# Patient Record
Sex: MALE | Race: WHITE | NOT HISPANIC OR LATINO | URBAN - METROPOLITAN AREA
[De-identification: names, ages, dates, MRNs, and addresses within clinical notes are randomized per-mention and may not be internally consistent; named-entity substitution may affect disease eponyms.]

---

## 2020-09-22 ENCOUNTER — TRANSFERRED RECORDS (OUTPATIENT)
Dept: HEALTH INFORMATION MANAGEMENT | Facility: CLINIC | Age: 50
End: 2020-09-22

## 2020-09-23 ENCOUNTER — TRANSFERRED RECORDS (OUTPATIENT)
Dept: HEALTH INFORMATION MANAGEMENT | Facility: CLINIC | Age: 50
End: 2020-09-23

## 2020-09-24 ENCOUNTER — TRANSFERRED RECORDS (OUTPATIENT)
Dept: HEALTH INFORMATION MANAGEMENT | Facility: CLINIC | Age: 50
End: 2020-09-24

## 2021-03-25 ENCOUNTER — TRANSFERRED RECORDS (OUTPATIENT)
Dept: HEALTH INFORMATION MANAGEMENT | Facility: CLINIC | Age: 51
End: 2021-03-25

## 2021-04-01 ENCOUNTER — TRANSFERRED RECORDS (OUTPATIENT)
Dept: HEALTH INFORMATION MANAGEMENT | Facility: CLINIC | Age: 51
End: 2021-04-01

## 2021-04-13 ENCOUNTER — MEDICAL CORRESPONDENCE (OUTPATIENT)
Dept: HEALTH INFORMATION MANAGEMENT | Facility: CLINIC | Age: 51
End: 2021-04-13

## 2021-04-15 ENCOUNTER — REFERRAL (OUTPATIENT)
Dept: TRANSPLANT | Facility: CLINIC | Age: 51
End: 2021-04-15

## 2021-05-03 ENCOUNTER — OFFICE VISIT (OUTPATIENT)
Dept: TRANSPLANT | Facility: CLINIC | Age: 51
End: 2021-05-03
Attending: TRANSPLANT SURGERY
Payer: COMMERCIAL

## 2021-05-03 VITALS
WEIGHT: 297 LBS | HEART RATE: 75 BPM | DIASTOLIC BLOOD PRESSURE: 57 MMHG | OXYGEN SATURATION: 98 % | SYSTOLIC BLOOD PRESSURE: 126 MMHG

## 2021-05-03 DIAGNOSIS — K43.0 INCISIONAL HERNIA WITH OBSTRUCTION BUT NO GANGRENE: Primary | ICD-10-CM

## 2021-05-03 PROCEDURE — 99202 OFFICE O/P NEW SF 15 MIN: CPT | Performed by: TRANSPLANT SURGERY

## 2021-05-03 RX ORDER — FLURBIPROFEN SODIUM 0.3 MG/ML
SOLUTION/ DROPS OPHTHALMIC
COMMUNITY
Start: 2021-01-12

## 2021-05-03 RX ORDER — BLOOD SUGAR DIAGNOSTIC
STRIP MISCELLANEOUS
COMMUNITY
Start: 2021-04-10

## 2021-05-03 RX ORDER — METOPROLOL SUCCINATE 100 MG/1
100 TABLET, EXTENDED RELEASE ORAL DAILY
Status: ON HOLD | COMMUNITY
Start: 2020-10-08 | End: 2022-06-14

## 2021-05-03 RX ORDER — GABAPENTIN 600 MG/1
900 TABLET ORAL 3 TIMES DAILY
COMMUNITY
Start: 2021-02-19 | End: 2022-01-01

## 2021-05-03 RX ORDER — LISINOPRIL 5 MG/1
TABLET ORAL
COMMUNITY
Start: 2020-10-25 | End: 2022-06-13

## 2021-05-03 RX ORDER — FUROSEMIDE 20 MG
40 TABLET ORAL 2 TIMES DAILY
COMMUNITY
Start: 2020-11-11 | End: 2022-06-08 | Stop reason: DRUGHIGH

## 2021-05-03 RX ORDER — SIMVASTATIN 20 MG
20 TABLET ORAL DAILY
COMMUNITY
Start: 2021-02-17 | End: 2022-01-01

## 2021-05-03 RX ORDER — MULTIVITAMIN,THER AND MINERALS
1 TABLET ORAL EVERY MORNING
COMMUNITY
Start: 2020-09-29

## 2021-05-03 RX ORDER — AMLODIPINE AND BENAZEPRIL HYDROCHLORIDE 5; 20 MG/1; MG/1
1 CAPSULE ORAL EVERY EVENING
COMMUNITY
Start: 2020-10-25 | End: 2022-08-09

## 2021-05-03 SDOH — HEALTH STABILITY: MENTAL HEALTH: HOW OFTEN DO YOU HAVE A DRINK CONTAINING ALCOHOL?: NOT ASKED

## 2021-05-03 SDOH — HEALTH STABILITY: MENTAL HEALTH: HOW MANY STANDARD DRINKS CONTAINING ALCOHOL DO YOU HAVE ON A TYPICAL DAY?: NOT ASKED

## 2021-05-03 SDOH — HEALTH STABILITY: MENTAL HEALTH: HOW OFTEN DO YOU HAVE 6 OR MORE DRINKS ON ONE OCCASION?: NOT ASKED

## 2021-05-03 NOTE — LETTER
Consultation with Dr. Kendrick        5/3/2021     Blair Oliver  [address] [facility] [address]      Dear Blair:    Thank you for allowing me to participate in your care. Your recent test results were reviewed and listed below.      Your results are provided below for your review  Liver function/ Hernia Repair            Thank you for choosing [facility]. As a result, please continue with the treatment plan discussed in the office. Return as discussed or sooner if symptoms worsens or fail to improve. If you have any further questions or concerns, please do not hesitate to contact us.      Sincerely,    Dr. Abner Kendrick   [facility]ANSPL[facility]  [address] [facility] [address]  Phone: [phone]  Fax: [phone]

## 2021-05-03 NOTE — LETTER
Date:May 11, 2021      Patient was self referred, no letter generated. Do not send.        [facility] Health Information

## 2021-05-03 NOTE — LETTER
5/3/2021         RE: Blair Oliver  [address] [place] [facility] [address]        Dear Colleague,    Thank you for referring your patient, Blair Oliver, to the [facility] TRANSPL[facility]. Please see a copy of my visit note below.    Patient has a known cirrhosis of the liver with ascites.  Presents with   Incisional hernia.     He saw a local general surgeon then transferred him here.    Vital signs:                      Weight: 134.7 kg (297 lb)  There is no height or weight on file to calculate BMI.    Examination abdomen: Significant obesity present.  He has an incisional hernia.    Recommendations: Please bring the CT scan films so that we can review.    In general, would avoid any surgery since she has got cirrhosis and a meld of approximately 18.        Again, thank you for allowing me to participate in the care of your patient.        Sincerely,        Abner Kendrick MD

## 2021-05-10 NOTE — PROGRESS NOTES
Patient has a known cirrhosis of the liver with ascites.  Presents with   Incisional hernia.     He saw a local general surgeon then transferred him here.    Vital signs:                      Weight: 134.7 kg (297 lb)  There is no height or weight on file to calculate BMI.    Examination abdomen: Significant obesity present.  He has an incisional hernia.    Recommendations: Please bring the CT scan films so that we can review.    In general, would avoid any surgery since she has got cirrhosis and a meld of approximately 18.

## 2021-05-27 ENCOUNTER — REFERRAL (OUTPATIENT)
Dept: TRANSPLANT | Facility: CLINIC | Age: 51
End: 2021-05-27

## 2021-05-27 ENCOUNTER — DOCUMENTATION ONLY (OUTPATIENT)
Dept: TRANSPLANT | Facility: CLINIC | Age: 51
End: 2021-05-27

## 2021-05-27 DIAGNOSIS — K70.30 CIRRHOSIS, ALCOHOLIC (H): Primary | ICD-10-CM

## 2021-05-27 NOTE — PROGRESS NOTES
Received patient's name from Dr. Kendrick who recently saw him for evaluation of hernia.  Plan is to undergo possible transplant eval.  lvm for patient; sent to intake to begin referral.

## 2021-05-27 NOTE — LETTER
6/1/2021    Blair Oliver  [address] [facility] [address]      Dear Blair,    Thank you for your interest in the Transplant Center at [facility]. We look forward to being a part of your care team and assisting you through the transplant process.    As we discussed, your transplant coordinator is Kala Sanford (Liver).  You may call your coordinator at any time with questions or concerns call [phone].    Please complete the following.    1. Fill out and return the enclosed forms    Authorization for Electronic Communication    Authorization to Discuss Protected Health Information    Authorization for Release of Protected Health Information    Authorization for Care Everywhere Release of Information    2. Sign up for:    [employer]quare, access to your electronic medical record (see enclosed pamphlet)    [email or web address], a transplant education website    You can use these tools to learn more about your transplant, communicate with your care team, and track your medical details      Sincerely,  Solid Organ Transplant  [facility]    cc: PCP

## 2021-05-29 NOTE — TELEPHONE ENCOUNTER
"LIVER DISEASE ETO  REFERRING PROVIDER Mireille    -----------------------------------------------------------------------------------------------------------------------------  MELD 13-15    ABO A      HISTORY OF LIVER DISEASE  September 2020, patient came in with bowel ischemia, requiring bowel resection- first dx of liver issues;  Heavy drinking/etoh withdrawal at that time. Since that time, multple issues with wound healing, incision oozing.  Now reports abdomen distended.  Has not had any care by GI doctor until just saw [facility] x 1 recently.  Referred to Dr. Kendrick for hernia repoair after these complications, liver function impaired and Dr. Kendrick referred for evaluation.  Reports no etoh since that time, no history of CD eval of tx.     Ascites  On Lasix since admission in September 2020, never had para.     TIPS no  HE  No overt confusion since admission but reports memory impairment/'brain fog'  Kidney function no issues  Variceal screening none  HCC Screening CT abdomen 3/2021      Hospitalizations  ---------------------------------------------------------------------------------------------------------------------------------  [facility]  Diabetes, HTN- irregular heart beat/bradycardia- saw cardiology Nov 2020 hasn't followed up since.      MICHELLE  Lives with wife mom, and one of his 2 adult sons; 2 9 year old granddaughters.      ------------------)---------------------------------------------------------------------------------------------------------------  LDLT Discussed not yet    PLAN  Patient referred for \"Mini-eval\" per Dr. Kendrick.  Patient has originally been referred for surgery consult for hernia. Patient has had no real GI/Hepatology care to date. Sounds like patient may have ascites per self report;  Need ASAP with Hepatology, SW, Nutrition, Labs, ultrasound ASAP as he has not had GI care.     If continuing with eval, needs cardiac testing/consult       "

## 2021-06-01 ENCOUNTER — DOCUMENTATION ONLY (OUTPATIENT)
Dept: TRANSPLANT | Facility: CLINIC | Age: 51
End: 2021-06-01

## 2021-06-01 ENCOUNTER — RECORDS - HEALTHEAST (OUTPATIENT)
Dept: ADMINISTRATIVE | Facility: CLINIC | Age: 51
End: 2021-06-01

## 2021-06-01 VITALS — BODY MASS INDEX: 36.68 KG/M2 | HEIGHT: 75 IN | WEIGHT: 295 LBS

## 2021-06-01 DIAGNOSIS — K70.30 ALCOHOLIC CIRRHOSIS (H): Primary | ICD-10-CM

## 2021-06-01 ASSESSMENT — MIFFLIN-ST. JEOR: SCORE: 2273.74

## 2021-06-01 NOTE — TELEPHONE ENCOUNTER
Patient was asked the following questions during liver intake call.     Referring Provider: Dr. CHRIS Kendrick/Self Referral   Referring Diagnosis: Alcoholic Cirrhosis of the Liver.   PCP: Dr. Georges Ovalle     1)Do you know why you have liver disease: Yes             If Alcoholic Cirrhosis is present when was your last drink: 9/2020             Have you ever been through treatment for alcohol: No  2) Presence of Ascites: Yes Paracentesis: None  3) Presence of Hepatic Encephalopathy: Yes Medications: Yes  4) History of GI Bleeding: None  5) Oxygen Use: No  6) EGD: Yes Where: [facility] When: 20+ years ago.   7) Colonoscopy: None  8) MELD Score: 13-15  9) Insurance information: [facility]       Policy beck: Self       Subscriber/policy/ID number: [id number]      Group Number: [id number]    Referral intake process completed.  Patient is aware that after financial approval is received, medical records will be requested.   Patient confirmed for a callback from transplant coordinator already spoke to patient on 5/28/21.  Tentative evaluation date TBD.    Confirmed coordinator will discuss evaluation process in more detail at the time of their call.   Patient is aware of the need to arrange age appropriate cancer screening, vaccinations, and dental care.  Reminded patient to complete questionnaire, complete medical records release, and review packet prior to evaluation visit .  Assessed patient for special needs (ie--wheelchair, assistance, guardian, and ):  None  Patient instructed to call [phone] with questions.     Patient gave verbal consent during intake call to obtain medical records and documents outside of [facility]:  Yes     AGUSTO Craven, LPN   Solid Organ Transplant

## 2021-06-02 ENCOUNTER — RECORDS - HEALTHEAST (OUTPATIENT)
Dept: ADMINISTRATIVE | Facility: CLINIC | Age: 51
End: 2021-06-02

## 2021-06-03 ENCOUNTER — RECORDS - HEALTHEAST (OUTPATIENT)
Dept: ADMINISTRATIVE | Facility: CLINIC | Age: 51
End: 2021-06-03

## 2021-06-13 NOTE — PROGRESS NOTES
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    Patient states that he was diagnosed with liver disease and cirrhosis in September 2020.  He was admitted to the hospital at that time he was found to have extensive superior mesenteric venous thrombosis, complicated by ischemic bowel and had to undergo an exploratory laparotomy with small bowel resection.  He was placed on Eliquis postoperatively.  During surgery, he was found to have some ascites that was evacuated.     Since then he has had trouble with incisional hernias, and rectus diastasis.  He was seen by his general surgeon in March where he was noted to have a MELD of 18.  He was then sent to [facility] to be seen by surgery for consideration of incisional hernia repair.    Patient complains of episodes of leakage of fluid from his incision that happens intermittently.  This fluid is usually blood tinged but sometimes yellow. He has never had a paracentesis.  He is on 20 mg of furosemide daily, that was started because of his heart failure and his pedal edema.  He has also noted that every 2 to 3 days he has watery bowel movements, up to 12 times a day.  In between there was episodes he has about 2-3 soft bowel movements per day.  He denies any fevers or chills, abdominal pain, melena, or hematochezia.    His wife is also noted that he is having some memory issues.  He has not had any major episodes of confusion, and has not been admitted to the hospital with hepatic encephalopathy.    He has a diagnosis of CHF, has not seen his cardiologist  "since the fall, and he is not aware of a lot of the details related to this.     Patient denies any prior history of liver disease. He has had abnormal liver enzymes and low platelets as far back as 2011. Past notes from his PCP had also documented excessive alcohol use, as well as, a hospitalization for overdose of Vicodin.       ROS: 10pt ROS performed and otherwise negative.    PERTINENT PAST MEDICAL HISTORY:  -- Type II diabetes Mellitus  -- Hypertension  -- Hyperlipidemia  -- CHF  -- GISELLE  -- SMV thrombus    PREVIOUS ABDOMINAL/GYNECOLOGIC SURGERIES:  -- Exploratory laparotomy with small bowel resection 9/2020    PREVIOUS ENDOSCOPY:  Had an EGD for reflux and a colonoscopy for \"food poisoning\". These were done >20 years ago    PERTINENT MEDICATIONS:  Current Outpatient Medications:      amLODIPine-benazepril (LOTREL) 10-40 MG capsule, TAKE 1 CAPSULE BY MOUTH EVERY DAY, Disp: , Rfl:      apixaban ANTICOAGULANT (ELIQUIS) 5 MG tablet, Take 5 mg by mouth, Disp: , Rfl:      furosemide (LASIX) 20 MG tablet, Take 20 mg by mouth, Disp: , Rfl:      gabapentin (NEURONTIN) 600 MG tablet, , Disp: , Rfl:      insulin detemir (LEVEMIR PEN) 100 UNIT/ML pen, Inject 65 Units Subcutaneous, Disp: , Rfl:      insulin pen needle (B-D U/F) 31G X 5 MM miscellaneous, AS DIRECTED. BD UF MINI PEN NEEDLE [id number]: USE ONCE DAILY, Disp: , Rfl:      lisinopril (ZESTRIL) 5 MG tablet, TAKE 1 TABLET BY MOUTH TWICE A DAY, Disp: , Rfl:      metFORMIN (GLUCOPHAGE) 500 MG tablet, Take 1,000 mg by mouth, Disp: , Rfl:      metoprolol succinate ER (TOPROL-XL) 100 MG 24 hr tablet, Take 100 mg by mouth, Disp: , Rfl:      Multiple Vitamins-Minerals (SUPER THERA DAMARIS M) TABS, Take 1 tablet by mouth, Disp: , Rfl:      omeprazole (PRILOSEC) 20 MG DR capsule, Take 20 mg by mouth, Disp: , Rfl:      ONETOUCH ULTRA test strip, PLEASE SEE ATTACHED FOR DETAILED DIRECTIONS, Disp: , Rfl:      simvastatin (ZOCOR) 20 MG tablet, , Disp: , Rfl:     SOCIAL " "HISTORY:  Patient previously worked with with [employer].  He is currently on disability due to pain from his hernias and discomfort.  Admits to significant alcohol use.  Used to drink about a liter of whiskey per day for about 20 years.  Denies any hospitalizations for intoxication or withdrawal, denies any DUIs, denies any prior treatments.  Last drink was 9/21/2020.  Denies any cravings, and is not in any treatments program at the moment.  Denies any tobacco use, denies any IV drug use.    FAMILY HISTORY:  No known family history of liver disease or liver cancer, no known family history of clotting or bleeding disorders.    PHYSICAL EXAMINATION:  /69   Pulse 72   Ht 1.93 m (6' 4\")   Wt 131.5 kg (290 lb)   SpO2 97%   BMI 35.30 kg/m     General: Alert, not in respiratory or painful distress, Not toxic looking, afebrile   HEENT: anicteric sclera, PERRL, EOMI   CV: RRR, nl S1/S2,    Lungs: Unlabored breathing  Abd: Full, soft, moves with respiration, mild line surgical scar, dressing over lower abdomen, upon removal reveals granulation tissue.  MADELINE: [place] absent  Ext: WWP, BLE edema  Skin: no rashes, cyanosis, or jaundice  Neuro: AOx3, CN II-XII intact and symmetric, No focal neurologic deficits  MSK:No discernible joint swelling or tenderness  Psych: Stable mood      PERTINENT STUDIES:     Lab Results   Component Value Date     06/15/2021    Lab Results   Component Value Date    CHLORIDE 112 06/15/2021    Lab Results   Component Value Date    BUN 8 06/15/2021      Lab Results   Component Value Date    POTASSIUM 4.1 06/15/2021    Lab Results   Component Value Date    CO2 25 06/15/2021    Lab Results   Component Value Date    CR 0.63 06/15/2021        Lab Results   Component Value Date    WBC 4.7 06/15/2021    HGB 8.6 (L) 06/15/2021    HCT 31.0 (L) 06/15/2021    MCV 80 06/15/2021     (L) 06/15/2021     Lab Results   Component Value Date    AST 34 06/15/2021    ALT 36 06/15/2021    ALKPHOS " 126 06/15/2021    BILITOTAL 0.8 06/15/2021     Lab Results   Component Value Date    INR 1.58 (H) 06/15/2021     MELD-Na score: 13 at 6/15/2021 12:35 PM  MELD score: 12 at 6/15/2021 12:35 PM  Calculated from:  Serum Creatinine: 0.63 mg/dL (Rounded to 1 mg/dL) at 6/15/2021 12:35 PM  Serum Sodium: 136 mmol/L at 6/15/2021 12:35 PM  Total Bilirubin: 0.8 mg/dL (Rounded to 1 mg/dL) at 6/15/2021 12:35 PM  INR(ratio): 1.58 at 6/15/2021 12:35 PM  Age: 50 years 8 months       CT Abdomen 4/11/2021  LOWER CHEST: The lung bases are clear.  HEPATOBILIARY: Cirrhotic appearance of the liver. No worrisome liver lesions within the limitations of a single phase exam. No biliary duct dilatation. No calcified gallstones.  PANCREAS: No significant mass, duct dilatation, or inflammatory change.  SPLEEN: Splenomegaly, measuring 19 cm in the craniocaudal dimension.  ADRENAL GLANDS: No significant nodules.  KIDNEYS/BLADDER: No significant mass, stone, or hydronephrosis. No bladder wall thickening.  BOWEL: Postoperative changes of small bowel resection with anastomosis in the right midabdomen. Diastases of the abdominis rectus musculature with broad-based bulging of the anterior abdominal wall fascia with multiple associated nonobstructed small bowel loops. The bowel is nondistended without inflammatory change. No evidence of appendicitis.  LYMPH NODES: No lymphadenopathy.  VASCULATURE: Few tiny gastroesophageal varices.  PELVIC ORGANS: Unremarkable.  ADDITIONAL FINDINGS: Mesenteric edema and small volume of ascites.  MUSCULOSKELETAL: Small fat-containing right inguinal hernia. Mild degenerative changes of the hips and visualized thoracolumbar spine.  IMPRESSION:   1.  Cirrhosis with portal hypertension as evidenced by splenomegaly, small volume ascites and tiny gastroesophageal varices.  2.  Diastases of the abdominis rectus musculature with broad-based bulging of the ventral abdominal wall fascia.  3.  Small fat-containing right inguinal  hernia.     Cardiac MRI 11/2020  1. No evidence of myocardial ischemia on stress MRI perfusion imaging.    2. Mild LV enlargement with mild to moderately reduced LV systolic function (calculated LVEF 41%), global hypokinesis; no evidence of LV scar on gadolinium delayed enhancement  3. Mild RV enlargement and mild RV dysfunction.   4. Mild left atrial enlargement.   5. No significant valve abnormalities noted.     6. Noncardiac findings (cirrhosis and ascites)    US ABDOMEN COMPLETE WITH DOPPLER COMPLETE, 6/15/2021   IMPRESSION:   1. Cirrhosis without focal liver lesion identified by ultrasound.  a. LI-RADS US Category: US-1 Negative: No US evidence of HCC  b. Recommend continued surveillance US.  2. Cholelithiasis versus gallbladder polyp, attention on follow-up.  3. Splenomegaly and ascites present.    ASSESSMENT/PLAN:  50 year old male with medical history significant for hypertension, hyperlipidemia, type 2 diabetes, obstructive sleep apnea, congestive heart failure, alcohol use disorder with decompensated alcohol-related and CALLEJAS cirrhosis seen in clinic today for new consult, also discussed of liver transplant and surgical repair of his hernia.     Cirrhosis:    - likely combination of alcohol and CALLEJAS (metabolic syndrome)  Decompensated due to ascites  MELD-Na 13, CTP B    Liver Transplant Candidacy:   No a candidate at this time given CHF (likely alcohol related with LVEF of 41%); low MELD.    We discussed that he follows up with his cardiologists - his CHF may have improved as he has been off alcohol for the last 9 months.   We will continue to follow    Hepatocellular Cancer Screening:   - USS 6/15/2021 with no HCC, AFP normal.   - Recommend screening for HCC every 6 months with either abdominal ultrasound or by alternating abdominal ultrasound with EITHER a triple/quad phase CT Liver with IV contrast OR a Quad phase MRI Liver with IV contrast.  AFP levels should be checked every 6 months at time of  imaging screen.    Ascites:   - Present on ultrasound and examination today.  - We will obtain a diagnostic paracentesis - send for cell count/dif, TP, Albumin. Remove 5 L max.   - On low dose diuretics - started for CHF  - Will obtain labs and if favorable, increase lasix to 40 mg daily and start spironolactone 100 mg daily  - Continue to follow a sodium restricted (<2g sodium diet)     Incisional hernia:  Based on CHF and also decompensated liver disease, do not recommend surgery at this time.  We recommend management of his ascites and CHF medically.  We did discuss a TIPS, but will not recommend this for now based on his most recent EF - we will see if his CHF has improved and this can be explored later to better manage his ascites    Hepatic Encephalopathy:  - Concerns for HE, but not apparent.  Complains of diarrhea, so will hold off on starting lactulose    Esophageal Varices:   - Recommend EGD screening for esophageal varices, reasonable to wait until followed up with cardiology to assess CHF. He is on Metoprolol XL - can discuss switching to core if large varices    Kidney Health: Normal    Immobility/Frailty: None    Nutrition:  As with most patients with chronic liver disease, there is a significant degree of protein malnutrition.  Dicussed need to change dietary habits to improve overall protein balance.  Discussed the importance of eating between 1.2-1.5g/kg/day lean protein like eggs, fish, chicken, nuts, and legumes, in addition to a diet rich in fresh fruits and vegetables.  Continue to follow a sodium restricted (<2g sodium diet) and discussed the need to minimize the intake of carbohydrates and sugars, to avoid obesity.   - Strongly encourage protein supplements 2-3 times daily (Boost, Ensure, [place] Instant Milk, etc.) to meet protein and caloric intake.  - Recommend a bedtime snack with protein and complex carbohydrate to minimize risk of muscle catabolism overnight    Routine Health Care in  Patient with Chronic Liver Disease:   - All patients with liver disease should avoid the use of Non-steroidal Anti-Inflammatory (NSAID) medications as they can cause significant injury to the kidneys in this population        Thank you for this consultation. It was a pleasure to participate in the care of this patient; please contact us with any further questions.    The visit lasted up to 60 minutes, with more than half of the time spent on counseling and education. All questions were answered to patient's satisfaction    Patient seen and discussed with staff GI physician, Dr. Bain, who agrees with my assessment and plan.     Sam Mercado MD  Transplant Hepatology fellow  PGY 6  [phone]    Staff Physician Attestation  I, Sweetie Bain M.D., discussed and examined this patient with the fellow and agree with the fellow s findings and plan of care as documented in the fellow s note.  I personally reviewed vital signs, medications, labs and imaging.    Sweetie Bain M.D.   of Medicine  Advanced & Transplant Hepatology  [facility]  Date of Service: Joseph 15, 2021

## 2021-06-14 NOTE — PROGRESS NOTES
[facility] Solid Organ Transplant  Outpatient MNT: Liver Transplant Evaluation    Current BMI: 35.3 (HT 76 in,  lbs/132 kg)  BMI is within recommendation of <40 for liver transplant    Fried Frailty, 1st screenshot-- Not Frail (2/5 points scored) for unintentional weight loss, reported exhaustion     FraiLT, 2nd screenshot-- Pre frail   [email or web address]         Time Spent: 15 minutes  Visit Type: Initial   Referring Physician: Mireille   Pt accompanied by: his wife, Margarita    History of previous txp: none     Nutrition Assessment  Appetite: good/baseline    Vitamins, Supplements, Pertinent Meds: MVI, alpha lipoic acid   Herbal Medicines/Supplements: milk thistle x few years  Protein supplement: no     Edema: + EMIL    Weight hx:   -- dropped 40 lbs since surgery in Sept (bowel resection), but has since regained 30 lbs   -- mild muscle wasting noted    Food Security: any concerns about having enough money to buy food or access to grocery stores? No     Diet Recall  Breakfast None    Lunch May snack on olives, chips    Dinner Eats out a lot- [place] [employer] chicken s/w; Pizza Ranch (3/4 pizza); at home- spaghetti; tacos; chicken and stuffing   Snacks Per L    Beverages Water    Dining out 4x/week or more      Physical Activity  No routine activity   Diabetic nerve pain in feet- limits activity     Malnutrition   % Intake: No decreased intake noted  % Weight Loss: Weight loss does not meet criteria for malnutrition   Subcutaneous Fat Loss: Mild   Muscle Loss: Mild   Fluid Accumulation/Edema: Mild   Malnutrition Diagnosis: Non-Severe malnutrition in the context of chronic illness     Nutrition Diagnosis  Predicted suboptimal nutrient intake (protein) r/t eating 1 main meal/day and at default not being able to consume enough protein    Nutrition Intervention  Nutrition education provided:  Discussed sodium intake (low sodium foods and drinks, seasoning food without salt and tips for low sodium  diet). Reviewed current convenience/restaurant/fast foods are high in sodium, yet he is eating 1 main meal/day, so it is difficult to determine if he is exceeding recommendation of 2 g Na/day. Consider tracking to get a better sense. Reviewed rationale for lower Na diet.     Reviewed adequate protein intake. Encouraged receiving protein from both animal and plant based sources. Encouraged a protein-rich food earlier in the day (protein bar/drink, boiled egg, cheese stick, yogurt, nuts, etc).     Reviewed post txp diet guidelines in brief (will review in further detail post txp):  (1) Review of proper food safety measures d/t immunosuppressant therapy post-op and increased risk for food-borne illness    (2) Avoid the following post txp d/t risk for rejection, unknown effects on the organs, and/or potential interactions with immunosuppressants:  - Herbal, Chinese, holistic, chiropractic, natural, alternative medicines and supplements  - Detoxes and cleanses  - Weight loss pills  - Protein powders or other products with extracts or herbs (ie green tea extract)    (3) Med regimen and possible side effects    Patient Understanding: Pt verbalized understanding of education provided.  Expected Engagement: Good  Follow-Up Plans: PRN     Nutrition Goals  Include protein-rich food earlier in the day     Iliana Barros RD, LD, CCTD

## 2021-06-15 ENCOUNTER — OFFICE VISIT (OUTPATIENT)
Dept: GASTROENTEROLOGY | Facility: CLINIC | Age: 51
End: 2021-06-15
Attending: STUDENT IN AN ORGANIZED HEALTH CARE EDUCATION/TRAINING PROGRAM
Payer: COMMERCIAL

## 2021-06-15 ENCOUNTER — ALLIED HEALTH/NURSE VISIT (OUTPATIENT)
Dept: TRANSPLANT | Facility: CLINIC | Age: 51
End: 2021-06-15
Attending: STUDENT IN AN ORGANIZED HEALTH CARE EDUCATION/TRAINING PROGRAM
Payer: COMMERCIAL

## 2021-06-15 ENCOUNTER — ANCILLARY PROCEDURE (OUTPATIENT)
Dept: GENERAL RADIOLOGY | Facility: CLINIC | Age: 51
End: 2021-06-15
Attending: STUDENT IN AN ORGANIZED HEALTH CARE EDUCATION/TRAINING PROGRAM
Payer: COMMERCIAL

## 2021-06-15 ENCOUNTER — COMMITTEE REVIEW (OUTPATIENT)
Dept: TRANSPLANT | Facility: CLINIC | Age: 51
End: 2021-06-15

## 2021-06-15 ENCOUNTER — ANCILLARY PROCEDURE (OUTPATIENT)
Dept: ULTRASOUND IMAGING | Facility: CLINIC | Age: 51
End: 2021-06-15
Attending: STUDENT IN AN ORGANIZED HEALTH CARE EDUCATION/TRAINING PROGRAM
Payer: COMMERCIAL

## 2021-06-15 VITALS
HEART RATE: 72 BPM | WEIGHT: 290 LBS | HEIGHT: 76 IN | BODY MASS INDEX: 35.31 KG/M2 | DIASTOLIC BLOOD PRESSURE: 69 MMHG | OXYGEN SATURATION: 97 % | SYSTOLIC BLOOD PRESSURE: 129 MMHG

## 2021-06-15 DIAGNOSIS — K70.31 ALCOHOLIC CIRRHOSIS OF LIVER WITH ASCITES (H): ICD-10-CM

## 2021-06-15 DIAGNOSIS — K70.30 CIRRHOSIS, ALCOHOLIC (H): ICD-10-CM

## 2021-06-15 DIAGNOSIS — F10.21 ALCOHOL USE DISORDER, SEVERE, IN EARLY REMISSION (H): ICD-10-CM

## 2021-06-15 DIAGNOSIS — K43.2 INCISIONAL HERNIA, WITHOUT OBSTRUCTION OR GANGRENE: ICD-10-CM

## 2021-06-15 DIAGNOSIS — I50.22 CHRONIC SYSTOLIC CONGESTIVE HEART FAILURE (H): ICD-10-CM

## 2021-06-15 DIAGNOSIS — K70.31 ALCOHOLIC CIRRHOSIS OF LIVER WITH ASCITES (H): Primary | ICD-10-CM

## 2021-06-15 DIAGNOSIS — E66.812 CLASS 2 OBESITY WITH BODY MASS INDEX (BMI) OF 35.0 TO 35.9 IN ADULT, UNSPECIFIED OBESITY TYPE, UNSPECIFIED WHETHER SERIOUS COMORBIDITY PRESENT: ICD-10-CM

## 2021-06-15 LAB
ABO + RH BLD: NORMAL
AFP SERPL-MCNC: 4 UG/L (ref 0–8)
ALBUMIN SERPL-MCNC: 3.1 G/DL (ref 3.4–5)
ALBUMIN UR-MCNC: NEGATIVE MG/DL
ALP SERPL-CCNC: 126 U/L (ref 40–150)
ALT SERPL W P-5'-P-CCNC: 36 U/L (ref 0–70)
ANION GAP SERPL CALCULATED.3IONS-SCNC: <1 MMOL/L (ref 3–14)
APPEARANCE UR: CLEAR
AST SERPL W P-5'-P-CCNC: 34 U/L (ref 0–45)
BILIRUB DIRECT SERPL-MCNC: 0.3 MG/DL (ref 0–0.2)
BILIRUB SERPL-MCNC: 0.8 MG/DL (ref 0.2–1.3)
BILIRUB UR QL STRIP: NEGATIVE
BLD GP AB SCN SERPL QL: NORMAL
BLOOD BANK CMNT PATIENT-IMP: NORMAL
BUN SERPL-MCNC: 8 MG/DL (ref 7–30)
CALCIUM SERPL-MCNC: 8.9 MG/DL (ref 8.5–10.1)
CHLORIDE SERPL-SCNC: 112 MMOL/L (ref 94–109)
CO2 SERPL-SCNC: 25 MMOL/L (ref 20–32)
COLOR UR AUTO: YELLOW
CREAT SERPL-MCNC: 0.63 MG/DL (ref 0.66–1.25)
DEPRECATED CALCIDIOL+CALCIFEROL SERPL-MC: 37 UG/L (ref 20–75)
ERYTHROCYTE [DISTWIDTH] IN BLOOD BY AUTOMATED COUNT: 17.3 % (ref 10–15)
FERRITIN SERPL-MCNC: 5 NG/ML (ref 26–388)
GFR SERPL CREATININE-BSD FRML MDRD: >90 ML/MIN/{1.73_M2}
GLUCOSE SERPL-MCNC: 123 MG/DL (ref 70–99)
GLUCOSE UR STRIP-MCNC: NEGATIVE MG/DL
HAV IGG SER QL IA: NONREACTIVE
HBV CORE AB SERPL QL IA: NONREACTIVE
HBV SURFACE AB SERPL IA-ACNC: 0 M[IU]/ML
HBV SURFACE AG SERPL QL IA: NONREACTIVE
HCT VFR BLD AUTO: 31 % (ref 40–53)
HCV AB SERPL QL IA: NONREACTIVE
HGB BLD-MCNC: 8.6 G/DL (ref 13.3–17.7)
HGB UR QL STRIP: NEGATIVE
HIV 1+2 AB+HIV1 P24 AG SERPL QL IA: NONREACTIVE
INR PPP: 1.58 (ref 0.86–1.14)
IRON SATN MFR SERPL: 8 % (ref 15–46)
IRON SERPL-MCNC: 37 UG/DL (ref 35–180)
KETONES UR STRIP-MCNC: 5 MG/DL
LEUKOCYTE ESTERASE UR QL STRIP: NEGATIVE
MCH RBC QN AUTO: 22.1 PG (ref 26.5–33)
MCHC RBC AUTO-ENTMCNC: 27.7 G/DL (ref 31.5–36.5)
MCV RBC AUTO: 80 FL (ref 78–100)
NITRATE UR QL: NEGATIVE
PH UR STRIP: 5 PH (ref 5–7)
PHOSPHATE SERPL-MCNC: 3.9 MG/DL (ref 2.5–4.5)
PLATELET # BLD AUTO: 110 10E9/L (ref 150–450)
POTASSIUM SERPL-SCNC: 4.1 MMOL/L (ref 3.4–5.3)
PROT SERPL-MCNC: 7.5 G/DL (ref 6.8–8.8)
PSA SERPL-ACNC: 0.14 UG/L (ref 0–4)
RBC # BLD AUTO: 3.89 10E12/L (ref 4.4–5.9)
SODIUM SERPL-SCNC: 136 MMOL/L (ref 133–144)
SOURCE: ABNORMAL
SP GR UR STRIP: 1.01 (ref 1–1.03)
SPECIMEN EXP DATE BLD: NORMAL
SPECIMEN EXP DATE BLD: NORMAL
T PALLIDUM AB SER QL: NONREACTIVE
TIBC SERPL-MCNC: 484 UG/DL (ref 240–430)
TRANSFERRIN SERPL-MCNC: 357 MG/DL (ref 210–360)
TSH SERPL DL<=0.005 MIU/L-ACNC: 1.28 MU/L (ref 0.4–4)
UROBILINOGEN UR STRIP-MCNC: 0 MG/DL (ref 0–2)
WBC # BLD AUTO: 4.7 10E9/L (ref 4–11)

## 2021-06-15 PROCEDURE — 86901 BLOOD TYPING SEROLOGIC RH(D): CPT | Performed by: PATHOLOGY

## 2021-06-15 PROCEDURE — 86481 TB AG RESPONSE T-CELL SUSP: CPT | Performed by: PATHOLOGY

## 2021-06-15 PROCEDURE — 86780 TREPONEMA PALLIDUM: CPT | Performed by: PATHOLOGY

## 2021-06-15 PROCEDURE — 80048 BASIC METABOLIC PNL TOTAL CA: CPT | Performed by: PATHOLOGY

## 2021-06-15 PROCEDURE — 82728 ASSAY OF FERRITIN: CPT | Performed by: PATHOLOGY

## 2021-06-15 PROCEDURE — 85610 PROTHROMBIN TIME: CPT | Performed by: PATHOLOGY

## 2021-06-15 PROCEDURE — 93975 VASCULAR STUDY: CPT | Performed by: RADIOLOGY

## 2021-06-15 PROCEDURE — 86900 BLOOD TYPING SEROLOGIC ABO: CPT | Performed by: PATHOLOGY

## 2021-06-15 PROCEDURE — 82105 ALPHA-FETOPROTEIN SERUM: CPT | Performed by: PATHOLOGY

## 2021-06-15 PROCEDURE — 84443 ASSAY THYROID STIM HORMONE: CPT | Performed by: PATHOLOGY

## 2021-06-15 PROCEDURE — 82306 VITAMIN D 25 HYDROXY: CPT | Performed by: PATHOLOGY

## 2021-06-15 PROCEDURE — 80076 HEPATIC FUNCTION PANEL: CPT | Performed by: PATHOLOGY

## 2021-06-15 PROCEDURE — 86708 HEPATITIS A ANTIBODY: CPT | Performed by: PATHOLOGY

## 2021-06-15 PROCEDURE — 87340 HEPATITIS B SURFACE AG IA: CPT | Performed by: PATHOLOGY

## 2021-06-15 PROCEDURE — 99205 OFFICE O/P NEW HI 60 MIN: CPT | Mod: GC | Performed by: STUDENT IN AN ORGANIZED HEALTH CARE EDUCATION/TRAINING PROGRAM

## 2021-06-15 PROCEDURE — 84100 ASSAY OF PHOSPHORUS: CPT | Performed by: PATHOLOGY

## 2021-06-15 PROCEDURE — 81003 URINALYSIS AUTO W/O SCOPE: CPT | Performed by: PATHOLOGY

## 2021-06-15 PROCEDURE — 99000 SPECIMEN HANDLING OFFICE-LAB: CPT | Performed by: PATHOLOGY

## 2021-06-15 PROCEDURE — 36415 COLL VENOUS BLD VENIPUNCTURE: CPT | Performed by: PATHOLOGY

## 2021-06-15 PROCEDURE — 86850 RBC ANTIBODY SCREEN: CPT | Performed by: PATHOLOGY

## 2021-06-15 PROCEDURE — 71046 X-RAY EXAM CHEST 2 VIEWS: CPT | Mod: GC | Performed by: RADIOLOGY

## 2021-06-15 PROCEDURE — 80321 ALCOHOLS BIOMARKERS 1OR 2: CPT | Performed by: PATHOLOGY

## 2021-06-15 PROCEDURE — 86704 HEP B CORE ANTIBODY TOTAL: CPT | Performed by: PATHOLOGY

## 2021-06-15 PROCEDURE — 86665 EPSTEIN-BARR CAPSID VCA: CPT | Performed by: PATHOLOGY

## 2021-06-15 PROCEDURE — G0103 PSA SCREENING: HCPCS | Performed by: PATHOLOGY

## 2021-06-15 PROCEDURE — 85027 COMPLETE CBC AUTOMATED: CPT | Performed by: PATHOLOGY

## 2021-06-15 PROCEDURE — 86644 CMV ANTIBODY: CPT | Performed by: PATHOLOGY

## 2021-06-15 PROCEDURE — 83540 ASSAY OF IRON: CPT | Performed by: PATHOLOGY

## 2021-06-15 PROCEDURE — 86803 HEPATITIS C AB TEST: CPT | Performed by: PATHOLOGY

## 2021-06-15 PROCEDURE — 84466 ASSAY OF TRANSFERRIN: CPT | Performed by: PATHOLOGY

## 2021-06-15 PROCEDURE — 86706 HEP B SURFACE ANTIBODY: CPT | Mod: 90 | Performed by: PATHOLOGY

## 2021-06-15 ASSESSMENT — MIFFLIN-ST. JEOR: SCORE: 2266.93

## 2021-06-15 NOTE — COMMITTEE REVIEW
Abdominal Committee Review Note     Evaluation Date: 6/15/2021  Committee Review Date: 6/15/2021    Organ being evaluated for: Liver    Transplant Phase: Evaluation  Transplant Status: Active    Transplant Coordinator: Kala Sanford  Transplant Surgeon:       Referring Physician:     Primary Diagnosis:   Secondary Diagnosis:     Committee Review Members:  Pharmacy Roxie Tom, [facility]    - Clinical GLENN Garcia, Aleksandra Morin, [facility]   Transplant Ivy Michael, RN, Kala Sanford, RN, Josi Deluca, RN, Cassie Braswell, ASTRID, Jr Manuel Tolbert, ASTRID, Hermelinda Bingham MD, Constance Sanders, APRN CNP, Nile Jones MD, Tucker Urena MD   Transplant Hepatology  [facility] Michael Mercado MD, Killian Harris MD, Sweetie Bain MD, Alf Jurado MD, Thomas M. Leventhal, MD   Transplant Surgery Abner Kendrick MD, Ney Jacobson MD       Transplant Eligibility: Cirrhosis with MELD, ETOH    Committee Review Decision: Declined    Relative Contraindications: Other    Absolute Contraindications: Other    Committee Chair Sweetie Bain MD verbally attested to the committee's decision.    Committee Discussion Details:     - patient has significant heart failure, needs to continue to follow with local cardiologist    - will close eval and medically manage this patient at this time, re-refer if indicated and significant improvement in heart failure    -referral for CD evaluation     - will set up outpatient paracentesis

## 2021-06-15 NOTE — LETTER
6/15/2021         RE: Blair Oliver  [address] [facility] [address]        Dear Colleague,    Thank you for referring your patient, Blair Oliver, to the [facility] [place]. Please see a copy of my visit note below.    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    Patient states that he was diagnosed with liver disease and cirrhosis in September 2020.  He was admitted to the hospital at that time he was found to have extensive superior mesenteric venous thrombosis, complicated by ischemic bowel and had to undergo an exploratory laparotomy with small bowel resection.  He was placed on Eliquis postoperatively.  During surgery, he was found to have some ascites that was evacuated.     Since then he has had trouble with incisional hernias, and rectus diastasis.  He was seen by his general surgeon in March where he was noted to have a MELD of 18.  He was then sent to [facility] to be seen by surgery for consideration of incisional hernia repair.    Patient complains of episodes of leakage of fluid from his incision that happens intermittently.  This fluid is usually blood tinged but sometimes yellow. He has never had a paracentesis.  He is on 20 mg of furosemide daily, that was started because of his heart failure and his pedal edema.  He has also noted that every 2 to 3 days he has watery bowel movements, up to 12 times a day.  In between there was episodes he has about 2-3 soft bowel movements per day.  He denies any fevers or chills, abdominal pain,  "melena, or hematochezia.    His wife is also noted that he is having some memory issues.  He has not had any major episodes of confusion, and has not been admitted to the hospital with hepatic encephalopathy.    He has a diagnosis of CHF, has not seen his cardiologist since the fall, and he is not aware of a lot of the details related to this.     Patient denies any prior history of liver disease. He has had abnormal liver enzymes and low platelets as far back as 2011. Past notes from his PCP had also documented excessive alcohol use, as well as, a hospitalization for overdose of Vicodin.       ROS: 10pt ROS performed and otherwise negative.    PERTINENT PAST MEDICAL HISTORY:  -- Type II diabetes Mellitus  -- Hypertension  -- Hyperlipidemia  -- CHF  -- GISELLE  -- SMV thrombus    PREVIOUS ABDOMINAL/GYNECOLOGIC SURGERIES:  -- Exploratory laparotomy with small bowel resection 9/2020    PREVIOUS ENDOSCOPY:  Had an EGD for reflux and a colonoscopy for \"food poisoning\". These were done >20 years ago    PERTINENT MEDICATIONS:  Current Outpatient Medications:      amLODIPine-benazepril (LOTREL) 10-40 MG capsule, TAKE 1 CAPSULE BY MOUTH EVERY DAY, Disp: , Rfl:      apixaban ANTICOAGULANT (ELIQUIS) 5 MG tablet, Take 5 mg by mouth, Disp: , Rfl:      furosemide (LASIX) 20 MG tablet, Take 20 mg by mouth, Disp: , Rfl:      gabapentin (NEURONTIN) 600 MG tablet, , Disp: , Rfl:      insulin detemir (LEVEMIR PEN) 100 UNIT/ML pen, Inject 65 Units Subcutaneous, Disp: , Rfl:      insulin pen needle (B-D U/F) 31G X 5 MM miscellaneous, AS DIRECTED. BD UF MINI PEN NEEDLE [id number]: USE ONCE DAILY, Disp: , Rfl:      lisinopril (ZESTRIL) 5 MG tablet, TAKE 1 TABLET BY MOUTH TWICE A DAY, Disp: , Rfl:      metFORMIN (GLUCOPHAGE) 500 MG tablet, Take 1,000 mg by mouth, Disp: , Rfl:      metoprolol succinate ER (TOPROL-XL) 100 MG 24 hr tablet, Take 100 mg by mouth, Disp: , Rfl:      Multiple Vitamins-Minerals (SUPER THERA DAMARIS M) TABS, Take 1 tablet by " "mouth, Disp: , Rfl:      omeprazole (PRILOSEC) 20 MG DR capsule, Take 20 mg by mouth, Disp: , Rfl:      ONETOUCH ULTRA test strip, PLEASE SEE ATTACHED FOR DETAILED DIRECTIONS, Disp: , Rfl:      simvastatin (ZOCOR) 20 MG tablet, , Disp: , Rfl:     SOCIAL HISTORY:  Patient previously worked with with [employer].  He is currently on disability due to pain from his hernias and discomfort.  Admits to significant alcohol use.  Used to drink about a liter of whiskey per day for about 20 years.  Denies any hospitalizations for intoxication or withdrawal, denies any DUIs, denies any prior treatments.  Last drink was 9/21/2020.  Denies any cravings, and is not in any treatments program at the moment.  Denies any tobacco use, denies any IV drug use.    FAMILY HISTORY:  No known family history of liver disease or liver cancer, no known family history of clotting or bleeding disorders.    PHYSICAL EXAMINATION:  /69   Pulse 72   Ht 1.93 m (6' 4\")   Wt 131.5 kg (290 lb)   SpO2 97%   BMI 35.30 kg/m     General: Alert, not in respiratory or painful distress, Not toxic looking, afebrile   HEENT: anicteric sclera, PERRL, EOMI   CV: RRR, nl S1/S2,    Lungs: Unlabored breathing  Abd: Full, soft, moves with respiration, mild line surgical scar, dressing over lower abdomen, upon removal reveals granulation tissue.  MADELINE: [place] absent  Ext: WWP, BLE edema  Skin: no rashes, cyanosis, or jaundice  Neuro: AOx3, CN II-XII intact and symmetric, No focal neurologic deficits  MSK:No discernible joint swelling or tenderness  Psych: Stable mood      PERTINENT STUDIES:     Lab Results   Component Value Date     06/15/2021    Lab Results   Component Value Date    CHLORIDE 112 06/15/2021    Lab Results   Component Value Date    BUN 8 06/15/2021      Lab Results   Component Value Date    POTASSIUM 4.1 06/15/2021    Lab Results   Component Value Date    CO2 25 06/15/2021    Lab Results   Component Value Date    CR 0.63 06/15/2021    "     Lab Results   Component Value Date    WBC 4.7 06/15/2021    HGB 8.6 (L) 06/15/2021    HCT 31.0 (L) 06/15/2021    MCV 80 06/15/2021     (L) 06/15/2021     Lab Results   Component Value Date    AST 34 06/15/2021    ALT 36 06/15/2021    ALKPHOS 126 06/15/2021    BILITOTAL 0.8 06/15/2021     Lab Results   Component Value Date    INR 1.58 (H) 06/15/2021     MELD-Na score: 13 at 6/15/2021 12:35 PM  MELD score: 12 at 6/15/2021 12:35 PM  Calculated from:  Serum Creatinine: 0.63 mg/dL (Rounded to 1 mg/dL) at 6/15/2021 12:35 PM  Serum Sodium: 136 mmol/L at 6/15/2021 12:35 PM  Total Bilirubin: 0.8 mg/dL (Rounded to 1 mg/dL) at 6/15/2021 12:35 PM  INR(ratio): 1.58 at 6/15/2021 12:35 PM  Age: 50 years 8 months       CT Abdomen 4/11/2021  LOWER CHEST: The lung bases are clear.  HEPATOBILIARY: Cirrhotic appearance of the liver. No worrisome liver lesions within the limitations of a single phase exam. No biliary duct dilatation. No calcified gallstones.  PANCREAS: No significant mass, duct dilatation, or inflammatory change.  SPLEEN: Splenomegaly, measuring 19 cm in the craniocaudal dimension.  ADRENAL GLANDS: No significant nodules.  KIDNEYS/BLADDER: No significant mass, stone, or hydronephrosis. No bladder wall thickening.  BOWEL: Postoperative changes of small bowel resection with anastomosis in the right midabdomen. Diastases of the abdominis rectus musculature with broad-based bulging of the anterior abdominal wall fascia with multiple associated nonobstructed small bowel loops. The bowel is nondistended without inflammatory change. No evidence of appendicitis.  LYMPH NODES: No lymphadenopathy.  VASCULATURE: Few tiny gastroesophageal varices.  PELVIC ORGANS: Unremarkable.  ADDITIONAL FINDINGS: Mesenteric edema and small volume of ascites.  MUSCULOSKELETAL: Small fat-containing right inguinal hernia. Mild degenerative changes of the hips and visualized thoracolumbar spine.  IMPRESSION:   1.  Cirrhosis with portal  hypertension as evidenced by splenomegaly, small volume ascites and tiny gastroesophageal varices.  2.  Diastases of the abdominis rectus musculature with broad-based bulging of the ventral abdominal wall fascia.  3.  Small fat-containing right inguinal hernia.     Cardiac MRI 11/2020  1. No evidence of myocardial ischemia on stress MRI perfusion imaging.    2. Mild LV enlargement with mild to moderately reduced LV systolic function (calculated LVEF 41%), global hypokinesis; no evidence of LV scar on gadolinium delayed enhancement  3. Mild RV enlargement and mild RV dysfunction.   4. Mild left atrial enlargement.   5. No significant valve abnormalities noted.     6. Noncardiac findings (cirrhosis and ascites)    US ABDOMEN COMPLETE WITH DOPPLER COMPLETE, 6/15/2021   IMPRESSION:   1. Cirrhosis without focal liver lesion identified by ultrasound.  a. LI-RADS US Category: US-1 Negative: No US evidence of HCC  b. Recommend continued surveillance US.  2. Cholelithiasis versus gallbladder polyp, attention on follow-up.  3. Splenomegaly and ascites present.    ASSESSMENT/PLAN:  50 year old male with medical history significant for hypertension, hyperlipidemia, type 2 diabetes, obstructive sleep apnea, congestive heart failure, alcohol use disorder with decompensated alcohol-related and CALLEJAS cirrhosis seen in clinic today for new consult, also discussed of liver transplant and surgical repair of his hernia.     Cirrhosis:    - likely combination of alcohol and CALLEJAS (metabolic syndrome)  Decompensated due to ascites  MELD-Na 13, CTP B    Liver Transplant Candidacy:   No a candidate at this time given CHF (likely alcohol related with LVEF of 41%); low MELD.    We discussed that he follows up with his cardiologists - his CHF may have improved as he has been off alcohol for the last 9 months.   We will continue to follow    Hepatocellular Cancer Screening:   - USS 6/15/2021 with no HCC, AFP normal.   - Recommend screening for  HCC every 6 months with either abdominal ultrasound or by alternating abdominal ultrasound with EITHER a triple/quad phase CT Liver with IV contrast OR a Quad phase MRI Liver with IV contrast.  AFP levels should be checked every 6 months at time of imaging screen.    Ascites:   - Present on ultrasound and examination today.  - We will obtain a diagnostic paracentesis - send for cell count/dif, TP, Albumin. Remove 5 L max.   - On low dose diuretics - started for CHF  - Will obtain labs and if favorable, increase lasix to 40 mg daily and start spironolactone 100 mg daily  - Continue to follow a sodium restricted (<2g sodium diet)     Incisional hernia:  Based on CHF and also decompensated liver disease, do not recommend surgery at this time.  We recommend management of his ascites and CHF medically.  We did discuss a TIPS, but will not recommend this for now based on his most recent EF - we will see if his CHF has improved and this can be explored later to better manage his ascites    Hepatic Encephalopathy:  - Concerns for HE, but not apparent.  Complains of diarrhea, so will hold off on starting lactulose    Esophageal Varices:   - Recommend EGD screening for esophageal varices, reasonable to wait until followed up with cardiology to assess CHF. He is on Metoprolol XL - can discuss switching to core if large varices    Kidney Health: Normal    Immobility/Frailty: None    Nutrition:  As with most patients with chronic liver disease, there is a significant degree of protein malnutrition.  Dicussed need to change dietary habits to improve overall protein balance.  Discussed the importance of eating between 1.2-1.5g/kg/day lean protein like eggs, fish, chicken, nuts, and legumes, in addition to a diet rich in fresh fruits and vegetables.  Continue to follow a sodium restricted (<2g sodium diet) and discussed the need to minimize the intake of carbohydrates and sugars, to avoid obesity.   - Strongly encourage protein  supplements 2-3 times daily (Boost, Ensure, Vale Instant Milk, etc.) to meet protein and caloric intake.  - Recommend a bedtime snack with protein and complex carbohydrate to minimize risk of muscle catabolism overnight    Routine Health Care in Patient with Chronic Liver Disease:   - All patients with liver disease should avoid the use of Non-steroidal Anti-Inflammatory (NSAID) medications as they can cause significant injury to the kidneys in this population        Thank you for this consultation. It was a pleasure to participate in the care of this patient; please contact us with any further questions.    The visit lasted up to 60 minutes, with more than half of the time spent on counseling and education. All questions were answered to patient's satisfaction    Patient seen and discussed with staff GI physician, Dr. Bain, who agrees with my assessment and plan.     Sam Mercado MD  Transplant Hepatology fellow  PGY 6  [phone]    Staff Physician Attestation  I, Sweetie Bain M.D., discussed and examined this patient with the fellow and agree with the fellow s findings and plan of care as documented in the fellow s note.  I personally reviewed vital signs, medications, labs and imaging.    Sweetie Bain M.D.   of Medicine  Advanced & Transplant Hepatology  [facility]  Date of Service: Joseph 15, 2021

## 2021-06-16 LAB
CMV IGG SERPL QL IA: <0.2 AI (ref 0–0.8)
EBV VCA IGG SER QL IA: >8 AI (ref 0–0.8)
INTERPRETATION ECG - MUSE: NORMAL

## 2021-06-17 LAB
GAMMA INTERFERON BACKGROUND BLD IA-ACNC: 0 IU/ML
M TB IFN-G CD4+ BCKGRND COR BLD-ACNC: 10 IU/ML
M TB TUBERC IFN-G BLD QL: NEGATIVE
MITOGEN IGNF BCKGRD COR BLD-ACNC: 0 IU/ML
MITOGEN IGNF BCKGRD COR BLD-ACNC: 0 IU/ML

## 2021-06-18 RX ORDER — FUROSEMIDE 40 MG
40 TABLET ORAL DAILY
Qty: 30 TABLET | Refills: 3 | Status: SHIPPED | OUTPATIENT
Start: 2021-06-18 | End: 2021-09-20

## 2021-06-18 RX ORDER — SPIRONOLACTONE 100 MG/1
100 TABLET, FILM COATED ORAL DAILY
Qty: 30 TABLET | Refills: 3 | Status: SHIPPED | OUTPATIENT
Start: 2021-06-18 | End: 2021-09-20

## 2021-06-21 ENCOUNTER — TELEPHONE (OUTPATIENT)
Dept: GASTROENTEROLOGY | Facility: CLINIC | Age: 51
End: 2021-06-21

## 2021-06-21 ENCOUNTER — DOCUMENTATION ONLY (OUTPATIENT)
Dept: TRANSPLANT | Facility: CLINIC | Age: 51
End: 2021-06-21

## 2021-06-21 DIAGNOSIS — K70.31 ALCOHOLIC CIRRHOSIS OF LIVER WITH ASCITES (H): Primary | ICD-10-CM

## 2021-06-21 LAB — PETH BLD-MCNC: NEGATIVE NG/ML

## 2021-06-21 NOTE — PROGRESS NOTES
Orders sent for outpatient paracentesis to [facility] at patients preference.  Orders sent via fax  to [phone] and patient to call [phone] to schedule.

## 2021-06-21 NOTE — TELEPHONE ENCOUNTER
----- Message from Sweetie Bain MD sent at 6/18/2021  1:23 PM CDT -----  Hey -     Since his labs are ok, I wanted to increase his diuretics to lasix 40 mg daily and start spironolactone 100 mg daily. Can someone tell the patient this, and also help him get set up for labs (BMP) next week?     Thanks!    Sweetie

## 2021-06-21 NOTE — TELEPHONE ENCOUNTER
LVM with new orders per Dr. Bain that pt's labs are ok. Pt to increase his diuretics to lasix 40 mg daily and start spironolactone 100 mg daily, check BMP one week after making changes.     Ave CURIEL LPN  Hepatology Clinic

## 2021-06-21 NOTE — LETTER
PHYSICIAN ORDERS     [facility] IR Fax # [phone]    DATE & TIME ISSUED: 18:18 AM  PATIENT NAME: Blair Oliver   : 1970     [facility] MR# : [id number]     DIAGNOSIS:  Cirrhosis  ICD-10 CODE: K70.31  Paracentesis orders.  Please have complete once ASAP then weekly prn. Orders  1 year after issue.  1. Lidocaine 1% solution 10 ml - 10 mL, Injection, ONCE. Starting when released.  2. Albumin 25% only, 12.5 g per liter removed, up to maximum of 50 grams  3. Draw platelet count if has not been checked within 1 month of paracentesis  4. Maximum  fluid volume to be removed   o  5 liters  5. US paracentesis - 1 TIME IMAGING. Starting when released.  Reason for exam: ascites   6. Please run all labs upon initiation of first paracentesis. Additionally, please run weekly if there are any signs of infection and/or SBP please add the following to assess fluid:    Cell count with diff (with WBC)     WBC (if not with cell count)     Total protein (protein fluid)     Gram stain    Albumin fluid     Culture  Please call Suyapa Sanford RN Transplant Coordinator, at [phone]  with questions or issues.      PLEASE FAX RESULTS AS SOON AS POSSIBLE TO [phone], ATTN:Yari Cunningham MD  Copied:  Dr. Georges Ovalle, PCP

## 2021-06-22 NOTE — TELEPHONE ENCOUNTER
Pt replied via [employer] verifying he received message regarding medication changes and need for lab.    Ave CURIEL LPN  Hepatology Clinic

## 2021-06-22 NOTE — TELEPHONE ENCOUNTER
Tried to call pt to make sure he received LM regarding medications changes. Went right to VM left second message asking pt to sent a mychart or call clinic to verify message was received. No additional attempts will be made by writer to reach pt.    Ave CURIEL LPN  Hepatology Clinic

## 2021-06-26 ENCOUNTER — HEALTH MAINTENANCE LETTER (OUTPATIENT)
Age: 51
End: 2021-06-26

## 2021-07-02 ENCOUNTER — HOSPITAL ENCOUNTER (OUTPATIENT)
Dept: BEHAVIORAL HEALTH | Facility: CLINIC | Age: 51
Discharge: HOME | End: 2021-07-02
Attending: FAMILY MEDICINE | Admitting: FAMILY MEDICINE
Payer: COMMERCIAL

## 2021-07-02 VITALS — HEIGHT: 75 IN | WEIGHT: 283 LBS | BODY MASS INDEX: 35.19 KG/M2

## 2021-07-02 DIAGNOSIS — K70.30 ALCOHOLIC CIRRHOSIS (H): ICD-10-CM

## 2021-07-02 PROCEDURE — H0001 ALCOHOL AND/OR DRUG ASSESS: HCPCS | Mod: GT

## 2021-07-02 ASSESSMENT — COLUMBIA-SUICIDE SEVERITY RATING SCALE - C-SSRS
1. IN THE PAST MONTH, HAVE YOU WISHED YOU WERE DEAD OR WISHED YOU COULD GO TO SLEEP AND NOT WAKE UP?: NO
ATTEMPT PAST THREE MONTHS: NO
2. HAVE YOU ACTUALLY HAD ANY THOUGHTS OF KILLING YOURSELF?: NO
2. HAVE YOU ACTUALLY HAD ANY THOUGHTS OF KILLING YOURSELF LIFETIME?: YES
1. IN THE PAST MONTH, HAVE YOU WISHED YOU WERE DEAD OR WISHED YOU COULD GO TO SLEEP AND NOT WAKE UP?: YES
ATTEMPT LIFETIME: NO

## 2021-07-02 ASSESSMENT — ANXIETY QUESTIONNAIRES
7. FEELING AFRAID AS IF SOMETHING AWFUL MIGHT HAPPEN: MORE THAN HALF THE DAYS
6. BECOMING EASILY ANNOYED OR IRRITABLE: MORE THAN HALF THE DAYS
4. TROUBLE RELAXING: SEVERAL DAYS
1. FEELING NERVOUS, ANXIOUS, OR ON EDGE: SEVERAL DAYS
2. NOT BEING ABLE TO STOP OR CONTROL WORRYING: NEARLY EVERY DAY
IF YOU CHECKED OFF ANY PROBLEMS ON THIS QUESTIONNAIRE, HOW DIFFICULT HAVE THESE PROBLEMS MADE IT FOR YOU TO DO YOUR WORK, TAKE CARE OF THINGS AT HOME, OR GET ALONG WITH OTHER PEOPLE: SOMEWHAT DIFFICULT
GAD7 TOTAL SCORE: 12
3. WORRYING TOO MUCH ABOUT DIFFERENT THINGS: NEARLY EVERY DAY
5. BEING SO RESTLESS THAT IT IS HARD TO SIT STILL: NOT AT ALL

## 2021-07-02 ASSESSMENT — MIFFLIN-ST. JEOR: SCORE: 2219.31

## 2021-07-02 ASSESSMENT — PAIN SCALES - GENERAL: PAINLEVEL: SEVERE PAIN (6)

## 2021-07-02 ASSESSMENT — PATIENT HEALTH QUESTIONNAIRE - PHQ9: SUM OF ALL RESPONSES TO PHQ QUESTIONS 1-9: 8

## 2021-07-02 NOTE — PROGRESS NOTES
"[facility]  Provider Name:  Alexandra Tellez     Credentials:  [facility]    PATIENT'S NAME: Blair Oliver  PREFERRED NAME: \"Slick"  PRONOUNS: he/him/his  MRN: [id number]  : 1970  ADDRESS: [address]  [place] [address]  ACCT. NUMBER:  [id number]  DATE OF SERVICE: 21  START TIME: 1:04 pm  END TIME: 2:15 pm  PREFERRED PHONE: [phone]  May we leave a program related message: Yes  SERVICE MODALITY:  Video Visit:      Provider verified identity through the following two step process.  Patient provided:  Patient  and Patient's last 4 digits of N    Telemedicine Visit: The patient's condition can be safely assessed and treated via synchronous audio and visual telemedicine encounter.      Reason for Telemedicine Visit: Patient has requested telehealth visit    Originating Site (Patient Location): Patient's home    Distant Site (Provider Location): Provider Remote Setting- Home Office    Consent:  The patient/guardian has verbally consented to: the potential risks and benefits of telemedicine (video visit) versus in person care; bill my insurance or make self-payment for services provided; and responsibility for payment of non-covered services.       Pt gives verbal consent for JASIEL to and from:      Himself, Email: [email or web address]    His Emergency and Collateral Contact, Margarita Oliver, Tel: [phone]    His Collateral Contact, Chucho Oliver, Tel: [phone]    Patient would like the video invitation sent by:  My Chart    Mode of Communication:  Video Conference via [facility]    As the provider I attest to compliance with applicable laws and regulations related to telemedicine.    UNIVERSAL ADULT Substance Use Disorder DIAGNOSTIC ASSESSMENT    Identifying Information:  Patient is a 50 year old,  .  The pronoun use throughout this assessment reflects the patient's chosen pronoun.  Patient was referred for an assessment by [facility] " "Transplant Team. .  Patient attended the session alone.     Chief Complaint:   The reason for seeking services at this time is: \" I have liver disease.  I have a hernia that they won't fix because my MELD score is too high. \"   The problem(s) began \"in my Thirties\". Patient has attempted to resolve these concerns in the past through quitting on his own for periods of time.    Patient does not appear to be in severe withdrawal, an imminent safety risk to self or others, or requiring immediate medical attention and may proceed with the assessment interview.    Social/Family History:  Patient reported they grew up in [place].  They were raised by his mother and father.  He has one younger brother.  Patient reported that their childhood was \"It was average\".  Patient describes current relationships with family of origin as \"fine with my mother and I butt heads with my brother from time to time\".      The patient describes their cultural background as  American.  Cultural influences and impact on patient's life structure, values, norms, and healthcare: none identified.  Contextual influences on patient's health include: Individual Factors The pt reports a long history of heavy drinking as an adult.  He reports no mental health concerns that he is aware of., Family Factors The pt has been  to his wif for 33 years.  they have two adult sons.  The pt's mother is currently living with them as is his son, his son's girlfriends and his son's daughter from a previous relationship., Learning Environment Factors The pt reports no concerns.  He graduated High School., Community Factors none identified., Societal Factors none identified., Economic Factors The pt is very concerned about finances due to his being on a medical leave from work and having medical bills.  and Health- Seeking Factors The pt is hoping for hernia surgery.  He is concerned about his health..  Patient identified their preferred language " "to be English. Patient reported they does not need the assistance of an  or other support involved in therapy.  Patient reports they are not involved in community of jonathan activities.  They reports spirituality impacts recovery in the following ways:  'I believe and I don't think i need to do more than that\"..     Patient reported had no significant delays in developmental tasks.   Patient's highest education level was high school graduate. Patient identified the following learning problems: none reported.  Patient reports they are  able to understand written materials.    Patient reported the following relationship history .  Patient's current relationship status is  for 33 years.   Patient identified their sexual orientation as heterosexual.  Patient reported having two child(jonathan).     Patient's current living/housing situation involves staying in own home/apartment.  They live with his wife and his son, his son's girlfriend and his son's daughter and his mother and they report that housing is stable. Patient identified mother, adult child, pets, spouse and co-worker as part of their support system.  Patient identified the quality of these relationships as good.      Patient reports engaging in the following recreational/leisure activities: \" going to the [place], fishing and enjoy the [place]\".  Patient is currently on medical leave from a full time job at  [employer]..  Patient reports their income is obtained through disability.  Patient does identify finances as a current stressor.      Patient denies substance related arrests or legal issues.  Patient denies being on probation / parole / under the jurisdiction of the court.      Patient's Strengths and Limitations:  Patient identified the following strengths or resources that will help them succeed in treatment: family support, intelligence, sense of humor, strong social skills and work ethic. Things that may interfere with " "the patient's success in treatment include: financial hardship and physical health concerns.     Personal and Family Medical History:  Patient did not report a family history of mental health concerns.  Patient reports family history includes Substance Abuse in his brother and maternal grandmother..      Patient reported the following previous mental health diagnoses: \"none\".  Patient reports their primary mental health symptoms include:  \"feelings of anxiety that are significant\" and these do impact his ability to function.   Patient has not received mental health services in the past.  Psychiatric Hospitalizations: None.  Patient denies a history of civil commitment.  Current mental health services/providers include:  \"none\"..    GAIN-SS Tool:    When was the last time that you had significant problems... 7/2/2021   with feeling very trapped, lonely, sad, blue, depressed or hopeless about the future? Past month   with sleep trouble, such as bad dreams, sleeping restlessly, or falling asleep during the day? Past Month   with feeling very anxious, nervous, tense, scared, panicked or like something bad was going to happen? Past month   with becoming very distressed & upset when something reminded you of the past? 2 to 12 months ago   with thinking about ending your life or committing suicide? Never     When was the last time that you did the following things 2 or more times? 7/2/2021   Lied or conned to get things you wanted or to avoid having to do something? Never   Had a hard time paying attention at school, work or home? Past month   Had a hard time listening to instructions at school, work or home? Past month   Were a bully or threatened other people? Never   Started physical fights with other people? Never       Patient has had a physical exam to rule out medical causes for current symptoms.  Date of last physical exam was within the past year. Client was encouraged to follow up with PCP if symptoms were to " "develop. The patient has a non-[facility] Primary Care Provider. Their PCP is Dr. Christian Ovalle with [facility]...  Patient reports the following current medical concerns: liver disease, diabetes, heart problem and hernia..  Patient reports pain concerns including diabetic nerve pain in hands and feet..  Patient does want help addressing pain concerns..  There are significant appetite / nutritional concerns / weight changes. \"I don't eat enough\"  Patient does not report a history of an eating disorder.  Patient does not report a history of head injury / trauma / cognitive impairment.     Patient reports current meds as:   Outpatient Medications Marked as Taking for the 7/2/21 encounter (Hospital Encounter) with Alexandra Tellez [facility]   Medication Sig     amLODIPine-benazepril (LOTREL) 10-40 MG capsule TAKE 1 CAPSULE BY MOUTH EVERY DAY     apixaban ANTICOAGULANT (ELIQUIS) 5 MG tablet Take 5 mg by mouth     furosemide (LASIX) 20 MG tablet Take 20 mg by mouth     furosemide (LASIX) 40 MG tablet Take 1 tablet (40 mg) by mouth daily     gabapentin (NEURONTIN) 600 MG tablet      insulin detemir (LEVEMIR PEN) 100 UNIT/ML pen Inject 65 Units Subcutaneous     insulin pen needle (B-D U/F) 31G X 5 MM miscellaneous AS DIRECTED. BD UF MINI PEN NEEDLE [id number]: USE ONCE DAILY     lisinopril (ZESTRIL) 5 MG tablet TAKE 1 TABLET BY MOUTH TWICE A DAY     metFORMIN (GLUCOPHAGE) 500 MG tablet Take 1,000 mg by mouth     metoprolol succinate ER (TOPROL-XL) 100 MG 24 hr tablet Take 100 mg by mouth     Multiple Vitamins-Minerals (SUPER THERA DAMARIS M) TABS Take 1 tablet by mouth     omeprazole (PRILOSEC) 20 MG DR capsule Take 20 mg by mouth     ONETOUCH ULTRA test strip PLEASE SEE ATTACHED FOR DETAILED DIRECTIONS     simvastatin (ZOCOR) 20 MG tablet      spironolactone (ALDACTONE) 100 MG tablet Take 1 tablet (100 mg) by mouth daily       Medication Adherence:  Patient reports taking prescribed medications as prescribed.    Patient Allergies:  " "  Allergies   Allergen Reactions     Minocycline Hives       Medical History:    Past Medical History:   Diagnosis Date     Diabetes (H)     Type 2 DM, Insulin Use.        Rating Scales:    PHQ9:    PHQ-9 SCORE 7/2/2021   PHQ-9 Total Score 8   ;      GAD7:    COURTNEY-7 SCORE 7/2/2021   Total Score 12       Substance Use:  Patient reported the following biological family members or relatives with chemical health issues: Maternal Grandmother reportedly used alcohol .  Patient has not received substance use disorder and/or gambling treatment in the past.  Patient has not ever been to detox.  Patient is not currently receiving any chemical dependency treatment. Patient reports no history of support group attendance.      Substance History of use Age of first use Pattern and duration of use (include amounts and frequency) Date of last use     Withdrawal potential Route of administration   Alcohol Has used 13 \"In my 20's a liter a week. In my 30's 2-3 1.75 bottles a week\".  Prior to quitting, in September of 2020, 4-5 1.75 liter bottles of whiskey and then switched to Captain Rich Rum or the [employer] mix a week for years, about 10 years prior to quitting. Sept 21, 2020 No oral   Cannabis   Has used teen \"never heavy\" 'my early twenties\" No smoked     Amphetamines           Cocaine/crack    Has used 13 Cocaine- \"when it was available\" 'my early twenties\" No snorted   Hallucinogens   Has used 16/17 Acid-30 x in life  Mushrooms-30X\"when it was available\" Mid twenties No oral   Inhalants         Heroin         Other Opiates         Benzodiazepine           Barbiturates         Over the counter meds         Caffeine Has used teens \"occasionally a soda\" \"couple weeks ago\" No oral   Nicotine  Has used 11/14  24 years ago No smoked   other substances not listed above:  Identify:              Patient reported the following problems as a result of their substance use:family problems, decrease in work performance, liver disease.  " "Patient is concerned about substance use in the past. Patient reports his entire family is concerned about their substance use.  Patient reports their recovery goals are \"to never drink alcohol again.  It almost took my life\".     Patient reports experiencing the following withdrawal symptoms within the past 12 months: sweating, shaky/jittery/tremors, unable to sleep, fatigue, vivid/unpleasant dreams, irritability, diarrhea, diminished appetite, confused/disrupted speech and anxiety/worry and the following within the past 30 days: none.   Patients reports urges to use Alcohol.  Patient reports )he has used more Alcohol than intended and over a longer period of time than intended. Patient reports he has had unsuccessful attempts to cut down or control use of Alcohol.  Patient reports longest period of abstinence was \"9 months\" and return to use was due to \"being at the cabin\". Patient reports he has needed to use more Alcohol to achieve the same effect.  Patient does not report diminished effect with use of same amount of Alcohol.     Patient does  report a great deal of time is spent in activities necessary to obtain, use, or recover from Alcohol effects.  Patient does  report important social, occupational, or recreational activities are given up or reduced because of Alcohol use.  Alcohol use is continued despite knowledge of having a persistent or recurrent physical or psychological problem that is likely to have caused or exacerbated by use.  Patient reports the following problem behaviors while under the influence of substances \"blackouts, falling, angry outbursts\"..     Patient reports substance use has not ever impacted their ability to function in a school setting. Patient reports substance use has ever impacted their ability to function in a work setting.  Patients demographics and history impact their recovery in the following ways:  Some family history.  Patient reports engaging in the following " "recreation/leisure activities while using:  \"going to the cabin, hanging out at the lake, sitting out on the deck at home, fishing\".  Patient reports the following people are supportive of recovery: \"My wife and everybody, my whole family\"    Patient does not have a history of gambling concerns and/or treatment.  Patient does not have other addictive behaviors he is concerned about .        Dimension Scale Ratings:    Dimension 1 -  Acute Intoxication/Withdrawal: 0 - No Problem  Dimension 2 - Biomedical: 2 - Moderate Problem  Dimension 3 - Emotional/Behavioral/Cognitive Conditions: 1 - Minor Problem  Dimension 4 - Readiness to Change:  2 - Moderate Problem  Dimension 5 - Relapse/Continued Use/ Continued Problem Potential: 1 - Minor Problem  Dimension 6 - Recovery Environment:  2 - Moderate Problem    Significant Losses / Trauma / Abuse / Neglect Issues:   Patient has not served in the .  There are indications or report of significant loss, trauma, abuse or neglect issues related to: are no indications and client denies any losses, trauma, abuse, or neglect concerns. \"When my father was 53 he had a heart attack and a stroke and was in a vegetative state\".  Concerns for possible neglect are not present.     Safety Assessment:   Current Safety Concerns:  [place] Suicide Severity Rating Scale (Lifetime/Recent)  [place] Suicide Severity Rating (Lifetime/Recent) 7/2/2021   1. Wish to be Dead (Lifetime) Yes   Wish to be Dead Description (Lifetime) (No Data)   Comments \"in September when I was going through bowel surgery\"   1. Wish to be Dead (Recent) No   Wish to be Dead Description (Recent) (No Data)   Comments \"no\"   2. Non-Specific Active Suicidal Thoughts (Lifetime) Yes   Comments \"thoughts at times, just wish to be dead, not suicidal\"'   2. Non-Specific Active Suicidal Thoughts (Recent) No   Actual Attempt (Lifetime) No   Actual Attempt (Past 3 Months) No   Has subject engaged in non-suicidal self-injurious " behavior? (Lifetime) No   Has subject engaged in non-suicidal self-injurious behavior? (Past 3 Months) No     Patient denies current homicidal ideation and behaviors.  Patient denies current self-injurious ideation and behaviors.    Patient denied risk behaviors associated with substance use.  Patient denies any high risk behaviors associated with mental health symptoms.  Patient reports the following current concerns for their personal safety: None.  Patient reports there are firearms in the house.  The firearms are secured in a locked space.    History of Safety Concerns:  Patient denied a history of homicidal ideation.     Patient denied a history of personal safety concerns.    Patient denied a history of assaultive behaviors.    Patient denied a history of sexual assault behaviors.     Patient reported a history unsafe motor vehicle operation associated with substance use.  Patient denies any history of high risk behaviors associated with mental health symptoms.  Patient reports the following protective factors: Patient reports the following protective factors: spirituality, positive relationships positive family connections, forward/future oriented thinking, dedication to family/friends, safe and stable environment, regular sleep, abstinence from substances, adherence with prescribed medication, living with other people, daily obligations, sense of meaning, positive social skills, sense of personal control or determination and access to a variety of clinical interventions   Risk Plan:  See Recommendations for Safety and Risk Management Plan    Review of Symptoms per patient report:  Substance Use:  blackouts, passing out, hangovers, other substance related medical issue liver disease, daily use, substance related decrease in work performance, work absence due to substance use, family relationship problems due to substance use, driving under the influence and cravings/urges to use     Diagnostic Criteria:  OP  "BEH NADINE CRITERIA: Substance is often taken in larger amounts or over a longer period than was intended.  Met for:  Alcohol, There is persistent desire or unsuccessful efforts to cut down or control use of the substance.  Met for:  Alcohol,  A great deal of time is spent in activities necessary to obtain the substance, use the substance, or recover from its effects.  Met for:  Alcohol, Craving, or a strong desire or urge to use the substance.  Met for:  Alcohol, Continued use of the substance despite having persistent or recurrent social or interpersonal problems caused or exacerbated by the effects of its use.  Met for:  Alcohol, Important social, occupational, or recreational activities are given up or reduced because of the substance.  Met for:  Alcohol, Use of the substance is continued despite knowledge of having a persistent or recurrent physical or psychological problem that is likely to have been cause or exacerbated by the substance.  Met for:  Alcohol, Tolerance:  either a need for markedly increased amounts of the substance to achieve the desired effect or a markedly diminished effect with continued use of the dame amount of the substance.  Met for:  Alcohol, Withdrawal:  either patient endorses characteristic withdrawal syndrome for the substance or the substance (or closely related substance) is taken to relieve or avoid withdrawal symptoms.  Met for:  Alcohol      Collateral Contact Summary:   Collateral contacts contributing to this assessment:       His Emergency and Collateral Contact, Margarita Oliver (wife), Tel: [phone]    1/5/2021: \"He is doing great!  He does not have any cravings and is doing well.\"      His Collateral Contact, Chucho Oliver (son), Tel: [phone]    1/5/2021: \"He is weaker than he used to be.  I honestly don't think he is having cravings for alcohol.  I don't see him drinking alcohol again.  This was a real wake up call.  He seems like a lot better person off the alcohol.  I " "just wish he would have quit before all these issues came up for him\".    If court related records were reviewed, summarize here: NA    Information from collateral contacts supported/largely agreed with information from the client and associated risk ratings.    Information in this assessment was obtained from the medical record and provided by patient who is a good historian.    Patient will have open access to their mental health medical record.    As evidenced by self report and criteria, client meets the following DSM5 Diagnoses:   (Sustained by DSM5 Criteria Listed Above)  Alcohol Use Disorder   303.90 (F10.20) Severe In early remission, .    Recommendations:     1. Plan for Safety and Risk Management:  Recommended that patient call 911 or go to the local ED should there be a change in any of these risk factors..      Report to child / adult protection services was NA.     2. NADINE Referrals:   Recommendations:        Pt meets criteria for a severe Alcohol Use Disorder.    He reports 9 months abstinent and appears committed to ongoing abstinence.    He could benefit from intensive outpatient treatment, but prefers to do individual work with a therapist knowledgeable in addiction.    The pt does not feel he would do well within a group setting.    The pt will be provided resources for individual counseling with a focus on addiction and loss.     Patient reports they are willing to follow these recommendations.  Patient would like the following family or other support people involved in their treatment:  TBD. Patient does not have a history of opiate use.    3. Mental Health Referrals:  The pt is being referred to see a therapist knowledgeable in addiction.     4. Patient's identified no special considerations for tx..     5. Recommendations for treatment focus:   Depressed Mood - depressed feelings per pt.  Anxiety - anxious feelings per pt.  Grief / Loss - loss of his father.  Alcohol / Substance Use - severe " alcohol dependency in early remission..     Provider Name/ Credentials:  THANIA Alfaro  July 2, 2021

## 2021-07-03 ASSESSMENT — ANXIETY QUESTIONNAIRES: GAD7 TOTAL SCORE: 12

## 2021-07-05 NOTE — ADDENDUM NOTE
Encounter addended by: Alexandra Tellez LADC on: 7/5/2021 10:10 AM   Actions taken: Clinical Note Signed

## 2021-07-05 NOTE — ADDENDUM NOTE
[facility] 7/5/2021 9:49 AM   Actions taken: Clinical Note Signed

## 2021-07-05 NOTE — ADDENDUM NOTE
Encounter addended by: Alexandra Tellez LADC on: 7/5/2021 10:54 AM   Actions taken: Clinical Note Signed

## 2021-09-20 ENCOUNTER — LAB (OUTPATIENT)
Dept: LAB | Facility: CLINIC | Age: 51
End: 2021-09-20
Attending: STUDENT IN AN ORGANIZED HEALTH CARE EDUCATION/TRAINING PROGRAM
Payer: COMMERCIAL

## 2021-09-20 ENCOUNTER — OFFICE VISIT (OUTPATIENT)
Dept: GASTROENTEROLOGY | Facility: CLINIC | Age: 51
End: 2021-09-20
Attending: STUDENT IN AN ORGANIZED HEALTH CARE EDUCATION/TRAINING PROGRAM
Payer: COMMERCIAL

## 2021-09-20 VITALS
OXYGEN SATURATION: 98 % | TEMPERATURE: 98.3 F | HEIGHT: 75 IN | HEART RATE: 63 BPM | RESPIRATION RATE: 20 BRPM | SYSTOLIC BLOOD PRESSURE: 117 MMHG | BODY MASS INDEX: 35.97 KG/M2 | WEIGHT: 289.3 LBS | DIASTOLIC BLOOD PRESSURE: 71 MMHG

## 2021-09-20 DIAGNOSIS — K43.2 INCISIONAL HERNIA, WITHOUT OBSTRUCTION OR GANGRENE: ICD-10-CM

## 2021-09-20 DIAGNOSIS — R19.7 DIARRHEA, UNSPECIFIED TYPE: ICD-10-CM

## 2021-09-20 DIAGNOSIS — R41.0 CONFUSION: Primary | ICD-10-CM

## 2021-09-20 DIAGNOSIS — I50.22 CHRONIC SYSTOLIC CONGESTIVE HEART FAILURE (H): ICD-10-CM

## 2021-09-20 DIAGNOSIS — K76.9 LIVER LESION: ICD-10-CM

## 2021-09-20 DIAGNOSIS — K70.31 ALCOHOLIC CIRRHOSIS OF LIVER WITH ASCITES (H): ICD-10-CM

## 2021-09-20 DIAGNOSIS — F10.21 ALCOHOL USE DISORDER, SEVERE, IN EARLY REMISSION (H): ICD-10-CM

## 2021-09-20 LAB
AFP SERPL-MCNC: 4.8 UG/L (ref 0–8)
ALBUMIN SERPL-MCNC: 3 G/DL (ref 3.4–5)
ALP SERPL-CCNC: 95 U/L (ref 40–150)
ALT SERPL W P-5'-P-CCNC: 39 U/L (ref 0–70)
ANION GAP SERPL CALCULATED.3IONS-SCNC: 6 MMOL/L (ref 3–14)
AST SERPL W P-5'-P-CCNC: 29 U/L (ref 0–45)
BILIRUB SERPL-MCNC: 0.9 MG/DL (ref 0.2–1.3)
BUN SERPL-MCNC: 12 MG/DL (ref 7–30)
CALCIUM SERPL-MCNC: 9.2 MG/DL (ref 8.5–10.1)
CHLORIDE BLD-SCNC: 109 MMOL/L (ref 94–109)
CO2 SERPL-SCNC: 24 MMOL/L (ref 20–32)
CREAT SERPL-MCNC: 0.67 MG/DL (ref 0.66–1.25)
ERYTHROCYTE [DISTWIDTH] IN BLOOD BY AUTOMATED COUNT: 16.6 % (ref 10–15)
GFR SERPL CREATININE-BSD FRML MDRD: >90 ML/MIN/1.73M2
GLUCOSE BLD-MCNC: 214 MG/DL (ref 70–99)
HCT VFR BLD AUTO: 29.5 % (ref 40–53)
HGB BLD-MCNC: 8.7 G/DL (ref 13.3–17.7)
INR PPP: 1.61 (ref 0.85–1.15)
MCH RBC QN AUTO: 22.5 PG (ref 26.5–33)
MCHC RBC AUTO-ENTMCNC: 29.5 G/DL (ref 31.5–36.5)
MCV RBC AUTO: 76 FL (ref 78–100)
PLATELET # BLD AUTO: 100 10E3/UL (ref 150–450)
POTASSIUM BLD-SCNC: 4.2 MMOL/L (ref 3.4–5.3)
PROT SERPL-MCNC: 7.3 G/DL (ref 6.8–8.8)
RBC # BLD AUTO: 3.87 10E6/UL (ref 4.4–5.9)
SODIUM SERPL-SCNC: 139 MMOL/L (ref 133–144)
WBC # BLD AUTO: 4 10E3/UL (ref 4–11)

## 2021-09-20 PROCEDURE — 36415 COLL VENOUS BLD VENIPUNCTURE: CPT | Performed by: PATHOLOGY

## 2021-09-20 PROCEDURE — 82105 ALPHA-FETOPROTEIN SERUM: CPT | Mod: 90 | Performed by: PATHOLOGY

## 2021-09-20 PROCEDURE — 85027 COMPLETE CBC AUTOMATED: CPT | Performed by: PATHOLOGY

## 2021-09-20 PROCEDURE — G0463 HOSPITAL OUTPT CLINIC VISIT: HCPCS

## 2021-09-20 PROCEDURE — 99215 OFFICE O/P EST HI 40 MIN: CPT | Performed by: STUDENT IN AN ORGANIZED HEALTH CARE EDUCATION/TRAINING PROGRAM

## 2021-09-20 PROCEDURE — 85610 PROTHROMBIN TIME: CPT | Performed by: PATHOLOGY

## 2021-09-20 PROCEDURE — 80053 COMPREHEN METABOLIC PANEL: CPT | Performed by: PATHOLOGY

## 2021-09-20 RX ORDER — EPLERENONE 25 MG/1
25 TABLET, FILM COATED ORAL DAILY
Qty: 30 TABLET | Refills: 3 | Status: SHIPPED | OUTPATIENT
Start: 2021-09-20 | End: 2021-11-29

## 2021-09-20 ASSESSMENT — PAIN SCALES - GENERAL: PAINLEVEL: SEVERE PAIN (6)

## 2021-09-20 ASSESSMENT — MIFFLIN-ST. JEOR: SCORE: 2247.89

## 2021-09-20 NOTE — LETTER
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      Initial History:     Patient states that he was diagnosed with liver disease and cirrhosis in September 2020.  He was admitted to the hospital at that time he was found to have extensive superior mesenteric venous thrombosis, complicated by ischemic bowel and had to undergo an exploratory laparotomy with small bowel resection.  He was placed on Eliquis postoperatively.  During surgery, he was found to have some ascites that was evacuated.     Since then he has had trouble with incisional hernias, and rectus diastasis.  He was seen by his general surgeon in March where he was noted to have a MELD of 18.  He was then sent to [facility] to be seen by surgery for consideration of incisional hernia repair.    Patient complains of episodes of leakage of fluid from his incision that happens intermittently.  This fluid is usually blood tinged but sometimes yellow. He has never had a paracentesis.  He is on 20 mg of furosemide daily, that was started because of his heart failure and his pedal edema.  He has also noted that every 2 to 3 days he has watery bowel movements, up to 12 times a day.  In between there was episodes he has about 2-3 soft bowel movements per day.  He denies any fevers or chills, abdominal pain, melena, or hematochezia.    His wife is also noted that he is having some memory issues.  He has not had any major episodes of confusion, and has not  been admitted to the hospital with hepatic encephalopathy.    Patient denies any prior history of liver disease. He has had abnormal liver enzymes and low platelets as far back as 2011. Past notes from his PCP had also documented excessive alcohol use, as well as, a hospitalization for overdose of Vicodin.     Interval Events:  - Reports regular confusion, not retaining information.   - Needs to schedule an appt with cardiology, but had recent TTE that showed improvement on medications  - Has many frequent bowel movements - FI and nocturnal bowel movements  - Underwent paracentesis 6/26 - 600 ml removed - 231 WBC, TP 1.4, Albumin 0.8.   - Fluid is better with addition of spironolactone, but now having breast swelling  - Not flower to work because of pain with the hernia    ROS: 14 point ROS negative except for positives noted in HPI.    PMHx:  Past Medical History:   Diagnosis Date     Diabetes (H)     Type 2 DM, Insulin Use.    -- Type II diabetes Mellitus  -- Hypertension  -- Hyperlipidemia  -- CHF  -- GISELLE  -- SMV thrombus    PSHx:  No past surgical history on file.   -- Exploratory laparotomy with small bowel resection 9/2020    FamHx:  Family History   Problem Relation Age of Onset     Substance Abuse Maternal Grandmother      Substance Abuse Brother      No family history of liver disease, liver cancer    SocHx:  Social History     Socioeconomic History     Marital status:      Spouse name: Not on file     Number of children: Not on file     Years of education: Not on file     Highest education level: Not on file   Occupational History     Not on file   Tobacco Use     Smoking status: Former Smoker     Types: Cigarettes     Smokeless tobacco: Never Used   Substance and Sexual Activity     Alcohol use: Not Currently     Comment: Quit 9/21/2020     Drug use: Never     Sexual activity: Not on file   Other Topics Concern     Parent/sibling w/ CABG, MI or angioplasty before 65F 55M? Not Asked   Social History  Narrative     Not on file     Social Determinants of Health     Financial Resource Strain:      Difficulty of Paying Living Expenses:    Food Insecurity:      Worried About Running Out of Food in the Last Year:      Ran Out of Food in the Last Year:    Transportation Needs:      Lack of Transportation (Medical):      Lack of Transportation (Non-Medical):    Physical Activity:      Days of Exercise per Week:      Minutes of Exercise per Session:    Stress:      Feeling of Stress :    Social Connections:      Frequency of Communication with Friends and Family:      Frequency of Social Gatherings with Friends and Family:      Attends Muslim Services:      Active Member of Clubs or Organizations:      Attends Club or Organization Meetings:      Marital Status:    Intimate Partner Violence:      Fear of Current or Ex-Partner:      Emotionally Abused:      Physically Abused:      Sexually Abused:    Patient previously worked with with [employer].  He is currently on disability due to pain from his hernias and discomfort.  Admits to significant alcohol use.  Used to drink about a liter of whiskey per day for about 20 years.  Denies any hospitalizations for intoxication or withdrawal, denies any DUIs, denies any prior treatments.  Last drink was 9/21/2020.  Denies any cravings, and is not in any treatments program at the moment.  Denies any tobacco use, denies any IV drug use.    Medications:  Current Outpatient Medications   Medication     amLODIPine-benazepril (LOTREL) 10-40 MG capsule     apixaban ANTICOAGULANT (ELIQUIS) 5 MG tablet     eplerenone (INSPRA) 25 MG tablet     furosemide (LASIX) 20 MG tablet     gabapentin (NEURONTIN) 600 MG tablet     insulin detemir (LEVEMIR PEN) 100 UNIT/ML pen     insulin pen needle (B-D U/F) 31G X 5 MM miscellaneous     lisinopril (ZESTRIL) 5 MG tablet     metFORMIN (GLUCOPHAGE) 500 MG tablet     metoprolol succinate ER (TOPROL-XL) 100 MG 24 hr tablet     Multiple  "Vitamins-Minerals (SUPER THERA DAMARIS M) TABS     omeprazole (PRILOSEC) 20 MG DR capsule     ONETOUCH ULTRA test strip     rifaximin (XIFAXAN) 550 MG TABS tablet     simvastatin (ZOCOR) 20 MG tablet     No current facility-administered medications for this visit.     No OTCs, herbals    Allergies:  Allergies   Allergen Reactions     Minocycline Hives       Objective:  /71 (BP Location: Right arm, Patient Position: Sitting, Cuff Size: Adult Large)   Pulse 63   Temp 98.3  F (36.8  C) (Oral)   Resp 20   Ht 1.905 m (6' 3\")   Wt 131.2 kg (289 lb 4.8 oz)   SpO2 98%   BMI 36.16 kg/m    Constitutional: pleasant man in NAD  Eyes: non icteric  Respiratory: Normal respiratory excursion   MSK: normal range of motion of visualized extremities  Abd: Midline hernia present, reducible, difficult to appreciate ascites  Skin: No jaundice  Psychiatric: normal mood and orientation    Labs:  Last Comprehensive Metabolic Panel:  Sodium   Date Value Ref Range Status   09/20/2021 139 133 - 144 mmol/L Final   06/15/2021 136 133 - 144 mmol/L Final     Potassium   Date Value Ref Range Status   09/20/2021 4.2 3.4 - 5.3 mmol/L Final   06/15/2021 4.1 3.4 - 5.3 mmol/L Final     Chloride   Date Value Ref Range Status   09/20/2021 109 94 - 109 mmol/L Final   06/15/2021 112 (H) 94 - 109 mmol/L Final     Carbon Dioxide   Date Value Ref Range Status   06/15/2021 25 20 - 32 mmol/L Final     Carbon Dioxide (CO2)   Date Value Ref Range Status   09/20/2021 24 20 - 32 mmol/L Final     Anion Gap   Date Value Ref Range Status   09/20/2021 6 3 - 14 mmol/L Final   06/15/2021 <1 (L) 3 - 14 mmol/L Final     Glucose   Date Value Ref Range Status   09/20/2021 214 (H) 70 - 99 mg/dL Final   06/15/2021 123 (H) 70 - 99 mg/dL Final     Urea Nitrogen   Date Value Ref Range Status   09/20/2021 12 7 - 30 mg/dL Final   06/15/2021 8 7 - 30 mg/dL Final     Creatinine   Date Value Ref Range Status   09/20/2021 0.67 0.66 - 1.25 mg/dL Final   06/15/2021 0.63 (L) 0.66 " - 1.25 mg/dL Final     GFR Estimate   Date Value Ref Range Status   09/20/2021 >90 >60 mL/min/1.73m2 Final     Comment:     As of July 11, 2021, eGFR is calculated by the CKD-EPI creatinine equation, without race adjustment. eGFR can be influenced by muscle mass, exercise, and diet. The reported eGFR is an estimation only and is only applicable if the renal function is stable.   06/15/2021 >90 >60 mL/min/[1.73_m2] Final     Comment:     Non  GFR Calc  Starting 12/18/2018, serum creatinine based estimated GFR (eGFR) will be   calculated using the Chronic Kidney Disease Epidemiology Collaboration   (CKD-EPI) equation.       Calcium   Date Value Ref Range Status   09/20/2021 9.2 8.5 - 10.1 mg/dL Final   06/15/2021 8.9 8.5 - 10.1 mg/dL Final     Bilirubin Total   Date Value Ref Range Status   09/20/2021 0.9 0.2 - 1.3 mg/dL Final   06/15/2021 0.8 0.2 - 1.3 mg/dL Final     Alkaline Phosphatase   Date Value Ref Range Status   09/20/2021 95 40 - 150 U/L Final   06/15/2021 126 40 - 150 U/L Final     ALT   Date Value Ref Range Status   09/20/2021 39 0 - 70 U/L Final   06/15/2021 36 0 - 70 U/L Final     AST   Date Value Ref Range Status   09/20/2021 29 0 - 45 U/L Final   06/15/2021 34 0 - 45 U/L Final       Lab Results   Component Value Date    WBC 4.7 06/15/2021     Lab Results   Component Value Date    RBC 3.89 06/15/2021     Lab Results   Component Value Date    HGB 8.6 06/15/2021     Lab Results   Component Value Date    HCT 31.0 06/15/2021     Lab Results   Component Value Date    MCV 80 06/15/2021     Lab Results   Component Value Date    MCH 22.1 06/15/2021     Lab Results   Component Value Date    MCHC 27.7 06/15/2021     Lab Results   Component Value Date    RDW 17.3 06/15/2021     Lab Results   Component Value Date     06/15/2021       INR   Date Value Ref Range Status   09/20/2021 1.61 (H) 0.85 - 1.15 Final     Comment:     Effective 7/11/2021, the reference range for this assay has changed.    06/15/2021 1.58 (H) 0.86 - 1.14 Final        MELD-Na score: 12 at 9/20/2021 12:27 PM  MELD score: 12 at 9/20/2021 12:27 PM  Calculated from:  Serum Creatinine: 0.67 mg/dL (Using min of 1 mg/dL) at 9/20/2021 12:27 PM  Serum Sodium: 139 mmol/L (Using max of 137 mmol/L) at 9/20/2021 12:27 PM  Total Bilirubin: 0.9 mg/dL (Using min of 1 mg/dL) at 9/20/2021 12:27 PM  INR(ratio): 1.61 at 9/20/2021 12:27 PM  Age: 50 years    Imaging:    CT 4/2021    LOWER CHEST: The lung bases are clear.  HEPATOBILIARY: Cirrhotic appearance of the liver. No worrisome liver lesions within the limitations of a single phase exam. No biliary duct dilatation. No calcified gallstones.  PANCREAS: No significant mass, duct dilatation, or inflammatory change.  SPLEEN: Splenomegaly, measuring 19 cm in the craniocaudal dimension.  ADRENAL GLANDS: No significant nodules.  KIDNEYS/BLADDER: No significant mass, stone, or hydronephrosis. No bladder wall thickening.  BOWEL: Postoperative changes of small bowel resection with anastomosis in the right midabdomen. Diastases of the abdominis rectus musculature with broad-based bulging of the anterior abdominal wall fascia with multiple associated nonobstructed small bowel loops. The bowel is nondistended without inflammatory change. No evidence of appendicitis.  LYMPH NODES: No lymphadenopathy.  VASCULATURE: Few tiny gastroesophageal varices.  PELVIC ORGANS: Unremarkable.  ADDITIONAL FINDINGS: Mesenteric edema and small volume of ascites.  MUSCULOSKELETAL: Small fat-containing right inguinal hernia. Mild degenerative changes of the hips and visualized thoracolumbar spine.  IMPRESSION:   1.  Cirrhosis with portal hypertension as evidenced by splenomegaly, small volume ascites and tiny gastroesophageal varices.  2.  Diastases of the abdominis rectus musculature with broad-based bulging of the ventral abdominal wall fascia.  3.  Small fat-containing right inguinal hernia.     Endoscopy: EGD/colon > 20 years  zoey    Independently reviewed labs and imaging.     Assessment/Plan: Mr. 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     Was originally referred for LT evaluation - given his CHF and low MELD, did not pursue this. He is having significant pain related to his complex hernia, discussed he would need to have resolution of his ascites, and improvement in his cardiac status to be considered a candidate for surgery. Discussed with his weight he would at high risk for recurrence. TTE showed improvement in his cardiac function with further sobriety and medications.     He is having chronic diarrhea, could be related to his bowel resection, SIBO, microscopic colitis.     Having some confusion, potentially HE    - Continue lasix 20 mg BID  - Stop spironolactone due to gynecomastia and start eplerenone 25 mg daily  - Repeat BMP next week after diuretic changes  - RUQ US to check for ascites - does not appear to have much on exam  - Start rifaximin for HE and potentially SIBO  - EGD for variceal screening  - Colonoscopy for CRC screening and diarrhea (random bx)  - Recommend further weight loss for potential hernia repair  - Continue to completely abstain from alcohol   - Follow up with cardiology for CHF - appears to be improving     Orders Placed This Encounter   Procedures     US Abdomen Limited     Comprehensive metabolic panel (BMP + Alb, Alk Phos, ALT, AST, Total. Bili, TP)     CBC with platelets     INR     AFP tumor marker     RTC 3 months.    Sweetie Bain MD MS  Hepatology/Liver Transplant  [facility]                Sweetie Bain MD

## 2021-09-20 NOTE — PROGRESS NOTES
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      Initial History:     Patient states that he was diagnosed with liver disease and cirrhosis in September 2020.  He was admitted to the hospital at that time he was found to have extensive superior mesenteric venous thrombosis, complicated by ischemic bowel and had to undergo an exploratory laparotomy with small bowel resection.  He was placed on Eliquis postoperatively.  During surgery, he was found to have some ascites that was evacuated.     Since then he has had trouble with incisional hernias, and rectus diastasis.  He was seen by his general surgeon in March where he was noted to have a MELD of 18.  He was then sent to [facility] to be seen by surgery for consideration of incisional hernia repair.    Patient complains of episodes of leakage of fluid from his incision that happens intermittently.  This fluid is usually blood tinged but sometimes yellow. He has never had a paracentesis.  He is on 20 mg of furosemide daily, that was started because of his heart failure and his pedal edema.  He has also noted that every 2 to 3 days he has watery bowel movements, up to 12 times a day.  In between there was episodes he has about 2-3 soft bowel movements per day.  He denies any fevers or chills, abdominal pain, melena, or hematochezia.    His wife is also noted that he is having some memory issues.  He has not had any major episodes of confusion, and has not been admitted to the hospital with hepatic encephalopathy.    Patient denies any prior history  of liver disease. He has had abnormal liver enzymes and low platelets as far back as 2011. Past notes from his PCP had also documented excessive alcohol use, as well as, a hospitalization for overdose of Vicodin.     Interval Events:  - Reports regular confusion, not retaining information.   - Needs to schedule an appt with cardiology, but had recent TTE that showed improvement on medications  - Has many frequent bowel movements - FI and nocturnal bowel movements  - Underwent paracentesis 6/26 - 600 ml removed - 231 WBC, TP 1.4, Albumin 0.8.   - Fluid is better with addition of spironolactone, but now having breast swelling  - Not flower to work because of pain with the hernia    ROS: 14 point ROS negative except for positives noted in HPI.    PMHx:  Past Medical History:   Diagnosis Date     Diabetes (H)     Type 2 DM, Insulin Use.    -- Type II diabetes Mellitus  -- Hypertension  -- Hyperlipidemia  -- CHF  -- GISELLE  -- SMV thrombus    PSHx:  No past surgical history on file.   -- Exploratory laparotomy with small bowel resection 9/2020    FamHx:  Family History   Problem Relation Age of Onset     Substance Abuse Maternal Grandmother      Substance Abuse Brother      No family history of liver disease, liver cancer    SocHx:  Social History     Socioeconomic History     Marital status:      Spouse name: Not on file     Number of children: Not on file     Years of education: Not on file     Highest education level: Not on file   Occupational History     Not on file   Tobacco Use     Smoking status: Former Smoker     Types: Cigarettes     Smokeless tobacco: Never Used   Substance and Sexual Activity     Alcohol use: Not Currently     Comment: Quit 9/21/2020     Drug use: Never     Sexual activity: Not on file   Other Topics Concern     Parent/sibling w/ CABG, MI or angioplasty before 65F 55M? Not Asked   Social History Narrative     Not on file     Social Determinants of Health     Financial Resource Strain:       Difficulty of Paying Living Expenses:    Food Insecurity:      Worried About Running Out of Food in the Last Year:      Ran Out of Food in the Last Year:    Transportation Needs:      Lack of Transportation (Medical):      Lack of Transportation (Non-Medical):    Physical Activity:      Days of Exercise per Week:      Minutes of Exercise per Session:    Stress:      Feeling of Stress :    Social Connections:      Frequency of Communication with Friends and Family:      Frequency of Social Gatherings with Friends and Family:      Attends Religion Services:      Active Member of Clubs or Organizations:      Attends Club or Organization Meetings:      Marital Status:    Intimate Partner Violence:      Fear of Current or Ex-Partner:      Emotionally Abused:      Physically Abused:      Sexually Abused:    Patient previously worked with with [employer].  He is currently on disability due to pain from his hernias and discomfort.  Admits to significant alcohol use.  Used to drink about a liter of whiskey per day for about 20 years.  Denies any hospitalizations for intoxication or withdrawal, denies any DUIs, denies any prior treatments.  Last drink was 9/21/2020.  Denies any cravings, and is not in any treatments program at the moment.  Denies any tobacco use, denies any IV drug use.    Medications:  Current Outpatient Medications   Medication     amLODIPine-benazepril (LOTREL) 10-40 MG capsule     apixaban ANTICOAGULANT (ELIQUIS) 5 MG tablet     eplerenone (INSPRA) 25 MG tablet     furosemide (LASIX) 20 MG tablet     gabapentin (NEURONTIN) 600 MG tablet     insulin detemir (LEVEMIR PEN) 100 UNIT/ML pen     insulin pen needle (B-D U/F) 31G X 5 MM miscellaneous     lisinopril (ZESTRIL) 5 MG tablet     metFORMIN (GLUCOPHAGE) 500 MG tablet     metoprolol succinate ER (TOPROL-XL) 100 MG 24 hr tablet     Multiple Vitamins-Minerals (SUPER THERA DAMARIS M) TABS     omeprazole (PRILOSEC) 20 MG DR capsule     ONETOUCH ULTRA test  "strip     rifaximin (XIFAXAN) 550 MG TABS tablet     simvastatin (ZOCOR) 20 MG tablet     No current facility-administered medications for this visit.     No OTCs, herbals    Allergies:  Allergies   Allergen Reactions     Minocycline Hives       Objective:  /71 (BP Location: Right arm, Patient Position: Sitting, Cuff Size: Adult Large)   Pulse 63   Temp 98.3  F (36.8  C) (Oral)   Resp 20   Ht 1.905 m (6' 3\")   Wt 131.2 kg (289 lb 4.8 oz)   SpO2 98%   BMI 36.16 kg/m    Constitutional: pleasant man in NAD  Eyes: non icteric  Respiratory: Normal respiratory excursion   MSK: normal range of motion of visualized extremities  Abd: Midline hernia present, reducible, difficult to appreciate ascites  Skin: No jaundice  Psychiatric: normal mood and orientation    Labs:  Last Comprehensive Metabolic Panel:  Sodium   Date Value Ref Range Status   09/20/2021 139 133 - 144 mmol/L Final   06/15/2021 136 133 - 144 mmol/L Final     Potassium   Date Value Ref Range Status   09/20/2021 4.2 3.4 - 5.3 mmol/L Final   06/15/2021 4.1 3.4 - 5.3 mmol/L Final     Chloride   Date Value Ref Range Status   09/20/2021 109 94 - 109 mmol/L Final   06/15/2021 112 (H) 94 - 109 mmol/L Final     Carbon Dioxide   Date Value Ref Range Status   06/15/2021 25 20 - 32 mmol/L Final     Carbon Dioxide (CO2)   Date Value Ref Range Status   09/20/2021 24 20 - 32 mmol/L Final     Anion Gap   Date Value Ref Range Status   09/20/2021 6 3 - 14 mmol/L Final   06/15/2021 <1 (L) 3 - 14 mmol/L Final     Glucose   Date Value Ref Range Status   09/20/2021 214 (H) 70 - 99 mg/dL Final   06/15/2021 123 (H) 70 - 99 mg/dL Final     Urea Nitrogen   Date Value Ref Range Status   09/20/2021 12 7 - 30 mg/dL Final   06/15/2021 8 7 - 30 mg/dL Final     Creatinine   Date Value Ref Range Status   09/20/2021 0.67 0.66 - 1.25 mg/dL Final   06/15/2021 0.63 (L) 0.66 - 1.25 mg/dL Final     GFR Estimate   Date Value Ref Range Status   09/20/2021 >90 >60 mL/min/1.73m2 Final     " Comment:     As of July 11, 2021, eGFR is calculated by the CKD-EPI creatinine equation, without race adjustment. eGFR can be influenced by muscle mass, exercise, and diet. The reported eGFR is an estimation only and is only applicable if the renal function is stable.   06/15/2021 >90 >60 mL/min/[1.73_m2] Final     Comment:     Non  GFR Calc  Starting 12/18/2018, serum creatinine based estimated GFR (eGFR) will be   calculated using the Chronic Kidney Disease Epidemiology Collaboration   (CKD-EPI) equation.       Calcium   Date Value Ref Range Status   09/20/2021 9.2 8.5 - 10.1 mg/dL Final   06/15/2021 8.9 8.5 - 10.1 mg/dL Final     Bilirubin Total   Date Value Ref Range Status   09/20/2021 0.9 0.2 - 1.3 mg/dL Final   06/15/2021 0.8 0.2 - 1.3 mg/dL Final     Alkaline Phosphatase   Date Value Ref Range Status   09/20/2021 95 40 - 150 U/L Final   06/15/2021 126 40 - 150 U/L Final     ALT   Date Value Ref Range Status   09/20/2021 39 0 - 70 U/L Final   06/15/2021 36 0 - 70 U/L Final     AST   Date Value Ref Range Status   09/20/2021 29 0 - 45 U/L Final   06/15/2021 34 0 - 45 U/L Final       Lab Results   Component Value Date    WBC 4.7 06/15/2021     Lab Results   Component Value Date    RBC 3.89 06/15/2021     Lab Results   Component Value Date    HGB 8.6 06/15/2021     Lab Results   Component Value Date    HCT 31.0 06/15/2021     Lab Results   Component Value Date    MCV 80 06/15/2021     Lab Results   Component Value Date    MCH 22.1 06/15/2021     Lab Results   Component Value Date    MCHC 27.7 06/15/2021     Lab Results   Component Value Date    RDW 17.3 06/15/2021     Lab Results   Component Value Date     06/15/2021       INR   Date Value Ref Range Status   09/20/2021 1.61 (H) 0.85 - 1.15 Final     Comment:     Effective 7/11/2021, the reference range for this assay has changed.   06/15/2021 1.58 (H) 0.86 - 1.14 Final        MELD-Na score: 12 at 9/20/2021 12:27 PM  MELD score: 12 at  9/20/2021 12:27 PM  Calculated from:  Serum Creatinine: 0.67 mg/dL (Using min of 1 mg/dL) at 9/20/2021 12:27 PM  Serum Sodium: 139 mmol/L (Using max of 137 mmol/L) at 9/20/2021 12:27 PM  Total Bilirubin: 0.9 mg/dL (Using min of 1 mg/dL) at 9/20/2021 12:27 PM  INR(ratio): 1.61 at 9/20/2021 12:27 PM  Age: 50 years    Imaging:    CT 4/2021    LOWER CHEST: The lung bases are clear.  HEPATOBILIARY: Cirrhotic appearance of the liver. No worrisome liver lesions within the limitations of a single phase exam. No biliary duct dilatation. No calcified gallstones.  PANCREAS: No significant mass, duct dilatation, or inflammatory change.  SPLEEN: Splenomegaly, measuring 19 cm in the craniocaudal dimension.  ADRENAL GLANDS: No significant nodules.  KIDNEYS/BLADDER: No significant mass, stone, or hydronephrosis. No bladder wall thickening.  BOWEL: Postoperative changes of small bowel resection with anastomosis in the right midabdomen. Diastases of the abdominis rectus musculature with broad-based bulging of the anterior abdominal wall fascia with multiple associated nonobstructed small bowel loops. The bowel is nondistended without inflammatory change. No evidence of appendicitis.  LYMPH NODES: No lymphadenopathy.  VASCULATURE: Few tiny gastroesophageal varices.  PELVIC ORGANS: Unremarkable.  ADDITIONAL FINDINGS: Mesenteric edema and small volume of ascites.  MUSCULOSKELETAL: Small fat-containing right inguinal hernia. Mild degenerative changes of the hips and visualized thoracolumbar spine.  IMPRESSION:   1.  Cirrhosis with portal hypertension as evidenced by splenomegaly, small volume ascites and tiny gastroesophageal varices.  2.  Diastases of the abdominis rectus musculature with broad-based bulging of the ventral abdominal wall fascia.  3.  Small fat-containing right inguinal hernia.     Endoscopy: EGD/colon > 20 years ago    Independently reviewed labs and imaging.     Assessment/Plan: Mr. 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     Was originally referred for LT evaluation - given his CHF and low MELD, did not pursue this. He is having significant pain related to his complex hernia, discussed he would need to have resolution of his ascites, and improvement in his cardiac status to be considered a candidate for surgery. Discussed with his weight he would at high risk for recurrence. TTE showed improvement in his cardiac function with further sobriety and medications.     He is having chronic diarrhea, could be related to his bowel resection, SIBO, microscopic colitis.     Having some confusion, potentially HE    - Continue lasix 20 mg BID  - Stop spironolactone due to gynecomastia and start eplerenone 25 mg daily  - Repeat BMP next week after diuretic changes  - RUQ US to check for ascites - does not appear to have much on exam  - Start rifaximin for HE and potentially SIBO  - EGD for variceal screening  - Colonoscopy for CRC screening and diarrhea (random bx)  - Recommend further weight loss for potential hernia repair  - Continue to completely abstain from alcohol   - Follow up with cardiology for CHF - appears to be improving     Orders Placed This Encounter   Procedures     US Abdomen Limited     Comprehensive metabolic panel (BMP + Alb, Alk Phos, ALT, AST, Total. Bili, TP)     CBC with platelets     INR     AFP tumor marker     RTC 3 months.    Sweetie Bain MD MS  Hepatology/Liver Transplant  [facility]

## 2021-09-20 NOTE — PATIENT INSTRUCTIONS
- Get your labs done today  - Schedule abdominal ultrasound to check for fluid in you abdomen  - Let me know when you see your heart doctor, if your heart has shown improvement and your ascites is gone we can consider further discussions about fixing the hernia  - If your labs are stable, we will change your water pill to one that is not associated with breast pain  - We will schedule you for an upper endoscopy and colonoscopy  - We are starting a medication called rifaximin that may help with the confusion and potentially the diarrhea

## 2021-09-20 NOTE — LETTER
9/20/2021         RE: Bliar Oliver  [address] [facility] [address]        Dear Colleague,    Thank you for referring your patient, Blair Oliver, to the [facility] [place]. Please see a copy of my visit note below.    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      Initial History:     Patient states that he was diagnosed with liver disease and cirrhosis in September 2020.  He was admitted to the hospital at that time he was found to have extensive superior mesenteric venous thrombosis, complicated by ischemic bowel and had to undergo an exploratory laparotomy with small bowel resection.  He was placed on Eliquis postoperatively.  During surgery, he was found to have some ascites that was evacuated.     Since then he has had trouble with incisional hernias, and rectus diastasis.  He was seen by his general surgeon in March where he was noted to have a MELD of 18.  He was then sent to [facility] to be seen by surgery for consideration of incisional hernia repair.    Patient complains of episodes of leakage of fluid from his incision that happens intermittently.  This fluid is usually blood tinged but sometimes yellow. He has never had a paracentesis.  He is on 20 mg of furosemide daily, that was started because of his heart failure and his pedal edema.  He has also noted that every 2 to 3 days he has watery bowel movements, up to 12 times a day.  In between there was episodes he has about 2-3 soft bowel movements per day.  He denies any fevers or  chills, abdominal pain, melena, or hematochezia.    His wife is also noted that he is having some memory issues.  He has not had any major episodes of confusion, and has not been admitted to the hospital with hepatic encephalopathy.    Patient denies any prior history of liver disease. He has had abnormal liver enzymes and low platelets as far back as 2011. Past notes from his PCP had also documented excessive alcohol use, as well as, a hospitalization for overdose of Vicodin.     Interval Events:  - Reports regular confusion, not retaining information.   - Needs to schedule an appt with cardiology, but had recent TTE that showed improvement on medications  - Has many frequent bowel movements - FI and nocturnal bowel movements  - Underwent paracentesis 6/26 - 600 ml removed - 231 WBC, TP 1.4, Albumin 0.8.   - Fluid is better with addition of spironolactone, but now having breast swelling  - Not flower to work because of pain with the hernia    ROS: 14 point ROS negative except for positives noted in HPI.    PMHx:  Past Medical History:   Diagnosis Date     Diabetes (H)     Type 2 DM, Insulin Use.    -- Type II diabetes Mellitus  -- Hypertension  -- Hyperlipidemia  -- CHF  -- GISELLE  -- SMV thrombus    PSHx:  No past surgical history on file.   -- Exploratory laparotomy with small bowel resection 9/2020    FamHx:  Family History   Problem Relation Age of Onset     Substance Abuse Maternal Grandmother      Substance Abuse Brother      No family history of liver disease, liver cancer    SocHx:  Social History     Socioeconomic History     Marital status:      Spouse name: Not on file     Number of children: Not on file     Years of education: Not on file     Highest education level: Not on file   Occupational History     Not on file   Tobacco Use     Smoking status: Former Smoker     Types: Cigarettes     Smokeless tobacco: Never Used   Substance and Sexual Activity     Alcohol use: Not Currently     Comment: Quit  9/21/2020     Drug use: Never     Sexual activity: Not on file   Other Topics Concern     Parent/sibling w/ CABG, MI or angioplasty before 65F 55M? Not Asked   Social History Narrative     Not on file     Social Determinants of Health     Financial Resource Strain:      Difficulty of Paying Living Expenses:    Food Insecurity:      Worried About Running Out of Food in the Last Year:      Ran Out of Food in the Last Year:    Transportation Needs:      Lack of Transportation (Medical):      Lack of Transportation (Non-Medical):    Physical Activity:      Days of Exercise per Week:      Minutes of Exercise per Session:    Stress:      Feeling of Stress :    Social Connections:      Frequency of Communication with Friends and Family:      Frequency of Social Gatherings with Friends and Family:      Attends Protestant Services:      Active Member of Clubs or Organizations:      Attends Club or Organization Meetings:      Marital Status:    Intimate Partner Violence:      Fear of Current or Ex-Partner:      Emotionally Abused:      Physically Abused:      Sexually Abused:    Patient previously worked with with [employer].  He is currently on disability due to pain from his hernias and discomfort.  Admits to significant alcohol use.  Used to drink about a liter of whiskey per day for about 20 years.  Denies any hospitalizations for intoxication or withdrawal, denies any DUIs, denies any prior treatments.  Last drink was 9/21/2020.  Denies any cravings, and is not in any treatments program at the moment.  Denies any tobacco use, denies any IV drug use.    Medications:  Current Outpatient Medications   Medication     amLODIPine-benazepril (LOTREL) 10-40 MG capsule     apixaban ANTICOAGULANT (ELIQUIS) 5 MG tablet     eplerenone (INSPRA) 25 MG tablet     furosemide (LASIX) 20 MG tablet     gabapentin (NEURONTIN) 600 MG tablet     insulin detemir (LEVEMIR PEN) 100 UNIT/ML pen     insulin pen needle (B-D U/F) 31G X 5 MM  "miscellaneous     lisinopril (ZESTRIL) 5 MG tablet     metFORMIN (GLUCOPHAGE) 500 MG tablet     metoprolol succinate ER (TOPROL-XL) 100 MG 24 hr tablet     Multiple Vitamins-Minerals (SUPER THERA ADMARIS M) TABS     omeprazole (PRILOSEC) 20 MG DR capsule     ONETOUCH ULTRA test strip     rifaximin (XIFAXAN) 550 MG TABS tablet     simvastatin (ZOCOR) 20 MG tablet     No current facility-administered medications for this visit.     No OTCs, herbals    Allergies:  Allergies   Allergen Reactions     Minocycline Hives       Objective:  /71 (BP Location: Right arm, Patient Position: Sitting, Cuff Size: Adult Large)   Pulse 63   Temp 98.3  F (36.8  C) (Oral)   Resp 20   Ht 1.905 m (6' 3\")   Wt 131.2 kg (289 lb 4.8 oz)   SpO2 98%   BMI 36.16 kg/m    Constitutional: pleasant man in NAD  Eyes: non icteric  Respiratory: Normal respiratory excursion   MSK: normal range of motion of visualized extremities  Abd: Midline hernia present, reducible, difficult to appreciate ascites  Skin: No jaundice  Psychiatric: normal mood and orientation    Labs:  Last Comprehensive Metabolic Panel:  Sodium   Date Value Ref Range Status   09/20/2021 139 133 - 144 mmol/L Final   06/15/2021 136 133 - 144 mmol/L Final     Potassium   Date Value Ref Range Status   09/20/2021 4.2 3.4 - 5.3 mmol/L Final   06/15/2021 4.1 3.4 - 5.3 mmol/L Final     Chloride   Date Value Ref Range Status   09/20/2021 109 94 - 109 mmol/L Final   06/15/2021 112 (H) 94 - 109 mmol/L Final     Carbon Dioxide   Date Value Ref Range Status   06/15/2021 25 20 - 32 mmol/L Final     Carbon Dioxide (CO2)   Date Value Ref Range Status   09/20/2021 24 20 - 32 mmol/L Final     Anion Gap   Date Value Ref Range Status   09/20/2021 6 3 - 14 mmol/L Final   06/15/2021 <1 (L) 3 - 14 mmol/L Final     Glucose   Date Value Ref Range Status   09/20/2021 214 (H) 70 - 99 mg/dL Final   06/15/2021 123 (H) 70 - 99 mg/dL Final     Urea Nitrogen   Date Value Ref Range Status   09/20/2021 12 7 " - 30 mg/dL Final   06/15/2021 8 7 - 30 mg/dL Final     Creatinine   Date Value Ref Range Status   09/20/2021 0.67 0.66 - 1.25 mg/dL Final   06/15/2021 0.63 (L) 0.66 - 1.25 mg/dL Final     GFR Estimate   Date Value Ref Range Status   09/20/2021 >90 >60 mL/min/1.73m2 Final     Comment:     As of July 11, 2021, eGFR is calculated by the CKD-EPI creatinine equation, without race adjustment. eGFR can be influenced by muscle mass, exercise, and diet. The reported eGFR is an estimation only and is only applicable if the renal function is stable.   06/15/2021 >90 >60 mL/min/[1.73_m2] Final     Comment:     Non  GFR Calc  Starting 12/18/2018, serum creatinine based estimated GFR (eGFR) will be   calculated using the Chronic Kidney Disease Epidemiology Collaboration   (CKD-EPI) equation.       Calcium   Date Value Ref Range Status   09/20/2021 9.2 8.5 - 10.1 mg/dL Final   06/15/2021 8.9 8.5 - 10.1 mg/dL Final     Bilirubin Total   Date Value Ref Range Status   09/20/2021 0.9 0.2 - 1.3 mg/dL Final   06/15/2021 0.8 0.2 - 1.3 mg/dL Final     Alkaline Phosphatase   Date Value Ref Range Status   09/20/2021 95 40 - 150 U/L Final   06/15/2021 126 40 - 150 U/L Final     ALT   Date Value Ref Range Status   09/20/2021 39 0 - 70 U/L Final   06/15/2021 36 0 - 70 U/L Final     AST   Date Value Ref Range Status   09/20/2021 29 0 - 45 U/L Final   06/15/2021 34 0 - 45 U/L Final       Lab Results   Component Value Date    WBC 4.7 06/15/2021     Lab Results   Component Value Date    RBC 3.89 06/15/2021     Lab Results   Component Value Date    HGB 8.6 06/15/2021     Lab Results   Component Value Date    HCT 31.0 06/15/2021     Lab Results   Component Value Date    MCV 80 06/15/2021     Lab Results   Component Value Date    MCH 22.1 06/15/2021     Lab Results   Component Value Date    MCHC 27.7 06/15/2021     Lab Results   Component Value Date    RDW 17.3 06/15/2021     Lab Results   Component Value Date     06/15/2021        INR   Date Value Ref Range Status   09/20/2021 1.61 (H) 0.85 - 1.15 Final     Comment:     Effective 7/11/2021, the reference range for this assay has changed.   06/15/2021 1.58 (H) 0.86 - 1.14 Final        MELD-Na score: 12 at 9/20/2021 12:27 PM  MELD score: 12 at 9/20/2021 12:27 PM  Calculated from:  Serum Creatinine: 0.67 mg/dL (Using min of 1 mg/dL) at 9/20/2021 12:27 PM  Serum Sodium: 139 mmol/L (Using max of 137 mmol/L) at 9/20/2021 12:27 PM  Total Bilirubin: 0.9 mg/dL (Using min of 1 mg/dL) at 9/20/2021 12:27 PM  INR(ratio): 1.61 at 9/20/2021 12:27 PM  Age: 50 years    Imaging:    CT 4/2021    LOWER CHEST: The lung bases are clear.  HEPATOBILIARY: Cirrhotic appearance of the liver. No worrisome liver lesions within the limitations of a single phase exam. No biliary duct dilatation. No calcified gallstones.  PANCREAS: No significant mass, duct dilatation, or inflammatory change.  SPLEEN: Splenomegaly, measuring 19 cm in the craniocaudal dimension.  ADRENAL GLANDS: No significant nodules.  KIDNEYS/BLADDER: No significant mass, stone, or hydronephrosis. No bladder wall thickening.  BOWEL: Postoperative changes of small bowel resection with anastomosis in the right midabdomen. Diastases of the abdominis rectus musculature with broad-based bulging of the anterior abdominal wall fascia with multiple associated nonobstructed small bowel loops. The bowel is nondistended without inflammatory change. No evidence of appendicitis.  LYMPH NODES: No lymphadenopathy.  VASCULATURE: Few tiny gastroesophageal varices.  PELVIC ORGANS: Unremarkable.  ADDITIONAL FINDINGS: Mesenteric edema and small volume of ascites.  MUSCULOSKELETAL: Small fat-containing right inguinal hernia. Mild degenerative changes of the hips and visualized thoracolumbar spine.  IMPRESSION:   1.  Cirrhosis with portal hypertension as evidenced by splenomegaly, small volume ascites and tiny gastroesophageal varices.  2.  Diastases of the abdominis  rectus musculature with broad-based bulging of the ventral abdominal wall fascia.  3.  Small fat-containing right inguinal hernia.     Endoscopy: EGD/colon > 20 years ago    Independently reviewed labs and imaging.     Assessment/Plan: Mr. 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     Was originally referred for LT evaluation - given his CHF and low MELD, did not pursue this. He is having significant pain related to his complex hernia, discussed he would need to have resolution of his ascites, and improvement in his cardiac status to be considered a candidate for surgery. Discussed with his weight he would at high risk for recurrence. TTE showed improvement in his cardiac function with further sobriety and medications.     He is having chronic diarrhea, could be related to his bowel resection, SIBO, microscopic colitis.     Having some confusion, potentially HE    - Continue lasix 20 mg BID  - Stop spironolactone due to gynecomastia and start eplerenone 25 mg daily  - Repeat BMP next week after diuretic changes  - RUQ US to check for ascites - does not appear to have much on exam  - Start rifaximin for HE and potentially SIBO  - EGD for variceal screening  - Colonoscopy for CRC screening and diarrhea (random bx)  - Recommend further weight loss for potential hernia repair  - Continue to completely abstain from alcohol   - Follow up with cardiology for CHF - appears to be improving     Orders Placed This Encounter   Procedures     US Abdomen Limited     Comprehensive metabolic panel (BMP + Alb, Alk Phos, ALT, AST, Total. Bili, TP)     CBC with platelets     INR     AFP tumor marker     RTC 3 months.    Sweetie Bain MD MS  Hepatology/Liver  Transplant  [facility]              Again, thank you for allowing me to participate in the care of your patient.        Sincerely,        Sweetie Bain MD

## 2021-09-20 NOTE — NURSING NOTE
"Chief Complaint   Patient presents with     RECHECK     Alcohol cirrhosis        Vital signs:  Temp: 98.3  F (36.8  C) Temp src: Oral BP: 117/71 Pulse: 63   Resp: 20 SpO2: 98 %     Height: 190.5 cm (6' 3\") Weight: 131.2 kg (289 lb 4.8 oz)  Estimated body mass index is 36.16 kg/m  as calculated from the following:    Height as of this encounter: 1.905 m (6' 3\").    Weight as of this encounter: 131.2 kg (289 lb 4.8 oz).        Opal De León, [facility]  9/20/2021 11:16 AM      "

## 2021-10-04 ENCOUNTER — ANCILLARY PROCEDURE (OUTPATIENT)
Dept: ULTRASOUND IMAGING | Facility: CLINIC | Age: 51
End: 2021-10-04
Attending: STUDENT IN AN ORGANIZED HEALTH CARE EDUCATION/TRAINING PROGRAM
Payer: COMMERCIAL

## 2021-10-04 DIAGNOSIS — K70.31 ALCOHOLIC CIRRHOSIS OF LIVER WITH ASCITES (H): ICD-10-CM

## 2021-10-04 PROCEDURE — 76705 ECHO EXAM OF ABDOMEN: CPT | Mod: GC | Performed by: RADIOLOGY

## 2021-10-06 ENCOUNTER — LAB (OUTPATIENT)
Dept: LAB | Facility: CLINIC | Age: 51
End: 2021-10-06
Payer: COMMERCIAL

## 2021-10-06 DIAGNOSIS — K70.31 ALCOHOLIC CIRRHOSIS OF LIVER WITH ASCITES (H): ICD-10-CM

## 2021-10-06 LAB
ANION GAP SERPL CALCULATED.3IONS-SCNC: 6 MMOL/L (ref 3–14)
BUN SERPL-MCNC: 11 MG/DL (ref 7–30)
CALCIUM SERPL-MCNC: 9 MG/DL (ref 8.5–10.1)
CHLORIDE BLD-SCNC: 104 MMOL/L (ref 94–109)
CO2 SERPL-SCNC: 26 MMOL/L (ref 20–32)
CREAT SERPL-MCNC: 0.7 MG/DL (ref 0.66–1.25)
GFR SERPL CREATININE-BSD FRML MDRD: >90 ML/MIN/1.73M2
GLUCOSE BLD-MCNC: 184 MG/DL (ref 70–99)
POTASSIUM BLD-SCNC: 4.5 MMOL/L (ref 3.4–5.3)
SODIUM SERPL-SCNC: 136 MMOL/L (ref 133–144)

## 2021-10-06 PROCEDURE — 36415 COLL VENOUS BLD VENIPUNCTURE: CPT

## 2021-10-06 PROCEDURE — 80048 BASIC METABOLIC PNL TOTAL CA: CPT

## 2021-10-08 ENCOUNTER — TELEPHONE (OUTPATIENT)
Dept: GASTROENTEROLOGY | Facility: CLINIC | Age: 51
End: 2021-10-08
Payer: COMMERCIAL

## 2021-10-08 DIAGNOSIS — Z11.59 ENCOUNTER FOR SCREENING FOR OTHER VIRAL DISEASES: ICD-10-CM

## 2021-10-08 NOTE — TELEPHONE ENCOUNTER
Screening Questions  1. Are you active on mychart? YES    2. What insurance is in the chart? BCBS    2.  Ordering/Referring Provider: ARON THOMPSON MD    3. BMI 34.4    4. Do you have any pulmonary issues? Yes: N    5. Have you had a heart, lung, or liver transplant? N    6. Are you currently on dialysis or have chronic kidney disease? N    7. Have you had a stroke or Transient ischemic atttack (TIA) within 6 months? N    8. In the past 6 months, have you had any heart related issues including cardiomyopathy or heart attack? N      If yes, did it require cardiac stenting or other implantable device?N      9. Do you have any implantable devices in your body (pacemaker, defib, LVAD)? N    10. Do you take nitroglycerin? If yes, how often? N    11. Are you currently taking any blood thinners?Y    12. Are you a diabetic? Y    13. (Females) Are you currently pregnant? N  If yes, how many weeks?    15. Are you taking any prescription pain medications on a routine schedule? N If yes, MAC sedation.    16. Do you have any chemical dependencies such as alcohol, street drugs, or methadone? N If yes, MAC sedation.    17. Do you have any history of post-traumatic stress syndrome, severe anxiety or history of psychosis? N    18. Do you transfer independently? Y    19.  Do you have any issues with constipation? N    20. Preferred Pharmacy for Pre Prescription [facility]    Scheduling Details    Which Colonoscopy Prep was Sent?: GPREP  Procedure Scheduled: [email or web address].COLON  Provider/Surgeon: JAY  Date of Procedure: 11/04 11ARRIVAL  [place] [facility] [place]  Caller (Please ask for phone number if not scheduled by patient): HALINA      Sedation Type: MAC  Conscious Sedation- Needs  for 6 hours after the procedure  MAC/General-Needs  for 24 hours after procedure    Pre-op Required at [facility] [place], [place] and OR for MAC sedation:   (if yes advise patient they will need a pre-op prior to procedure)      Is patient on  blood thinners? -YES (If yes- inform patient to follow up with PCP or provider for follow up instructions)     Informed patient they will need an adult  Y  Cannot take any type of public or medical transportation alone    [facility]: ESTELLE 11/01 145PM    Confirmed Nurse will call to complete assessment Y    Additional comments:

## 2021-10-20 ENCOUNTER — TELEPHONE (OUTPATIENT)
Dept: GASTROENTEROLOGY | Facility: CLINIC | Age: 51
End: 2021-10-20

## 2021-10-20 DIAGNOSIS — K70.31 ALCOHOLIC CIRRHOSIS OF LIVER WITH ASCITES (H): Primary | ICD-10-CM

## 2021-10-20 DIAGNOSIS — R19.7 DIARRHEA, UNSPECIFIED TYPE: ICD-10-CM

## 2021-10-20 RX ORDER — BISACODYL 5 MG
TABLET, DELAYED RELEASE (ENTERIC COATED) ORAL
Qty: 4 TABLET | Refills: 0 | Status: SHIPPED | OUTPATIENT
Start: 2021-10-20 | End: 2021-12-09

## 2021-10-20 NOTE — TELEPHONE ENCOUNTER
Pre-visit planning for upcoming procedure.     Patient scheduled for colonoscopy/EGD on 11/4/21    Covid test scheduled: 11/1/21    Arrival time: 1100    Facility location: [facility]    Sedation type: MAC    Indication for procedure: diarrhea, variceal screening    Anticoagulations? Eliquis Holding interval of 2 days    Bowel prep recommendation: Golytely d/t insulin use    Golytely prep sent to [id number] - [place], [place] - [address] AT [facility]. Prep instructions sent via [employer]You at time of scheduling.      Gretta Yen RN

## 2021-10-21 NOTE — TELEPHONE ENCOUNTER
Attempted to contact patient regarding upcoming colonoscopy/EGD procedure on 11/4/21 for pre assessment questions. No answer.     Left message to return call to [phone] #2    Patient is on Wood Yen RN

## 2021-10-22 ENCOUNTER — ANCILLARY PROCEDURE (OUTPATIENT)
Dept: MRI IMAGING | Facility: CLINIC | Age: 51
End: 2021-10-22
Attending: STUDENT IN AN ORGANIZED HEALTH CARE EDUCATION/TRAINING PROGRAM
Payer: COMMERCIAL

## 2021-10-22 DIAGNOSIS — K70.31 ALCOHOLIC CIRRHOSIS OF LIVER WITH ASCITES (H): ICD-10-CM

## 2021-10-22 DIAGNOSIS — K76.9 LIVER LESION: ICD-10-CM

## 2021-10-22 PROCEDURE — 74183 MRI ABD W/O CNTR FLWD CNTR: CPT | Performed by: RADIOLOGY

## 2021-10-22 PROCEDURE — A9585 GADOBUTROL INJECTION: HCPCS | Performed by: RADIOLOGY

## 2021-10-22 RX ORDER — GADOBUTROL 604.72 MG/ML
10 INJECTION INTRAVENOUS ONCE
Status: COMPLETED | OUTPATIENT
Start: 2021-10-22 | End: 2021-10-22

## 2021-10-22 RX ADMIN — GADOBUTROL 10 ML: 604.72 INJECTION INTRAVENOUS at 14:48

## 2021-10-25 NOTE — TELEPHONE ENCOUNTER
Writer reviewed pre-assessment questions with patient prior to upcoming colonoscopy/EGD on 11.4.2021.      Covid test scheduled: 11.1.2021    Arrival time: 1100    Facility location: [facility]    Sedation type: MAC    Electronic Implantable devices? No    Blood thinners/Antiplatelet medication? Eliquis.  Pt instructed to f/u with PCP re: Eliquis dosing prior to procedure.    Reviewed Golytely prep instructions with patient.  No fiber/iron supplements or foods that contain nuts/seeds 7 days prior to procedure.     Designated  policy reviewed with patient.     Patient verbalized understanding.  No further questions or concerns.    Vanna Terrell RN

## 2021-11-01 ENCOUNTER — LAB (OUTPATIENT)
Dept: LAB | Facility: CLINIC | Age: 51
End: 2021-11-01
Payer: COMMERCIAL

## 2021-11-01 DIAGNOSIS — K76.89 LIVER NODULE: Primary | ICD-10-CM

## 2021-11-01 DIAGNOSIS — Z11.59 ENCOUNTER FOR SCREENING FOR OTHER VIRAL DISEASES: ICD-10-CM

## 2021-11-01 PROCEDURE — U0003 INFECTIOUS AGENT DETECTION BY NUCLEIC ACID (DNA OR RNA); SEVERE ACUTE RESPIRATORY SYNDROME CORONAVIRUS 2 (SARS-COV-2) (CORONAVIRUS DISEASE [COVID-19]), AMPLIFIED PROBE TECHNIQUE, MAKING USE OF HIGH THROUGHPUT TECHNOLOGIES AS DESCRIBED BY CMS-2020-01-R: HCPCS

## 2021-11-01 PROCEDURE — U0005 INFEC AGEN DETEC AMPLI PROBE: HCPCS

## 2021-11-02 ENCOUNTER — TELEPHONE (OUTPATIENT)
Dept: LAB | Facility: CLINIC | Age: 51
End: 2021-11-02

## 2021-11-02 LAB — SARS-COV-2 RNA RESP QL NAA+PROBE: POSITIVE

## 2021-11-02 NOTE — TELEPHONE ENCOUNTER
"-Coronavirus (COVID-19) Notification    Caller Name (Patient, parent, daughter/son, grandparent, etc)  Patient    Reason for call  Notify of Positive Coronavirus (COVID-19) lab results, assess symptoms,  review  [facility] recommendations    Lab Result    Lab test:  2019-nCoV rRt-PCR or SARS-CoV-2 PCR    Oropharyngeal AND/OR nasopharyngeal swabs is POSITIVE for 2019-nCoV RNA/SARS-COV-2 PCR (COVID-19 virus)    RN Recommendations/Instructions per [facility] Coronavirus COVID-19 recommendations    Brief introduction script  Introduce self then review script:  \"I am calling on behalf of [employer].  We were notified that your Coronavirus test (COVID-19) for was POSITIVE for the virus.  I have some information to relay to you but first I wanted to mention that the [place] Dept of Health will be contacting you shortly [it's possible MD already called Patient] to talk to you more about how you are feeling and other people you have had contact with who might now also have the virus.  Also,  [facility] is Partnering with the [facility] for Covid-19 research, you may be contacted directly by research staff.\"    Assessment (Inquire about Patient's current symptoms)   Assessment   Current Symptoms at time of phone call: (if no symptoms, document No symptoms] No symptoms   Symptoms onset (if applicable) N/A     If at time of call, Patients symptoms hare worsened, the Patient should contact 911 or have someone drive them to Emergency Dept promptly:      If Patient calling 911, inform 911 personal that you have tested positive for the Coronavirus (COVID-19).  Place mask on and await 911 to arrive.    If Emergency Dept, If possible, please have another adult drive you to the Emergency Dept but you need to wear mask when in contact with other people.      Monoclonal Antibody Administration    You may be eligible to receive a new treatment with a monoclonal antibody for preventing hospitalization in " "patients at high risk for complications from COVID-19.   This medication is still experimental and available on a limited basis; it is given through an IV and must be given at an infusion center. Please note that not all people who are eligible will receive the medication since it is in limited supply.     Are you interested in being considered for this medication?  No.   Does the patient fit the criteria: No    If patient qualifies based on above criteria:  \"You will be contacted if you are selected to receive this treatment in the next 1-2 business days.   This is time sensitive and if you are not selected in the next 1-2 business days, you will not receive the medication.  If you do not receive a call to schedule, you have not been selected.\"      Review information with Patient    Your result was positive. This means you have COVID-19 (coronavirus).  We have sent you a letter that reviews the information that I'll be reviewing with you now.    How can I protect others?    If you have symptoms: stay home and away from others (self-isolate) until:    You've had no fever--and no medicine that reduces fever--for 1 full day (24 hours). And       Your other symptoms have gotten better. For example, your cough or breathing has improved. And     At least 10 days have passed since your symptoms started. (If you've been told by a doctor that you have a weak immune system, wait 20 days.)     If you don't have symptoms: Stay home and away from others (self-isolate) until at least 10 days have passed since your first positive COVID-19 test. (Date test collected)    During this time:    Stay in your own room, including for meals. Use your own bathroom if you can.    Stay away from others in your home. No hugging, kissing or shaking hands. No visitors.     Don't go to work, school or anywhere else.     Clean  high touch  surfaces often (doorknobs, counters, handles, etc.). Use a household cleaning spray or wipes. You'll find a " full list on the EPA website at www.epa.gov/pesticide-registration/list-n-disinfectants-use-against-sars-cov-2.     Cover your mouth and nose with a mask, tissue or other face covering to avoid spreading germs.    Wash your hands and face often with soap and water.    Make a list of people you have been in close contact with recently, even if either of you wore a face covering.   ; Start your list from 2 days before you became ill or had a positive test.  ; Include anyone that was within 6 feet of you for a cumulative total of 15 minutes or more in 24 hours. (Example: if you sat next to Killian for 5 minutes in the morning and 10 minutes in the afternoon, then you were in close contact for 15 minutes total that day. Killian would be added to your list.)    A public health worker will call or text you. It is important that you answer. They will ask you questions about possible exposures to COVID-19, such as people you have been in direct contact with and places you have visited.    Tell the people on your list that you have COVID-19; they should stay away from others for 14 days starting from the last time they were in contact with you (unless you are told something different from a public health worker).     Caregivers in these groups are at risk for severe illness due to COVID-19:  o People 65 years and older  o People who live in a nursing home or long-term care facility  o People with chronic disease (lung, heart, cancer, diabetes, kidney, liver, immunologic)  o People who have a weakened immune system, including those who:  - Are in cancer treatment  - Take medicine that weakens the immune system, such as corticosteroids  - Had a bone marrow or organ transplant  - Have an immune deficiency  - Have poorly controlled HIV or AIDS  - Are obese (body mass index of 40 or higher)  - Smoke regularly    Caregivers should wear gloves while washing dishes, handling laundry and cleaning bedrooms and bathrooms.    Wash and dry  laundry with special caution. Don't shake dirty laundry, and use the warmest water setting you can.    If you have a weakened immune system, ask your doctor about other actions you should take.    For more tips, go to www.cdc.gov/coronavirus/2019-ncov/downloads/10Things.pdf.    You should not go back to work until you meet the guidelines above for ending your home isolation. You don't need to be retested for COVID-19 before going back to work--studies show that you won't spread the virus if it's been at least 10 days since your symptoms started (or 20 days, if you have a weak immune system).    Employers: This document serves as formal notice of your employee's medical guidelines for going back to work. They must meet the above guidelines before going back to work in person.    How can I take care of myself?    1. Get lots of rest. Drink extra fluids (unless a doctor has told you not to).    2. Take Tylenol (acetaminophen) for fever or pain. If you have liver or kidney problems, ask your family doctor if it's okay to take Tylenol.     Take either:     650 mg (two 325 mg pills) every 4 to 6 hours, or     1,000 mg (two 500 mg pills) every 8 hours as needed.     Note: Don't take more than 3,000 mg in one day. Acetaminophen is found in many medicines (both prescribed and over-the-counter medicines). Read all labels to be sure you don't take too much.    For children, check the Tylenol bottle for the right dose (based on their age or weight).    3. If you have other health problems (like cancer, heart failure, an organ transplant or severe kidney disease): Call your specialty clinic if you don't feel better in the next 2 days.    4. Know when to call 911: Emergency warning signs include:    Trouble breathing or shortness of breath    Pain or pressure in the chest that doesn't go away    Feeling confused like you haven't felt before, or not being able to wake up    Bluish-colored lips or face    5. Sign up for [facility]  Dirk. We know it's scary to hear that you have COVID-19. We want to track your symptoms to make sure you're okay over the next 2 weeks. Please look for an email from Gokul Cavazos--this is a free, online program that we'll use to keep in touch. To sign up, follow the link in the email. Learn more at [email or web address].    Where can I get more information?    [facility]: [email or web address]    Coronavirus Basics: www.health.[facility].[place]./diseases/coronavirus/basics.html    What to Do If You're Sick: www.cdc.gov/coronavirus/2019-ncov/about/steps-when-sick.html    Ending Home Isolation: www.cdc.gov/coronavirus/2019-ncov/hcp/disposition-in-home-patients.html     Caring for Someone with COVID-19: www.cdc.gov/coronavirus/2019-ncov/if-you-are-sick/care-for-someone.html     [facility] clinical trials (COVID-19 research studies): clinicalaffairs.[facility]-clinical-trials     A Positive COVID-19 letter will be sent via [employer] or the mail. (Exception, no letters sent to Presurgerical/Preprocedure Patients)    Nanda Rolon LPN

## 2021-11-03 NOTE — TELEPHONE ENCOUNTER
Caller:  SELF    Procedure:  COLON + EGD    Date of Procedure Cancelled:  11-4    Ordering Provider:      Reason for cancel:   TESTED POSITIVE FOR COVID ON 11/1 AT PRE PROCEDURE TESTING    Any Staff messages sent regarding case?:  Yes      From: Sweetie Bain MD   Sent: 11/2/2021   3:19 PM CDT   To: Endoscopy Scheduling Pool   Subject: COVID +                                           Hey-     I was going through cases for today, saw that this patient is + DAMON [facility]     --------------------------------------------------------------  Eden Bain,     Yes that came back two days ago.     Most providers will see the pt after 3 weeks without symptoms.     We have other providers that prefer a 90 day waiting period.     What are you preferences for COVID + pts?     Please advise, and we'll be happy to reschedule pt.     Thank you,     Zoe RIVERA   Endoscopy Scheduling Team   ------------------------------------------------------------------    Sweetie Bain MD Hang, Nancy    3 weeks without symptoms is fine - I am on vacation or service for a little bit, so It wont be until December at the earliest     Thanks!      Rescheduled:  YES     If rescheduled:    Date:  12-9   Location:   [facility]   Note any change or update to original order/sedation:  NO

## 2021-11-28 DIAGNOSIS — K70.31 ALCOHOLIC CIRRHOSIS OF LIVER WITH ASCITES (H): ICD-10-CM

## 2021-11-29 RX ORDER — EPLERENONE 25 MG/1
TABLET, FILM COATED ORAL
Qty: 90 TABLET | Refills: 2 | Status: SHIPPED | OUTPATIENT
Start: 2021-11-29 | End: 2022-06-13

## 2021-12-02 ENCOUNTER — TELEPHONE (OUTPATIENT)
Dept: GASTROENTEROLOGY | Facility: CLINIC | Age: 51
End: 2021-12-02
Payer: COMMERCIAL

## 2021-12-02 NOTE — TELEPHONE ENCOUNTER
Rescheduled procedure d/t positive Covid test.     Attempted to contact patient regarding upcoming colonoscopy/EGD procedure on 12/9/21 for pre assessment questions. No answer.     Left message to return call to [phone] #2    Covid test scheduled: positive PCR Covid test on 11/1/21. No pre procedure Covid test required as long as patient is asymptomatic.     Arrival time: 1130    Facility location: [facility]    Sedation type: MAC    Indication for procedure: diarrhea and variceal screening    Anticoagulants: Eliquis. Holding interval of 2 days    Bowel prep recommendation: Golytely d/t DM. Verify that patient has bowel prep.     Gretta Yen RN

## 2021-12-03 NOTE — TELEPHONE ENCOUNTER
Returning pt's call.    No answer.  Left message to return call [phone] #2    Vanna Terrell RN         detailed exam

## 2021-12-06 NOTE — TELEPHONE ENCOUNTER
Attempted for pre-assessment prior to upcoming colonoscopy/EGD.    No answer.  Left message to return call [phone] #2    [employer] message sent.    Vanna Terrell RN

## 2021-12-06 NOTE — TELEPHONE ENCOUNTER
Pre assessment questions completed for upcoming colonoscopy/EGD procedure scheduled on 12.9.2021    COVID test scheduled 11.1.2021; Pt is aware that he needs to be s/sx free x 14 days prior to procedure.  IF any s/sx develop pt should r/s procedures.    Reviewed procedural arrival time 1130 and facility location [facility].    Designated  policy reviewed. Instructed to have someone stay 24 hours post procedure.     Anticoagulation/blood thinners? Eliquis; Pt instructed to f/u with PCP re: Eliquis and insulin dosing prior to procedure.    Electronic implanted devices? no    Reviewed Golytely prep instructions with patient.     Patient verbalized understanding and had no questions or concerns at this time.    Vanna Terrell RN

## 2021-12-07 DIAGNOSIS — K70.31 ALCOHOLIC CIRRHOSIS OF LIVER WITH ASCITES (H): Primary | ICD-10-CM

## 2021-12-09 ENCOUNTER — ANESTHESIA EVENT (OUTPATIENT)
Dept: SURGERY | Facility: AMBULATORY SURGERY CENTER | Age: 51
End: 2021-12-09
Payer: COMMERCIAL

## 2021-12-09 ENCOUNTER — ANESTHESIA (OUTPATIENT)
Dept: SURGERY | Facility: AMBULATORY SURGERY CENTER | Age: 51
End: 2021-12-09
Payer: COMMERCIAL

## 2021-12-09 ENCOUNTER — HOSPITAL ENCOUNTER (OUTPATIENT)
Facility: AMBULATORY SURGERY CENTER | Age: 51
End: 2021-12-09
Attending: STUDENT IN AN ORGANIZED HEALTH CARE EDUCATION/TRAINING PROGRAM
Payer: COMMERCIAL

## 2021-12-09 VITALS
HEART RATE: 83 BPM | TEMPERATURE: 97.9 F | DIASTOLIC BLOOD PRESSURE: 68 MMHG | SYSTOLIC BLOOD PRESSURE: 131 MMHG | OXYGEN SATURATION: 98 % | RESPIRATION RATE: 14 BRPM

## 2021-12-09 VITALS — HEART RATE: 55 BPM

## 2021-12-09 LAB
COLONOSCOPY: NORMAL
GLUCOSE BLDC GLUCOMTR-MCNC: 186 MG/DL (ref 70–99)
GLUCOSE BLDC GLUCOMTR-MCNC: 222 MG/DL (ref 70–99)
UPPER GI ENDOSCOPY: NORMAL

## 2021-12-09 PROCEDURE — 88305 TISSUE EXAM BY PATHOLOGIST: CPT | Mod: TC | Performed by: STUDENT IN AN ORGANIZED HEALTH CARE EDUCATION/TRAINING PROGRAM

## 2021-12-09 PROCEDURE — 82962 GLUCOSE BLOOD TEST: CPT | Performed by: PATHOLOGY

## 2021-12-09 PROCEDURE — 43239 EGD BIOPSY SINGLE/MULTIPLE: CPT

## 2021-12-09 PROCEDURE — 45380 COLONOSCOPY AND BIOPSY: CPT | Mod: 59

## 2021-12-09 PROCEDURE — 88305 TISSUE EXAM BY PATHOLOGIST: CPT | Mod: 26 | Performed by: PATHOLOGY

## 2021-12-09 PROCEDURE — 45385 COLONOSCOPY W/LESION REMOVAL: CPT

## 2021-12-09 RX ORDER — KETAMINE HYDROCHLORIDE 10 MG/ML
INJECTION, SOLUTION INTRAMUSCULAR; INTRAVENOUS PRN
Status: DISCONTINUED | OUTPATIENT
Start: 2021-12-09 | End: 2021-12-09

## 2021-12-09 RX ORDER — SODIUM CHLORIDE, SODIUM LACTATE, POTASSIUM CHLORIDE, CALCIUM CHLORIDE 600; 310; 30; 20 MG/100ML; MG/100ML; MG/100ML; MG/100ML
INJECTION, SOLUTION INTRAVENOUS CONTINUOUS
Status: DISCONTINUED | OUTPATIENT
Start: 2021-12-09 | End: 2021-12-10 | Stop reason: HOSPADM

## 2021-12-09 RX ORDER — FLUMAZENIL 0.1 MG/ML
0.2 INJECTION, SOLUTION INTRAVENOUS
Status: CANCELLED | OUTPATIENT
Start: 2021-12-09 | End: 2021-12-10

## 2021-12-09 RX ORDER — ONDANSETRON 2 MG/ML
4 INJECTION INTRAMUSCULAR; INTRAVENOUS
Status: DISCONTINUED | OUTPATIENT
Start: 2021-12-09 | End: 2021-12-10 | Stop reason: HOSPADM

## 2021-12-09 RX ORDER — NALOXONE HYDROCHLORIDE 0.4 MG/ML
0.4 INJECTION, SOLUTION INTRAMUSCULAR; INTRAVENOUS; SUBCUTANEOUS
Status: CANCELLED | OUTPATIENT
Start: 2021-12-09

## 2021-12-09 RX ORDER — SIMETHICONE
LIQUID (ML) MISCELLANEOUS PRN
Status: DISCONTINUED | OUTPATIENT
Start: 2021-12-09 | End: 2021-12-09 | Stop reason: HOSPADM

## 2021-12-09 RX ORDER — ONDANSETRON 2 MG/ML
4 INJECTION INTRAMUSCULAR; INTRAVENOUS EVERY 6 HOURS PRN
Status: CANCELLED | OUTPATIENT
Start: 2021-12-09

## 2021-12-09 RX ORDER — LIDOCAINE HYDROCHLORIDE 20 MG/ML
INJECTION, SOLUTION INFILTRATION; PERINEURAL PRN
Status: DISCONTINUED | OUTPATIENT
Start: 2021-12-09 | End: 2021-12-09

## 2021-12-09 RX ORDER — NALOXONE HYDROCHLORIDE 0.4 MG/ML
0.2 INJECTION, SOLUTION INTRAMUSCULAR; INTRAVENOUS; SUBCUTANEOUS
Status: CANCELLED | OUTPATIENT
Start: 2021-12-09

## 2021-12-09 RX ORDER — PROPOFOL 10 MG/ML
INJECTION, EMULSION INTRAVENOUS PRN
Status: DISCONTINUED | OUTPATIENT
Start: 2021-12-09 | End: 2021-12-09

## 2021-12-09 RX ORDER — PROPOFOL 10 MG/ML
INJECTION, EMULSION INTRAVENOUS CONTINUOUS PRN
Status: DISCONTINUED | OUTPATIENT
Start: 2021-12-09 | End: 2021-12-09

## 2021-12-09 RX ORDER — LIDOCAINE 40 MG/G
CREAM TOPICAL
Status: DISCONTINUED | OUTPATIENT
Start: 2021-12-09 | End: 2021-12-10 | Stop reason: HOSPADM

## 2021-12-09 RX ORDER — PROCHLORPERAZINE MALEATE 10 MG
10 TABLET ORAL EVERY 6 HOURS PRN
Status: CANCELLED | OUTPATIENT
Start: 2021-12-09

## 2021-12-09 RX ORDER — GLYCOPYRROLATE 0.2 MG/ML
INJECTION, SOLUTION INTRAMUSCULAR; INTRAVENOUS PRN
Status: DISCONTINUED | OUTPATIENT
Start: 2021-12-09 | End: 2021-12-09

## 2021-12-09 RX ORDER — ONDANSETRON 4 MG/1
4 TABLET, ORALLY DISINTEGRATING ORAL EVERY 6 HOURS PRN
Status: CANCELLED | OUTPATIENT
Start: 2021-12-09

## 2021-12-09 RX ADMIN — LIDOCAINE HYDROCHLORIDE 100 MG: 20 INJECTION, SOLUTION INFILTRATION; PERINEURAL at 13:02

## 2021-12-09 RX ADMIN — SODIUM CHLORIDE, SODIUM LACTATE, POTASSIUM CHLORIDE, CALCIUM CHLORIDE: 600; 310; 30; 20 INJECTION, SOLUTION INTRAVENOUS at 12:56

## 2021-12-09 RX ADMIN — KETAMINE HYDROCHLORIDE 20 MG: 10 INJECTION, SOLUTION INTRAMUSCULAR; INTRAVENOUS at 13:02

## 2021-12-09 RX ADMIN — GLYCOPYRROLATE 0.2 MG: 0.2 INJECTION, SOLUTION INTRAMUSCULAR; INTRAVENOUS at 12:58

## 2021-12-09 RX ADMIN — PROPOFOL 200 MCG/KG/MIN: 10 INJECTION, EMULSION INTRAVENOUS at 13:02

## 2021-12-09 RX ADMIN — PROPOFOL 100 MG: 10 INJECTION, EMULSION INTRAVENOUS at 13:03

## 2021-12-09 NOTE — ANESTHESIA POSTPROCEDURE EVALUATION
Patient: Blair Oliver    Procedure: Procedure(s):  ESOPHAGOGASTRODUODENOSCOPY, WITH BIOPSY  COLONOSCOPY, WITH BIOPSY, WITH CLIPS       Diagnosis:Alcoholic cirrhosis of liver with ascites (H) [K70.31]  Diagnosis Additional Information: No value filed.    Anesthesia Type:  MAC    Note:  Disposition: Outpatient   Postop Pain Control: Uneventful            Sign Out: Well controlled pain   PONV:    Neuro/Psych: Uneventful            Sign Out: Acceptable/Baseline neuro status   Airway/Respiratory: Uneventful            Sign Out: Acceptable/Baseline resp. status   CV/Hemodynamics: Uneventful            Sign Out: Acceptable CV status; No obvious hypovolemia; No obvious fluid overload   Other NRE:    DID A NON-ROUTINE EVENT OCCUR?            Last vitals:  Vitals Value Taken Time   /68 12/09/21 1430   Temp 36.6  C (97.9  F) 12/09/21 1430   Pulse 83 12/09/21 1430   Resp 14 12/09/21 1430   SpO2 98 % 12/09/21 1430       Electronically Signed By: Ian Tate MD  December 9, 2021  3:06 PM

## 2021-12-09 NOTE — ANESTHESIA CARE TRANSFER NOTE
Patient: Blair Oliver    Procedure: Procedure(s):  ESOPHAGOGASTRODUODENOSCOPY, WITH BIOPSY  COLONOSCOPY, WITH BIOPSY, WITH CLIPS       Diagnosis: Alcoholic cirrhosis of liver with ascites (H) [K70.31]  Diagnosis Additional Information: No value filed.    Anesthesia Type:   MAC     Note:    Oropharynx: oropharynx clear of all foreign objects  Level of Consciousness: awake and drowsy  Oxygen Supplementation: room air    Independent Airway: airway patency satisfactory and stable  Dentition: dentition unchanged  Vital Signs Stable: post-procedure vital signs reviewed and stable  Report to RN Given: handoff report given  Patient transferred to: Phase II  Comments: VSS and WNL, comfortable, no PONV, report to Sharmaine RESENDIZ  Handoff Report: Identifed the Patient, Identified the Reponsible Provider, Reviewed the pertinent medical history, Discussed the surgical course, Reviewed Intra-OP anesthesia mangement and issues during anesthesia, Set expectations for post-procedure period and Allowed opportunity for questions and acknowledgement of understanding      Vitals:  Vitals Value Taken Time   /53 12/09/21 1358   Temp 36.8  C (98.2  F) 12/09/21 1358   Pulse     Resp 14 12/09/21 1358   SpO2 94 % 12/09/21 1358       Electronically Signed By: GABRIEL Landrum CRNA  December 9, 2021  2:02 PM

## 2021-12-09 NOTE — ANESTHESIA PREPROCEDURE EVALUATION
Anesthesia Pre-Procedure Evaluation    Patient: Blair Oliver   MRN: [id number] : 1970        Preoperative Diagnosis: Alcoholic cirrhosis of liver with ascites (H) [K70.31]    Procedure : Procedure(s):  ESOPHAGOGASTRODUODENOSCOPY (EGD)  COLONOSCOPY          Past Medical History:   Diagnosis Date     Diabetes (H)     Type 2 DM, Insulin Use.      Hypertension       History reviewed. No pertinent surgical history.   Allergies   Allergen Reactions     Minocycline Hives      Social History     Tobacco Use     Smoking status: Former Smoker     Types: Cigarettes     Smokeless tobacco: Never Used   Substance Use Topics     Alcohol use: Not Currently     Comment: Quit 2020      Wt Readings from Last 1 Encounters:   21 131.2 kg (289 lb 4.8 oz)           Physical Exam    Airway        Mallampati: II   TM distance: > 3 FB   Neck ROM: full   Mouth opening: > 3 cm    Respiratory Devices and Support         Dental           Cardiovascular             Pulmonary                   OUTSIDE LABS:  CBC:   Lab Results   Component Value Date    WBC 4.0 2021    WBC 4.7 06/15/2021    HGB 8.7 (L) 2021    HGB 8.6 (L) 06/15/2021    HCT 29.5 (L) 2021    HCT 31.0 (L) 06/15/2021     (L) 2021     (L) 06/15/2021     BMP:   Lab Results   Component Value Date     10/06/2021     2021    POTASSIUM 4.5 10/06/2021    POTASSIUM 4.2 2021    CHLORIDE 104 10/06/2021    CHLORIDE 109 2021    CO2 26 10/06/2021    CO2 24 2021    BUN 11 10/06/2021    BUN 12 2021    CR 0.70 10/06/2021    CR 0.67 2021     (H) 2021     (H) 10/06/2021     COAGS:   Lab Results   Component Value Date    INR 1.61 (H) 2021     POC: No results found for: BGM, HCG, HCGS  HEPATIC:   Lab Results   Component Value Date    ALBUMIN 3.0 (L) 2021    PROTTOTAL 7.3 2021    ALT 39 2021    AST 29 2021    ALKPHOS 95 2021    BILITOTAL 0.9  09/20/2021     OTHER:   Lab Results   Component Value Date    A1C 11.2 (H) 08/17/2011    DANIELLE 9.0 10/06/2021    PHOS 3.9 06/15/2021    TSH 1.28 06/15/2021       Anesthesia Plan    ASA Status:  3   NPO Status:  NPO Appropriate    Anesthesia Type: MAC.     - Reason for MAC: straight local not clinically adequate   Induction: Intravenous, Propofol.   Maintenance: TIVA.        Consents    Anesthesia Plan(s) and associated risks, benefits, and realistic alternatives discussed. Questions answered and patient/representative(s) expressed understanding.    - Discussed:     - Discussed with:  Patient      - Extended Intubation/Ventilatory Support Discussed: No.      - Patient is DNR/DNI Status: No    Use of blood products discussed: No .     Postoperative Care       PONV prophylaxis: Ondansetron (or other 5HT-3)     Comments:           H&P reviewed: Unable to attach H&P to encounter due to EHR limitations. H&P Update: appropriate H&P reviewed, patient examined. No interval changes since H&P (within 30 days).         Ian Tate MD   Yes, patient not at risk

## 2021-12-10 LAB
PATH REPORT.COMMENTS IMP SPEC: NORMAL
PATH REPORT.COMMENTS IMP SPEC: NORMAL
PATH REPORT.FINAL DX SPEC: NORMAL
PATH REPORT.GROSS SPEC: NORMAL
PATH REPORT.MICROSCOPIC SPEC OTHER STN: NORMAL
PATH REPORT.RELEVANT HX SPEC: NORMAL
PHOTO IMAGE: NORMAL

## 2021-12-13 ENCOUNTER — OFFICE VISIT (OUTPATIENT)
Dept: GASTROENTEROLOGY | Facility: CLINIC | Age: 51
End: 2021-12-13
Attending: STUDENT IN AN ORGANIZED HEALTH CARE EDUCATION/TRAINING PROGRAM
Payer: COMMERCIAL

## 2021-12-13 ENCOUNTER — LAB (OUTPATIENT)
Dept: LAB | Facility: CLINIC | Age: 51
End: 2021-12-13
Attending: STUDENT IN AN ORGANIZED HEALTH CARE EDUCATION/TRAINING PROGRAM
Payer: COMMERCIAL

## 2021-12-13 VITALS
RESPIRATION RATE: 18 BRPM | WEIGHT: 290.7 LBS | HEIGHT: 75 IN | BODY MASS INDEX: 36.14 KG/M2 | OXYGEN SATURATION: 100 % | HEART RATE: 69 BPM | SYSTOLIC BLOOD PRESSURE: 127 MMHG | DIASTOLIC BLOOD PRESSURE: 81 MMHG | TEMPERATURE: 98.6 F

## 2021-12-13 DIAGNOSIS — K70.31 ALCOHOLIC CIRRHOSIS OF LIVER WITH ASCITES (H): ICD-10-CM

## 2021-12-13 DIAGNOSIS — K76.9 LIVER LESION: ICD-10-CM

## 2021-12-13 DIAGNOSIS — R19.7 DIARRHEA, UNSPECIFIED TYPE: ICD-10-CM

## 2021-12-13 DIAGNOSIS — I50.22 CHRONIC SYSTOLIC CONGESTIVE HEART FAILURE (H): ICD-10-CM

## 2021-12-13 DIAGNOSIS — Z23 NEED FOR COVID-19 VACCINE: Primary | ICD-10-CM

## 2021-12-13 DIAGNOSIS — Z90.49 HISTORY OF BOWEL RESECTION: Primary | ICD-10-CM

## 2021-12-13 LAB
AFP SERPL-MCNC: 4.5 UG/L (ref 0–8)
ALBUMIN SERPL-MCNC: 3 G/DL (ref 3.4–5)
ALP SERPL-CCNC: 111 U/L (ref 40–150)
ALT SERPL W P-5'-P-CCNC: 40 U/L (ref 0–70)
ANION GAP SERPL CALCULATED.3IONS-SCNC: 9 MMOL/L (ref 3–14)
AST SERPL W P-5'-P-CCNC: 28 U/L (ref 0–45)
BILIRUB DIRECT SERPL-MCNC: 0.3 MG/DL (ref 0–0.2)
BILIRUB SERPL-MCNC: 1 MG/DL (ref 0.2–1.3)
BUN SERPL-MCNC: 6 MG/DL (ref 7–30)
CALCIUM SERPL-MCNC: 8.6 MG/DL (ref 8.5–10.1)
CHLORIDE BLD-SCNC: 108 MMOL/L (ref 94–109)
CO2 SERPL-SCNC: 24 MMOL/L (ref 20–32)
CREAT SERPL-MCNC: 0.59 MG/DL (ref 0.66–1.25)
ERYTHROCYTE [DISTWIDTH] IN BLOOD BY AUTOMATED COUNT: 18.6 % (ref 10–15)
GFR SERPL CREATININE-BSD FRML MDRD: >90 ML/MIN/1.73M2
GLUCOSE BLD-MCNC: 209 MG/DL (ref 70–99)
HCT VFR BLD AUTO: 33.8 % (ref 40–53)
HGB BLD-MCNC: 9.3 G/DL (ref 13.3–17.7)
INR PPP: 1.48 (ref 0.85–1.15)
MCH RBC QN AUTO: 21.7 PG (ref 26.5–33)
MCHC RBC AUTO-ENTMCNC: 27.5 G/DL (ref 31.5–36.5)
MCV RBC AUTO: 79 FL (ref 78–100)
PLATELET # BLD AUTO: 106 10E3/UL (ref 150–450)
POTASSIUM BLD-SCNC: 3.7 MMOL/L (ref 3.4–5.3)
PROT SERPL-MCNC: 7.4 G/DL (ref 6.8–8.8)
RBC # BLD AUTO: 4.28 10E6/UL (ref 4.4–5.9)
SODIUM SERPL-SCNC: 141 MMOL/L (ref 133–144)
WBC # BLD AUTO: 5.3 10E3/UL (ref 4–11)

## 2021-12-13 PROCEDURE — 36415 COLL VENOUS BLD VENIPUNCTURE: CPT | Performed by: PATHOLOGY

## 2021-12-13 PROCEDURE — 0004A HC ADMIN COVID VAC PFIZER, BOOSTER: CPT | Performed by: STUDENT IN AN ORGANIZED HEALTH CARE EDUCATION/TRAINING PROGRAM

## 2021-12-13 PROCEDURE — 82248 BILIRUBIN DIRECT: CPT | Performed by: PATHOLOGY

## 2021-12-13 PROCEDURE — 82105 ALPHA-FETOPROTEIN SERUM: CPT | Mod: 90 | Performed by: PATHOLOGY

## 2021-12-13 PROCEDURE — G0463 HOSPITAL OUTPT CLINIC VISIT: HCPCS | Mod: 25

## 2021-12-13 PROCEDURE — 85027 COMPLETE CBC AUTOMATED: CPT | Performed by: PATHOLOGY

## 2021-12-13 PROCEDURE — 250N000011 HC RX IP 250 OP 636: Performed by: STUDENT IN AN ORGANIZED HEALTH CARE EDUCATION/TRAINING PROGRAM

## 2021-12-13 PROCEDURE — 85610 PROTHROMBIN TIME: CPT | Performed by: PATHOLOGY

## 2021-12-13 PROCEDURE — 99000 SPECIMEN HANDLING OFFICE-LAB: CPT | Performed by: PATHOLOGY

## 2021-12-13 PROCEDURE — 80053 COMPREHEN METABOLIC PANEL: CPT | Performed by: PATHOLOGY

## 2021-12-13 PROCEDURE — 99215 OFFICE O/P EST HI 40 MIN: CPT | Performed by: STUDENT IN AN ORGANIZED HEALTH CARE EDUCATION/TRAINING PROGRAM

## 2021-12-13 PROCEDURE — 91300 HC RX IP 250 OP 636: CPT | Performed by: STUDENT IN AN ORGANIZED HEALTH CARE EDUCATION/TRAINING PROGRAM

## 2021-12-13 RX ADMIN — RNA INGREDIENT BNT-162B2 0.3 ML: 0.23 INJECTION, SUSPENSION INTRAMUSCULAR at 11:48

## 2021-12-13 ASSESSMENT — MIFFLIN-ST. JEOR: SCORE: 2249.24

## 2021-12-13 ASSESSMENT — PAIN SCALES - GENERAL: PAINLEVEL: MODERATE PAIN (5)

## 2021-12-13 NOTE — PROGRESS NOTES
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      Initial History:     Patient states that he was diagnosed with liver disease and cirrhosis in September 2020.  He was admitted to the hospital at that time he was found to have extensive superior mesenteric venous thrombosis, complicated by ischemic bowel and had to undergo an exploratory laparotomy with small bowel resection.  He was placed on Eliquis postoperatively.  During surgery, he was found to have some ascites that was evacuated.     Since then he has had trouble with incisional hernias, and rectus diastasis.  He was seen by his general surgeon in March where he was noted to have a MELD of 18.  He was then sent to [facility] to be seen by surgery for consideration of incisional hernia repair.    Patient complains of episodes of leakage of fluid from his incision that happens intermittently.  This fluid is usually blood tinged but sometimes yellow. He is on 20 mg of furosemide daily, that was started because of his heart failure and his pedal edema.  He has also noted that every 2 to 3 days he has watery bowel movements, up to 12 times a day.  In between there was episodes he has about 2-3 soft bowel movements per day.  He denies any fevers or chills, abdominal pain, melena, or hematochezia.    His wife is also noted that he is having some memory issues.  He has not had any major episodes of confusion, and has not been admitted to the hospital with hepatic encephalopathy.    Patient denies any prior history of liver disease. He has had  abnormal liver enzymes and low platelets as far back as 2011. Past notes from his PCP had also documented excessive alcohol use, as well as, a hospitalization for overdose of Vicodin.     First and only paracentesis 6/26 - 600 ml removed - 231 WBC, TP 1.4, Albumin 0.8.     Interval Events:  - On lasix and eplerenone, no recurrent ascites or LE swelling.   - Recent EGD with bx negative for celiac. Colon bx showing chronic inactive colitis. Still had loose stools after colonoscopy prep, not c/w overflow.   - Saw Cardiology - LVEF 40-45% is clinically doing well without s/s CHF  - Overall has lost 40 lbs over the past year intentionally    ROS: 14 point ROS negative except for positives noted in HPI.    PMHx:  Past Medical History:   Diagnosis Date     Diabetes (H)     Type 2 DM, Insulin Use.      Hypertension    -- Type II diabetes Mellitus  -- Hypertension  -- Hyperlipidemia  -- CHF  -- GISELLE  -- SMV thrombus    PSHx:  Past Surgical History:   Procedure Laterality Date     COLONOSCOPY N/A 12/9/2021    Procedure: COLONOSCOPY, WITH BIOPSY, WITH CLIPS;  Surgeon: Sweetie Bain MD;  [place] [facility] [place]     ESOPHAGOSCOPY, GASTROSCOPY, DUODENOSCOPY (EGD), COMBINED N/A 12/9/2021    Procedure: ESOPHAGOGASTRODUODENOSCOPY, WITH BIOPSY;  Surgeon: Sweetie Bain MD;  [place] [facility] [place]      -- Exploratory laparotomy with small bowel resection 9/2020    FamHx:  Family History   Problem Relation Age of Onset     Substance Abuse Maternal Grandmother      Substance Abuse Brother      No family history of liver disease, liver cancer    SocHx:  Social History     Socioeconomic History     Marital status:      Spouse name: Not on file     Number of children: Not on file     Years of education: Not on file     Highest education level: Not on file   Occupational History     Not on file   Tobacco Use     Smoking status: Former Smoker     Types: Cigarettes     Smokeless tobacco: Never Used   Substance and Sexual Activity     Alcohol use:  Not Currently     Comment: Quit 9/21/2020     Drug use: Never     Sexual activity: Not on file   Other Topics Concern     Parent/sibling w/ CABG, MI or angioplasty before 65F 55M? Not Asked   Social History Narrative     Not on file     Social Determinants of Health     Financial Resource Strain: Not on file   Food Insecurity: Not on file   Transportation Needs: Not on file   Physical Activity: Not on file   Stress: Not on file   Social Connections: Not on file   Intimate Partner Violence: Not on file   Housing Stability: Not on file   Patient previously worked with with [employer].  He is currently on disability due to pain from his hernias and discomfort.  Admits to significant alcohol use.  Used to drink about a liter of whiskey per day for about 20 years.  Denies any hospitalizations for intoxication or withdrawal, denies any DUIs, denies any prior treatments.  Last drink was 9/21/2020.  Denies any cravings, and is not in any treatments program at the moment.  Denies any tobacco use, denies any IV drug use.    Medications:  Current Outpatient Medications   Medication     amLODIPine-benazepril (LOTREL) 10-40 MG capsule     apixaban ANTICOAGULANT (ELIQUIS) 5 MG tablet     eplerenone (INSPRA) 25 MG tablet     furosemide (LASIX) 20 MG tablet     gabapentin (NEURONTIN) 600 MG tablet     insulin detemir (LEVEMIR PEN) 100 UNIT/ML pen     insulin pen needle (B-D U/F) 31G X 5 MM miscellaneous     lisinopril (ZESTRIL) 5 MG tablet     metFORMIN (GLUCOPHAGE) 500 MG tablet     metoprolol succinate ER (TOPROL-XL) 100 MG 24 hr tablet     Multiple Vitamins-Minerals (SUPER THERA DAMARIS M) TABS     omeprazole (PRILOSEC) 20 MG DR capsule     ONETOUCH ULTRA test strip     simvastatin (ZOCOR) 20 MG tablet     Current Facility-Administered Medications   Medication     EPINEPHrine (ADRENALIN) kit 0.3 mg     No OTCs, herbals    Allergies:  Allergies   Allergen Reactions     Minocycline Hives       Objective:  /81 (BP Location:  "Right arm, Patient Position: Sitting, Cuff Size: Adult Large)   Pulse 69   Temp 98.6  F (37  C) (Oral)   Resp 18   Ht 1.905 m (6' 3\")   Wt 131.9 kg (290 lb 11.2 oz)   SpO2 100%   BMI 36.34 kg/m    Constitutional: pleasant man in NAD  Eyes: non icteric  Respiratory: Normal respiratory excursion   MSK: normal range of motion of visualized extremities  Abd: Midline hernia present, reducible, difficult to appreciate ascites  Skin: No jaundice  Psychiatric: normal mood and orientation    Labs:  Last Comprehensive Metabolic Panel:  Sodium   Date Value Ref Range Status   12/13/2021 141 133 - 144 mmol/L Final   06/15/2021 136 133 - 144 mmol/L Final     Potassium   Date Value Ref Range Status   12/13/2021 3.7 3.4 - 5.3 mmol/L Final   06/15/2021 4.1 3.4 - 5.3 mmol/L Final     Chloride   Date Value Ref Range Status   12/13/2021 108 94 - 109 mmol/L Final   06/15/2021 112 (H) 94 - 109 mmol/L Final     Carbon Dioxide   Date Value Ref Range Status   06/15/2021 25 20 - 32 mmol/L Final     Carbon Dioxide (CO2)   Date Value Ref Range Status   12/13/2021 24 20 - 32 mmol/L Final     Anion Gap   Date Value Ref Range Status   12/13/2021 9 3 - 14 mmol/L Final   06/15/2021 <1 (L) 3 - 14 mmol/L Final     Glucose   Date Value Ref Range Status   12/13/2021 209 (H) 70 - 99 mg/dL Final   06/15/2021 123 (H) 70 - 99 mg/dL Final     Urea Nitrogen   Date Value Ref Range Status   12/13/2021 6 (L) 7 - 30 mg/dL Final   06/15/2021 8 7 - 30 mg/dL Final     Creatinine   Date Value Ref Range Status   12/13/2021 0.59 (L) 0.66 - 1.25 mg/dL Final   06/15/2021 0.63 (L) 0.66 - 1.25 mg/dL Final     GFR Estimate   Date Value Ref Range Status   12/13/2021 >90 >60 mL/min/1.73m2 Final     Comment:     As of July 11, 2021, eGFR is calculated by the CKD-EPI creatinine equation, without race adjustment. eGFR can be influenced by muscle mass, exercise, and diet. The reported eGFR is an estimation only and is only applicable if the renal function is stable. "   06/15/2021 >90 >60 mL/min/[1.73_m2] Final     Comment:     Non  GFR Calc  Starting 12/18/2018, serum creatinine based estimated GFR (eGFR) will be   calculated using the Chronic Kidney Disease Epidemiology Collaboration   (CKD-EPI) equation.       Calcium   Date Value Ref Range Status   12/13/2021 8.6 8.5 - 10.1 mg/dL Final   06/15/2021 8.9 8.5 - 10.1 mg/dL Final     Bilirubin Total   Date Value Ref Range Status   12/13/2021 1.0 0.2 - 1.3 mg/dL Final   06/15/2021 0.8 0.2 - 1.3 mg/dL Final     Alkaline Phosphatase   Date Value Ref Range Status   12/13/2021 111 40 - 150 U/L Final   06/15/2021 126 40 - 150 U/L Final     ALT   Date Value Ref Range Status   12/13/2021 40 0 - 70 U/L Final   06/15/2021 36 0 - 70 U/L Final     AST   Date Value Ref Range Status   12/13/2021 28 0 - 45 U/L Final   06/15/2021 34 0 - 45 U/L Final       Lab Results   Component Value Date    WBC 4.7 06/15/2021     Lab Results   Component Value Date    RBC 3.89 06/15/2021     Lab Results   Component Value Date    HGB 8.6 06/15/2021     Lab Results   Component Value Date    HCT 31.0 06/15/2021     Lab Results   Component Value Date    MCV 80 06/15/2021     Lab Results   Component Value Date    MCH 22.1 06/15/2021     Lab Results   Component Value Date    MCHC 27.7 06/15/2021     Lab Results   Component Value Date    RDW 17.3 06/15/2021     Lab Results   Component Value Date     06/15/2021       INR   Date Value Ref Range Status   12/13/2021 1.48 (H) 0.85 - 1.15 Final   06/15/2021 1.58 (H) 0.86 - 1.14 Final        MELD-Na score: 11 at 12/13/2021 10:40 AM  MELD score: 11 at 12/13/2021 10:40 AM  Calculated from:  Serum Creatinine: 0.59 mg/dL (Using min of 1 mg/dL) at 12/13/2021 10:40 AM  Serum Sodium: 141 mmol/L (Using max of 137 mmol/L) at 12/13/2021 10:40 AM  Total Bilirubin: 1.0 mg/dL at 12/13/2021 10:40 AM  INR(ratio): 1.48 at 12/13/2021 10:40 AM  Age: 51 years    Imaging:    CT 4/2021    LOWER CHEST: The lung bases are  clear.  HEPATOBILIARY: Cirrhotic appearance of the liver. No worrisome liver lesions within the limitations of a single phase exam. No biliary duct dilatation. No calcified gallstones.  PANCREAS: No significant mass, duct dilatation, or inflammatory change.  SPLEEN: Splenomegaly, measuring 19 cm in the craniocaudal dimension.  ADRENAL GLANDS: No significant nodules.  KIDNEYS/BLADDER: No significant mass, stone, or hydronephrosis. No bladder wall thickening.  BOWEL: Postoperative changes of small bowel resection with anastomosis in the right midabdomen. Diastases of the abdominis rectus musculature with broad-based bulging of the anterior abdominal wall fascia with multiple associated nonobstructed small bowel loops. The bowel is nondistended without inflammatory change. No evidence of appendicitis.  LYMPH NODES: No lymphadenopathy.  VASCULATURE: Few tiny gastroesophageal varices.  PELVIC ORGANS: Unremarkable.  ADDITIONAL FINDINGS: Mesenteric edema and small volume of ascites.  MUSCULOSKELETAL: Small fat-containing right inguinal hernia. Mild degenerative changes of the hips and visualized thoracolumbar spine.  IMPRESSION:   1.  Cirrhosis with portal hypertension as evidenced by splenomegaly, small volume ascites and tiny gastroesophageal varices.  2.  Diastases of the abdominis rectus musculature with broad-based bulging of the ventral abdominal wall fascia.  3.  Small fat-containing right inguinal hernia.     MRI 10/2021    Liver: Cirrhotic morphology of the liver. Multiple T1 hyperintense,  nonenhancing observations throughout the right hepatic lobe, including  a 10 mm observation in hepatic segment 6 (series 7 image 30) and a 6  mm observation in the right hepatic dome (series 7 image 59). No  suspicious arterially enhancing or restricting lesions. Mild to  moderate loss of signal intensity abnormalities imaging consistent  with iron deposition.     Gallbladder: Distended. 4 mm polyp/adherent stone along the  inferior  aspect of the gallbladder (series 3 image 20).     Spleen: Enlarged, measuring 17 cm in length.     Kidneys: Unremarkable.     Adrenal glands: Unremarkable.     Pancreas: Mild to moderate parenchymal atrophy. The pancreatic duct  within normal limits. No suspicious pancreatic lesions.     Bowel: The visualized bowel is unremarkable.     Lymph nodes: No suspicious abdominal adenopathy.     Blood vessels: Normal caliber abdominal aorta. Major abdominal  vasculature is patent.     Lung bases: The visualized lung bases are unremarkable.     Bones and soft tissues: Unremarkable.     Mesentery and abdominal wall: Unremarkable.     Ascites: Trace perihepatic ascites.                                                                      IMPRESSION:  1. Cirrhotic morphology of the liver with multiple subcentimeter T1  hyperintense, nonenhancing lesions, likely representing small  regenerative/dysplastic nodules. Exam quality is slightly limited due  to motion artifact.  2. Splenomegaly.  Endoscopy: EGD/colon > 20 years ago    Independently reviewed labs and imaging.     Assessment/Plan: Mr. 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     Was originally referred for LT evaluation - given his CHF and low MELD, did not pursue this. He is having significant pain related to his complex hernia, discussed he would need to have resolution of his ascites, and improvement in his cardiac status to be considered a candidate for surgery. Discussed with his weight he would at high risk for recurrence, he has been working to lose weight. TTE showed improvement in his cardiac function with further sobriety and medications but still LVEF 40-45%.     He is having  chronic diarrhea, could be related to his bowel resection, SIBO, microscopic colitis. Random bx 12/2021 showing chronic inactive colitis. Decreased rectal tone noted on exam as well.     - Continue lasix 20 mg BID and eplerenone 25 mg daily  - Repeat MRI 2/2022 to follow up liver nodules  - Obtain fecal calprotectin for diarrhea evaluation and ?colitis  - Consider GHBT in the future, ARM  - Continue to completely abstain from alcohol   - Follow up with cardiology for CHF - appears to be improving     Orders Placed This Encounter   Procedures     Calprotectin Feces     Nursing Instructions for Covid-19 Vaccine     Vital Signs for Hypersensitivity Reactions     Cold pack (aka APPLY PACK)  PRN     Notify Provider     RTC 3 months after MRI    Sweetie Bain MD MS  Hepatology/Liver Transplant  [facility]    We [facility] participation [facility].  The patient understands that the study may later link survey data with clinical data.  The patient would like to be contacted to participate in the study.

## 2021-12-13 NOTE — NURSING NOTE
"Chief Complaint   Patient presents with     RECHECK     alcohol cirrhosis     Vital signs:  Temp: 98.6  F (37  C) Temp src: Oral BP: 127/81 Pulse: 69   Resp: 18 SpO2: 100 %     Height: 190.5 cm (6' 3\") Weight: 131.9 kg (290 lb 11.2 oz)  Estimated body mass index is 36.34 kg/m  as calculated from the following:    Height as of this encounter: 1.905 m (6' 3\").    Weight as of this encounter: 131.9 kg (290 lb 11.2 oz).        Opal De León, [facility]  12/13/2021 10:53 AM      "

## 2021-12-13 NOTE — LETTER
12/13/2021     RE: Blair Oliver  [address] [facility] [address]    Dear Colleague,    Thank you for referring your patient, Blair Oliver, to the [facility] [place]. Please see a copy of my visit note below.    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      Initial History:     Patient states that he was diagnosed with liver disease and cirrhosis in September 2020.  He was admitted to the hospital at that time he was found to have extensive superior mesenteric venous thrombosis, complicated by ischemic bowel and had to undergo an exploratory laparotomy with small bowel resection.  He was placed on Eliquis postoperatively.  During surgery, he was found to have some ascites that was evacuated.     Since then he has had trouble with incisional hernias, and rectus diastasis.  He was seen by his general surgeon in March where he was noted to have a MELD of 18.  He was then sent to [facility] to be seen by surgery for consideration of incisional hernia repair.    Patient complains of episodes of leakage of fluid from his incision that happens intermittently.  This fluid is usually blood tinged but sometimes yellow. He is on 20 mg of furosemide daily, that was started because of his heart failure and his pedal edema.  He has also noted that every 2 to 3 days he has watery bowel movements, up to 12 times a day.  In between there was episodes he has about 2-3 soft bowel movements per day.  He denies any fevers or chills, abdominal pain, melena, or  hematochezia.    His wife is also noted that he is having some memory issues.  He has not had any major episodes of confusion, and has not been admitted to the hospital with hepatic encephalopathy.    Patient denies any prior history of liver disease. He has had abnormal liver enzymes and low platelets as far back as 2011. Past notes from his PCP had also documented excessive alcohol use, as well as, a hospitalization for overdose of Vicodin.     First and only paracentesis 6/26 - 600 ml removed - 231 WBC, TP 1.4, Albumin 0.8.     Interval Events:  - On lasix and eplerenone, no recurrent ascites or LE swelling.   - Recent EGD with bx negative for celiac. Colon bx showing chronic inactive colitis. Still had loose stools after colonoscopy prep, not c/w overflow.   - Saw Cardiology - LVEF 40-45% is clinically doing well without s/s CHF  - Overall has lost 40 lbs over the past year intentionally    ROS: 14 point ROS negative except for positives noted in HPI.    PMHx:  Past Medical History:   Diagnosis Date     Diabetes (H)     Type 2 DM, Insulin Use.      Hypertension    -- Type II diabetes Mellitus  -- Hypertension  -- Hyperlipidemia  -- CHF  -- GISELLE  -- SMV thrombus    PSHx:  Past Surgical History:   Procedure Laterality Date     COLONOSCOPY N/A 12/9/2021    Procedure: COLONOSCOPY, WITH BIOPSY, WITH CLIPS;  Surgeon: Sweetie Bain MD;  [place] [facility] [place]     ESOPHAGOSCOPY, GASTROSCOPY, DUODENOSCOPY (EGD), COMBINED N/A 12/9/2021    Procedure: ESOPHAGOGASTRODUODENOSCOPY, WITH BIOPSY;  Surgeon: Sweetie Bain MD;  [place] [facility] [place]      -- Exploratory laparotomy with small bowel resection 9/2020    FamHx:  Family History   Problem Relation Age of Onset     Substance Abuse Maternal Grandmother      Substance Abuse Brother      No family history of liver disease, liver cancer    SocHx:  Social History     Socioeconomic History     Marital status:      Spouse name: Not on file     Number of children: Not on file      Years of education: Not on file     Highest education level: Not on file   Occupational History     Not on file   Tobacco Use     Smoking status: Former Smoker     Types: Cigarettes     Smokeless tobacco: Never Used   Substance and Sexual Activity     Alcohol use: Not Currently     Comment: Quit 9/21/2020     Drug use: Never     Sexual activity: Not on file   Other Topics Concern     Parent/sibling w/ CABG, MI or angioplasty before 65F 55M? Not Asked   Social History Narrative     Not on file     Social Determinants of Health     Financial Resource Strain: Not on file   Food Insecurity: Not on file   Transportation Needs: Not on file   Physical Activity: Not on file   Stress: Not on file   Social Connections: Not on file   Intimate Partner Violence: Not on file   Housing Stability: Not on file   Patient previously worked with with [employer].  He is currently on disability due to pain from his hernias and discomfort.  Admits to significant alcohol use.  Used to drink about a liter of whiskey per day for about 20 years.  Denies any hospitalizations for intoxication or withdrawal, denies any DUIs, denies any prior treatments.  Last drink was 9/21/2020.  Denies any cravings, and is not in any treatments program at the moment.  Denies any tobacco use, denies any IV drug use.    Medications:  Current Outpatient Medications   Medication     amLODIPine-benazepril (LOTREL) 10-40 MG capsule     apixaban ANTICOAGULANT (ELIQUIS) 5 MG tablet     eplerenone (INSPRA) 25 MG tablet     furosemide (LASIX) 20 MG tablet     gabapentin (NEURONTIN) 600 MG tablet     insulin detemir (LEVEMIR PEN) 100 UNIT/ML pen     insulin pen needle (B-D U/F) 31G X 5 MM miscellaneous     lisinopril (ZESTRIL) 5 MG tablet     metFORMIN (GLUCOPHAGE) 500 MG tablet     metoprolol succinate ER (TOPROL-XL) 100 MG 24 hr tablet     Multiple Vitamins-Minerals (SUPER THERA DAMARIS M) TABS     omeprazole (PRILOSEC) 20 MG DR capsule     ONETOUCH ULTRA test strip  "    simvastatin (ZOCOR) 20 MG tablet     Current Facility-Administered Medications   Medication     EPINEPHrine (ADRENALIN) kit 0.3 mg     No OTCs, herbals    Allergies:  Allergies   Allergen Reactions     Minocycline Hives       Objective:  /81 (BP Location: Right arm, Patient Position: Sitting, Cuff Size: Adult Large)   Pulse 69   Temp 98.6  F (37  C) (Oral)   Resp 18   Ht 1.905 m (6' 3\")   Wt 131.9 kg (290 lb 11.2 oz)   SpO2 100%   BMI 36.34 kg/m    Constitutional: pleasant man in NAD  Eyes: non icteric  Respiratory: Normal respiratory excursion   MSK: normal range of motion of visualized extremities  Abd: Midline hernia present, reducible, difficult to appreciate ascites  Skin: No jaundice  Psychiatric: normal mood and orientation    Labs:  Last Comprehensive Metabolic Panel:  Sodium   Date Value Ref Range Status   12/13/2021 141 133 - 144 mmol/L Final   06/15/2021 136 133 - 144 mmol/L Final     Potassium   Date Value Ref Range Status   12/13/2021 3.7 3.4 - 5.3 mmol/L Final   06/15/2021 4.1 3.4 - 5.3 mmol/L Final     Chloride   Date Value Ref Range Status   12/13/2021 108 94 - 109 mmol/L Final   06/15/2021 112 (H) 94 - 109 mmol/L Final     Carbon Dioxide   Date Value Ref Range Status   06/15/2021 25 20 - 32 mmol/L Final     Carbon Dioxide (CO2)   Date Value Ref Range Status   12/13/2021 24 20 - 32 mmol/L Final     Anion Gap   Date Value Ref Range Status   12/13/2021 9 3 - 14 mmol/L Final   06/15/2021 <1 (L) 3 - 14 mmol/L Final     Glucose   Date Value Ref Range Status   12/13/2021 209 (H) 70 - 99 mg/dL Final   06/15/2021 123 (H) 70 - 99 mg/dL Final     Urea Nitrogen   Date Value Ref Range Status   12/13/2021 6 (L) 7 - 30 mg/dL Final   06/15/2021 8 7 - 30 mg/dL Final     Creatinine   Date Value Ref Range Status   12/13/2021 0.59 (L) 0.66 - 1.25 mg/dL Final   06/15/2021 0.63 (L) 0.66 - 1.25 mg/dL Final     GFR Estimate   Date Value Ref Range Status   12/13/2021 >90 >60 mL/min/1.73m2 Final     Comment: "     As of July 11, 2021, eGFR is calculated by the CKD-EPI creatinine equation, without race adjustment. eGFR can be influenced by muscle mass, exercise, and diet. The reported eGFR is an estimation only and is only applicable if the renal function is stable.   06/15/2021 >90 >60 mL/min/[1.73_m2] Final     Comment:     Non  GFR Calc  Starting 12/18/2018, serum creatinine based estimated GFR (eGFR) will be   calculated using the Chronic Kidney Disease Epidemiology Collaboration   (CKD-EPI) equation.       Calcium   Date Value Ref Range Status   12/13/2021 8.6 8.5 - 10.1 mg/dL Final   06/15/2021 8.9 8.5 - 10.1 mg/dL Final     Bilirubin Total   Date Value Ref Range Status   12/13/2021 1.0 0.2 - 1.3 mg/dL Final   06/15/2021 0.8 0.2 - 1.3 mg/dL Final     Alkaline Phosphatase   Date Value Ref Range Status   12/13/2021 111 40 - 150 U/L Final   06/15/2021 126 40 - 150 U/L Final     ALT   Date Value Ref Range Status   12/13/2021 40 0 - 70 U/L Final   06/15/2021 36 0 - 70 U/L Final     AST   Date Value Ref Range Status   12/13/2021 28 0 - 45 U/L Final   06/15/2021 34 0 - 45 U/L Final       Lab Results   Component Value Date    WBC 4.7 06/15/2021     Lab Results   Component Value Date    RBC 3.89 06/15/2021     Lab Results   Component Value Date    HGB 8.6 06/15/2021     Lab Results   Component Value Date    HCT 31.0 06/15/2021     Lab Results   Component Value Date    MCV 80 06/15/2021     Lab Results   Component Value Date    MCH 22.1 06/15/2021     Lab Results   Component Value Date    MCHC 27.7 06/15/2021     Lab Results   Component Value Date    RDW 17.3 06/15/2021     Lab Results   Component Value Date     06/15/2021       INR   Date Value Ref Range Status   12/13/2021 1.48 (H) 0.85 - 1.15 Final   06/15/2021 1.58 (H) 0.86 - 1.14 Final        MELD-Na score: 11 at 12/13/2021 10:40 AM  MELD score: 11 at 12/13/2021 10:40 AM  Calculated from:  Serum Creatinine: 0.59 mg/dL (Using min of 1 mg/dL) at  12/13/2021 10:40 AM  Serum Sodium: 141 mmol/L (Using max of 137 mmol/L) at 12/13/2021 10:40 AM  Total Bilirubin: 1.0 mg/dL at 12/13/2021 10:40 AM  INR(ratio): 1.48 at 12/13/2021 10:40 AM  Age: 51 years    Imaging:    CT 4/2021    LOWER CHEST: The lung bases are clear.  HEPATOBILIARY: Cirrhotic appearance of the liver. No worrisome liver lesions within the limitations of a single phase exam. No biliary duct dilatation. No calcified gallstones.  PANCREAS: No significant mass, duct dilatation, or inflammatory change.  SPLEEN: Splenomegaly, measuring 19 cm in the craniocaudal dimension.  ADRENAL GLANDS: No significant nodules.  KIDNEYS/BLADDER: No significant mass, stone, or hydronephrosis. No bladder wall thickening.  BOWEL: Postoperative changes of small bowel resection with anastomosis in the right midabdomen. Diastases of the abdominis rectus musculature with broad-based bulging of the anterior abdominal wall fascia with multiple associated nonobstructed small bowel loops. The bowel is nondistended without inflammatory change. No evidence of appendicitis.  LYMPH NODES: No lymphadenopathy.  VASCULATURE: Few tiny gastroesophageal varices.  PELVIC ORGANS: Unremarkable.  ADDITIONAL FINDINGS: Mesenteric edema and small volume of ascites.  MUSCULOSKELETAL: Small fat-containing right inguinal hernia. Mild degenerative changes of the hips and visualized thoracolumbar spine.  IMPRESSION:   1.  Cirrhosis with portal hypertension as evidenced by splenomegaly, small volume ascites and tiny gastroesophageal varices.  2.  Diastases of the abdominis rectus musculature with broad-based bulging of the ventral abdominal wall fascia.  3.  Small fat-containing right inguinal hernia.     MRI 10/2021    Liver: Cirrhotic morphology of the liver. Multiple T1 hyperintense,  nonenhancing observations throughout the right hepatic lobe, including  a 10 mm observation in hepatic segment 6 (series 7 image 30) and a 6  mm observation in the right  hepatic dome (series 7 image 59). No  suspicious arterially enhancing or restricting lesions. Mild to  moderate loss of signal intensity abnormalities imaging consistent  with iron deposition.     Gallbladder: Distended. 4 mm polyp/adherent stone along the inferior  aspect of the gallbladder (series 3 image 20).     Spleen: Enlarged, measuring 17 cm in length.     Kidneys: Unremarkable.     Adrenal glands: Unremarkable.     Pancreas: Mild to moderate parenchymal atrophy. The pancreatic duct  within normal limits. No suspicious pancreatic lesions.     Bowel: The visualized bowel is unremarkable.     Lymph nodes: No suspicious abdominal adenopathy.     Blood vessels: Normal caliber abdominal aorta. Major abdominal  vasculature is patent.     Lung bases: The visualized lung bases are unremarkable.     Bones and soft tissues: Unremarkable.     Mesentery and abdominal wall: Unremarkable.     Ascites: Trace perihepatic ascites.                                                                      IMPRESSION:  1. Cirrhotic morphology of the liver with multiple subcentimeter T1  hyperintense, nonenhancing lesions, likely representing small  regenerative/dysplastic nodules. Exam quality is slightly limited due  to motion artifact.  2. Splenomegaly.  Endoscopy: EGD/colon > 20 years ago    Independently reviewed labs and imaging.     Assessment/Plan: Mr. 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     Was originally referred for LT evaluation - given his CHF and low MELD, did not pursue this. He is having significant pain related to his complex hernia, discussed he would need to have resolution of his ascites, and improvement in his cardiac status to be  considered a candidate for surgery. Discussed with his weight he would at high risk for recurrence, he has been working to lose weight. TTE showed improvement in his cardiac function with further sobriety and medications but still LVEF 40-45%.     He is having chronic diarrhea, could be related to his bowel resection, SIBO, microscopic colitis. Random bx 12/2021 showing chronic inactive colitis. Decreased rectal tone noted on exam as well.     - Continue lasix 20 mg BID and eplerenone 25 mg daily  - Repeat MRI 2/2022 to follow up liver nodules  - Obtain fecal calprotectin for diarrhea evaluation and ?colitis  - Consider GHBT in the future, ARM  - Continue to completely abstain from alcohol   - Follow up with cardiology for CHF - appears to be improving     Orders Placed This Encounter   Procedures     Calprotectin Feces     Nursing Instructions for Covid-19 Vaccine     Vital Signs for Hypersensitivity Reactions     Cold pack (aka APPLY PACK)  PRN     Notify Provider     RTC 3 months after MRI    Sweetie Bain MD MS  Hepatology/Liver Transplant  [facility]    We discussed participation in the [facility] patient-related outcomes study.  The patient understands that the study may later link survey data with clinical data.  The patient would like to be contacted to participate in the study.

## 2021-12-16 PROCEDURE — 83993 ASSAY FOR CALPROTECTIN FECAL: CPT | Mod: 90 | Performed by: PATHOLOGY

## 2021-12-17 LAB — CALPROTECTIN STL-MCNT: 46.5 MG/KG (ref 0–49.9)

## 2021-12-20 ENCOUNTER — TELEPHONE (OUTPATIENT)
Dept: GASTROENTEROLOGY | Facility: CLINIC | Age: 51
End: 2021-12-20
Payer: COMMERCIAL

## 2021-12-20 NOTE — TELEPHONE ENCOUNTER
Non- Invasive Endoscopy  Scheduling Details      WHICH NON - INVASIVE PROCEDURE SCHEDULED?: Hydrogen Breath Test  Ordering Provider: JAY  Provider/Surgeon: RN  Date of Scheduled Procedure: 02/11/2022  [facility] [place] [UNLESS IT IS VCE @ [facility] ARRIVAL 60MINS BEFORE]  Caller (Please ask for phone number if not scheduled by patient): Blair Oliver      Sedation Type: NO SEDATION    Informed patient they will need an adult  No  NEEDED  Cannot take any type of public or medical transportation alone    COVID TEST SCHEDULED AT [facility] OR EXTERNALLY?: [facility]    MyChart/Letter sent? : Yes

## 2022-01-01 ENCOUNTER — PATIENT OUTREACH (OUTPATIENT)
Dept: GASTROENTEROLOGY | Facility: CLINIC | Age: 52
End: 2022-01-01

## 2022-01-01 ENCOUNTER — TRANSFERRED RECORDS (OUTPATIENT)
Dept: HEALTH INFORMATION MANAGEMENT | Facility: CLINIC | Age: 52
End: 2022-01-01

## 2022-01-01 ENCOUNTER — ANESTHESIA EVENT (OUTPATIENT)
Dept: SURGERY | Facility: CLINIC | Age: 52
End: 2022-01-01

## 2022-01-01 ENCOUNTER — TELEPHONE (OUTPATIENT)
Dept: GASTROENTEROLOGY | Facility: CLINIC | Age: 52
End: 2022-01-01

## 2022-01-01 ENCOUNTER — PRE VISIT (OUTPATIENT)
Dept: SURGERY | Facility: CLINIC | Age: 52
End: 2022-01-01

## 2022-01-01 ENCOUNTER — TELEPHONE (OUTPATIENT)
Dept: VASCULAR SURGERY | Facility: CLINIC | Age: 52
End: 2022-01-01

## 2022-01-01 ENCOUNTER — HOSPITAL ENCOUNTER (OUTPATIENT)
Facility: CLINIC | Age: 52
Discharge: HOME | End: 2022-12-01
Attending: INTERNAL MEDICINE | Admitting: INTERNAL MEDICINE
Payer: COMMERCIAL

## 2022-01-01 ENCOUNTER — ANCILLARY PROCEDURE (OUTPATIENT)
Dept: CARDIOLOGY | Facility: CLINIC | Age: 52
End: 2022-01-01
Attending: STUDENT IN AN ORGANIZED HEALTH CARE EDUCATION/TRAINING PROGRAM
Payer: COMMERCIAL

## 2022-01-01 ENCOUNTER — ANESTHESIA EVENT (OUTPATIENT)
Dept: SURGERY | Facility: CLINIC | Age: 52
End: 2022-01-01
Payer: COMMERCIAL

## 2022-01-01 ENCOUNTER — OFFICE VISIT (OUTPATIENT)
Dept: GASTROENTEROLOGY | Facility: CLINIC | Age: 52
End: 2022-01-01
Attending: STUDENT IN AN ORGANIZED HEALTH CARE EDUCATION/TRAINING PROGRAM
Payer: COMMERCIAL

## 2022-01-01 ENCOUNTER — LAB (OUTPATIENT)
Dept: LAB | Facility: CLINIC | Age: 52
End: 2022-01-01
Payer: COMMERCIAL

## 2022-01-01 ENCOUNTER — VIRTUAL VISIT (OUTPATIENT)
Dept: VASCULAR SURGERY | Facility: CLINIC | Age: 52
End: 2022-01-01
Payer: COMMERCIAL

## 2022-01-01 ENCOUNTER — HOSPITAL ENCOUNTER (OUTPATIENT)
Facility: CLINIC | Age: 52
Discharge: HOME | End: 2022-12-05
Attending: RADIOLOGY | Admitting: RADIOLOGY
Payer: COMMERCIAL

## 2022-01-01 ENCOUNTER — TELEPHONE (OUTPATIENT)
Dept: SURGERY | Facility: AMBULATORY SURGERY CENTER | Age: 52
End: 2022-01-01

## 2022-01-01 ENCOUNTER — LAB (OUTPATIENT)
Dept: LAB | Facility: CLINIC | Age: 52
End: 2022-01-01
Attending: STUDENT IN AN ORGANIZED HEALTH CARE EDUCATION/TRAINING PROGRAM
Payer: COMMERCIAL

## 2022-01-01 ENCOUNTER — VIRTUAL VISIT (OUTPATIENT)
Dept: SURGERY | Facility: CLINIC | Age: 52
End: 2022-01-01
Payer: COMMERCIAL

## 2022-01-01 ENCOUNTER — HOSPITAL ENCOUNTER (INPATIENT)
Facility: CLINIC | Age: 52
Setting detail: SURGERY ADMIT
End: 2022-01-01
Payer: COMMERCIAL

## 2022-01-01 ENCOUNTER — ANESTHESIA (OUTPATIENT)
Dept: SURGERY | Facility: CLINIC | Age: 52
End: 2022-01-01
Payer: COMMERCIAL

## 2022-01-01 ENCOUNTER — HOSPITAL ENCOUNTER (OUTPATIENT)
Facility: CLINIC | Age: 52
End: 2022-01-01
Attending: INTERNAL MEDICINE | Admitting: INTERNAL MEDICINE
Payer: COMMERCIAL

## 2022-01-01 ENCOUNTER — DOCUMENTATION ONLY (OUTPATIENT)
Dept: VASCULAR SURGERY | Facility: CLINIC | Age: 52
End: 2022-01-01

## 2022-01-01 VITALS
DIASTOLIC BLOOD PRESSURE: 65 MMHG | RESPIRATION RATE: 16 BRPM | HEART RATE: 55 BPM | OXYGEN SATURATION: 100 % | SYSTOLIC BLOOD PRESSURE: 100 MMHG | TEMPERATURE: 97.7 F

## 2022-01-01 VITALS
HEIGHT: 75 IN | OXYGEN SATURATION: 100 % | WEIGHT: 257.28 LBS | BODY MASS INDEX: 31.99 KG/M2 | TEMPERATURE: 97.2 F | DIASTOLIC BLOOD PRESSURE: 69 MMHG | HEART RATE: 52 BPM | SYSTOLIC BLOOD PRESSURE: 114 MMHG

## 2022-01-01 VITALS
HEIGHT: 75 IN | SYSTOLIC BLOOD PRESSURE: 99 MMHG | BODY MASS INDEX: 32.24 KG/M2 | WEIGHT: 259.3 LBS | HEART RATE: 65 BPM | DIASTOLIC BLOOD PRESSURE: 65 MMHG

## 2022-01-01 DIAGNOSIS — E87.6 HYPOKALEMIA: ICD-10-CM

## 2022-01-01 DIAGNOSIS — K55.069: ICD-10-CM

## 2022-01-01 DIAGNOSIS — I50.22 CHRONIC SYSTOLIC CONGESTIVE HEART FAILURE (H): ICD-10-CM

## 2022-01-01 DIAGNOSIS — Z01.818 PREOP EXAMINATION: Primary | ICD-10-CM

## 2022-01-01 DIAGNOSIS — Z79.4 TYPE 2 DIABETES MELLITUS WITH DIABETIC NEUROPATHY, WITH LONG-TERM CURRENT USE OF INSULIN (H): ICD-10-CM

## 2022-01-01 DIAGNOSIS — R19.7 DIARRHEA, UNSPECIFIED TYPE: Primary | ICD-10-CM

## 2022-01-01 DIAGNOSIS — K70.31 ALCOHOLIC CIRRHOSIS OF LIVER WITH ASCITES (H): Primary | ICD-10-CM

## 2022-01-01 DIAGNOSIS — K65.2 SPONTANEOUS BACTERIAL PERITONITIS (H): ICD-10-CM

## 2022-01-01 DIAGNOSIS — K70.31 ALCOHOLIC CIRRHOSIS OF LIVER WITH ASCITES (H): ICD-10-CM

## 2022-01-01 DIAGNOSIS — Z01.818 PRE-OP EVALUATION: Primary | ICD-10-CM

## 2022-01-01 DIAGNOSIS — Z11.52 ENCOUNTER FOR PREPROCEDURE SCREENING LABORATORY TESTING FOR COVID-19: Primary | ICD-10-CM

## 2022-01-01 DIAGNOSIS — Z20.822 ENCOUNTER FOR LABORATORY TESTING FOR COVID-19 VIRUS: ICD-10-CM

## 2022-01-01 DIAGNOSIS — R60.9 EDEMA: Primary | ICD-10-CM

## 2022-01-01 DIAGNOSIS — Z01.818 PREOP EXAMINATION: ICD-10-CM

## 2022-01-01 DIAGNOSIS — Z01.812 ENCOUNTER FOR PREPROCEDURE SCREENING LABORATORY TESTING FOR COVID-19: Primary | ICD-10-CM

## 2022-01-01 DIAGNOSIS — D50.0 IRON DEFICIENCY ANEMIA DUE TO CHRONIC BLOOD LOSS: Primary | ICD-10-CM

## 2022-01-01 DIAGNOSIS — I50.21 ACUTE HFREF (HEART FAILURE WITH REDUCED EJECTION FRACTION) (H): ICD-10-CM

## 2022-01-01 DIAGNOSIS — D64.9 ANEMIA, UNSPECIFIED TYPE: ICD-10-CM

## 2022-01-01 DIAGNOSIS — E11.40 TYPE 2 DIABETES MELLITUS WITH DIABETIC NEUROPATHY, WITH LONG-TERM CURRENT USE OF INSULIN (H): ICD-10-CM

## 2022-01-01 LAB
ABO/RH(D): NORMAL
ABO/RH(D): NORMAL
ALBUMIN SERPL BCG-MCNC: 3.2 G/DL (ref 3.5–5.2)
ALBUMIN SERPL BCG-MCNC: 3.3 G/DL (ref 3.5–5.2)
ALBUMIN SERPL-MCNC: 2.7 G/DL (ref 3.4–5)
ALBUMIN SERPL-MCNC: 2.8 G/DL (ref 3.4–5)
ALBUMIN SERPL-MCNC: 3.1 G/DL (ref 3.4–5)
ALBUMIN SERPL-MCNC: 3.3 G/DL (ref 3.4–5)
ALP SERPL-CCNC: 161 U/L (ref 40–150)
ALP SERPL-CCNC: 186 U/L (ref 40–129)
ALP SERPL-CCNC: 206 U/L (ref 40–129)
ALP SERPL-CCNC: 224 U/L (ref 40–150)
ALP SERPL-CCNC: 235 U/L (ref 40–150)
ALP SERPL-CCNC: 278 U/L (ref 40–150)
ALT SERPL W P-5'-P-CCNC: 23 U/L (ref 10–50)
ALT SERPL W P-5'-P-CCNC: 23 U/L (ref 10–50)
ALT SERPL W P-5'-P-CCNC: 34 U/L (ref 0–70)
ALT SERPL W P-5'-P-CCNC: 35 U/L (ref 0–70)
ALT SERPL W P-5'-P-CCNC: 36 U/L (ref 0–70)
ALT SERPL W P-5'-P-CCNC: 40 U/L (ref 0–70)
ANION GAP SERPL CALCULATED.3IONS-SCNC: 10 MMOL/L (ref 3–14)
ANION GAP SERPL CALCULATED.3IONS-SCNC: 12 MMOL/L (ref 3–14)
ANION GAP SERPL CALCULATED.3IONS-SCNC: 17 MMOL/L (ref 7–15)
ANION GAP SERPL CALCULATED.3IONS-SCNC: 9 MMOL/L (ref 3–14)
ANION GAP SERPL CALCULATED.3IONS-SCNC: 9 MMOL/L (ref 3–14)
ANION GAP SERPL CALCULATED.3IONS-SCNC: 9 MMOL/L (ref 7–15)
ANTIBODY SCREEN: NEGATIVE
ANTIBODY SCREEN: NEGATIVE
AST SERPL W P-5'-P-CCNC: 28 U/L (ref 0–45)
AST SERPL W P-5'-P-CCNC: 35 U/L (ref 0–45)
AST SERPL W P-5'-P-CCNC: 37 U/L (ref 0–45)
AST SERPL W P-5'-P-CCNC: 37 U/L (ref 0–45)
AST SERPL W P-5'-P-CCNC: 37 U/L (ref 10–50)
AST SERPL W P-5'-P-CCNC: 37 U/L (ref 10–50)
BASOPHILS # BLD AUTO: 0.1 10E3/UL (ref 0–0.2)
BASOPHILS NFR BLD AUTO: 1 %
BI-PLANE LVEF ECHO: NORMAL
BILIRUB DIRECT SERPL-MCNC: 0.21 MG/DL (ref 0–0.3)
BILIRUB DIRECT SERPL-MCNC: 0.27 MG/DL (ref 0–0.3)
BILIRUB SERPL-MCNC: 0.6 MG/DL (ref 0.2–1.3)
BILIRUB SERPL-MCNC: 0.6 MG/DL (ref 0.2–1.3)
BILIRUB SERPL-MCNC: 0.7 MG/DL
BILIRUB SERPL-MCNC: 0.8 MG/DL
BILIRUB SERPL-MCNC: 0.9 MG/DL (ref 0.2–1.3)
BILIRUB SERPL-MCNC: 1.1 MG/DL (ref 0.2–1.3)
BUN SERPL-MCNC: 10.9 MG/DL (ref 6–20)
BUN SERPL-MCNC: 16 MG/DL (ref 7–30)
BUN SERPL-MCNC: 18 MG/DL (ref 7–30)
BUN SERPL-MCNC: 18.2 MG/DL (ref 6–20)
BUN SERPL-MCNC: 21 MG/DL (ref 7–30)
BUN SERPL-MCNC: 22 MG/DL (ref 7–30)
CA-I BLD-MCNC: 4.4 MG/DL (ref 4.4–5.2)
CALCIUM SERPL-MCNC: 7.2 MG/DL (ref 8.6–10)
CALCIUM SERPL-MCNC: 7.7 MG/DL (ref 8.5–10.1)
CALCIUM SERPL-MCNC: 7.7 MG/DL (ref 8.5–10.1)
CALCIUM SERPL-MCNC: 7.9 MG/DL (ref 8.5–10.1)
CALCIUM SERPL-MCNC: 8.4 MG/DL (ref 8.5–10.1)
CALCIUM SERPL-MCNC: 8.4 MG/DL (ref 8.6–10)
CF REDUC 60M P MA LENFR BLD TEG: 0.4 % (ref 0–15)
CFT BLD TEG: 1.8 MINUTE (ref 1–3)
CHLORIDE BLD-SCNC: 110 MMOL/L (ref 94–109)
CHLORIDE BLD-SCNC: 115 MMOL/L (ref 94–109)
CHLORIDE BLD-SCNC: 117 MMOL/L (ref 94–109)
CHLORIDE BLD-SCNC: 125 MMOL/L (ref 94–109)
CHLORIDE SERPL-SCNC: 106 MMOL/L (ref 98–107)
CHLORIDE SERPL-SCNC: 109 MMOL/L (ref 98–107)
CI (COAGULATION INDEX)(Z) NON NATIVE: -1.1 (ref -3–3)
CLOT ANGLE BLD TEG: 45.5 DEGREES (ref 53–72)
CLOT INIT BLD TEG: 5.1 MINUTE (ref 5–10)
CLOT LYSIS 30M P MA LENFR BLD TEG: 0 % (ref 0–8)
CLOT STRENGTH BLD TEG: 7.4 KD/SC (ref 4.5–11)
CO2 SERPL-SCNC: 12 MMOL/L (ref 20–32)
CO2 SERPL-SCNC: 15 MMOL/L (ref 20–32)
CO2 SERPL-SCNC: 17 MMOL/L (ref 20–32)
CO2 SERPL-SCNC: 20 MMOL/L (ref 20–32)
CPB POCT: NO
CREAT SERPL-MCNC: 0.78 MG/DL (ref 0.66–1.25)
CREAT SERPL-MCNC: 0.92 MG/DL (ref 0.67–1.17)
CREAT SERPL-MCNC: 1.08 MG/DL (ref 0.66–1.25)
CREAT SERPL-MCNC: 1.36 MG/DL (ref 0.66–1.25)
CREAT SERPL-MCNC: 1.5 MG/DL (ref 0.67–1.17)
CREAT SERPL-MCNC: 1.65 MG/DL (ref 0.66–1.25)
DEPRECATED HCO3 PLAS-SCNC: 13 MMOL/L (ref 22–29)
DEPRECATED HCO3 PLAS-SCNC: 23 MMOL/L (ref 22–29)
EOSINOPHIL # BLD AUTO: 0.2 10E3/UL (ref 0–0.7)
EOSINOPHIL NFR BLD AUTO: 4 %
ERYTHROCYTE [DISTWIDTH] IN BLOOD BY AUTOMATED COUNT: 17.7 % (ref 10–15)
ERYTHROCYTE [DISTWIDTH] IN BLOOD BY AUTOMATED COUNT: 17.8 % (ref 10–15)
ERYTHROCYTE [DISTWIDTH] IN BLOOD BY AUTOMATED COUNT: 17.9 % (ref 10–15)
ERYTHROCYTE [DISTWIDTH] IN BLOOD BY AUTOMATED COUNT: 19.5 % (ref 10–15)
FERRITIN SERPL-MCNC: 13 NG/ML (ref 31–409)
FERRITIN SERPL-MCNC: 8 NG/ML (ref 26–388)
GFR SERPL CREATININE-BSD FRML MDRD: 49 ML/MIN/1.73M2
GFR SERPL CREATININE-BSD FRML MDRD: 55 ML/MIN/1.73M2
GFR SERPL CREATININE-BSD FRML MDRD: 62 ML/MIN/1.73M2
GFR SERPL CREATININE-BSD FRML MDRD: 82 ML/MIN/1.73M2
GFR SERPL CREATININE-BSD FRML MDRD: >90 ML/MIN/1.73M2
GFR SERPL CREATININE-BSD FRML MDRD: >90 ML/MIN/1.73M2
GLUCOSE BLD-MCNC: 105 MG/DL (ref 70–99)
GLUCOSE BLD-MCNC: 179 MG/DL (ref 70–99)
GLUCOSE BLD-MCNC: 225 MG/DL (ref 70–99)
GLUCOSE BLD-MCNC: 73 MG/DL (ref 70–99)
GLUCOSE BLD-MCNC: 84 MG/DL (ref 70–99)
GLUCOSE BLDC GLUCOMTR-MCNC: 127 MG/DL (ref 70–99)
GLUCOSE BLDC GLUCOMTR-MCNC: 87 MG/DL (ref 70–99)
GLUCOSE SERPL-MCNC: 137 MG/DL (ref 70–99)
GLUCOSE SERPL-MCNC: 176 MG/DL (ref 70–99)
HBA1C MFR BLD: 6.1 % (ref 0–5.6)
HCO3 BLDV-SCNC: 15 MMOL/L (ref 21–28)
HCT VFR BLD AUTO: 23.5 % (ref 40–53)
HCT VFR BLD AUTO: 26.3 % (ref 40–53)
HCT VFR BLD AUTO: 28.4 % (ref 40–53)
HCT VFR BLD AUTO: 28.8 % (ref 40–53)
HCT VFR BLD CALC: 20 % (ref 40–53)
HGB BLD-MCNC: 6.8 G/DL (ref 13.3–17.7)
HGB BLD-MCNC: 7.6 G/DL (ref 13.3–17.7)
HGB BLD-MCNC: 8.1 G/DL (ref 13.3–17.7)
HGB BLD-MCNC: 8.6 G/DL (ref 13.3–17.7)
HGB BLD-MCNC: 8.7 G/DL (ref 13.3–17.7)
IMM GRANULOCYTES # BLD: 0 10E3/UL
IMM GRANULOCYTES NFR BLD: 1 %
INR PPP: 1.72 (ref 0.85–1.15)
INR PPP: 1.73 (ref 0.85–1.15)
INR PPP: 2.1 (ref 0.85–1.15)
INR PPP: 2.15 (ref 0.85–1.15)
INR PPP: 2.21 (ref 0.85–1.15)
INR PPP: 2.43 (ref 0.85–1.15)
IRON SATN MFR SERPL: 10 % (ref 15–46)
IRON SERPL-MCNC: 28 UG/DL (ref 35–180)
LVEF ECHO: NORMAL
LYMPHOCYTES # BLD AUTO: 0.9 10E3/UL (ref 0.8–5.3)
LYMPHOCYTES NFR BLD AUTO: 15 %
MCF BLD TEG: 59.5 MM (ref 50–70)
MCH RBC QN AUTO: 26.5 PG (ref 26.5–33)
MCH RBC QN AUTO: 27 PG (ref 26.5–33)
MCH RBC QN AUTO: 31.3 PG (ref 26.5–33)
MCH RBC QN AUTO: 32.5 PG (ref 26.5–33)
MCHC RBC AUTO-ENTMCNC: 30.2 G/DL (ref 31.5–36.5)
MCHC RBC AUTO-ENTMCNC: 30.3 G/DL (ref 31.5–36.5)
MCHC RBC AUTO-ENTMCNC: 30.8 G/DL (ref 31.5–36.5)
MCHC RBC AUTO-ENTMCNC: 32.3 G/DL (ref 31.5–36.5)
MCV RBC AUTO: 100 FL (ref 78–100)
MCV RBC AUTO: 102 FL (ref 78–100)
MCV RBC AUTO: 88 FL (ref 78–100)
MCV RBC AUTO: 89 FL (ref 78–100)
MONOCYTES # BLD AUTO: 0.6 10E3/UL (ref 0–1.3)
MONOCYTES NFR BLD AUTO: 9 %
NEUTROPHILS # BLD AUTO: 4.5 10E3/UL (ref 1.6–8.3)
NEUTROPHILS NFR BLD AUTO: 70 %
NRBC # BLD AUTO: 0 10E3/UL
NRBC BLD AUTO-RTO: 0 /100
PCO2 BLDV: 25 MM HG (ref 40–50)
PH BLDV: 7.39 [PH] (ref 7.32–7.43)
PLATELET # BLD AUTO: 105 10E3/UL (ref 150–450)
PLATELET # BLD AUTO: 112 10E3/UL (ref 150–450)
PLATELET # BLD AUTO: 116 10E3/UL (ref 150–450)
PLATELET # BLD AUTO: 118 10E3/UL (ref 150–450)
PO2 BLDV: 29 MM HG (ref 25–47)
POTASSIUM BLD-SCNC: 2.4 MMOL/L (ref 3.4–5.3)
POTASSIUM BLD-SCNC: 2.6 MMOL/L (ref 3.4–5.3)
POTASSIUM BLD-SCNC: 2.9 MMOL/L (ref 3.4–5.3)
POTASSIUM BLD-SCNC: 3.8 MMOL/L (ref 3.4–5.3)
POTASSIUM BLD-SCNC: 4 MMOL/L (ref 3.4–5.3)
POTASSIUM SERPL-SCNC: 2.3 MMOL/L (ref 3.4–5.3)
POTASSIUM SERPL-SCNC: 2.8 MMOL/L (ref 3.4–5.3)
POTASSIUM SERPL-SCNC: 3.8 MMOL/L (ref 3.4–5.3)
PROT SERPL-MCNC: 6 G/DL (ref 6.8–8.8)
PROT SERPL-MCNC: 6.1 G/DL (ref 6.4–8.3)
PROT SERPL-MCNC: 6.1 G/DL (ref 6.8–8.8)
PROT SERPL-MCNC: 6.6 G/DL (ref 6.4–8.3)
PROT SERPL-MCNC: 6.7 G/DL (ref 6.8–8.8)
PROT SERPL-MCNC: 6.8 G/DL (ref 6.8–8.8)
RBC # BLD AUTO: 2.34 10E6/UL (ref 4.4–5.9)
RBC # BLD AUTO: 2.59 10E6/UL (ref 4.4–5.9)
RBC # BLD AUTO: 3.19 10E6/UL (ref 4.4–5.9)
RBC # BLD AUTO: 3.28 10E6/UL (ref 4.4–5.9)
SAO2 % BLDV: 56 % (ref 94–100)
SARS-COV-2 RNA RESP QL NAA+PROBE: NEGATIVE
SODIUM BLD-SCNC: 146 MMOL/L (ref 133–144)
SODIUM SERPL-SCNC: 138 MMOL/L (ref 136–145)
SODIUM SERPL-SCNC: 139 MMOL/L (ref 133–144)
SODIUM SERPL-SCNC: 139 MMOL/L (ref 136–145)
SODIUM SERPL-SCNC: 141 MMOL/L (ref 133–144)
SODIUM SERPL-SCNC: 144 MMOL/L (ref 133–144)
SODIUM SERPL-SCNC: 147 MMOL/L (ref 133–144)
SPECIMEN EXPIRATION DATE: NORMAL
SPECIMEN EXPIRATION DATE: NORMAL
TIBC SERPL-MCNC: 286 UG/DL (ref 240–430)
UPPER GI ENDOSCOPY: NORMAL
WBC # BLD AUTO: 5.7 10E3/UL (ref 4–11)
WBC # BLD AUTO: 5.7 10E3/UL (ref 4–11)
WBC # BLD AUTO: 6.2 10E3/UL (ref 4–11)
WBC # BLD AUTO: 6.3 10E3/UL (ref 4–11)

## 2022-01-01 PROCEDURE — 82962 GLUCOSE BLOOD TEST: CPT

## 2022-01-01 PROCEDURE — 82728 ASSAY OF FERRITIN: CPT | Performed by: STUDENT IN AN ORGANIZED HEALTH CARE EDUCATION/TRAINING PROGRAM

## 2022-01-01 PROCEDURE — C1769 GUIDE WIRE: HCPCS

## 2022-01-01 PROCEDURE — 272N000497 HC NEEDLE CR16

## 2022-01-01 PROCEDURE — 99205 OFFICE O/P NEW HI 60 MIN: CPT | Mod: GT | Performed by: NURSE PRACTITIONER

## 2022-01-01 PROCEDURE — 85396 CLOTTING ASSAY WHOLE BLOOD: CPT | Performed by: RADIOLOGY

## 2022-01-01 PROCEDURE — 85610 PROTHROMBIN TIME: CPT | Performed by: PATHOLOGY

## 2022-01-01 PROCEDURE — 85027 COMPLETE CBC AUTOMATED: CPT

## 2022-01-01 PROCEDURE — 999N000005 HC CANCELLED SURGERY UP TO 61-90 MINS

## 2022-01-01 PROCEDURE — 85610 PROTHROMBIN TIME: CPT

## 2022-01-01 PROCEDURE — 82728 ASSAY OF FERRITIN: CPT

## 2022-01-01 PROCEDURE — 83550 IRON BINDING TEST: CPT

## 2022-01-01 PROCEDURE — 250N000011 HC RX IP 250 OP 636: Performed by: NURSE PRACTITIONER

## 2022-01-01 PROCEDURE — 272N000504 HC NEEDLE CR4

## 2022-01-01 PROCEDURE — 86900 BLOOD TYPING SEROLOGIC ABO: CPT

## 2022-01-01 PROCEDURE — 84132 ASSAY OF SERUM POTASSIUM: CPT | Performed by: RADIOLOGY

## 2022-01-01 PROCEDURE — 82248 BILIRUBIN DIRECT: CPT | Performed by: NURSE PRACTITIONER

## 2022-01-01 PROCEDURE — 80053 COMPREHEN METABOLIC PANEL: CPT

## 2022-01-01 PROCEDURE — 93325 DOPPLER ECHO COLOR FLOW MAPG: CPT | Mod: GC | Performed by: INTERNAL MEDICINE

## 2022-01-01 PROCEDURE — 83036 HEMOGLOBIN GLYCOSYLATED A1C: CPT

## 2022-01-01 PROCEDURE — 85027 COMPLETE CBC AUTOMATED: CPT | Performed by: PATHOLOGY

## 2022-01-01 PROCEDURE — 36415 COLL VENOUS BLD VENIPUNCTURE: CPT | Performed by: PATHOLOGY

## 2022-01-01 PROCEDURE — 99215 OFFICE O/P EST HI 40 MIN: CPT | Performed by: STUDENT IN AN ORGANIZED HEALTH CARE EDUCATION/TRAINING PROGRAM

## 2022-01-01 PROCEDURE — 36415 COLL VENOUS BLD VENIPUNCTURE: CPT

## 2022-01-01 PROCEDURE — 36415 COLL VENOUS BLD VENIPUNCTURE: CPT | Performed by: RADIOLOGY

## 2022-01-01 PROCEDURE — 80053 COMPREHEN METABOLIC PANEL: CPT | Performed by: NURSE PRACTITIONER

## 2022-01-01 PROCEDURE — 250N000013 HC RX MED GY IP 250 OP 250 PS 637: Performed by: NURSE PRACTITIONER

## 2022-01-01 PROCEDURE — 43244 EGD VARICES LIGATION: CPT | Performed by: INTERNAL MEDICINE

## 2022-01-01 PROCEDURE — 93321 DOPPLER ECHO F-UP/LMTD STD: CPT | Mod: GC | Performed by: INTERNAL MEDICINE

## 2022-01-01 PROCEDURE — G0500 MOD SEDAT ENDO SERVICE >5YRS: HCPCS | Performed by: INTERNAL MEDICINE

## 2022-01-01 PROCEDURE — 86850 RBC ANTIBODY SCREEN: CPT

## 2022-01-01 PROCEDURE — 80053 COMPREHEN METABOLIC PANEL: CPT | Performed by: PATHOLOGY

## 2022-01-01 PROCEDURE — U0005 INFEC AGEN DETEC AMPLI PROBE: HCPCS

## 2022-01-01 PROCEDURE — U0003 INFECTIOUS AGENT DETECTION BY NUCLEIC ACID (DNA OR RNA); SEVERE ACUTE RESPIRATORY SYNDROME CORONAVIRUS 2 (SARS-COV-2) (CORONAVIRUS DISEASE [COVID-19]), AMPLIFIED PROBE TECHNIQUE, MAKING USE OF HIGH THROUGHPUT TECHNOLOGIES AS DESCRIBED BY CMS-2020-01-R: HCPCS

## 2022-01-01 PROCEDURE — 99215 OFFICE O/P EST HI 40 MIN: CPT | Mod: GT | Performed by: NURSE PRACTITIONER

## 2022-01-01 PROCEDURE — 85610 PROTHROMBIN TIME: CPT | Performed by: NURSE PRACTITIONER

## 2022-01-01 PROCEDURE — 258N000003 HC RX IP 258 OP 636: Performed by: ANESTHESIOLOGY

## 2022-01-01 PROCEDURE — C1887 CATHETER, GUIDING: HCPCS

## 2022-01-01 PROCEDURE — 86901 BLOOD TYPING SEROLOGIC RH(D): CPT | Performed by: NURSE PRACTITIONER

## 2022-01-01 PROCEDURE — 86850 RBC ANTIBODY SCREEN: CPT | Performed by: NURSE PRACTITIONER

## 2022-01-01 PROCEDURE — 82947 ASSAY GLUCOSE BLOOD QUANT: CPT

## 2022-01-01 PROCEDURE — 99204 OFFICE O/P NEW MOD 45 MIN: CPT | Mod: GT | Performed by: RADIOLOGY

## 2022-01-01 PROCEDURE — 86901 BLOOD TYPING SEROLOGIC RH(D): CPT

## 2022-01-01 PROCEDURE — 250N000011 HC RX IP 250 OP 636: Performed by: INTERNAL MEDICINE

## 2022-01-01 PROCEDURE — 36415 COLL VENOUS BLD VENIPUNCTURE: CPT | Performed by: NURSE PRACTITIONER

## 2022-01-01 PROCEDURE — G0463 HOSPITAL OUTPT CLINIC VISIT: HCPCS

## 2022-01-01 PROCEDURE — 93308 TTE F-UP OR LMTD: CPT | Mod: GC | Performed by: INTERNAL MEDICINE

## 2022-01-01 PROCEDURE — 85025 COMPLETE CBC W/AUTO DIFF WBC: CPT | Performed by: NURSE PRACTITIONER

## 2022-01-01 PROCEDURE — 999N000141 HC STATISTIC PRE-PROCEDURE NURSING ASSESSMENT

## 2022-01-01 PROCEDURE — 82248 BILIRUBIN DIRECT: CPT | Performed by: PATHOLOGY

## 2022-01-01 RX ORDER — POTASSIUM CHLORIDE 1500 MG/1
20 TABLET, EXTENDED RELEASE ORAL 2 TIMES DAILY
Qty: 60 TABLET | Refills: 5 | Status: SHIPPED | OUTPATIENT
Start: 2022-01-01 | End: 2022-01-01 | Stop reason: DRUGHIGH

## 2022-01-01 RX ORDER — ALBUTEROL SULFATE 90 UG/1
1-2 AEROSOL, METERED RESPIRATORY (INHALATION)
Status: CANCELLED
Start: 2022-01-01

## 2022-01-01 RX ORDER — GABAPENTIN 300 MG/1
900 CAPSULE ORAL 2 TIMES DAILY
COMMUNITY

## 2022-01-01 RX ORDER — ONDANSETRON 2 MG/ML
4 INJECTION INTRAMUSCULAR; INTRAVENOUS
Status: DISCONTINUED | OUTPATIENT
Start: 2022-01-01 | End: 2022-01-01 | Stop reason: HOSPADM

## 2022-01-01 RX ORDER — HEPARIN SODIUM (PORCINE) LOCK FLUSH IV SOLN 100 UNIT/ML 100 UNIT/ML
5 SOLUTION INTRAVENOUS
Status: CANCELLED | OUTPATIENT
Start: 2022-01-01

## 2022-01-01 RX ORDER — DIPHENHYDRAMINE HYDROCHLORIDE 50 MG/ML
50 INJECTION INTRAMUSCULAR; INTRAVENOUS
Status: CANCELLED
Start: 2022-01-01

## 2022-01-01 RX ORDER — PROCHLORPERAZINE MALEATE 10 MG
10 TABLET ORAL EVERY 6 HOURS PRN
Status: DISCONTINUED | OUTPATIENT
Start: 2022-01-01 | End: 2022-01-01 | Stop reason: HOSPADM

## 2022-01-01 RX ORDER — HEPARIN SODIUM 200 [USP'U]/100ML
1 INJECTION, SOLUTION INTRAVENOUS CONTINUOUS PRN
Status: DISCONTINUED | OUTPATIENT
Start: 2022-01-01 | End: 2022-01-01 | Stop reason: HOSPADM

## 2022-01-01 RX ORDER — EPINEPHRINE 1 MG/ML
0.3 INJECTION, SOLUTION, CONCENTRATE INTRAVENOUS EVERY 5 MIN PRN
Status: CANCELLED | OUTPATIENT
Start: 2022-01-01

## 2022-01-01 RX ORDER — LIDOCAINE 40 MG/G
CREAM TOPICAL
Status: DISCONTINUED | OUTPATIENT
Start: 2022-01-01 | End: 2022-01-01 | Stop reason: HOSPADM

## 2022-01-01 RX ORDER — TORSEMIDE 20 MG/1
60 TABLET ORAL 2 TIMES DAILY
Qty: 540 TABLET | Refills: 0 | Status: SHIPPED | OUTPATIENT
Start: 2022-01-01 | End: 2023-01-01

## 2022-01-01 RX ORDER — DEXTROSE MONOHYDRATE 25 G/50ML
25-50 INJECTION, SOLUTION INTRAVENOUS
Status: DISCONTINUED | OUTPATIENT
Start: 2022-01-01 | End: 2022-01-01 | Stop reason: HOSPADM

## 2022-01-01 RX ORDER — SODIUM CHLORIDE, SODIUM LACTATE, POTASSIUM CHLORIDE, CALCIUM CHLORIDE 600; 310; 30; 20 MG/100ML; MG/100ML; MG/100ML; MG/100ML
INJECTION, SOLUTION INTRAVENOUS CONTINUOUS
Status: CANCELLED | OUTPATIENT
Start: 2022-01-01

## 2022-01-01 RX ORDER — NALOXONE HYDROCHLORIDE 0.4 MG/ML
0.2 INJECTION, SOLUTION INTRAMUSCULAR; INTRAVENOUS; SUBCUTANEOUS
Status: DISCONTINUED | OUTPATIENT
Start: 2022-01-01 | End: 2022-01-01 | Stop reason: HOSPADM

## 2022-01-01 RX ORDER — METHYLPREDNISOLONE SODIUM SUCCINATE 125 MG/2ML
125 INJECTION, POWDER, LYOPHILIZED, FOR SOLUTION INTRAMUSCULAR; INTRAVENOUS
Status: CANCELLED
Start: 2022-01-01

## 2022-01-01 RX ORDER — TAMSULOSIN HYDROCHLORIDE 0.4 MG/1
0.4 CAPSULE ORAL DAILY
Status: ON HOLD | COMMUNITY
End: 2023-01-01

## 2022-01-01 RX ORDER — ACETAMINOPHEN 500 MG
500-1000 TABLET ORAL EVERY 8 HOURS PRN
Status: ON HOLD | COMMUNITY
End: 2023-01-01

## 2022-01-01 RX ORDER — FENTANYL CITRATE 50 UG/ML
INJECTION, SOLUTION INTRAMUSCULAR; INTRAVENOUS PRN
Status: DISCONTINUED | OUTPATIENT
Start: 2022-01-01 | End: 2022-01-01 | Stop reason: HOSPADM

## 2022-01-01 RX ORDER — FLUMAZENIL 0.1 MG/ML
0.2 INJECTION, SOLUTION INTRAVENOUS
Status: DISCONTINUED | OUTPATIENT
Start: 2022-01-01 | End: 2022-01-01 | Stop reason: HOSPADM

## 2022-01-01 RX ORDER — MEPERIDINE HYDROCHLORIDE 25 MG/ML
25 INJECTION INTRAMUSCULAR; INTRAVENOUS; SUBCUTANEOUS EVERY 30 MIN PRN
Status: CANCELLED | OUTPATIENT
Start: 2022-01-01

## 2022-01-01 RX ORDER — NICOTINE POLACRILEX 4 MG
15-30 LOZENGE BUCCAL
Status: DISCONTINUED | OUTPATIENT
Start: 2022-01-01 | End: 2022-01-01 | Stop reason: HOSPADM

## 2022-01-01 RX ORDER — POTASSIUM CHLORIDE 1500 MG/1
20 TABLET, EXTENDED RELEASE ORAL 2 TIMES DAILY
Qty: 60 TABLET | Refills: 0 | Status: SHIPPED | OUTPATIENT
Start: 2022-01-01 | End: 2022-01-01

## 2022-01-01 RX ORDER — AMPICILLIN AND SULBACTAM 2; 1 G/1; G/1
3 INJECTION, POWDER, FOR SOLUTION INTRAMUSCULAR; INTRAVENOUS
Status: DISCONTINUED | OUTPATIENT
Start: 2022-01-01 | End: 2022-01-01 | Stop reason: HOSPADM

## 2022-01-01 RX ORDER — FENTANYL CITRATE 50 UG/ML
50 INJECTION, SOLUTION INTRAMUSCULAR; INTRAVENOUS EVERY 5 MIN PRN
Status: CANCELLED | OUTPATIENT
Start: 2022-01-01

## 2022-01-01 RX ORDER — HEPARIN SODIUM,PORCINE 10 UNIT/ML
5 VIAL (ML) INTRAVENOUS
Status: CANCELLED | OUTPATIENT
Start: 2022-01-01

## 2022-01-01 RX ORDER — METOPROLOL SUCCINATE 50 MG/1
50 TABLET, EXTENDED RELEASE ORAL 2 TIMES DAILY
Status: ON HOLD | COMMUNITY
Start: 2022-01-01 | End: 2023-01-01

## 2022-01-01 RX ORDER — DIPHENHYDRAMINE HYDROCHLORIDE 50 MG/ML
INJECTION INTRAMUSCULAR; INTRAVENOUS PRN
Status: DISCONTINUED | OUTPATIENT
Start: 2022-01-01 | End: 2022-01-01 | Stop reason: HOSPADM

## 2022-01-01 RX ORDER — ONDANSETRON 2 MG/ML
4 INJECTION INTRAMUSCULAR; INTRAVENOUS EVERY 30 MIN PRN
Status: CANCELLED | OUTPATIENT
Start: 2022-01-01

## 2022-01-01 RX ORDER — ALBUTEROL SULFATE 0.83 MG/ML
2.5 SOLUTION RESPIRATORY (INHALATION)
Status: CANCELLED | OUTPATIENT
Start: 2022-01-01

## 2022-01-01 RX ORDER — POTASSIUM CHLORIDE 1.5 G/1.58G
80 POWDER, FOR SOLUTION ORAL ONCE
Status: COMPLETED | OUTPATIENT
Start: 2022-01-01 | End: 2022-01-01

## 2022-01-01 RX ORDER — ONDANSETRON 2 MG/ML
4 INJECTION INTRAMUSCULAR; INTRAVENOUS EVERY 6 HOURS PRN
Status: DISCONTINUED | OUTPATIENT
Start: 2022-01-01 | End: 2022-01-01 | Stop reason: HOSPADM

## 2022-01-01 RX ORDER — HYDROMORPHONE HYDROCHLORIDE 1 MG/ML
0.4 INJECTION, SOLUTION INTRAMUSCULAR; INTRAVENOUS; SUBCUTANEOUS EVERY 5 MIN PRN
Status: CANCELLED | OUTPATIENT
Start: 2022-01-01

## 2022-01-01 RX ORDER — NALOXONE HYDROCHLORIDE 0.4 MG/ML
0.4 INJECTION, SOLUTION INTRAMUSCULAR; INTRAVENOUS; SUBCUTANEOUS
Status: DISCONTINUED | OUTPATIENT
Start: 2022-01-01 | End: 2022-01-01 | Stop reason: HOSPADM

## 2022-01-01 RX ORDER — FERROUS GLUCONATE 324(38)MG
324 TABLET ORAL
Qty: 90 TABLET | Refills: 3 | Status: SHIPPED | OUTPATIENT
Start: 2022-01-01 | End: 2023-09-13

## 2022-01-01 RX ORDER — EPLERENONE 25 MG/1
50 TABLET, FILM COATED ORAL 2 TIMES DAILY
Qty: 360 TABLET | Refills: 1 | Status: ON HOLD | OUTPATIENT
Start: 2022-01-01 | End: 2023-01-01

## 2022-01-01 RX ORDER — CIPROFLOXACIN 500 MG/1
500 TABLET, FILM COATED ORAL DAILY
Qty: 90 TABLET | Refills: 3 | Status: SHIPPED | OUTPATIENT
Start: 2022-01-01

## 2022-01-01 RX ORDER — CIPROFLOXACIN 500 MG/1
500 TABLET, FILM COATED ORAL DAILY
Qty: 90 TABLET | Refills: 0 | Status: SHIPPED | OUTPATIENT
Start: 2022-01-01 | End: 2022-01-01

## 2022-01-01 RX ORDER — HYDROMORPHONE HYDROCHLORIDE 1 MG/ML
0.2 INJECTION, SOLUTION INTRAMUSCULAR; INTRAVENOUS; SUBCUTANEOUS EVERY 5 MIN PRN
Status: CANCELLED | OUTPATIENT
Start: 2022-01-01

## 2022-01-01 RX ORDER — ONDANSETRON 4 MG/1
4 TABLET, ORALLY DISINTEGRATING ORAL EVERY 30 MIN PRN
Status: CANCELLED | OUTPATIENT
Start: 2022-01-01

## 2022-01-01 RX ORDER — POTASSIUM CHLORIDE 750 MG/1
30 TABLET, EXTENDED RELEASE ORAL 2 TIMES DAILY
Qty: 180 TABLET | Refills: 3 | Status: SHIPPED | OUTPATIENT
Start: 2022-01-01 | End: 2023-01-01

## 2022-01-01 RX ORDER — FENTANYL CITRATE 50 UG/ML
25 INJECTION, SOLUTION INTRAMUSCULAR; INTRAVENOUS EVERY 5 MIN PRN
Status: CANCELLED | OUTPATIENT
Start: 2022-01-01

## 2022-01-01 RX ORDER — ONDANSETRON 4 MG/1
4 TABLET, ORALLY DISINTEGRATING ORAL EVERY 6 HOURS PRN
Status: DISCONTINUED | OUTPATIENT
Start: 2022-01-01 | End: 2022-01-01 | Stop reason: HOSPADM

## 2022-01-01 RX ORDER — POTASSIUM CHLORIDE 7.45 MG/ML
10 INJECTION INTRAVENOUS ONCE
Status: COMPLETED | OUTPATIENT
Start: 2022-01-01 | End: 2022-01-01

## 2022-01-01 RX ORDER — SODIUM CHLORIDE, SODIUM LACTATE, POTASSIUM CHLORIDE, CALCIUM CHLORIDE 600; 310; 30; 20 MG/100ML; MG/100ML; MG/100ML; MG/100ML
INJECTION, SOLUTION INTRAVENOUS CONTINUOUS
Status: DISCONTINUED | OUTPATIENT
Start: 2022-01-01 | End: 2022-01-01 | Stop reason: HOSPADM

## 2022-01-01 RX ADMIN — POTASSIUM CHLORIDE 10 MEQ: 7.46 INJECTION, SOLUTION INTRAVENOUS at 08:45

## 2022-01-01 RX ADMIN — SODIUM CHLORIDE, POTASSIUM CHLORIDE, SODIUM LACTATE AND CALCIUM CHLORIDE 100 ML/HR: 600; 310; 30; 20 INJECTION, SOLUTION INTRAVENOUS at 08:45

## 2022-01-01 RX ADMIN — POTASSIUM CHLORIDE 80 MEQ: 1.5 POWDER, FOR SOLUTION ORAL at 10:04

## 2022-01-01 ASSESSMENT — PAIN SCALES - GENERAL
PAINLEVEL: SEVERE PAIN (7)
PAINLEVEL: SEVERE PAIN (7)
PAINLEVEL: EXTREME PAIN (8)

## 2022-01-01 ASSESSMENT — ACTIVITIES OF DAILY LIVING (ADL)
ADLS_ACUITY_SCORE: 35
ADLS_ACUITY_SCORE: 21

## 2022-01-01 ASSESSMENT — LIFESTYLE VARIABLES
TOBACCO_USE: 1
TOBACCO_USE: 1

## 2022-01-01 ASSESSMENT — ENCOUNTER SYMPTOMS
ORTHOPNEA: 0
ORTHOPNEA: 0
DYSRHYTHMIAS: 1

## 2022-01-01 ASSESSMENT — COPD QUESTIONNAIRES: COPD: 0

## 2022-01-01 ASSESSMENT — NEW YORK HEART ASSOCIATION (NYHA) CLASSIFICATION
NYHA FUNCTIONAL CLASS: II
NYHA FUNCTIONAL CLASS: II

## 2022-01-10 DIAGNOSIS — R19.7 DIARRHEA, UNSPECIFIED TYPE: Primary | ICD-10-CM

## 2022-01-10 DIAGNOSIS — R14.0 ABDOMINAL DISTENSION: ICD-10-CM

## 2022-01-10 DIAGNOSIS — R14.0 BLOATING: ICD-10-CM

## 2022-01-27 ENCOUNTER — TELEPHONE (OUTPATIENT)
Dept: GASTROENTEROLOGY | Facility: CLINIC | Age: 52
End: 2022-01-27
Payer: COMMERCIAL

## 2022-01-27 NOTE — TELEPHONE ENCOUNTER
Patient returned call to schedule breath testing, but he's already scheduled on 2/11/22. Duplicate order found and removed.

## 2022-01-31 DIAGNOSIS — Z11.59 ENCOUNTER FOR SCREENING FOR OTHER VIRAL DISEASES: Primary | ICD-10-CM

## 2022-02-02 ENCOUNTER — PATIENT OUTREACH (OUTPATIENT)
Dept: GASTROENTEROLOGY | Facility: CLINIC | Age: 52
End: 2022-02-02
Payer: COMMERCIAL

## 2022-02-02 NOTE — TELEPHONE ENCOUNTER
Left message for pt to call back with any questions regarding upcoming HBT. Sent instructions through [employer] as well.

## 2022-02-07 ENCOUNTER — LAB (OUTPATIENT)
Dept: LAB | Facility: CLINIC | Age: 52
End: 2022-02-07
Payer: COMMERCIAL

## 2022-02-07 DIAGNOSIS — Z11.59 ENCOUNTER FOR SCREENING FOR OTHER VIRAL DISEASES: ICD-10-CM

## 2022-02-07 PROCEDURE — U0003 INFECTIOUS AGENT DETECTION BY NUCLEIC ACID (DNA OR RNA); SEVERE ACUTE RESPIRATORY SYNDROME CORONAVIRUS 2 (SARS-COV-2) (CORONAVIRUS DISEASE [COVID-19]), AMPLIFIED PROBE TECHNIQUE, MAKING USE OF HIGH THROUGHPUT TECHNOLOGIES AS DESCRIBED BY CMS-2020-01-R: HCPCS

## 2022-02-07 PROCEDURE — U0005 INFEC AGEN DETEC AMPLI PROBE: HCPCS

## 2022-02-08 LAB — SARS-COV-2 RNA RESP QL NAA+PROBE: NEGATIVE

## 2022-02-21 DIAGNOSIS — R19.7 DIARRHEA, UNSPECIFIED TYPE: ICD-10-CM

## 2022-02-21 DIAGNOSIS — K63.89 BACTERIAL OVERGROWTH SYNDROME: ICD-10-CM

## 2022-02-21 DIAGNOSIS — Z11.59 ENCOUNTER FOR SCREENING FOR OTHER VIRAL DISEASES: Primary | ICD-10-CM

## 2022-02-21 DIAGNOSIS — R19.7 DIARRHEA, UNSPECIFIED TYPE: Primary | ICD-10-CM

## 2022-02-21 DIAGNOSIS — R14.0 BLOATING: ICD-10-CM

## 2022-03-01 DIAGNOSIS — K70.31 ALCOHOLIC CIRRHOSIS OF LIVER WITH ASCITES (H): Primary | ICD-10-CM

## 2022-03-02 ENCOUNTER — LAB (OUTPATIENT)
Dept: LAB | Facility: CLINIC | Age: 52
End: 2022-03-02
Attending: STUDENT IN AN ORGANIZED HEALTH CARE EDUCATION/TRAINING PROGRAM
Payer: COMMERCIAL

## 2022-03-02 ENCOUNTER — ANCILLARY PROCEDURE (OUTPATIENT)
Dept: MRI IMAGING | Facility: CLINIC | Age: 52
End: 2022-03-02
Attending: STUDENT IN AN ORGANIZED HEALTH CARE EDUCATION/TRAINING PROGRAM
Payer: COMMERCIAL

## 2022-03-02 ENCOUNTER — OFFICE VISIT (OUTPATIENT)
Dept: GASTROENTEROLOGY | Facility: CLINIC | Age: 52
End: 2022-03-02
Attending: STUDENT IN AN ORGANIZED HEALTH CARE EDUCATION/TRAINING PROGRAM
Payer: COMMERCIAL

## 2022-03-02 VITALS
HEART RATE: 73 BPM | BODY MASS INDEX: 37.2 KG/M2 | OXYGEN SATURATION: 100 % | DIASTOLIC BLOOD PRESSURE: 66 MMHG | WEIGHT: 297.6 LBS | SYSTOLIC BLOOD PRESSURE: 113 MMHG

## 2022-03-02 DIAGNOSIS — F10.21 ALCOHOL USE DISORDER, SEVERE, IN EARLY REMISSION (H): Primary | ICD-10-CM

## 2022-03-02 DIAGNOSIS — R19.7 DIARRHEA, UNSPECIFIED TYPE: ICD-10-CM

## 2022-03-02 DIAGNOSIS — K76.89 LIVER NODULE: ICD-10-CM

## 2022-03-02 DIAGNOSIS — Z11.59 ENCOUNTER FOR SCREENING FOR OTHER VIRAL DISEASES: ICD-10-CM

## 2022-03-02 DIAGNOSIS — K63.89 BACTERIAL OVERGROWTH SYNDROME: ICD-10-CM

## 2022-03-02 DIAGNOSIS — K70.31 ALCOHOLIC CIRRHOSIS OF LIVER WITH ASCITES (H): ICD-10-CM

## 2022-03-02 LAB
AFP SERPL-MCNC: 5.1 UG/L (ref 0–8)
ALBUMIN SERPL-MCNC: 2.7 G/DL (ref 3.4–5)
ALP SERPL-CCNC: 118 U/L (ref 40–150)
ALT SERPL W P-5'-P-CCNC: 30 U/L (ref 0–70)
ANION GAP SERPL CALCULATED.3IONS-SCNC: 8 MMOL/L (ref 3–14)
AST SERPL W P-5'-P-CCNC: 30 U/L (ref 0–45)
BILIRUB DIRECT SERPL-MCNC: 0.4 MG/DL (ref 0–0.2)
BILIRUB SERPL-MCNC: 0.9 MG/DL (ref 0.2–1.3)
BUN SERPL-MCNC: 9 MG/DL (ref 7–30)
CALCIUM SERPL-MCNC: 8.7 MG/DL (ref 8.5–10.1)
CHLORIDE BLD-SCNC: 105 MMOL/L (ref 94–109)
CO2 SERPL-SCNC: 24 MMOL/L (ref 20–32)
CREAT SERPL-MCNC: 0.69 MG/DL (ref 0.66–1.25)
ERYTHROCYTE [DISTWIDTH] IN BLOOD BY AUTOMATED COUNT: 18.4 % (ref 10–15)
GFR SERPL CREATININE-BSD FRML MDRD: >90 ML/MIN/1.73M2
GLUCOSE BLD-MCNC: 234 MG/DL (ref 70–99)
HCT VFR BLD AUTO: 29.8 % (ref 40–53)
HGB BLD-MCNC: 8.1 G/DL (ref 13.3–17.7)
INR PPP: 1.66 (ref 0.85–1.15)
MCH RBC QN AUTO: 21.4 PG (ref 26.5–33)
MCHC RBC AUTO-ENTMCNC: 27.2 G/DL (ref 31.5–36.5)
MCV RBC AUTO: 79 FL (ref 78–100)
PLATELET # BLD AUTO: 117 10E3/UL (ref 150–450)
POTASSIUM BLD-SCNC: 4.2 MMOL/L (ref 3.4–5.3)
PROT SERPL-MCNC: 7.1 G/DL (ref 6.8–8.8)
RBC # BLD AUTO: 3.78 10E6/UL (ref 4.4–5.9)
SODIUM SERPL-SCNC: 137 MMOL/L (ref 133–144)
WBC # BLD AUTO: 5.7 10E3/UL (ref 4–11)

## 2022-03-02 PROCEDURE — 74183 MRI ABD W/O CNTR FLWD CNTR: CPT | Performed by: RADIOLOGY

## 2022-03-02 PROCEDURE — 82105 ALPHA-FETOPROTEIN SERUM: CPT | Performed by: STUDENT IN AN ORGANIZED HEALTH CARE EDUCATION/TRAINING PROGRAM

## 2022-03-02 PROCEDURE — 85027 COMPLETE CBC AUTOMATED: CPT | Performed by: PATHOLOGY

## 2022-03-02 PROCEDURE — 85610 PROTHROMBIN TIME: CPT | Performed by: PATHOLOGY

## 2022-03-02 PROCEDURE — 99215 OFFICE O/P EST HI 40 MIN: CPT | Performed by: STUDENT IN AN ORGANIZED HEALTH CARE EDUCATION/TRAINING PROGRAM

## 2022-03-02 PROCEDURE — 80053 COMPREHEN METABOLIC PANEL: CPT | Performed by: PATHOLOGY

## 2022-03-02 PROCEDURE — A9585 GADOBUTROL INJECTION: HCPCS | Performed by: RADIOLOGY

## 2022-03-02 PROCEDURE — 36415 COLL VENOUS BLD VENIPUNCTURE: CPT | Performed by: PATHOLOGY

## 2022-03-02 PROCEDURE — 82248 BILIRUBIN DIRECT: CPT | Performed by: PATHOLOGY

## 2022-03-02 RX ORDER — GADOBUTROL 604.72 MG/ML
15 INJECTION INTRAVENOUS ONCE
Status: COMPLETED | OUTPATIENT
Start: 2022-03-02 | End: 2022-03-02

## 2022-03-02 RX ORDER — FUROSEMIDE 40 MG
40 TABLET ORAL 2 TIMES DAILY
Qty: 180 TABLET | Refills: 3 | Status: SHIPPED | OUTPATIENT
Start: 2022-03-02 | End: 2022-06-13

## 2022-03-02 RX ORDER — CHOLESTYRAMINE 4 G/9G
1 POWDER, FOR SUSPENSION ORAL 2 TIMES DAILY WITH MEALS
Qty: 60 PACKET | Refills: 3 | Status: SHIPPED | OUTPATIENT
Start: 2022-03-02 | End: 2022-06-13

## 2022-03-02 RX ADMIN — GADOBUTROL 13 ML: 604.72 INJECTION INTRAVENOUS at 10:35

## 2022-03-02 ASSESSMENT — PAIN SCALES - GENERAL: PAINLEVEL: SEVERE PAIN (7)

## 2022-03-02 NOTE — LETTER
3/2/2022     RE: Blair Oliver  [address] [facility] [address]    Dear Colleague,    Thank you for referring your patient, Blair Oliver, to the [facility] [place]. Please see a copy of my visit note below.    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      Initial History:     Patient states that he was diagnosed with liver disease and cirrhosis in September 2020.  He was admitted to the hospital at that time he was found to have extensive superior mesenteric venous thrombosis, complicated by ischemic bowel and had to undergo an exploratory laparotomy with small bowel resection.  He was placed on Eliquis postoperatively.  During surgery, he was found to have some ascites that was evacuated.     Since then he has had trouble with incisional hernias, and rectus diastasis.  He was seen by his general surgeon in March where he was noted to have a MELD of 18.  He was then sent to [facility] to be seen by surgery for consideration of incisional hernia repair.    Patient complains of episodes of leakage of fluid from his incision that happens intermittently.  This fluid is usually blood tinged but sometimes yellow. He is on 20 mg of furosemide daily, that was started because of his heart failure and his pedal edema.  He has also noted that every 2 to 3 days he has watery bowel movements, up to 12 times a day.  In between there was episodes he has about 2-3 soft bowel movements per day.  He denies any fevers or chills, abdominal pain, melena, or  hematochezia.    His wife is also noted that he is having some memory issues.  He has not had any major episodes of confusion, and has not been admitted to the hospital with hepatic encephalopathy.    Patient denies any prior history of liver disease. He has had abnormal liver enzymes and low platelets as far back as 2011. Past notes from his PCP had also documented excessive alcohol use, as well as, a hospitalization for overdose of Vicodin.     First and only paracentesis 6/26 - 600 ml removed - 231 WBC, TP 1.4, Albumin 0.8.     Recent EGD with bx negative for celiac. Colon bx showing chronic inactive colitis. Still had loose stools after colonoscopy prep, not c/w overflow.  We have tried a several week course of rifaximin for encephalopathy, this did not help his diarrhea.  Fecal calprotectin was normal when checked December 2021    Interval Events:  -Liver MRI March 2 showing cirrhosis without any suspicious enhancing lesions.  Previously demonstrated hyperintense nonenhancing lesions are not well visualized in the current exam likely due to motion.  Also showed new small to moderate volume ascites.   -He has noticed his abdomen is more distended, and the hernia is more prominent  -Was not able to do breath testing as he had to hold Imodium.  Also diarrhea did not appear to respond to rifaximin in the past.  He is taking up to 8 Imodium's a day.     ROS: 14 point ROS negative except for positives noted in HPI.    PMHx:  Past Medical History:   Diagnosis Date     Diabetes (H)     Type 2 DM, Insulin Use.      Hypertension    -- Type II diabetes Mellitus  -- Hypertension  -- Hyperlipidemia  -- CHF  -- GISELLE  -- SMV thrombus    PSHx:  Past Surgical History:   Procedure Laterality Date     COLONOSCOPY N/A 12/9/2021    Procedure: COLONOSCOPY, WITH BIOPSY, WITH CLIPS;  Surgeon: Sweetie Bain MD;  [place] [facility] [place]     ESOPHAGOSCOPY, GASTROSCOPY, DUODENOSCOPY (EGD), COMBINED N/A 12/9/2021    Procedure:  ESOPHAGOGASTRODUODENOSCOPY, WITH BIOPSY;  Surgeon: Sweetie Bain MD;  [place]: [facility] [place]      -- Exploratory laparotomy with small bowel resection 9/2020    FamHx:  Family History   Problem Relation Age of Onset     Substance Abuse Maternal Grandmother      Substance Abuse Brother      No family history of liver disease, liver cancer    SocHx:  Social History     Socioeconomic History     Marital status:      Spouse name: Not on file     Number of children: Not on file     Years of education: Not on file     Highest education level: Not on file   Occupational History     Not on file   Tobacco Use     Smoking status: Former Smoker     Types: Cigarettes     Smokeless tobacco: Never Used   Substance and Sexual Activity     Alcohol use: Not Currently     Comment: Quit 9/21/2020     Drug use: Never     Sexual activity: Not on file   Other Topics Concern     Parent/sibling w/ CABG, MI or angioplasty before 65F 55M? Not Asked   Social History Narrative     Not on file     Social Determinants of Health     Financial Resource Strain: Not on file   Food Insecurity: Not on file   Transportation Needs: Not on file   Physical Activity: Not on file   Stress: Not on file   Social Connections: Not on file   Intimate Partner Violence: Not on file   Housing Stability: Not on file   Patient previously worked with with [employer].  He is currently on disability due to pain from his hernias and discomfort.  Admits to significant alcohol use.  Used to drink about a liter of whiskey per day for about 20 years.  Denies any hospitalizations for intoxication or withdrawal, denies any DUIs, denies any prior treatments.  Last drink was 9/21/2020.  Denies any cravings, and is not in any treatments program at the moment.  Denies any tobacco use, denies any IV drug use.    Medications:  Current Outpatient Medications   Medication     amLODIPine-benazepril (LOTREL) 10-40 MG capsule     apixaban ANTICOAGULANT (ELIQUIS) 5 MG tablet      cholestyramine (QUESTRAN) 4 g packet     eplerenone (INSPRA) 25 MG tablet     furosemide (LASIX) 20 MG tablet     furosemide (LASIX) 40 MG tablet     gabapentin (NEURONTIN) 600 MG tablet     insulin detemir (LEVEMIR PEN) 100 UNIT/ML pen     insulin pen needle (B-D U/F) 31G X 5 MM miscellaneous     lisinopril (ZESTRIL) 5 MG tablet     metFORMIN (GLUCOPHAGE) 500 MG tablet     metoprolol succinate ER (TOPROL-XL) 100 MG 24 hr tablet     Multiple Vitamins-Minerals (SUPER THERA DAMARIS M) TABS     omeprazole (PRILOSEC) 20 MG DR capsule     ONETOUCH ULTRA test strip     rifaximin (XIFAXAN) 550 MG TABS tablet     simvastatin (ZOCOR) 20 MG tablet     Current Facility-Administered Medications   Medication     EPINEPHrine (ADRENALIN) kit 0.3 mg     No OTCs, herbals    Allergies:  Allergies   Allergen Reactions     Minocycline Hives       Objective:  /66   Pulse 73   Wt 135 kg (297 lb 9.6 oz)   SpO2 100%   BMI 37.20 kg/m    Constitutional: pleasant man in NAD  Eyes: non icteric  Respiratory: Normal respiratory excursion   MSK: normal range of motion of visualized extremities  Abd: Midline hernia present, reducible, more distended than in the past  Skin: No jaundice  Psychiatric: normal mood and orientation    Labs:  Last Comprehensive Metabolic Panel:  Sodium   Date Value Ref Range Status   03/02/2022 137 133 - 144 mmol/L Final   06/15/2021 136 133 - 144 mmol/L Final     Potassium   Date Value Ref Range Status   03/02/2022 4.2 3.4 - 5.3 mmol/L Final   06/15/2021 4.1 3.4 - 5.3 mmol/L Final     Chloride   Date Value Ref Range Status   03/02/2022 105 94 - 109 mmol/L Final   06/15/2021 112 (H) 94 - 109 mmol/L Final     Carbon Dioxide   Date Value Ref Range Status   06/15/2021 25 20 - 32 mmol/L Final     Carbon Dioxide (CO2)   Date Value Ref Range Status   03/02/2022 24 20 - 32 mmol/L Final     Anion Gap   Date Value Ref Range Status   03/02/2022 8 3 - 14 mmol/L Final   06/15/2021 <1 (L) 3 - 14 mmol/L Final     Glucose   Date  Value Ref Range Status   03/02/2022 234 (H) 70 - 99 mg/dL Final   06/15/2021 123 (H) 70 - 99 mg/dL Final     Urea Nitrogen   Date Value Ref Range Status   03/02/2022 9 7 - 30 mg/dL Final   06/15/2021 8 7 - 30 mg/dL Final     Creatinine   Date Value Ref Range Status   03/02/2022 0.69 0.66 - 1.25 mg/dL Final   06/15/2021 0.63 (L) 0.66 - 1.25 mg/dL Final     GFR Estimate   Date Value Ref Range Status   03/02/2022 >90 >60 mL/min/1.73m2 Final     Comment:     Effective December 21, 2021 eGFRcr in adults is calculated using the 2021 CKD-EPI creatinine equation which includes age and gender (Jenn et al., NEJ, DOI: 10.1056/DVCDdb7733322)   06/15/2021 >90 >60 mL/min/[1.73_m2] Final     Comment:     Non  GFR Calc  Starting 12/18/2018, serum creatinine based estimated GFR (eGFR) will be   calculated using the Chronic Kidney Disease Epidemiology Collaboration   (CKD-EPI) equation.       Calcium   Date Value Ref Range Status   03/02/2022 8.7 8.5 - 10.1 mg/dL Final   06/15/2021 8.9 8.5 - 10.1 mg/dL Final     Bilirubin Total   Date Value Ref Range Status   03/02/2022 0.9 0.2 - 1.3 mg/dL Final   06/15/2021 0.8 0.2 - 1.3 mg/dL Final     Alkaline Phosphatase   Date Value Ref Range Status   03/02/2022 118 40 - 150 U/L Final   06/15/2021 126 40 - 150 U/L Final     ALT   Date Value Ref Range Status   03/02/2022 30 0 - 70 U/L Final   06/15/2021 36 0 - 70 U/L Final     AST   Date Value Ref Range Status   03/02/2022 30 0 - 45 U/L Final   06/15/2021 34 0 - 45 U/L Final       Lab Results   Component Value Date    WBC 4.7 06/15/2021     Lab Results   Component Value Date    RBC 3.89 06/15/2021     Lab Results   Component Value Date    HGB 8.6 06/15/2021     Lab Results   Component Value Date    HCT 31.0 06/15/2021     Lab Results   Component Value Date    MCV 80 06/15/2021     Lab Results   Component Value Date    MCH 22.1 06/15/2021     Lab Results   Component Value Date    MCHC 27.7 06/15/2021     Lab Results   Component  Value Date    RDW 17.3 06/15/2021     Lab Results   Component Value Date     06/15/2021       INR   Date Value Ref Range Status   03/02/2022 1.66 (H) 0.85 - 1.15 Final   06/15/2021 1.58 (H) 0.86 - 1.14 Final        MELD-Na score: 12 at 3/2/2022  9:23 AM  MELD score: 12 at 3/2/2022  9:23 AM  Calculated from:  Serum Creatinine: 0.69 mg/dL (Using min of 1 mg/dL) at 3/2/2022  9:23 AM  Serum Sodium: 137 mmol/L at 3/2/2022  9:23 AM  Total Bilirubin: 0.9 mg/dL (Using min of 1 mg/dL) at 3/2/2022  9:23 AM  INR(ratio): 1.66 at 3/2/2022  9:23 AM  Age: 51 years    Imaging: Reviewed in EHR    Endoscopy: Reviewed in EHR    Independently reviewed labs and imaging.     Assessment/Plan: Mr. 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     Was originally referred for LT evaluation - given his CHF and low MELD, did not pursue this. He is having pain related to his complex hernia, discussed he would need to have resolution of his ascites, and improvement in his cardiac status to be considered a candidate for surgery. Discussed with his weight he would at high risk for recurrence, he has been working to lose weight. TTE showed improvement in his cardiac function with further sobriety and medications but still LVEF 40-45%.     He is having chronic diarrhea, could be related to his bowel resection (142 cm distal small bowel resected). Random bx 12/2021 showing mild inactive chronic inflammation.  Colon appeared normal but was congested. Decreased rectal tone noted on exam as well.  Fetal calprotectin was normal.  His diarrhea did not improve with rifaximin for encephalopathy.  He is having to take Imodium several times a day and will have fecal incontinence if he  . Less likely to be bile acid diarrhea since >100 cm were resected, but will trial cholestyramine. Fatty acid diarrhea also possible. Rechecking infectious studies. No clear medication culprit. Referring to luminal GI and GI dietician as well. Can continue imodium.     MRI liver 3/2022 to follow up liver nodules without enhancing lesions. Showing recurrent ascites. Increase Lasix to 40 mg twice a day with labs next week given new ascites.  Continue eplerenone 25 mg daily can consider increase in the future     Continue to completely abstain from alcohol     EGD 12/2021 with grade 1 EVs, repeat 1 year.    Follow up with cardiology for CHF - appears to be stable     Orders Placed This Encounter   Procedures     Adult Gastro Ref - Consult Only     RTC 3 months     Sweetie Bain MD MS  Hepatology/Liver Transplant  [facility]    We discussed participation in the [facility] patient-related outcomes study.  The patient understands that the study may later link survey data with clinical data.  The patient would like to be contacted to participate in the study.    Approximately 25 minutes was spent for the visit with 20 minutes of non face-to-face time were spent in review of the patient's medical record on the day of the visit. This included review of previous: clinic visits, hospital records, lab results, imaging studies, and documentation.  The findings from this review are summarized in the above note.    Again, thank you for allowing me to participate in the care of your patient.      Sincerely,    Sweetie Bain MD

## 2022-03-02 NOTE — PROGRESS NOTES
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      Initial History:     Patient states that he was diagnosed with liver disease and cirrhosis in September 2020.  He was admitted to the hospital at that time he was found to have extensive superior mesenteric venous thrombosis, complicated by ischemic bowel and had to undergo an exploratory laparotomy with small bowel resection.  He was placed on Eliquis postoperatively.  During surgery, he was found to have some ascites that was evacuated.     Since then he has had trouble with incisional hernias, and rectus diastasis.  He was seen by his general surgeon in March where he was noted to have a MELD of 18.  He was then sent to [facility] to be seen by surgery for consideration of incisional hernia repair.    Patient complains of episodes of leakage of fluid from his incision that happens intermittently.  This fluid is usually blood tinged but sometimes yellow. He is on 20 mg of furosemide daily, that was started because of his heart failure and his pedal edema.  He has also noted that every 2 to 3 days he has watery bowel movements, up to 12 times a day.  In between there was episodes he has about 2-3 soft bowel movements per day.  He denies any fevers or chills, abdominal pain, melena, or hematochezia.    His wife is also noted that he is having some memory issues.  He has not had any major episodes of confusion, and has not been admitted to the hospital with hepatic encephalopathy.    Patient denies any prior history of liver disease. He has had  abnormal liver enzymes and low platelets as far back as 2011. Past notes from his PCP had also documented excessive alcohol use, as well as, a hospitalization for overdose of Vicodin.     First and only paracentesis 6/26 - 600 ml removed - 231 WBC, TP 1.4, Albumin 0.8.     Recent EGD with bx negative for celiac. Colon bx showing chronic inactive colitis. Still had loose stools after colonoscopy prep, not c/w overflow.  We have tried a several week course of rifaximin for encephalopathy, this did not help his diarrhea.  Fecal calprotectin was normal when checked December 2021    Interval Events:  -Liver MRI March 2 showing cirrhosis without any suspicious enhancing lesions.  Previously demonstrated hyperintense nonenhancing lesions are not well visualized in the current exam likely due to motion.  Also showed new small to moderate volume ascites.   -He has noticed his abdomen is more distended, and the hernia is more prominent  -Was not able to do breath testing as he had to hold Imodium.  Also diarrhea did not appear to respond to rifaximin in the past.  He is taking up to 8 Imodium's a day.     ROS: 14 point ROS negative except for positives noted in HPI.    PMHx:  Past Medical History:   Diagnosis Date     Diabetes (H)     Type 2 DM, Insulin Use.      Hypertension    -- Type II diabetes Mellitus  -- Hypertension  -- Hyperlipidemia  -- CHF  -- GISELLE  -- SMV thrombus    PSHx:  Past Surgical History:   Procedure Laterality Date     COLONOSCOPY N/A 12/9/2021    Procedure: COLONOSCOPY, WITH BIOPSY, WITH CLIPS;  Surgeon: Sweetie Bain MD;  Location: [facility] [place] [facility] OR     ESOPHAGOSCOPY, GASTROSCOPY, DUODENOSCOPY (EGD), COMBINED N/A 12/9/2021    Procedure: ESOPHAGOGASTRODUODENOSCOPY, WITH BIOPSY;  Surgeon: Sweetie Bain MD;  Location: [facility] [place] [facility] OR      -- Exploratory laparotomy with small bowel resection 9/2020    FamHx:  Family History   Problem Relation Age of Onset     Substance Abuse Maternal Grandmother      Substance Abuse Brother       No family history of liver disease, liver cancer    SocHx:  Social History     Socioeconomic History     Marital status:      Spouse name: Not on file     Number of children: Not on file     Years of education: Not on file     Highest education level: Not on file   Occupational History     Not on file   Tobacco Use     Smoking status: Former Smoker     Types: Cigarettes     Smokeless tobacco: Never Used   Substance and Sexual Activity     Alcohol use: Not Currently     Comment: Quit 9/21/2020     Drug use: Never     Sexual activity: Not on file   Other Topics Concern     Parent/sibling w/ CABG, MI or angioplasty before 65F 55M? Not Asked   Social History Narrative     Not on file     Social Determinants of Health     Financial Resource Strain: Not on file   Food Insecurity: Not on file   Transportation Needs: Not on file   Physical Activity: Not on file   Stress: Not on file   Social Connections: Not on file   Intimate Partner Violence: Not on file   Housing Stability: Not on file   Patient previously worked with with [employer].  He is currently on disability due to pain from his hernias and discomfort.  Admits to significant alcohol use.  Used to drink about a liter of whiskey per day for about 20 years.  Denies any hospitalizations for intoxication or withdrawal, denies any DUIs, denies any prior treatments.  Last drink was 9/21/2020.  Denies any cravings, and is not in any treatments program at the moment.  Denies any tobacco use, denies any IV drug use.    Medications:  Current Outpatient Medications   Medication     amLODIPine-benazepril (LOTREL) 10-40 MG capsule     apixaban ANTICOAGULANT (ELIQUIS) 5 MG tablet     cholestyramine (QUESTRAN) 4 g packet     eplerenone (INSPRA) 25 MG tablet     furosemide (LASIX) 20 MG tablet     furosemide (LASIX) 40 MG tablet     gabapentin (NEURONTIN) 600 MG tablet     insulin detemir (LEVEMIR PEN) 100 UNIT/ML pen     insulin pen needle (B-D U/F) 31G X 5 MM  miscellaneous     lisinopril (ZESTRIL) 5 MG tablet     metFORMIN (GLUCOPHAGE) 500 MG tablet     metoprolol succinate ER (TOPROL-XL) 100 MG 24 hr tablet     Multiple Vitamins-Minerals (SUPER THERA DAMARIS M) TABS     omeprazole (PRILOSEC) 20 MG DR capsule     ONETOUCH ULTRA test strip     rifaximin (XIFAXAN) 550 MG TABS tablet     simvastatin (ZOCOR) 20 MG tablet     Current Facility-Administered Medications   Medication     EPINEPHrine (ADRENALIN) kit 0.3 mg     No OTCs, herbals    Allergies:  Allergies   Allergen Reactions     Minocycline Hives       Objective:  /66   Pulse 73   Wt 135 kg (297 lb 9.6 oz)   SpO2 100%   BMI 37.20 kg/m    Constitutional: pleasant man in NAD  Eyes: non icteric  Respiratory: Normal respiratory excursion   MSK: normal range of motion of visualized extremities  Abd: Midline hernia present, reducible, more distended than in the past  Skin: No jaundice  Psychiatric: normal mood and orientation    Labs:  Last Comprehensive Metabolic Panel:  Sodium   Date Value Ref Range Status   03/02/2022 137 133 - 144 mmol/L Final   06/15/2021 136 133 - 144 mmol/L Final     Potassium   Date Value Ref Range Status   03/02/2022 4.2 3.4 - 5.3 mmol/L Final   06/15/2021 4.1 3.4 - 5.3 mmol/L Final     Chloride   Date Value Ref Range Status   03/02/2022 105 94 - 109 mmol/L Final   06/15/2021 112 (H) 94 - 109 mmol/L Final     Carbon Dioxide   Date Value Ref Range Status   06/15/2021 25 20 - 32 mmol/L Final     Carbon Dioxide (CO2)   Date Value Ref Range Status   03/02/2022 24 20 - 32 mmol/L Final     Anion Gap   Date Value Ref Range Status   03/02/2022 8 3 - 14 mmol/L Final   06/15/2021 <1 (L) 3 - 14 mmol/L Final     Glucose   Date Value Ref Range Status   03/02/2022 234 (H) 70 - 99 mg/dL Final   06/15/2021 123 (H) 70 - 99 mg/dL Final     Urea Nitrogen   Date Value Ref Range Status   03/02/2022 9 7 - 30 mg/dL Final   06/15/2021 8 7 - 30 mg/dL Final     Creatinine   Date Value Ref Range Status   03/02/2022  0.69 0.66 - 1.25 mg/dL Final   06/15/2021 0.63 (L) 0.66 - 1.25 mg/dL Final     GFR Estimate   Date Value Ref Range Status   03/02/2022 >90 >60 mL/min/1.73m2 Final     Comment:     Effective December 21, 2021 eGFRcr in adults is calculated using the 2021 CKD-EPI creatinine equation which includes age and gender (Jenn et al., NE, DOI: 10.[facility]6/CQJHet8424545)   06/15/2021 >90 >60 mL/min/[1.73_m2] Final     Comment:     Non  GFR Calc  Starting 12/18/2018, serum creatinine based estimated GFR (eGFR) will be   calculated using the Chronic Kidney Disease Epidemiology Collaboration   (CKD-EPI) equation.       Calcium   Date Value Ref Range Status   03/02/2022 8.7 8.5 - 10.1 mg/dL Final   06/15/2021 8.9 8.5 - 10.1 mg/dL Final     Bilirubin Total   Date Value Ref Range Status   03/02/2022 0.9 0.2 - 1.3 mg/dL Final   06/15/2021 0.8 0.2 - 1.3 mg/dL Final     Alkaline Phosphatase   Date Value Ref Range Status   03/02/2022 118 40 - 150 U/L Final   06/15/2021 126 40 - 150 U/L Final     ALT   Date Value Ref Range Status   03/02/2022 30 0 - 70 U/L Final   06/15/2021 36 0 - 70 U/L Final     AST   Date Value Ref Range Status   03/02/2022 30 0 - 45 U/L Final   06/15/2021 34 0 - 45 U/L Final       Lab Results   Component Value Date    WBC 4.7 06/15/2021     Lab Results   Component Value Date    RBC 3.89 06/15/2021     Lab Results   Component Value Date    HGB 8.6 06/15/2021     Lab Results   Component Value Date    HCT 31.0 06/15/2021     Lab Results   Component Value Date    MCV 80 06/15/2021     Lab Results   Component Value Date    MCH 22.1 06/15/2021     Lab Results   Component Value Date    MCHC 27.7 06/15/2021     Lab Results   Component Value Date    RDW 17.3 06/15/2021     Lab Results   Component Value Date     06/15/2021       INR   Date Value Ref Range Status   03/02/2022 1.66 (H) 0.85 - 1.15 Final   06/15/2021 1.58 (H) 0.86 - 1.14 Final        MELD-Na score: 12 at 3/2/2022  9:23 AM  MELD score: 12 at  3/2/2022  9:23 AM  Calculated from:  Serum Creatinine: 0.69 mg/dL (Using min of 1 mg/dL) at 3/2/2022  9:23 AM  Serum Sodium: 137 mmol/L at 3/2/2022  9:23 AM  Total Bilirubin: 0.9 mg/dL (Using min of 1 mg/dL) at 3/2/2022  9:23 AM  INR(ratio): 1.66 at 3/2/2022  9:23 AM  Age: 51 years    Imaging: Reviewed in EHR    Endoscopy: Reviewed in EHR    Independently reviewed labs and imaging.     Assessment/Plan: Mr. 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     Was originally referred for LT evaluation - given his CHF and low MELD, did not pursue this. He is having pain related to his complex hernia, discussed he would need to have resolution of his ascites, and improvement in his cardiac status to be considered a candidate for surgery. Discussed with his weight he would at high risk for recurrence, he has been working to lose weight. TTE showed improvement in his cardiac function with further sobriety and medications but still LVEF 40-45%.     He is having chronic diarrhea, could be related to his bowel resection (142 cm distal small bowel resected). Random bx 12/2021 showing mild inactive chronic inflammation.  Colon appeared normal but was congested. Decreased rectal tone noted on exam as well.  Fetal calprotectin was normal.  His diarrhea did not improve with rifaximin for encephalopathy.  He is having to take Imodium several times a day and will have fecal incontinence if he misses. Less likely to be bile acid diarrhea since >100 cm were resected, but will trial cholestyramine. Fatty acid diarrhea also possible. Rechecking infectious studies. No clear medication culprit. Referring to luminal GI and GI dietician as well. Can continue imodium.     MRI  liver 3/2022 to follow up liver nodules without enhancing lesions. Showing recurrent ascites. Increase Lasix to 40 mg twice a day with labs next week given new ascites.  Continue eplerenone 25 mg daily can consider increase in the future     Continue to completely abstain from alcohol     EGD 12/2021 with grade 1 EVs, repeat 1 year.    Follow up with cardiology for CHF - appears to be stable     Orders Placed This Encounter   Procedures     Adult Gastro Ref - Consult Only     RTC 3 months     Sweetie Bain MD MS  Hepatology/Liver Transplant  [facility]    We discussed participation in the [facility] patient-related outcomes study.  The patient understands that the study may later link survey data with clinical data.  The patient would like to be contacted to participate in the study.    Approximately 25 minutes was spent for the visit with 20 minutes of non face-to-face time were spent in review of the patient's medical record on the day of the visit. This included review of previous: clinic visits, hospital records, lab results, imaging studies, and documentation.  The findings from this review are summarized in the above note.

## 2022-03-02 NOTE — PATIENT INSTRUCTIONS
- Start taking cholestyramine for your diarrhea - 4 grams twice a day. Take 3 hours away from other medications  - Continue to take imodium  - I am referring you to luminal GI for your diarrhea. I am also referring you to talk to a GI dietician about the diarrhea  - Increase lasix to 40 mg twice a day and get labs next week to see if this helps with the swelling in your abdomen

## 2022-03-18 LAB
C COLI+JEJUNI+LARI FUSA STL QL NAA+PROBE: NOT DETECTED
C DIFF TOX B STL QL: NEGATIVE
EC STX1 GENE STL QL NAA+PROBE: NOT DETECTED
EC STX2 GENE STL QL NAA+PROBE: NOT DETECTED
NOROV GI+II ORF1-ORF2 JNC STL QL NAA+PR: NOT DETECTED
RVA NSP5 STL QL NAA+PROBE: NOT DETECTED
SALMONELLA SP RPOD STL QL NAA+PROBE: NOT DETECTED
SHIGELLA SP+EIEC IPAH STL QL NAA+PROBE: NOT DETECTED
V CHOL+PARA RFBL+TRKH+TNAA STL QL NAA+PR: NOT DETECTED
Y ENTERO RECN STL QL NAA+PROBE: NOT DETECTED

## 2022-03-18 PROCEDURE — 87506 IADNA-DNA/RNA PROBE TQ 6-11: CPT | Performed by: STUDENT IN AN ORGANIZED HEALTH CARE EDUCATION/TRAINING PROGRAM

## 2022-03-18 PROCEDURE — 87493 C DIFF AMPLIFIED PROBE: CPT | Mod: XU | Performed by: STUDENT IN AN ORGANIZED HEALTH CARE EDUCATION/TRAINING PROGRAM

## 2022-04-27 ENCOUNTER — MYC MEDICAL ADVICE (OUTPATIENT)
Dept: GASTROENTEROLOGY | Facility: CLINIC | Age: 52
End: 2022-04-27
Payer: COMMERCIAL

## 2022-04-27 DIAGNOSIS — K70.31 ALCOHOLIC CIRRHOSIS OF LIVER WITH ASCITES (H): Primary | ICD-10-CM

## 2022-04-27 NOTE — TELEPHONE ENCOUNTER
Para order entered. Pt requested order be faxed to [facility], order faxed.    Ave CURIEL LPN  Hepatology Clinic    ----------------  Sweetie Bain MD [place], Shannon, LPN  Can you help him get set up for a paracentesis (can do standing orders)   6 L max with standard albumin replacement, do not need to send studies

## 2022-05-06 ENCOUNTER — MYC MEDICAL ADVICE (OUTPATIENT)
Dept: GASTROENTEROLOGY | Facility: CLINIC | Age: 52
End: 2022-05-06
Payer: COMMERCIAL

## 2022-05-06 DIAGNOSIS — K70.31 ALCOHOLIC CIRRHOSIS OF LIVER WITH ASCITES (H): ICD-10-CM

## 2022-05-06 DIAGNOSIS — R19.7 DIARRHEA, UNSPECIFIED TYPE: Primary | ICD-10-CM

## 2022-05-10 RX ORDER — EPLERENONE 25 MG/1
50 TABLET, FILM COATED ORAL DAILY
Qty: 180 TABLET | Refills: 3 | Status: SHIPPED | OUTPATIENT
Start: 2022-05-10 | End: 2022-01-01

## 2022-05-10 RX ORDER — FUROSEMIDE 40 MG
40 TABLET ORAL 2 TIMES DAILY
Qty: 180 TABLET | Refills: 3 | Status: SHIPPED | OUTPATIENT
Start: 2022-05-10 | End: 2022-06-13

## 2022-05-11 ENCOUNTER — LAB (OUTPATIENT)
Dept: LAB | Facility: CLINIC | Age: 52
End: 2022-05-11
Payer: COMMERCIAL

## 2022-05-11 DIAGNOSIS — K70.31 ALCOHOLIC CIRRHOSIS OF LIVER WITH ASCITES (H): ICD-10-CM

## 2022-05-11 LAB
ANION GAP SERPL CALCULATED.3IONS-SCNC: 9 MMOL/L (ref 3–14)
BUN SERPL-MCNC: 11 MG/DL (ref 7–30)
CALCIUM SERPL-MCNC: 8.6 MG/DL (ref 8.5–10.1)
CHLORIDE BLD-SCNC: 111 MMOL/L (ref 94–109)
CO2 SERPL-SCNC: 20 MMOL/L (ref 20–32)
CREAT SERPL-MCNC: 0.65 MG/DL (ref 0.66–1.25)
GFR SERPL CREATININE-BSD FRML MDRD: >90 ML/MIN/1.73M2
GLUCOSE BLD-MCNC: 155 MG/DL (ref 70–99)
POTASSIUM BLD-SCNC: 4.1 MMOL/L (ref 3.4–5.3)
SODIUM SERPL-SCNC: 140 MMOL/L (ref 133–144)

## 2022-05-11 PROCEDURE — 80048 BASIC METABOLIC PNL TOTAL CA: CPT

## 2022-05-11 PROCEDURE — 36415 COLL VENOUS BLD VENIPUNCTURE: CPT

## 2022-05-17 ENCOUNTER — MYC MEDICAL ADVICE (OUTPATIENT)
Dept: GASTROENTEROLOGY | Facility: CLINIC | Age: 52
End: 2022-05-17
Payer: COMMERCIAL

## 2022-05-19 ENCOUNTER — TELEPHONE (OUTPATIENT)
Dept: GASTROENTEROLOGY | Facility: CLINIC | Age: 52
End: 2022-05-19
Payer: COMMERCIAL

## 2022-05-19 NOTE — TELEPHONE ENCOUNTER
Received call from [facility] that PA for pt's paracentesis was approved until 8/17/22. Pt updated.    Ave CURIEL LPN  Hepatology Clinic

## 2022-05-19 NOTE — TELEPHONE ENCOUNTER
Returned call to [employer]. Insurance company is needing medical records from visits with Dr. Bain before authorizing paracentesis coverage, records faxed.     Ave CURIEL LPN  Hepatology Clinic

## 2022-05-19 NOTE — TELEPHONE ENCOUNTER
[facility] Call Center    Phone Message    May a detailed message be left on voicemail: yes     Reason for Call: Other:     [facility] is calling for verbal prior auth on Paracentesis order for Blair. Please call [phone], prior Auth # [id number]    Action Taken: Message routed to:  [facility] ([facility]): hep    Travel Screening: Not Applicable

## 2022-05-25 ENCOUNTER — MYC MEDICAL ADVICE (OUTPATIENT)
Dept: GASTROENTEROLOGY | Facility: CLINIC | Age: 52
End: 2022-05-25
Payer: COMMERCIAL

## 2022-06-06 DIAGNOSIS — K70.31 ALCOHOLIC CIRRHOSIS OF LIVER WITH ASCITES (H): Primary | ICD-10-CM

## 2022-06-08 ENCOUNTER — VIRTUAL VISIT (OUTPATIENT)
Dept: GASTROENTEROLOGY | Facility: CLINIC | Age: 52
End: 2022-06-08
Attending: STUDENT IN AN ORGANIZED HEALTH CARE EDUCATION/TRAINING PROGRAM
Payer: COMMERCIAL

## 2022-06-08 DIAGNOSIS — R19.7 DIARRHEA, UNSPECIFIED TYPE: ICD-10-CM

## 2022-06-08 PROCEDURE — 99213 OFFICE O/P EST LOW 20 MIN: CPT | Mod: GT | Performed by: INTERNAL MEDICINE

## 2022-06-08 NOTE — PROGRESS NOTES
Blair is a 51 year old who is being evaluated via a billable video visit.      How would you like to obtain your AVS? MyChart  If the video visit is dropped, the invitation should be resent by: Text to cell phone: [phone]  Will anyone else be joining your video visit? No      Video Start Time: 10:10        Subjective   Blair is a 51 year old who presents for the following health issues.    HPI     51-year-old male with hypertension hyperlipidemia type 2 diabetes obstructive sleep apnea congestive heart failure cirrhosis.  Greater than 1 year of diarrhea this is at times more than 6 stools a day and sometimes nocturnal.  These are watery to soft.  2 days a week stools are formed.  There is no significant abdominal pain.  No new medications there is been a work-up and evaluation without clear answers.  EGD and biopsies were negative for celiac disease colon biopsies show chronic and active colitis.  He was treated with rifaximin without benefit.  Fecal calprotectin in December was normal.  No new medications.  Breath testing was not possible.    Occasionally he will use a sugar-free mints.  No use of milk.  Sodas with fructose 1-2 a day no honey no agave.  He is not gluten-free.  No herbal or supplemen    Past medical history type 2 diabetes on insulin hypertension hyperlipidemia CHF obstructive sleep apnea SMV thrombus.  Past surgical history EGD and colon December 2021.  Exploratory lap small bowel resection September 2020.    Social former [employer] ,  former tobacco.  Quit alcohol 920.    Medications reviewed on epic.  Metformin for a long duration.    Allergies and minocycline hives    Review of Systems   Patient notes a reduced rectal tone and intensity towards incontinence.  He also endorses a poor sleep with his urgency.  Otherwise negative noncontributory      Objective    Vitals - Patient Reported  Weight (Patient Reported): 129.3 kg (285 lb)  Height (Patient Reported): 190.5 cm  "(6' 3\")  BMI (Based on Pt Reported Ht/Wt): 35.62  Pain Score: Moderate Pain (4)  Pain Loc: Other - see comment        Physical Exam   GENERAL: Healthy, alert and no distress  EYES: Eyes grossly normal to inspection.  No discharge or erythema, or obvious scleral/conjunctival abnormalities.  RESP: No audible wheeze, cough, or visible cyanosis.  No visible retractions or increased work of breathing.    SKIN: Visible skin clear. No significant rash, abnormal pigmentation or lesions.  NEURO: Cranial nerves grossly intact.  Mentation and speech appropriate for age.  PSYCH: Mentation appears normal, affect normal/bright, judgement and insight intact, normal speech and appearance well-groomed.    Impression: Ongoing chronic diarrhea.  Previous bowel resection 142 cm distal small bowel was removed.  Random biopsies and active chronic inflammation this is a grossly normal not endoscopic review.  No benefit with the cholestyramine after a several month trial.  Consider a diabetic diarrhea consider other.  Metformin long prior use of it felt to be less likely.  Brief discussion of the incontinence to review for sacral nerve stimulator if needed in the future.    Plan:   1. minimize her sugar-free mints menorrhagia minimize sodas with high fruit fructose corn syrup.  Diet sodas not a problem.  2.  Stop Questran.  3.  Scheduled Imodium 2 twice daily or 3 times daily on scheduled.  His current use is as needed and this may produce some peaks and valleys with his control.   4.Check labs stool lytes, fecal elastase.  Consider hormonal work-up if needed.  5.  Lomotil or Paregoric may be options.  Patient notes octreotide is not covered by his insurance is not likely this would be needed.    Follow-up with PRN  and in 1 to 2 months      Video-Visit Details    Type of service:  Video Visit    Video End Time:10:34    Originating Location (pt. Location): Home    Distant Location (provider location):  [facility] "     Platform used for Video Visit: Jacob

## 2022-06-08 NOTE — PATIENT INSTRUCTIONS
As we discussed    1.  We will check lab studies    2.  Stop the Questran as its not effective    3.  Regular scheduled use of Imodium  2 tabs twice daily.  Max dose 8/d.  Goal to minimize peaks and valleys    4.  Other treatment options, we can consider use of Lomotil.    Follow-up in 1 to 2-months

## 2022-06-13 ENCOUNTER — APPOINTMENT (OUTPATIENT)
Dept: ULTRASOUND IMAGING | Facility: CLINIC | Age: 52
DRG: 433 | End: 2022-06-13
Attending: STUDENT IN AN ORGANIZED HEALTH CARE EDUCATION/TRAINING PROGRAM
Payer: COMMERCIAL

## 2022-06-13 ENCOUNTER — LAB (OUTPATIENT)
Dept: LAB | Facility: CLINIC | Age: 52
End: 2022-06-13
Attending: STUDENT IN AN ORGANIZED HEALTH CARE EDUCATION/TRAINING PROGRAM
Payer: COMMERCIAL

## 2022-06-13 ENCOUNTER — HOSPITAL ENCOUNTER (INPATIENT)
Facility: CLINIC | Age: 52
LOS: 2 days | Discharge: HOME | DRG: 433 | End: 2022-06-15
Attending: EMERGENCY MEDICINE | Admitting: INTERNAL MEDICINE
Payer: COMMERCIAL

## 2022-06-13 ENCOUNTER — OFFICE VISIT (OUTPATIENT)
Dept: GASTROENTEROLOGY | Facility: CLINIC | Age: 52
End: 2022-06-13
Payer: COMMERCIAL

## 2022-06-13 VITALS
WEIGHT: 294 LBS | SYSTOLIC BLOOD PRESSURE: 112 MMHG | OXYGEN SATURATION: 100 % | DIASTOLIC BLOOD PRESSURE: 55 MMHG | HEIGHT: 75 IN | BODY MASS INDEX: 36.56 KG/M2 | HEART RATE: 71 BPM

## 2022-06-13 DIAGNOSIS — D64.9 ANEMIA, UNSPECIFIED TYPE: ICD-10-CM

## 2022-06-13 DIAGNOSIS — R19.7 DIARRHEA, UNSPECIFIED TYPE: ICD-10-CM

## 2022-06-13 DIAGNOSIS — D64.9 ANEMIA: Primary | ICD-10-CM

## 2022-06-13 DIAGNOSIS — K70.31 ALCOHOLIC CIRRHOSIS OF LIVER WITH ASCITES (H): Primary | ICD-10-CM

## 2022-06-13 DIAGNOSIS — R00.1 BRADYCARDIA: ICD-10-CM

## 2022-06-13 DIAGNOSIS — R53.83 FATIGUE, UNSPECIFIED TYPE: ICD-10-CM

## 2022-06-13 DIAGNOSIS — R53.83 OTHER FATIGUE: ICD-10-CM

## 2022-06-13 DIAGNOSIS — K55.069: Primary | ICD-10-CM

## 2022-06-13 DIAGNOSIS — K70.31 ALCOHOLIC CIRRHOSIS OF LIVER WITH ASCITES (H): ICD-10-CM

## 2022-06-13 DIAGNOSIS — Z20.822 CONTACT WITH AND (SUSPECTED) EXPOSURE TO COVID-19: ICD-10-CM

## 2022-06-13 LAB
ABO/RH(D): NORMAL
ALBUMIN SERPL-MCNC: 2.5 G/DL (ref 3.4–5)
ALBUMIN UR-MCNC: 10 MG/DL
ALP SERPL-CCNC: 143 U/L (ref 40–150)
ALT SERPL W P-5'-P-CCNC: 26 U/L (ref 0–70)
AMMONIA PLAS-SCNC: 53 UMOL/L (ref 10–50)
ANION GAP SERPL CALCULATED.3IONS-SCNC: 12 MMOL/L (ref 3–14)
ANTIBODY SCREEN: NEGATIVE
APPEARANCE UR: CLEAR
AST SERPL W P-5'-P-CCNC: 29 U/L (ref 0–45)
ATRIAL RATE - MUSE: 71 BPM
BILIRUB DIRECT SERPL-MCNC: 0.3 MG/DL (ref 0–0.2)
BILIRUB SERPL-MCNC: 0.9 MG/DL (ref 0.2–1.3)
BILIRUB UR QL STRIP: NEGATIVE
BLD PROD TYP BPU: NORMAL
BLD PROD TYP BPU: NORMAL
BLOOD COMPONENT TYPE: NORMAL
BLOOD COMPONENT TYPE: NORMAL
BUN SERPL-MCNC: 17 MG/DL (ref 7–30)
CALCIUM SERPL-MCNC: 8.4 MG/DL (ref 8.5–10.1)
CHLORIDE BLD-SCNC: 109 MMOL/L (ref 94–109)
CO2 SERPL-SCNC: 21 MMOL/L (ref 20–32)
CODING SYSTEM: NORMAL
CODING SYSTEM: NORMAL
COLOR UR AUTO: YELLOW
CREAT SERPL-MCNC: 0.69 MG/DL (ref 0.66–1.25)
CROSSMATCH: NORMAL
CROSSMATCH: NORMAL
DIASTOLIC BLOOD PRESSURE - MUSE: NORMAL MMHG
ERYTHROCYTE [DISTWIDTH] IN BLOOD BY AUTOMATED COUNT: 19.8 % (ref 10–15)
FIBRINOGEN PPP-MCNC: 272 MG/DL (ref 170–490)
GFR SERPL CREATININE-BSD FRML MDRD: >90 ML/MIN/1.73M2
GLUCOSE BLD-MCNC: 142 MG/DL (ref 70–99)
GLUCOSE BLDC GLUCOMTR-MCNC: 154 MG/DL (ref 70–99)
GLUCOSE BLDC GLUCOMTR-MCNC: 82 MG/DL (ref 70–99)
GLUCOSE UR STRIP-MCNC: NEGATIVE MG/DL
HCT VFR BLD AUTO: 24.2 % (ref 40–53)
HGB BLD-MCNC: 6.8 G/DL (ref 13.3–17.7)
HGB UR QL STRIP: NEGATIVE
HOLD SPECIMEN: NORMAL
HYALINE CASTS: 4 /LPF
INR PPP: 2.13 (ref 0.85–1.15)
INR PPP: 2.25 (ref 0.85–1.15)
INTERPRETATION ECG - MUSE: NORMAL
ISSUE DATE AND TIME: NORMAL
ISSUE DATE AND TIME: NORMAL
KETONES UR STRIP-MCNC: NEGATIVE MG/DL
LDH SERPL L TO P-CCNC: 242 U/L (ref 85–227)
LEUKOCYTE ESTERASE UR QL STRIP: NEGATIVE
MAGNESIUM SERPL-MCNC: 1.6 MG/DL (ref 1.6–2.3)
MCH RBC QN AUTO: 22.6 PG (ref 26.5–33)
MCHC RBC AUTO-ENTMCNC: 28.1 G/DL (ref 31.5–36.5)
MCV RBC AUTO: 80 FL (ref 78–100)
MUCOUS THREADS #/AREA URNS LPF: PRESENT /LPF
NITRATE UR QL: NEGATIVE
NT-PROBNP SERPL-MCNC: 167 PG/ML (ref 0–900)
P AXIS - MUSE: -11 DEGREES
PH UR STRIP: 5.5 [PH] (ref 5–7)
PLATELET # BLD AUTO: 154 10E3/UL (ref 150–450)
POTASSIUM BLD-SCNC: 4.2 MMOL/L (ref 3.4–5.3)
PR INTERVAL - MUSE: 144 MS
PROT SERPL-MCNC: 6.8 G/DL (ref 6.8–8.8)
QRS DURATION - MUSE: 90 MS
QT - MUSE: 450 MS
QTC - MUSE: 489 MS
R AXIS - MUSE: 16 DEGREES
RBC # BLD AUTO: 3.01 10E6/UL (ref 4.4–5.9)
RBC URINE: 1 /HPF
SARS-COV-2 RNA RESP QL NAA+PROBE: NEGATIVE
SODIUM SERPL-SCNC: 142 MMOL/L (ref 133–144)
SP GR UR STRIP: 1.03 (ref 1–1.03)
SPECIMEN EXPIRATION DATE: NORMAL
SQUAMOUS EPITHELIAL: <1 /HPF
SYSTOLIC BLOOD PRESSURE - MUSE: NORMAL MMHG
T AXIS - MUSE: 71 DEGREES
TSH SERPL DL<=0.005 MIU/L-ACNC: 1.34 MU/L (ref 0.4–4)
UNIT ABO/RH: NORMAL
UNIT ABO/RH: NORMAL
UNIT NUMBER: NORMAL
UNIT NUMBER: NORMAL
UNIT STATUS: NORMAL
UNIT STATUS: NORMAL
UNIT TYPE ISBT: 6200
UNIT TYPE ISBT: 6200
UROBILINOGEN UR STRIP-MCNC: NORMAL MG/DL
VENTRICULAR RATE- MUSE: 71 BPM
WBC # BLD AUTO: 4.8 10E3/UL (ref 4–11)
WBC URINE: <1 /HPF

## 2022-06-13 PROCEDURE — 99285 EMERGENCY DEPT VISIT HI MDM: CPT | Mod: 25 | Performed by: EMERGENCY MEDICINE

## 2022-06-13 PROCEDURE — 82248 BILIRUBIN DIRECT: CPT | Performed by: PATHOLOGY

## 2022-06-13 PROCEDURE — 999N000157 HC STATISTIC RCP TIME EA 10 MIN

## 2022-06-13 PROCEDURE — 83615 LACTATE (LD) (LDH) ENZYME: CPT | Performed by: STUDENT IN AN ORGANIZED HEALTH CARE EDUCATION/TRAINING PROGRAM

## 2022-06-13 PROCEDURE — 99215 OFFICE O/P EST HI 40 MIN: CPT | Performed by: STUDENT IN AN ORGANIZED HEALTH CARE EDUCATION/TRAINING PROGRAM

## 2022-06-13 PROCEDURE — 85610 PROTHROMBIN TIME: CPT | Performed by: EMERGENCY MEDICINE

## 2022-06-13 PROCEDURE — 86923 COMPATIBILITY TEST ELECTRIC: CPT | Performed by: STUDENT IN AN ORGANIZED HEALTH CARE EDUCATION/TRAINING PROGRAM

## 2022-06-13 PROCEDURE — 250N000009 HC RX 250: Performed by: STUDENT IN AN ORGANIZED HEALTH CARE EDUCATION/TRAINING PROGRAM

## 2022-06-13 PROCEDURE — 93005 ELECTROCARDIOGRAM TRACING: CPT | Performed by: EMERGENCY MEDICINE

## 2022-06-13 PROCEDURE — 81001 URINALYSIS AUTO W/SCOPE: CPT | Performed by: EMERGENCY MEDICINE

## 2022-06-13 PROCEDURE — 250N000013 HC RX MED GY IP 250 OP 250 PS 637: Performed by: INTERNAL MEDICINE

## 2022-06-13 PROCEDURE — 250N000011 HC RX IP 250 OP 636: Performed by: STUDENT IN AN ORGANIZED HEALTH CARE EDUCATION/TRAINING PROGRAM

## 2022-06-13 PROCEDURE — 84443 ASSAY THYROID STIM HORMONE: CPT | Performed by: EMERGENCY MEDICINE

## 2022-06-13 PROCEDURE — U0005 INFEC AGEN DETEC AMPLI PROBE: HCPCS | Performed by: EMERGENCY MEDICINE

## 2022-06-13 PROCEDURE — 93975 VASCULAR STUDY: CPT | Mod: 26 | Performed by: RADIOLOGY

## 2022-06-13 PROCEDURE — 85027 COMPLETE CBC AUTOMATED: CPT | Performed by: PATHOLOGY

## 2022-06-13 PROCEDURE — 94660 CPAP INITIATION&MGMT: CPT

## 2022-06-13 PROCEDURE — 80053 COMPREHEN METABOLIC PANEL: CPT | Performed by: PATHOLOGY

## 2022-06-13 PROCEDURE — 82140 ASSAY OF AMMONIA: CPT | Performed by: STUDENT IN AN ORGANIZED HEALTH CARE EDUCATION/TRAINING PROGRAM

## 2022-06-13 PROCEDURE — 85610 PROTHROMBIN TIME: CPT | Performed by: PATHOLOGY

## 2022-06-13 PROCEDURE — 86850 RBC ANTIBODY SCREEN: CPT | Performed by: EMERGENCY MEDICINE

## 2022-06-13 PROCEDURE — 85384 FIBRINOGEN ACTIVITY: CPT | Performed by: STUDENT IN AN ORGANIZED HEALTH CARE EDUCATION/TRAINING PROGRAM

## 2022-06-13 PROCEDURE — 36415 COLL VENOUS BLD VENIPUNCTURE: CPT | Performed by: EMERGENCY MEDICINE

## 2022-06-13 PROCEDURE — 96365 THER/PROPH/DIAG IV INF INIT: CPT | Performed by: EMERGENCY MEDICINE

## 2022-06-13 PROCEDURE — 86923 COMPATIBILITY TEST ELECTRIC: CPT | Performed by: EMERGENCY MEDICINE

## 2022-06-13 PROCEDURE — C9803 HOPD COVID-19 SPEC COLLECT: HCPCS | Performed by: EMERGENCY MEDICINE

## 2022-06-13 PROCEDURE — 36415 COLL VENOUS BLD VENIPUNCTURE: CPT | Performed by: STUDENT IN AN ORGANIZED HEALTH CARE EDUCATION/TRAINING PROGRAM

## 2022-06-13 PROCEDURE — G0463 HOSPITAL OUTPT CLINIC VISIT: HCPCS

## 2022-06-13 PROCEDURE — 250N000013 HC RX MED GY IP 250 OP 250 PS 637: Performed by: STUDENT IN AN ORGANIZED HEALTH CARE EDUCATION/TRAINING PROGRAM

## 2022-06-13 PROCEDURE — 120N000002 HC R&B MED SURG/OB UMMC

## 2022-06-13 PROCEDURE — 93975 VASCULAR STUDY: CPT

## 2022-06-13 PROCEDURE — 83735 ASSAY OF MAGNESIUM: CPT | Performed by: EMERGENCY MEDICINE

## 2022-06-13 PROCEDURE — 36415 COLL VENOUS BLD VENIPUNCTURE: CPT | Performed by: PATHOLOGY

## 2022-06-13 PROCEDURE — P9016 RBC LEUKOCYTES REDUCED: HCPCS | Performed by: EMERGENCY MEDICINE

## 2022-06-13 PROCEDURE — 99223 1ST HOSP IP/OBS HIGH 75: CPT | Mod: AI | Performed by: INTERNAL MEDICINE

## 2022-06-13 PROCEDURE — 83880 ASSAY OF NATRIURETIC PEPTIDE: CPT | Performed by: STUDENT IN AN ORGANIZED HEALTH CARE EDUCATION/TRAINING PROGRAM

## 2022-06-13 PROCEDURE — 93005 ELECTROCARDIOGRAM TRACING: CPT | Mod: 76 | Performed by: EMERGENCY MEDICINE

## 2022-06-13 PROCEDURE — 93010 ELECTROCARDIOGRAM REPORT: CPT | Performed by: EMERGENCY MEDICINE

## 2022-06-13 PROCEDURE — 36430 TRANSFUSION BLD/BLD COMPNT: CPT | Performed by: EMERGENCY MEDICINE

## 2022-06-13 RX ORDER — DULOXETIN HYDROCHLORIDE 60 MG/1
60 CAPSULE, DELAYED RELEASE ORAL EVERY EVENING
Status: DISCONTINUED | OUTPATIENT
Start: 2022-06-13 | End: 2022-06-15 | Stop reason: HOSPADM

## 2022-06-13 RX ORDER — HEPARIN SODIUM (PORCINE) LOCK FLUSH IV SOLN 100 UNIT/ML 100 UNIT/ML
5 SOLUTION INTRAVENOUS
Status: CANCELLED | OUTPATIENT
Start: 2022-06-13

## 2022-06-13 RX ORDER — LOPERAMIDE HCL 2 MG
2 CAPSULE ORAL 2 TIMES DAILY
Status: DISCONTINUED | OUTPATIENT
Start: 2022-06-13 | End: 2022-06-15 | Stop reason: HOSPADM

## 2022-06-13 RX ORDER — SIMVASTATIN 20 MG
20 TABLET ORAL DAILY
Status: DISCONTINUED | OUTPATIENT
Start: 2022-06-14 | End: 2022-06-15 | Stop reason: HOSPADM

## 2022-06-13 RX ORDER — AMOXICILLIN 250 MG
2 CAPSULE ORAL 2 TIMES DAILY PRN
Status: DISCONTINUED | OUTPATIENT
Start: 2022-06-13 | End: 2022-06-15 | Stop reason: HOSPADM

## 2022-06-13 RX ORDER — FUROSEMIDE 20 MG
60 TABLET ORAL DAILY
Qty: 270 TABLET | Refills: 0 | Status: ON HOLD | OUTPATIENT
Start: 2022-06-13 | End: 2022-06-14

## 2022-06-13 RX ORDER — EPLERENONE 50 MG/1
50 TABLET, FILM COATED ORAL DAILY
Status: DISCONTINUED | OUTPATIENT
Start: 2022-06-14 | End: 2022-06-15 | Stop reason: HOSPADM

## 2022-06-13 RX ORDER — PANTOPRAZOLE SODIUM 40 MG/1
40 TABLET, DELAYED RELEASE ORAL EVERY EVENING
Status: DISCONTINUED | OUTPATIENT
Start: 2022-06-13 | End: 2022-06-15 | Stop reason: HOSPADM

## 2022-06-13 RX ORDER — DULOXETIN HYDROCHLORIDE 60 MG/1
60 CAPSULE, DELAYED RELEASE ORAL EVERY EVENING
COMMUNITY

## 2022-06-13 RX ORDER — FUROSEMIDE 40 MG
40 TABLET ORAL 2 TIMES DAILY
Status: ON HOLD | COMMUNITY
End: 2022-06-14

## 2022-06-13 RX ORDER — DEXTROSE MONOHYDRATE 25 G/50ML
25-50 INJECTION, SOLUTION INTRAVENOUS
Status: DISCONTINUED | OUTPATIENT
Start: 2022-06-13 | End: 2022-06-15 | Stop reason: HOSPADM

## 2022-06-13 RX ORDER — LOPERAMIDE HCL 2 MG
12 CAPSULE ORAL 2 TIMES DAILY
COMMUNITY
End: 2022-01-01

## 2022-06-13 RX ORDER — HEPARIN SODIUM,PORCINE 10 UNIT/ML
5 VIAL (ML) INTRAVENOUS
Status: CANCELLED | OUTPATIENT
Start: 2022-06-13

## 2022-06-13 RX ORDER — MAGNESIUM SULFATE HEPTAHYDRATE 40 MG/ML
4 INJECTION, SOLUTION INTRAVENOUS ONCE
Status: COMPLETED | OUTPATIENT
Start: 2022-06-13 | End: 2022-06-13

## 2022-06-13 RX ORDER — AMOXICILLIN 250 MG
1 CAPSULE ORAL 2 TIMES DAILY PRN
Status: DISCONTINUED | OUTPATIENT
Start: 2022-06-13 | End: 2022-06-15 | Stop reason: HOSPADM

## 2022-06-13 RX ORDER — MULTIPLE VITAMINS W/ MINERALS TAB 9MG-400MCG
1 TAB ORAL DAILY
Status: DISCONTINUED | OUTPATIENT
Start: 2022-06-14 | End: 2022-06-15 | Stop reason: HOSPADM

## 2022-06-13 RX ORDER — LIDOCAINE 40 MG/G
CREAM TOPICAL
Status: DISCONTINUED | OUTPATIENT
Start: 2022-06-13 | End: 2022-06-15 | Stop reason: HOSPADM

## 2022-06-13 RX ORDER — MULTIVITAMIN,THER AND MINERALS
1 TABLET ORAL DAILY
Status: DISCONTINUED | OUTPATIENT
Start: 2022-06-13 | End: 2022-06-13 | Stop reason: CLARIF

## 2022-06-13 RX ORDER — HYDRALAZINE HYDROCHLORIDE 20 MG/ML
10 INJECTION INTRAMUSCULAR; INTRAVENOUS EVERY 6 HOURS PRN
Status: DISCONTINUED | OUTPATIENT
Start: 2022-06-13 | End: 2022-06-15 | Stop reason: HOSPADM

## 2022-06-13 RX ORDER — NICOTINE POLACRILEX 4 MG
15-30 LOZENGE BUCCAL
Status: DISCONTINUED | OUTPATIENT
Start: 2022-06-13 | End: 2022-06-15 | Stop reason: HOSPADM

## 2022-06-13 RX ADMIN — FUROSEMIDE 60 MG: 40 TABLET ORAL at 20:11

## 2022-06-13 RX ADMIN — Medication 900 MG: at 21:14

## 2022-06-13 RX ADMIN — MAGNESIUM SULFATE HEPTAHYDRATE 4 G: 40 INJECTION, SOLUTION INTRAVENOUS at 18:22

## 2022-06-13 RX ADMIN — PHYTONADIONE 10 MG: 10 INJECTION, EMULSION INTRAMUSCULAR; INTRAVENOUS; SUBCUTANEOUS at 21:14

## 2022-06-13 RX ADMIN — DULOXETINE HYDROCHLORIDE 60 MG: 60 CAPSULE, DELAYED RELEASE ORAL at 20:11

## 2022-06-13 RX ADMIN — PANTOPRAZOLE SODIUM 40 MG: 40 TABLET, DELAYED RELEASE ORAL at 20:11

## 2022-06-13 ASSESSMENT — ENCOUNTER SYMPTOMS
DYSURIA: 0
FEVER: 0
ABDOMINAL DISTENTION: 1
APPETITE CHANGE: 0
ABDOMINAL PAIN: 0
LIGHT-HEADEDNESS: 1
SHORTNESS OF BREATH: 1

## 2022-06-13 ASSESSMENT — ACTIVITIES OF DAILY LIVING (ADL)
ADLS_ACUITY_SCORE: 20
ADLS_ACUITY_SCORE: 35
ADLS_ACUITY_SCORE: 20
ADLS_ACUITY_SCORE: 35
ADLS_ACUITY_SCORE: 35

## 2022-06-13 ASSESSMENT — PAIN SCALES - GENERAL: PAINLEVEL: MODERATE PAIN (5)

## 2022-06-13 NOTE — ED PROVIDER NOTES
"ED Provider Note  [facility] EMERGENCY DEPARTMENT ([facility])  June 13, 2022    History     Chief Complaint   Patient presents with     Referral     HPI  Blair Oliver is a 51 year old male with a past medical history including chronic systolic CHF, acute mesenteric ischemia, ischemic bowel disease, s/p bowel resection, anomalous coronary artery origin, anemia, possible spontaneous bacterial peritonitis, DM2, ascites due to alcoholic cirrhosis who presents to the Emergency Department for evaluation of anemia, fatigue and abdominal swelling.  Patient reports that his last paracentesis was 2.5 weeks ago.     Patient reports that recently he has been feeling very fatigued, weak, \"lazy and no energy\".  Endorses shortness of breath while resting, worse with any activity.  Visible edema in the abdomen, which he states has been growing worse and that requires paracentesis.  Denies abdominal pain, fevers, or chest pain.  Denies dysuria, appetite changes, dysphagia.  He reports that when he tries to stand up he gets lightheaded and syncopal, with trouble staying on his feet.  His wife accompanying him says that he has been sleeping for days.  Patient endorses taking Lasix, metoprolol, Eliquis, amlodipine, among others.  He notes that he has Hx clot, which in the past required 4 feet of his intestine to be removed due to lack of sufficient blood flow.  His liver disease is from drinking.  Last alcoholic drink was 19 months ago.    Per chart review patient presented to IR pre-post today for scheduled paracentesis.  Patient HR noted to be in mid 30s, he stated that he had an irregular heart rate was taking metoprolol.  Stated his HR can run in 40s at times.    Past Medical History  Past Medical History:   Diagnosis Date     Alcoholic cirrhosis of liver with ascites (H)      Chronic systolic heart failure (H)      Diabetes (H)     Type 2 DM, Insulin Use.      Hypertension "      Past Surgical History:   Procedure Laterality Date     COLONOSCOPY N/A 12/9/2021    Procedure: COLONOSCOPY, WITH BIOPSY, WITH CLIPS;  Surgeon: Sweetie Bain MD;  Location: [facility] [place] [facility] OR     ESOPHAGOSCOPY, GASTROSCOPY, DUODENOSCOPY (EGD), COMBINED N/A 12/9/2021    Procedure: ESOPHAGOGASTRODUODENOSCOPY, WITH BIOPSY;  Surgeon: Sweetie Bain MD;  Location: [facility] [place] OR     amLODIPine-benazepril (LOTREL) 10-40 MG capsule  apixaban ANTICOAGULANT (ELIQUIS) 5 MG tablet  eplerenone (INSPRA) 25 MG tablet  eplerenone (INSPRA) 25 MG tablet  furosemide (LASIX) 20 MG tablet  furosemide (LASIX) 40 MG tablet  furosemide (LASIX) 40 MG tablet  gabapentin (NEURONTIN) 600 MG tablet  insulin detemir (LEVEMIR PEN) 100 UNIT/ML pen  insulin pen needle (B-D U/F) 31G X 5 MM miscellaneous  metFORMIN (GLUCOPHAGE) 500 MG tablet  metoprolol succinate ER (TOPROL-XL) 100 MG 24 hr tablet  Multiple Vitamins-Minerals (SUPER THERA DAMARIS M) TABS  omeprazole (PRILOSEC) 20 MG DR capsule  ONETOUCH ULTRA test strip  simvastatin (ZOCOR) 20 MG tablet      Allergies   Allergen Reactions     Minocycline Hives     Past medical history, past surgical history, medications, and allergies were reviewed with the patient. Additional pertinent items: chronic systolic CHF, acute mesenteric ischemia, ischemic bowel disease, anomalous coronary artery origin, possible spontaneous bacterial peritonitis, DM2, ascites due to alcoholic cirrhosis retrieved from [facility] Everywhere.    Family History  Family History   Problem Relation Age of Onset     Substance Abuse Maternal Grandmother      Substance Abuse Brother      Family history was reviewed with the patient. Additional pertinent items: None    Social History  Social History     Tobacco Use     Smoking status: Former Smoker     Types: Cigarettes     Smokeless tobacco: Never Used   Substance Use Topics     Alcohol use: Not Currently     Comment: Quit 9/21/2020     Drug use: Never      Social history was reviewed with the patient.  "Additional pertinent items: None      Review of Systems   Constitutional: Negative for appetite change and fever.   Respiratory: Positive for shortness of breath.    Cardiovascular: Negative for chest pain.   Gastrointestinal: Positive for abdominal distention. Negative for abdominal pain.   Genitourinary: Negative for dysuria.   Neurological: Positive for light-headedness.   All other systems reviewed and are negative.    A complete review of systems was performed with pertinent positives and negatives noted in the HPI, and all other systems negative.    Physical Exam   BP: 129/62  Pulse: (!) 37  Temp: 97.9  F (36.6  C)  Resp: 17  Height: 190.5 cm (6' 3\")  Weight: 133.4 kg (294 lb)  SpO2: 100 %  Physical Exam    GEN:  Well developed, no acute distress  HEENT:  EOMI, Mucous membranes are moist.   Cardio:  RRR, no murmur, radial pulses equal bilaterally  PULM:  Lungs clear, good air movement, no wheezes, rales,   Abd:  Soft, normal bowel sounds, no focal tenderness, abdomen is markedly distended  Musculoskeletal:  normal range of motion of the extremities, no lower extremity swelling or calf tenderness  Neuro:  Alert and oriented X3, Follows commands, moving all extremities spontaneously   Skin:  Warm, dry    ED Course     12:34 PM  The patient was seen and examined by Roxie Martin MD in Room ED26.    Procedures            EKG Interpretation:      Interpreted by Roxie Martin MD  Time reviewed: 12:30  Symptoms at time of EKG: fatigue   Rhythm: Sinus rhythm with occasional PVCs  Rate: normal, 71  Axis: normal  Ectopy: none  Conduction: Prolonged QT, QTC is 489 ms  ST Segments/ T Waves: No ST-T wave changes  Q Waves: none  Comparison to prior: Previous EKG from Denise 15, 2021 is showing sinus bradycardia with rate of 59, no PVCs on that EKG. QTC on previous EKG is 447    Clinical Impression: Sinus rhythm with occasional PVCs    Labs were done earlier in clinic today.  These were reviewed by me.  " Additional labs done in the ED also were reviewed by me and results are shown below.      Given his anemia, started a blood transfusion in the ED after obtaining consent.     Results for orders placed or performed during the hospital encounter of 06/13/22   US Abdomen Limited w Doppler Complete     Status: None    Narrative    EXAMINATION: US ABDOMEN LIMITED WITH DOPPLER COMPLETE 6/13/2022 4:01  PM     COMPARISON: 10/4/2021    HISTORY: History of cirrhosis, evaluate for ascites and portal venous  thrombosis.    TECHNIQUE: The right upper quadrant of the abdomen was scanned in  standard fashion with specialized ultrasound transducer(s) using both  gray-scale, color Doppler, and spectral flow techniques.    Findings:  Pancreas: The pancreas is not well-visualized.    Liver: The liver measures 15.1 cm with increased echogenicity, coarse  in echotexture hepatic parenchyma, and significant surface nodularity.  Previously identified echogenic focus in the periphery of the right  hepatic lobe as seen on ultrasound dated 10/4/2021 is not well  appreciated on today's examination. No evidence of suspect focal  hepatic mass.    Gallbladder: The gallbladder is partially visualized with borderline  gallbladder wall thickening, measuring up to 3 mm. No pericholecystic  fluid or cholelithiasis.     Bile Ducts: The intrahepatic biliary system is of normal caliber.  The  common bile duct is not well-visualized.    Right Kidney: Measures 13.2 cm in length with normal parenchymal  echogenicity and cortical thickness. No hydronephrosis.    Fluid: Significant abdominal ascites.    Extrahepatic portal vein flow is antegrade at 23 cm/second.  Right portal vein flow is antegrade, measuring 14 cm/second.  Left portal vein flow is antegrade, measuring 16 cm/second.    Flow in the hepatic artery is towards the liver and:  58 cm/second peak systolic  0.73 resistive index.     The splenic vein is patent and flow is towards the liver.  The  left,  middle, and right hepatic veins are patent with flow towards the IVC.  The IVC is patent with flow towards the heart.       Impression    Impression:   1. Cirrhotic configuration of liver with significant abdominal  ascites.  2. Limited grayscale evaluation of the right upper quadrant abdomen  due to significant overlying abdominal ascites.  3. Borderline gallbladder wall thickening, measuring up to 3 mm,  likely due to underlying cirrhosis and ascites. No pericholecystic  fluid or cholelithiasis.  4. Patent Doppler evaluation of the hepatic vasculature. No  sonographic evidence to suggest portal venous thrombosis.    I have personally reviewed the examination and initial interpretation  and I agree with the findings.    KEYONNA CASTILLO MD         SYSTEM ID:  [id number]   Bryan Draw     Status: None    Narrative    The following orders were created for panel order Bryan Draw.  Procedure                               Abnormality         Status                     ---------                               -----------         ------                     Extra Blue Top Tube[[id number]]                              Final result               Extra Red Top Tube[[id number]]                               Final result               Extra Green Top (Lithium...[[id number]]                      Final result               Extra Purple Top Tube[[id number]]                                                         Please view results for these tests on the individual orders.   INR     Status: Abnormal   Result Value Ref Range    INR 2.13 (H) 0.85 - 1.15   Magnesium     Status: Normal   Result Value Ref Range    Magnesium 1.6 1.6 - 2.3 mg/dL   TSH with free T4 reflex     Status: Normal   Result Value Ref Range    TSH 1.34 0.40 - 4.00 mU/L   UA with Microscopic reflex to Culture     Status: Abnormal    Specimen: Urine, Midstream   Result Value Ref Range    Color Urine Yellow Colorless, Straw, Light Yellow, Yellow    Appearance  Urine Clear Clear    Glucose Urine Negative Negative mg/dL    Bilirubin Urine Negative Negative    Ketones Urine Negative Negative mg/dL    Specific Gravity Urine 1.027 1.003 - 1.035    Blood Urine Negative Negative    pH Urine 5.5 5.0 - 7.0    Protein Albumin Urine 10  (A) Negative mg/dL    Urobilinogen Urine Normal Normal, 2.0 mg/dL    Nitrite Urine Negative Negative    Leukocyte Esterase Urine Negative Negative    Mucus Urine Present (A) None Seen /LPF    RBC Urine 1 <=2 /HPF    WBC Urine <1 <=5 /HPF    Squamous Epithelials Urine <1 <=1 /HPF    Hyaline Casts Urine 4 (H) <=2 /LPF    Narrative    Urine Culture not indicated   Extra Blue Top Tube     Status: None   Result Value Ref Range    Hold Specimen JIC    Extra Red Top Tube     Status: None   Result Value Ref Range    Hold Specimen JIC    Extra Green Top (Lithium Heparin) Tube     Status: None   Result Value Ref Range    Hold Specimen JIC    Asymptomatic COVID-19 Virus (Coronavirus) by PCR Nose     Status: Normal    Specimen: Nose; Swab   Result Value Ref Range    SARS CoV2 PCR Negative Negative, Testing sent to reference lab. Results will be returned via unsolicited result    Narrative    Testing was performed using the Xpert Xpress SARS-CoV-2 Assay on the  Cepheid Gene-Xpert Instrument Systems. Additional information about  this Emergency Use Authorization (EUA) assay can be found via the Lab  Guide. This test should be ordered for the detection of SARS-CoV-2 in  individuals who meet SARS-CoV-2 clinical and/or epidemiological  criteria. Test performance is unknown in asymptomatic patients. This  test is for in vitro diagnostic use under the FDA EUA for  laboratories certified under CLIA to perform high complexity testing.  This test has not been FDA cleared or approved. A negative result  does not rule out the presence of PCR inhibitors in the specimen or  target RNA in concentration below the limit of detection for the  assay. The possibility of a false  negative should be considered if  the patient's recent exposure or clinical presentation suggests  COVID-19. This test was validated by the [facility]. This laboratory is certified under  the Clinical Laboratory Improvement Amendments of 1988 (CLIA-88) as  qualified to perform high complexity laboratory testing.     Lactate Dehydrogenase     Status: Abnormal   Result Value Ref Range    Lactate Dehydrogenase 242 (H) 85 - 227 U/L   Nt probnp inpatient     Status: Normal   Result Value Ref Range    N terminal Pro BNP Inpatient 167 0 - 900 pg/mL   Fibrinogen activity     Status: Normal   Result Value Ref Range    Fibrinogen Activity 272 170 - 490 mg/dL   EKG 12-lead, tracing only     Status: None (Preliminary result)   Result Value Ref Range    Systolic Blood Pressure  mmHg    Diastolic Blood Pressure  mmHg    Ventricular Rate 71 BPM    Atrial Rate 71 BPM    WA Interval 144 ms    QRS Duration 90 ms     ms    QTc 489 ms    P Axis -11 degrees    R AXIS 16 degrees    T Axis 71 degrees    Interpretation ECG       Sinus rhythm with occasional Premature ventricular complexes  Low voltage QRS  Prolonged QT  Abnormal ECG     Adult Type and Screen     Status: None   Result Value Ref Range    ABO/RH(D) A POS     Antibody Screen Negative Negative    SPECIMEN EXPIRATION DATE [id number]    Prepare red blood cells (unit)     Status: None (Preliminary result)   Result Value Ref Range    CROSSMATCH Compatible     UNIT ABO/RH A Pos     Unit Number [id number]     Unit Status Ready     Blood Component Type Red Blood Cells     Product Code X4520S10     CODING SYSTEM [id number]     UNIT TYPE ISBT 6200    Prepare red blood cells (unit)     Status: None (Preliminary result)   Result Value Ref Range    CROSSMATCH Compatible     UNIT ABO/RH A Pos     Unit Number [id number]     Unit Status Issued     Blood Component Type Red Blood Cells     Product Code Q4461T60     CODING SYSTEM  [id number]     UNIT TYPE ISBT 6200     ISSUE DATE AND TIME [id number]    ABO/Rh type and screen     Status: None    Narrative    The following orders were created for panel order ABO/Rh type and screen.  Procedure                               Abnormality         Status                     ---------                               -----------         ------                     Adult Type and Screen[[id number]]                            Final result                 Please view results for these tests on the individual orders.   Results for orders placed or performed in visit on 06/13/22   INR     Status: Abnormal   Result Value Ref Range    INR 2.25 (H) 0.85 - 1.15   Hepatic function panel     Status: Abnormal   Result Value Ref Range    Bilirubin Total 0.9 0.2 - 1.3 mg/dL    Bilirubin Direct 0.3 (H) 0.0 - 0.2 mg/dL    Protein Total 6.8 6.8 - 8.8 g/dL    Albumin 2.5 (L) 3.4 - 5.0 g/dL    Alkaline Phosphatase 143 40 - 150 U/L    AST 29 0 - 45 U/L    ALT 26 0 - 70 U/L   Basic metabolic panel     Status: Abnormal   Result Value Ref Range    Sodium 142 133 - 144 mmol/L    Potassium 4.2 3.4 - 5.3 mmol/L    Chloride 109 94 - 109 mmol/L    Carbon Dioxide (CO2) 21 20 - 32 mmol/L    Anion Gap 12 3 - 14 mmol/L    Urea Nitrogen 17 7 - 30 mg/dL    Creatinine 0.69 0.66 - 1.25 mg/dL    Calcium 8.4 (L) 8.5 - 10.1 mg/dL    Glucose 142 (H) 70 - 99 mg/dL    GFR Estimate >90 >60 mL/min/1.73m2   CBC with platelets     Status: Abnormal   Result Value Ref Range    WBC Count 4.8 4.0 - 11.0 10e3/uL    RBC Count 3.01 (L) 4.40 - 5.90 10e6/uL    Hemoglobin 6.8 (LL) 13.3 - 17.7 g/dL    Hematocrit 24.2 (L) 40.0 - 53.0 %    MCV 80 78 - 100 fL    MCH 22.6 (L) 26.5 - 33.0 pg    MCHC 28.1 (L) 31.5 - 36.5 g/dL    RDW 19.8 (H) 10.0 - 15.0 %    Platelet Count 154 150 - 450 10e3/uL     Medications   magnesium sulfate 4 g in 100 mL sterile water (premade) (has no administration in time range)        Assessments & Plan (with Medical Decision Making)    Patient presents with generalized fatigue, increased sleepiness, shortness of breath at rest and worse with activity.  He at times is bradycardic with heart rates in the 30s.  Has significant ascites fluid and likely would benefit from therapeutic paracentesis.  Patient also has anemia with hemoglobin 6.8 and would likely feel improved after blood transfusion.  Transfusion has been started in the ED.  Patient will be admitted to medicine service for further care.    I have reviewed the nursing notes. I have reviewed the findings, diagnosis, plan and need for follow up with the patient.    New Prescriptions    No medications on file       Final diagnoses:   Bradycardia   Fatigue, unspecified type   Anemia, unspecified type   Alcoholic cirrhosis of liver with ascites (H)     I, Treasure Canela, am serving as a trained medical scribe to document services personally performed by Roxie Martin MD, based on the provider's statements to me.   Roxie PELLETIER MD, was physically present and have reviewed and verified the accuracy of this note documented by Treasure Canela.    --  Roxie Martin  [facility] EMERGENCY DEPARTMENT  6/13/2022     Roxie Martin MD  06/13/22 [id number]

## 2022-06-13 NOTE — H&P
"[facility]    History and Physical - Maroon 4     Date of Admission:  6/13/2022    Assessment & Plan      Blair Oliver is a 51 year old male with history of decompensated cirrhosis c/b recurrent ascites, chronic systolic CHF, acute mesenteric ischemia, ischemic bowel disease s/p bowel resection c/b incisional hernia, anomalous coronary artery origin, anemia, HTN, HLD, GISELLE, and DM2 who presented to the ED for fatigue and abdominal swelling. He was admitted on 6/13/2022 for anemia, fatigue and recurrent ascites.     Decompensated Cirrhosis secondary to alcohol use disorder  Cirrhosis History: Decompensated cirrhosis 2/2 alcohol use complicated by ascites, portal hypertension, gastroesophageal varices and splenomegaly.   Etiology: Alcohol (in remission)   Primary Hepatologist: Sweetie Bingham MD  MELD-Na: 18  Portal hypertension: Yes, gastroesophageal varices and splenomegaly  HE: No Hospitalizations for HE  Ascites: Yes, last therapeutic paracentesis on 6/6/2022 drained 6 L.   Coagulopathy: INR of 2.3 on 6/13/2022  Varices: Grade 1 esophageal varices per Upper GI Endoscopy (12/9/21)  HCC Screening: Ultrasound and Liver MRI ordered  Any Thrombosis: Ultrasound and Liver MRI ordered  Last US: 10/4/21   Transplant Candidacy: \"Declined, Does Not Meet Criteria Medical on 6/15/2021\"   Plan   - Liver Ultrasound    - PTA lasix 60 mg BID   - PTA eplerenone 50 mg every day    - CAPS consult for diagnostic and therapeutic paracentesis, max 8L with 7.5 g albumin /L removed; hold PTA apixaban   - MRI liver to evaluate for HCC   - Vitamin K challenge x 3 days    Bradycardia  Found to have bradycardia to 30s in clinic 6/13. EKG with frequent PVCs. Per chart review, previously noted to be bradycardic with irrgular heart beat in 11/2020, evaluated by cardiology and subsequently lost to follow up. Again noted in 6/2021 with HR 59 on EKG. With his last paracentesis was noted to have HR in " "30s.   - Telemtry  - Repeat TTE   - Hold PTA metoprolol   - Maintain K>4, Mg>2    Anemia, acute on chronic  On 6/13/22 admission found to have a hemoglobin of 6.8. No obvious sources of bleeding (hematochezia, melena, etc) though patient reports \"rust\" colored stools.   - 2 units pRBC for Hgb<8  - Hold PTA apixaban  - Monitor for source of bleed  - Ensure large bore PIV x2    Chronic Systolic Congestive Heart Failure   Last Echocardiogram with LVEF of 40-45%, normal diastolic function and RV function, no significant valvular abnormalities. On physical exam there is noticeable peripheral edema, will continue with home dose diuretics and continually assess volume status. Denies orthopnea.  - Repeat TTE  - PTA furosemide 60 mg BID  - PTA Eplerenone 50 mg every day     Hx of thrombus causing acute mesenteric ischemia, ischemic bowel disease s/p bowel resection c/b incisional hernia  Diarrhea  Poorly controlled diarrhea which has been ongoing.  - C diff, enteric panel  - Hold PTA apixaban as above  - Hold PTA loperamide     Type 2 Diabetes Mellitus   - Decrease PTA glargine from 65u to 50u  - Moderate Dose Sliding Scale  - Hold PTA Metformin 1000 mg BID    Hypertension  - Hold PTA Metoprolol Succinate  mg every day given bradycardia  - Hydralazine PRN for SBP>180    Hyperlipidemia   - PTA simvastatin 20 mg every day    Obstructive Sleep Apnea  - CPAP       Diet: Combination Diet High Kcal/High Protein Diet, ADULT  Fluid restriction 2000 ML FLUID   DVT Prophylaxis: VTE Prophylaxis contraindicated due to planned paracentesis 6/14  [place] Catheter: Not present  Fluids:  None  Central Lines: None  Cardiac Monitoring: ACTIVE order. Indication: Bradycardias (48 hours)  Code Status: Full CodeFull Code    Clinically Significant Risk Factors Present on Admission             # Hypoalbuminemia: Albumin = 2.5 g/dL (Ref range: 3.4 - 5.0 g/dL) on admission, will monitor as appropriate   # Coagulation Defect: home medication list " "includes an anticoagulant medication    # Obesity: Estimated body mass index is 36.75 kg/m  as calculated from the following:    Height as of this encounter: 1.905 m (6' 3\").    Weight as of this encounter: 133.4 kg (294 lb).      Disposition Plan   Expected Discharge: 06/15/2022  Anticipated discharge location:  Awaiting care coordination huddle  Delays:           The patient's care was discussed with the Attending Physician, Dr. Bolivar.    CHANI MIGUEL  Medical Student  Hospitalist Service, [facility]  Securely message with the Vocera Web Console (learn more here)  Text page via [facility] Paging/Directory   Please see signed in provider for up to date coverage information    I was present with the medical student who participated in the service and in the documentation of the note. I have verified the history and personally performed the physical exam and medical decision making. I agree with the assessment and plan of care as documented in the note.    May Owen MD  PGY-2 Internal Medicine      ______________________________________________________________________    Chief Complaint    \"I was sent here because of my EKG\" \"I've been tired\"  History is obtained from the patient and patient's spouse.    History of Present Illness   Blair Oliver is a 51 year old male with history as above who presents on instruction from health care provider at clinic visit today (6/13/22) for bradycardia, fatigue and abdominal swelling.     Blair states that he was at his clinic visit where he was planning to get his paracentesis when the health care providers noticed that he was bradycardic and his EKG showed multiple PVCs. He states that he has been extremely tired and sleeping a lot over the last week. He states that he has confusion at times that his wife also states she has noticed. He has not had fevers, chills, nausea or vomiting. He does state that he has some " abdominal pain that is chronic, chronic diarrhea with rust colored stools and pale colored stools. He occasionally will see some bright red blood drip into the toilet. He does not think he has any hemorrhoids.     Review of Systems    The 10 point Review of Systems is negative other than noted in the HPI or here.     Past Medical History    I have reviewed this patient's medical history and updated it with pertinent information if needed.   Past Medical History:   Diagnosis Date     Alcoholic cirrhosis of liver with ascites (H)      Chronic systolic heart failure (H)      Diabetes (H)     Type 2 DM, Insulin Use.      Hypertension         Past Surgical History   I have reviewed this patient's surgical history and updated it with pertinent information if needed.  Past Surgical History:   Procedure Laterality Date     COLONOSCOPY N/A 12/9/2021    Procedure: COLONOSCOPY, WITH BIOPSY, WITH CLIPS;  Surgeon: Sweetie Bain MD;  [place] [facility] [place]     ESOPHAGOSCOPY, GASTROSCOPY, DUODENOSCOPY (EGD), COMBINED N/A 12/9/2021    Procedure: ESOPHAGOGASTRODUODENOSCOPY, WITH BIOPSY;  Surgeon: Sweetie Bain MD;  [place] [facility] [place]     Social History   I have reviewed this patient's social history and updated it with pertinent information if needed. Blair Oliver  reports that he has quit smoking. His smoking use included cigarettes. He has never used smokeless tobacco. He reports previous alcohol use. He reports that he does not use drugs.    Family History   I have reviewed this patient's family history and updated it with pertinent information if needed.  Family History   Problem Relation Age of Onset     Substance Abuse Maternal Grandmother      Substance Abuse Brother        Prior to Admission Medications   Prior to Admission Medications   Prescriptions Last Dose Informant Patient Reported? Taking?   DULoxetine (CYMBALTA) 60 MG capsule 6/12/2022 at Unknown time Spouse/Significant Other Yes Yes   Sig: Take 60 mg by mouth every  evening   Multiple Vitamins-Minerals (SUPER THERA DAMARIS M) TABS 6/13/2022 at Unknown time Spouse/Significant Other Yes Yes   Sig: Take 1 tablet by mouth daily   ONETOUCH ULTRA test strip  Spouse/Significant Other Yes No   Sig: PLEASE SEE ATTACHED FOR DETAILED DIRECTIONS   amLODIPine-benazepril (LOTREL) 5-20 MG capsule 6/12/2022 at Unknown time Spouse/Significant Other Yes Yes   Sig: Take 1 capsule by mouth every evening   apixaban ANTICOAGULANT (ELIQUIS) 5 MG tablet 6/13/2022 at AM Spouse/Significant Other Yes Yes   Sig: Take 5 mg by mouth 2 times daily    eplerenone (INSPRA) 25 MG tablet 6/13/2022 at AM Spouse/Significant Other No Yes   Sig: Take 2 tablets (50 mg) by mouth daily   furosemide (LASIX) 20 MG tablet NOT STARTED Spouse/Significant Other No No   Sig: Take 3 tablets (60 mg) by mouth daily for 90 days   Patient taking differently: Take 60 mg by mouth 2 times daily   furosemide (LASIX) 40 MG tablet 6/13/2022 at AM Spouse/Significant Other Yes Yes   Sig: Take 40 mg by mouth 2 times daily   gabapentin (NEURONTIN) 600 MG tablet 6/13/2022 at AM Spouse/Significant Other Yes Yes   Sig: Take 900 mg by mouth 3 times daily    insulin glargine (LANTUS PEN) 100 UNIT/ML pen 6/13/2022 at AM Spouse/Significant Other Yes Yes   Sig: Inject 65 Units Subcutaneous every morning   insulin pen needle (B-D U/F) 31G X 5 MM miscellaneous  Spouse/Significant Other Yes No   Sig: AS DIRECTED. BD UF MINI PEN NEEDLE [id number]: USE ONCE DAILY   loperamide (IMODIUM) 2 MG capsule  Spouse/Significant Other Yes Yes   Sig: Take 12 mg by mouth 2 times daily   metFORMIN (GLUCOPHAGE) 1000 MG tablet 6/13/2022 at AM Spouse/Significant Other Yes Yes   Sig: Take 1,000 mg by mouth 2 times daily    metoprolol succinate ER (TOPROL-XL) 100 MG 24 hr tablet 6/13/2022 at Unknown time Spouse/Significant Other Yes Yes   Sig: Take 100 mg by mouth daily    omeprazole (PRILOSEC) 20 MG DR capsule 6/12/2022 at Unknown time Spouse/Significant Other Yes Yes   Sig:  Take 20 mg by mouth every evening   simvastatin (ZOCOR) 20 MG tablet 6/13/2022 at Unknown time Spouse/Significant Other Yes Yes   Sig: Take 20 mg by mouth daily       Facility-Administered Medications: None     Allergies   Allergies   Allergen Reactions     Minocycline Hives       Physical Exam   Vital Signs: Temp: 97.8  F (36.6  C) Temp src: Oral BP: 118/68 Pulse: 61   Resp: 16 SpO2: 100 % O2 Device: None (Room air)    Weight: 294 lbs 0 oz    General Appearance: NAD. Older man sitting up in bed, wife is at bedside.  Eyes: Anicteric, no conjuctivitis  HEENT: Head is atraumatic, normocephalic  Respiratory: Normal work of breathing, no cough during exam. Lungs are clear to auscultation bilaterally with no wheezes or crackles appreciated  Cardiovascular: Regular rate and rhytmn. No murmurs appreciated.   GI: Abdomen is distended with large RLQ hernia. Vertical midline surgical scar consistent with Ex. Lap seen. Small sinus tract seen at inferior edge of scar with dried drainage.   Lymph/Hematologic: Extremeties are warm. Peripheral edema 1+ noted in bilateral lower extremeties.   Skin: No jaundice. No rashes on exposed skin. Small draining area next to Ex. Lap scar.   Musculoskeletal: Moves all extremeties spontaneously and purposefully. Normal muscle mass.   Neurologic: No asterixis. Alert and orientated to place and situation.   Psychiatric: Normal Affect.    Data   Data reviewed today: I reviewed all medications, new labs and imaging results over the last 24 hours.     Recent Labs   Lab 06/13/22  1832 06/13/22  1233 06/13/22  1034   WBC  --   --  4.8   HGB  --   --  6.8*   MCV  --   --  80   PLT  --   --  154   INR  --  2.13* 2.25*   NA  --   --  142   POTASSIUM  --   --  4.2   CHLORIDE  --   --  109   CO2  --   --  21   BUN  --   --  17   CR  --   --  0.69   ANIONGAP  --   --  12   DANIELLE  --   --  8.4*   GLC 82  --  142*   ALBUMIN  --   --  2.5*   PROTTOTAL  --   --  6.8   BILITOTAL  --   --  0.9   ALKPHOS  --   --   143   ALT  --   --  26   AST  --   --  29     Recent Results (from the past 24 hour(s))   US Abdomen Limited w Doppler Complete    Narrative    EXAMINATION: US ABDOMEN LIMITED WITH DOPPLER COMPLETE 6/13/2022 4:01  PM     COMPARISON: 10/4/2021    HISTORY: History of cirrhosis, evaluate for ascites and portal venous  thrombosis.    TECHNIQUE: The right upper quadrant of the abdomen was scanned in  standard fashion with specialized ultrasound transducer(s) using both  gray-scale, color Doppler, and spectral flow techniques.    Findings:  Pancreas: The pancreas is not well-visualized.    Liver: The liver measures 15.1 cm with increased echogenicity, coarse  in echotexture hepatic parenchyma, and significant surface nodularity.  Previously identified echogenic focus in the periphery of the right  hepatic lobe as seen on ultrasound dated 10/4/2021 is not well  appreciated on today's examination. No evidence of suspect focal  hepatic mass.    Gallbladder: The gallbladder is partially visualized with borderline  gallbladder wall thickening, measuring up to 3 mm. No pericholecystic  fluid or cholelithiasis.     Bile Ducts: The intrahepatic biliary system is of normal caliber.  The  common bile duct is not well-visualized.    Right Kidney: Measures 13.2 cm in length with normal parenchymal  echogenicity and cortical thickness. No hydronephrosis.    Fluid: Significant abdominal ascites.    Extrahepatic portal vein flow is antegrade at 23 cm/second.  Right portal vein flow is antegrade, measuring 14 cm/second.  Left portal vein flow is antegrade, measuring 16 cm/second.    Flow in the hepatic artery is towards the liver and:  58 cm/second peak systolic  0.73 resistive index.     The splenic vein is patent and flow is towards the liver.  The left,  middle, and right hepatic veins are patent with flow towards the IVC.  The IVC is patent with flow towards the heart.       Impression    Impression:   1. Cirrhotic configuration of  liver with significant abdominal  ascites.  2. Limited grayscale evaluation of the right upper quadrant abdomen  due to significant overlying abdominal ascites.  3. Borderline gallbladder wall thickening, measuring up to 3 mm,  likely due to underlying cirrhosis and ascites. No pericholecystic  fluid or cholelithiasis.  4. Patent Doppler evaluation of the hepatic vasculature. No  sonographic evidence to suggest portal venous thrombosis.    I have personally reviewed the examination and initial interpretation  and I agree with the findings.    KEYONNA CASTILLO MD         SYSTEM ID:  [id number]

## 2022-06-13 NOTE — PROGRESS NOTES
Furosemide 40mg BID    Eplerenone 50 mg daily      Patient arrives to IR pre-post for scheduled paracentesis. Pt HR noted to be in mid 30s, pt states he has an irregular heart rate and take Metoprolol. States his HR can run in 40s at times. Denies lightheaded/dizziness. /56. HR now in low 60s. Dr Henriquez updated, ok to continue with procedure.     Stop lisinopril

## 2022-06-13 NOTE — PHARMACY-ADMISSION MEDICATION HISTORY
Admission Medication History Completed by Pharmacy    See [facility] Admission Navigator for allergy information, preferred outpatient pharmacy, prior to admission medications and immunization status.     Medication History Sources:     Patient, spouse, Surescripts    Changes made to PTA medication list (reason):    Added: duloxetine, loperamide, Lantus    Deleted: eplerenone 25 mg daily (duplicate/old dose), furosemide (duplicate), Levemir    Changed: amlodipine/benazepril (changed tablet strength, daily --> QPM), furosemide (60 mg daily --> 60 mg BID; new directions from 6/13, not yet started), metformin (500 mg tab --> 1000 mg tab), omeprazole (daily --> QPM), multivitamin (added frequency)    Additional Information:    None    Prior to Admission medications    Medication Sig Last Dose Taking? Auth Provider Long Term End Date   amLODIPine-benazepril (LOTREL) 5-20 MG capsule Take 1 capsule by mouth every evening 6/12/2022 at Unknown time Yes Reported, Patient Yes    apixaban ANTICOAGULANT (ELIQUIS) 5 MG tablet Take 5 mg by mouth 2 times daily  6/13/2022 at AM Yes Reported, Patient     DULoxetine (CYMBALTA) 60 MG capsule Take 60 mg by mouth every evening 6/12/2022 at Unknown time Yes Unknown, Entered By History Yes    eplerenone (INSPRA) 25 MG tablet Take 2 tablets (50 mg) by mouth daily 6/13/2022 at AM Yes Sweetie Bain MD Yes 8/8/22   furosemide (LASIX) 40 MG tablet Take 40 mg by mouth 2 times daily 6/13/2022 at AM Yes Unknown, Entered By History Yes    gabapentin (NEURONTIN) 600 MG tablet Take 900 mg by mouth 3 times daily  6/13/2022 at AM Yes Reported, Patient Yes    insulin glargine (LANTUS PEN) 100 UNIT/ML pen Inject 65 Units Subcutaneous every morning 6/13/2022 at AM Yes Unknown, Entered By History Yes    loperamide (IMODIUM) 2 MG capsule Take 12 mg by mouth 2 times daily  Yes Unknown, Entered By History     metFORMIN (GLUCOPHAGE) 1000 MG tablet Take 1,000 mg by mouth 2 times daily  6/13/2022 at AM Yes  Reported, Patient Yes    metoprolol succinate ER (TOPROL-XL) 100 MG 24 hr tablet Take 100 mg by mouth daily  6/13/2022 at Unknown time Yes Reported, Patient Yes    Multiple Vitamins-Minerals (SUPER THERA DAMARIS M) TABS Take 1 tablet by mouth daily 6/13/2022 at Unknown time Yes Reported, Patient     omeprazole (PRILOSEC) 20 MG DR capsule Take 20 mg by mouth every evening 6/12/2022 at Unknown time Yes Reported, Patient     simvastatin (ZOCOR) 20 MG tablet Take 20 mg by mouth daily  6/13/2022 at Unknown time Yes Reported, Patient Yes    furosemide (LASIX) 20 MG tablet Take 3 tablets (60 mg) by mouth daily for 90 days  Patient taking differently: Take 60 mg by mouth 2 times daily NOT STARTED  Sweetie Bain MD Yes 9/11/22   insulin pen needle (B-D U/F) 31G X 5 MM miscellaneous AS DIRECTED. BD UF MINI PEN NEEDLE [id number]: USE ONCE DAILY   Reported, Patient     ONETOUCH ULTRA test strip PLEASE SEE ATTACHED FOR DETAILED DIRECTIONS   Reported, Patient       Date completed: 06/13/22    Medication history completed by: Julieta Ruiz [facility]

## 2022-06-13 NOTE — ED TRIAGE NOTES
"Pt states he went to a dr appointment today where they did an ekg and told him to go straight to the ER.    Bradycardic in the 30's in triage.   Pt c/o of SOB and weakness.    Denies chest pain    /62   Pulse (!) 37   Temp 97.9  F (36.6  C) (Oral)   Resp 17   Ht 1.905 m (6' 3\")   Wt 133.4 kg (294 lb)   SpO2 100%   BMI 36.75 kg/m         Triage Assessment     Row Name 06/13/22 1209       Triage Assessment (Adult)    Airway WDL WDL       Respiratory WDL    Respiratory WDL WDL       Skin Circulation/Temperature WDL    Skin Circulation/Temperature WDL WDL       Cardiac WDL    Cardiac WDL rhythm    Cardiac Rhythm bradycardic       Peripheral/Neurovascular WDL    Peripheral Neurovascular WDL WDL       Cognitive/Neuro/Behavioral WDL    Cognitive/Neuro/Behavioral WDL WDL              "

## 2022-06-13 NOTE — PATIENT INSTRUCTIONS
It was a pleasure taking care of you today.  I've included a brief summary of our discussion and care plan from today's visit below.  Please review this information with your primary care provider.  ______________________________________________________________________     My recommendations are summarized as follows:     - We will get you set up for a blood transfusion this week  - Increase your lasix to 60 mg twice a day, get labs within a week  - We will get your set up for a repeat MRI  - Stop taking lisinopril  - Call to schedule your visit with your cardiologist     Return to Hepatology (Liver) Clinic in 2 months to review your progress.    ______________________________________________________________________     How do I schedule labs, imaging studies, or procedures that were ordered in clinic today?      Labs: To schedule lab appointment at the [facility], use my chart or call [phone]. If you have a [place] lab closer to home where you are regularly seen you can give them a call.     Procedures: If a colonoscopy or upper endoscopy was ordered today, our endoscopy team will call you to schedule this. If you have not heard from our endoscopy team within a week, please call [phone] to schedule.      Imaging Studies: If you were scheduled for a CT scan, X-ray, MRI, ultrasound, or other imaging study, please call [phone] to have this scheduled.      Referral: If a referral to another specialty was ordered, expect a phone call or follow instructions above. If you have not heard from anyone regarding your referral in a week, please call our clinic to check the status.      Who do I call with any questions after my visit?  Please be in touch if there are any further questions that arise following today's visit.  There are multiple ways to contact your hepatology (liver) care team.       During business hours, you may reach a Hepatology nurse at [phone]    To schedule or  reschedule an appointment, please call [phone]     You can always send a secure message through [employer].  [employer] messages are answered by your nurse or doctor typically within 24 hours.  Please allow extra time on weekends and holidays.       How will I get the results of any tests ordered?    You will receive all of your results.  If you have signed up for [facility], any tests ordered at your visit will be available to you after your provider reviews them.  If you are not signed up for [facility], you will receive a letter with the results. Typically this takes 1-2 weeks.  If there are urgent results that require a change in your care plan, your provider or nurse will call you to discuss the next steps.       What is [employer]?  [employer] is a secure way for you to access all of your healthcare records from the [facility].  It is a web based computer program, so you can sign on to it from any location.  It also allows you to send secure messages to your care team.  I recommend signing up for [employer] access if you have not already done so and are comfortable with using a computer.       How to I schedule a follow-up visit?  If you did not schedule a follow-up visit today, please call [phone] to schedule a follow-up office visit.       Sincerely,     Sweetie Bain MD  Hepatology  Division of Gastroenterology, Hepatology & Nutrition  [facility]

## 2022-06-13 NOTE — LETTER
6/13/2022         RE: Blair Oliver  [address] [facility] [address]        Dear Colleague,    Thank you for referring your patient, Blair Oliver, to the [facility] [place]. Please see a copy of my visit note below.    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      Initial History:     Patient states that he was diagnosed with liver disease and cirrhosis in September 2020.  He was admitted to the hospital at that time he was found to have extensive superior mesenteric venous thrombosis, complicated by ischemic bowel and had to undergo an exploratory laparotomy with small bowel resection.  He was placed on Eliquis postoperatively.  During surgery, he was found to have some ascites that was evacuated.     Since then he has had trouble with incisional hernias, and rectus diastasis.  He was seen by his general surgeon in March where he was noted to have a MELD of 18.  He was then sent to [facility] to be seen by surgery for consideration of incisional hernia repair.    Patient complains of episodes of leakage of fluid from his incision that happens intermittently.  This fluid is usually blood tinged but sometimes yellow. He is on 20 mg of furosemide daily, that was started because of his heart failure and his pedal edema.  He has also noted that every 2 to 3 days he has watery bowel movements, up to 12 times a day.  In between there was episodes he has about 2-3 soft bowel movements per day.  He denies any fevers or chills, abdominal pain, melena,  or hematochezia.    His wife is also noted that he is having some memory issues.  He has not had any major episodes of confusion, and has not been admitted to the hospital with hepatic encephalopathy.    Patient denies any prior history of liver disease. He has had abnormal liver enzymes and low platelets as far back as 2011. Past notes from his PCP had also documented excessive alcohol use, as well as, a hospitalization for overdose of Vicodin.     First paracentesis 6/26/2021 - 600 ml removed - 231 WBC, TP 1.4, Albumin 0.8.     Recent EGD with bx negative for celiac. Colon bx showing chronic inactive colitis. Still had loose stools after colonoscopy prep, not c/w overflow.  We have tried a several week course of rifaximin for encephalopathy, this did not help his diarrhea.  Fecal calprotectin was normal when checked December 2021    Liver MRI March 2 showing cirrhosis without any suspicious enhancing lesions.  Previously demonstrated hyperintense nonenhancing lesions are not well visualized in the current exam likely due to motion.  Also showed new small to moderate volume ascites.     Interval Events:  - Started having issues with ascites again - not drinking any alcohol. He had a 6 L paracentesis June 6, 8 liters paracentesis May 24, prior to that his last paracentesis was April 29 with 6 L was removed. During his last paracentesis they noted that his HR was in the mid 30s, later improved to 60s. Taking BB. Taking lasix 40 mg BID and eplerenone 50 mg daily. No history of a. Fib. He has does have a history of CHF. Today, he feels weak and has felt weak over the past few days. No chest pain. Hgb down to 6.8 from baseline in the 7s, reports some BRBPT at times  - Saw Dr. Lord for diarrhea - stopped cholestyramine because it wasn't helpful. Now back on high dose imodium.     ROS: 14 point ROS negative except for positives noted in HPI.    PMHx:  Past Medical History:   Diagnosis Date     Diabetes (H)     Type 2 DM,  Insulin Use.      Hypertension    -- Type II diabetes Mellitus  -- Hypertension  -- Hyperlipidemia  -- CHF  -- GISELLE  -- SMV thrombus    PSHx:  Past Surgical History:   Procedure Laterality Date     COLONOSCOPY N/A 12/9/2021    Procedure: COLONOSCOPY, WITH BIOPSY, WITH CLIPS;  Surgeon: Sweetie Bain MD;  Location: [facility] [place] OR     ESOPHAGOSCOPY, GASTROSCOPY, DUODENOSCOPY (EGD), COMBINED N/A 12/9/2021    Procedure: ESOPHAGOGASTRODUODENOSCOPY, WITH BIOPSY;  Surgeon: Sweetie Bain MD;  Location: [facility] [place] OR      -- Exploratory laparotomy with small bowel resection 9/2020    FamHx:  Family History   Problem Relation Age of Onset     Substance Abuse Maternal Grandmother      Substance Abuse Brother      No family history of liver disease, liver cancer    SocHx:  Social History     Socioeconomic History     Marital status:      Spouse name: Not on file     Number of children: Not on file     Years of education: Not on file     Highest education level: Not on file   Occupational History     Not on file   Tobacco Use     Smoking status: Former Smoker     Types: Cigarettes     Smokeless tobacco: Never Used   Substance and Sexual Activity     Alcohol use: Not Currently     Comment: Quit 9/21/2020     Drug use: Never     Sexual activity: Not on file   Other Topics Concern     Parent/sibling w/ CABG, MI or angioplasty before 65F 55M? Not Asked   Social History Narrative     Not on file     Social Determinants of Health     Financial Resource Strain: Not on file   Food Insecurity: Not on file   Transportation Needs: Not on file   Physical Activity: Not on file   Stress: Not on file   Social Connections: Not on file   Intimate Partner Violence: Not on file   Housing Stability: Not on file   Patient previously worked with with [employer].  He is currently on disability due to pain from his hernias and discomfort.  Admits to significant alcohol use.  Used to drink about a liter of whiskey per day for about 20 years.  Denies any  "hospitalizations for intoxication or withdrawal, denies any DUIs, denies any prior treatments.  Last drink was 9/21/2020.  Denies any cravings, and is not in any treatments program at the moment.  Denies any tobacco use, denies any IV drug use.    Medications:  Current Facility-Administered Medications   Medication     EPINEPHrine (ADRENALIN) kit 0.3 mg     Current Outpatient Medications   Medication     amLODIPine-benazepril (LOTREL) 10-40 MG capsule     apixaban ANTICOAGULANT (ELIQUIS) 5 MG tablet     eplerenone (INSPRA) 25 MG tablet     eplerenone (INSPRA) 25 MG tablet     furosemide (LASIX) 20 MG tablet     furosemide (LASIX) 40 MG tablet     gabapentin (NEURONTIN) 600 MG tablet     insulin detemir (LEVEMIR PEN) 100 UNIT/ML pen     insulin pen needle (B-D U/F) 31G X 5 MM miscellaneous     metFORMIN (GLUCOPHAGE) 500 MG tablet     metoprolol succinate ER (TOPROL-XL) 100 MG 24 hr tablet     Multiple Vitamins-Minerals (SUPER THERA DAMARIS M) TABS     omeprazole (PRILOSEC) 20 MG DR capsule     ONETOUCH ULTRA test strip     simvastatin (ZOCOR) 20 MG tablet     furosemide (LASIX) 40 MG tablet     No OTCs, herbals    Allergies:  Allergies   Allergen Reactions     Minocycline Hives       Objective:  /55   Pulse 71   Ht 1.905 m (6' 3\")   Wt 133.4 kg (294 lb)   SpO2 100%   BMI 36.75 kg/m    Constitutional: pleasant man in NAD  Eyes: non icteric  Respiratory: Normal respiratory excursion   MSK: normal range of motion of visualized extremities  Abd: Midline hernia present, reducible, more distended than in the past  Skin: No jaundice  Psychiatric: normal mood and orientation    Labs:  Last Comprehensive Metabolic Panel:  Sodium   Date Value Ref Range Status   06/13/2022 142 133 - 144 mmol/L Final   06/15/2021 136 133 - 144 mmol/L Final     Potassium   Date Value Ref Range Status   06/13/2022 4.2 3.4 - 5.3 mmol/L Final   06/15/2021 4.1 3.4 - 5.3 mmol/L Final     Chloride   Date Value Ref Range Status   06/13/2022 109 " 94 - 109 mmol/L Final   06/15/2021 112 (H) 94 - 109 mmol/L Final     Carbon Dioxide   Date Value Ref Range Status   06/15/2021 25 20 - 32 mmol/L Final     Carbon Dioxide (CO2)   Date Value Ref Range Status   06/13/2022 21 20 - 32 mmol/L Final     Anion Gap   Date Value Ref Range Status   06/13/2022 12 3 - 14 mmol/L Final   06/15/2021 <1 (L) 3 - 14 mmol/L Final     Glucose   Date Value Ref Range Status   06/13/2022 142 (H) 70 - 99 mg/dL Final   06/15/2021 123 (H) 70 - 99 mg/dL Final     Urea Nitrogen   Date Value Ref Range Status   06/13/2022 17 7 - 30 mg/dL Final   06/15/2021 8 7 - 30 mg/dL Final     Creatinine   Date Value Ref Range Status   06/13/2022 0.69 0.66 - 1.25 mg/dL Final   06/15/2021 0.63 (L) 0.66 - 1.25 mg/dL Final     GFR Estimate   Date Value Ref Range Status   06/13/2022 >90 >60 mL/min/1.73m2 Final     Comment:     Effective December 21, 2021 eGFRcr in adults is calculated using the 2021 CKD-EPI creatinine equation which includes age and gender (Jenn et al., NE, DOI: 10.1056/FGLNko2642924)   06/15/2021 >90 >60 mL/min/[1.73_m2] Final     Comment:     Non  GFR Calc  Starting 12/18/2018, serum creatinine based estimated GFR (eGFR) will be   calculated using the Chronic Kidney Disease Epidemiology Collaboration   (CKD-EPI) equation.       Calcium   Date Value Ref Range Status   06/13/2022 8.4 (L) 8.5 - 10.1 mg/dL Final   06/15/2021 8.9 8.5 - 10.1 mg/dL Final     Bilirubin Total   Date Value Ref Range Status   06/13/2022 0.9 0.2 - 1.3 mg/dL Final   06/15/2021 0.8 0.2 - 1.3 mg/dL Final     Alkaline Phosphatase   Date Value Ref Range Status   06/13/2022 143 40 - 150 U/L Final   06/15/2021 126 40 - 150 U/L Final     ALT   Date Value Ref Range Status   06/13/2022 26 0 - 70 U/L Final   06/15/2021 36 0 - 70 U/L Final     AST   Date Value Ref Range Status   06/13/2022 29 0 - 45 U/L Final   06/15/2021 34 0 - 45 U/L Final       Lab Results   Component Value Date    WBC 4.7 06/15/2021     Lab  Results   Component Value Date    RBC 3.89 06/15/2021     Lab Results   Component Value Date    HGB 8.6 06/15/2021     Lab Results   Component Value Date    HCT 31.0 06/15/2021     Lab Results   Component Value Date    MCV 80 06/15/2021     Lab Results   Component Value Date    MCH 22.1 06/15/2021     Lab Results   Component Value Date    MCHC 27.7 06/15/2021     Lab Results   Component Value Date    RDW 17.3 06/15/2021     Lab Results   Component Value Date     06/15/2021       INR   Date Value Ref Range Status   06/13/2022 2.25 (H) 0.85 - 1.15 Final   06/15/2021 1.58 (H) 0.86 - 1.14 Final        MELD-Na score: 16 at 6/13/2022 10:34 AM  MELD score: 16 at 6/13/2022 10:34 AM  Calculated from:  Serum Creatinine: 0.69 mg/dL (Using min of 1 mg/dL) at 6/13/2022 10:34 AM  Serum Sodium: 142 mmol/L (Using max of 137 mmol/L) at 6/13/2022 10:34 AM  Total Bilirubin: 0.9 mg/dL (Using min of 1 mg/dL) at 6/13/2022 10:34 AM  INR(ratio): 2.25 at 6/13/2022 10:34 AM  Age: 51 years    Imaging: Reviewed in EHR    Endoscopy: Reviewed in EHR    Independently reviewed labs and imaging.     Assessment/Plan: Mr. 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     Was originally referred for LT evaluation - given his CHF and low MELD, did not pursue this. He is having pain related to his complex hernia, discussed he would need to have resolution of his ascites, and improvement in his cardiac status to be considered a candidate for surgery. Discussed with his weight he would at high risk for recurrence, he has been working to lose weight. TTE showed improvement in his cardiac function with further sobriety and medications but still LVEF 40-45%.     He is having chronic  diarrhea, could be related to his bowel resection (142 cm distal small bowel resected). Random bx 12/2021 showing mild inactive chronic inflammation.  Colon appeared normal but was congested. Decreased rectal tone noted on exam as well.  Fetal calprotectin was normal.  His diarrhea did not improve with rifaximin for encephalopathy.  He is having to take Imodium several times a day and will have fecal incontinence if he misses. Less likely to be bile acid diarrhea since >100 cm were resected, but will trialed cholesytramine and it was not effective. Fatty acid diarrhea also possible. Rechecking infectious studies. No clear medication culprit. Recently established with Dr. Lord for this.     MRI liver 3/2022 to follow up liver nodules without enhancing lesions. Showing recurrent ascites. Have been increasing his diuretics, but unfortunately still having significant ascites. Apparently had bradycardia at his last paracentesis. Today his HR is in the 70s, with good blood pressure. EGD showing bigeminy, which he has had before per review of his cardiology visits. He has acute on chronic anemia likely 2/2 rectal bleeding, originally plan was for outpatient transfusion this week, but given his recurrent bigeminy with fatigue, would recommend he go to the ED for further evaluation and transfusion. Called ED for signout.     Pending ED evaluation, discussed not clear if ascites is worsened from progression of his liver disease (recommend MRI liver to evaluate for HCC given history of LR3 lesions and to evaluate for PVT), or cardiac (arrythmia or CHF driving ascites). He is not a TIPS candidate unless his cardiac function improves - in that case ascites management is limited to ascites/paracentesis. Rise in INR could be 2/2 hepatic congestion from cardiac issues. Will plan to increase lasix to 60 mg BID, continue eplerenone 50 mg daily. Paracentesis weekly PRN 8 L max with ~ 7.5 grams albumin replacement/L removed. Will send  for total protein, albumin, cell count with next paracentesis.     Stop lisinopril. Will consider stopping Amlodipine/ACEi in the future given ascites. BB may need to be adjusted as well.     RTC 2 months     Sweetie Bain MD MS  Hepatology/Liver Transplant  [facility]

## 2022-06-13 NOTE — NURSING NOTE
"Chief Complaint   Patient presents with     RECHECK     Alcoholic cirrhosis of the liver     /55   Pulse 71   Ht 1.905 m (6' 3\")   Wt 133.4 kg (294 lb)   SpO2 100%   BMI 36.75 kg/m      Yogi Milner MA  "

## 2022-06-13 NOTE — PROGRESS NOTES
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      Initial History:     Patient states that he was diagnosed with liver disease and cirrhosis in September 2020.  He was admitted to the hospital at that time he was found to have extensive superior mesenteric venous thrombosis, complicated by ischemic bowel and had to undergo an exploratory laparotomy with small bowel resection.  He was placed on Eliquis postoperatively.  During surgery, he was found to have some ascites that was evacuated.     Since then he has had trouble with incisional hernias, and rectus diastasis.  He was seen by his general surgeon in March where he was noted to have a MELD of 18.  He was then sent to [facility] to be seen by surgery for consideration of incisional hernia repair.    Patient complains of episodes of leakage of fluid from his incision that happens intermittently.  This fluid is usually blood tinged but sometimes yellow. He is on 20 mg of furosemide daily, that was started because of his heart failure and his pedal edema.  He has also noted that every 2 to 3 days he has watery bowel movements, up to 12 times a day.  In between there was episodes he has about 2-3 soft bowel movements per day.  He denies any fevers or chills, abdominal pain, melena, or hematochezia.    His wife is also noted that he is having some memory issues.  He has not had any major episodes of confusion, and has not been admitted to the hospital with hepatic encephalopathy.    Patient denies any prior history of liver disease. He has had  abnormal liver enzymes and low platelets as far back as 2011. Past notes from his PCP had also documented excessive alcohol use, as well as, a hospitalization for overdose of Vicodin.     First paracentesis 6/26/2021 - 600 ml removed - 231 WBC, TP 1.4, Albumin 0.8.     Recent EGD with bx negative for celiac. Colon bx showing chronic inactive colitis. Still had loose stools after colonoscopy prep, not c/w overflow.  We have tried a several week course of rifaximin for encephalopathy, this did not help his diarrhea.  Fecal calprotectin was normal when checked December 2021    Liver MRI March 2 showing cirrhosis without any suspicious enhancing lesions.  Previously demonstrated hyperintense nonenhancing lesions are not well visualized in the current exam likely due to motion.  Also showed new small to moderate volume ascites.     Interval Events:  - Started having issues with ascites again - not drinking any alcohol. He had a 6 L paracentesis June 6, 8 liters paracentesis May 24, prior to that his last paracentesis was April 29 with 6 L was removed. During his last paracentesis they noted that his HR was in the mid 30s, later improved to 60s. Taking BB. Taking lasix 40 mg BID and eplerenone 50 mg daily. No history of a. Fib. He has does have a history of CHF. Today, he feels weak and has felt weak over the past few days. No chest pain. Hgb down to 6.8 from baseline in the 7s, reports some BRBPT at times  - Saw Dr. Lord for diarrhea - stopped cholestyramine because it wasn't helpful. Now back on high dose imodium.     ROS: 14 point ROS negative except for positives noted in HPI.    PMHx:  Past Medical History:   Diagnosis Date     Diabetes (H)     Type 2 DM, Insulin Use.      Hypertension    -- Type II diabetes Mellitus  -- Hypertension  -- Hyperlipidemia  -- CHF  -- GISELLE  -- SMV thrombus    PSHx:  Past Surgical History:   Procedure Laterality Date     COLONOSCOPY N/A 12/9/2021    Procedure: COLONOSCOPY, WITH BIOPSY,  WITH CLIPS;  Surgeon: Sweetie Bain MD;  Location: [facility] OR     ESOPHAGOSCOPY, GASTROSCOPY, DUODENOSCOPY (EGD), COMBINED N/A 12/9/2021    Procedure: ESOPHAGOGASTRODUODENOSCOPY, WITH BIOPSY;  Surgeon: Sweetie Bain MD;  Location: [facility] OR      -- Exploratory laparotomy with small bowel resection 9/2020    FamHx:  Family History   Problem Relation Age of Onset     Substance Abuse Maternal Grandmother      Substance Abuse Brother      No family history of liver disease, liver cancer    SocHx:  Social History     Socioeconomic History     Marital status:      Spouse name: Not on file     Number of children: Not on file     Years of education: Not on file     Highest education level: Not on file   Occupational History     Not on file   Tobacco Use     Smoking status: Former Smoker     Types: Cigarettes     Smokeless tobacco: Never Used   Substance and Sexual Activity     Alcohol use: Not Currently     Comment: Quit 9/21/2020     Drug use: Never     Sexual activity: Not on file   Other Topics Concern     Parent/sibling w/ CABG, MI or angioplasty before 65F 55M? Not Asked   Social History Narrative     Not on file     Social Determinants of Health     Financial Resource Strain: Not on file   Food Insecurity: Not on file   Transportation Needs: Not on file   Physical Activity: Not on file   Stress: Not on file   Social Connections: Not on file   Intimate Partner Violence: Not on file   Housing Stability: Not on file   Patient previously worked with with [employer].  He is currently on disability due to pain from his hernias and discomfort.  Admits to significant alcohol use.  Used to drink about a liter of whiskey per day for about 20 years.  Denies any hospitalizations for intoxication or withdrawal, denies any DUIs, denies any prior treatments.  Last drink was 9/21/2020.  Denies any cravings, and is not in any treatments program at the moment.  Denies any tobacco use, denies any IV drug  "use.    Medications:  Current Facility-Administered Medications   Medication     EPINEPHrine (ADRENALIN) kit 0.3 mg     Current Outpatient Medications   Medication     amLODIPine-benazepril (LOTREL) 10-40 MG capsule     apixaban ANTICOAGULANT (ELIQUIS) 5 MG tablet     eplerenone (INSPRA) 25 MG tablet     eplerenone (INSPRA) 25 MG tablet     furosemide (LASIX) 20 MG tablet     furosemide (LASIX) 40 MG tablet     gabapentin (NEURONTIN) 600 MG tablet     insulin detemir (LEVEMIR PEN) 100 UNIT/ML pen     insulin pen needle (B-D U/F) 31G X 5 MM miscellaneous     metFORMIN (GLUCOPHAGE) 500 MG tablet     metoprolol succinate ER (TOPROL-XL) 100 MG 24 hr tablet     Multiple Vitamins-Minerals (SUPER THERA DAMARIS M) TABS     omeprazole (PRILOSEC) 20 MG DR capsule     ONETOUCH ULTRA test strip     simvastatin (ZOCOR) 20 MG tablet     furosemide (LASIX) 40 MG tablet     No OTCs, herbals    Allergies:  Allergies   Allergen Reactions     Minocycline Hives       Objective:  /55   Pulse 71   Ht 1.905 m (6' 3\")   Wt 133.4 kg (294 lb)   SpO2 100%   BMI 36.75 kg/m    Constitutional: pleasant man in NAD  Eyes: non icteric  Respiratory: Normal respiratory excursion   MSK: normal range of motion of visualized extremities  Abd: Midline hernia present, reducible, more distended than in the past  Skin: No jaundice  Psychiatric: normal mood and orientation    Labs:  Last Comprehensive Metabolic Panel:  Sodium   Date Value Ref Range Status   06/13/2022 142 133 - 144 mmol/L Final   06/15/2021 136 133 - 144 mmol/L Final     Potassium   Date Value Ref Range Status   06/13/2022 4.2 3.4 - 5.3 mmol/L Final   06/15/2021 4.1 3.4 - 5.3 mmol/L Final     Chloride   Date Value Ref Range Status   06/13/2022 109 94 - 109 mmol/L Final   06/15/2021 112 (H) 94 - 109 mmol/L Final     Carbon Dioxide   Date Value Ref Range Status   06/15/2021 25 20 - 32 mmol/L Final     Carbon Dioxide (CO2)   Date Value Ref Range Status   06/13/2022 21 20 - 32 mmol/L " Final     Anion Gap   Date Value Ref Range Status   06/13/2022 12 3 - 14 mmol/L Final   06/15/2021 <1 (L) 3 - 14 mmol/L Final     Glucose   Date Value Ref Range Status   06/13/2022 142 (H) 70 - 99 mg/dL Final   06/15/2021 123 (H) 70 - 99 mg/dL Final     Urea Nitrogen   Date Value Ref Range Status   06/13/2022 17 7 - 30 mg/dL Final   06/15/2021 8 7 - 30 mg/dL Final     Creatinine   Date Value Ref Range Status   06/13/2022 0.69 0.66 - 1.25 mg/dL Final   06/15/2021 0.63 (L) 0.66 - 1.25 mg/dL Final     GFR Estimate   Date Value Ref Range Status   06/13/2022 >90 >60 mL/min/1.73m2 Final     Comment:     Effective December 21, 2021 eGFRcr in adults is calculated using the 2021 CKD-EPI creatinine equation which includes age and gender (Jenn et al., [place], DOI: 10.1056/AFRJnm3556320)   06/15/2021 >90 >60 mL/min/[1.73_m2] Final     Comment:     Non  GFR Calc  Starting 12/18/2018, serum creatinine based estimated GFR (eGFR) will be   calculated using the Chronic Kidney Disease Epidemiology Collaboration   (CKD-EPI) equation.       Calcium   Date Value Ref Range Status   06/13/2022 8.4 (L) 8.5 - 10.1 mg/dL Final   06/15/2021 8.9 8.5 - 10.1 mg/dL Final     Bilirubin Total   Date Value Ref Range Status   06/13/2022 0.9 0.2 - 1.3 mg/dL Final   06/15/2021 0.8 0.2 - 1.3 mg/dL Final     Alkaline Phosphatase   Date Value Ref Range Status   06/13/2022 143 40 - 150 U/L Final   06/15/2021 126 40 - 150 U/L Final     ALT   Date Value Ref Range Status   06/13/2022 26 0 - 70 U/L Final   06/15/2021 36 0 - 70 U/L Final     AST   Date Value Ref Range Status   06/13/2022 29 0 - 45 U/L Final   06/15/2021 34 0 - 45 U/L Final       Lab Results   Component Value Date    WBC 4.7 06/15/2021     Lab Results   Component Value Date    RBC 3.89 06/15/2021     Lab Results   Component Value Date    HGB 8.6 06/15/2021     Lab Results   Component Value Date    HCT 31.0 06/15/2021     Lab Results   Component Value Date    MCV 80 06/15/2021      Lab Results   Component Value Date    MCH 22.1 06/15/2021     Lab Results   Component Value Date    MCHC 27.7 06/15/2021     Lab Results   Component Value Date    RDW 17.3 06/15/2021     Lab Results   Component Value Date     06/15/2021       INR   Date Value Ref Range Status   06/13/2022 2.25 (H) 0.85 - 1.15 Final   06/15/2021 1.58 (H) 0.86 - 1.14 Final        MELD-Na score: 16 at 6/13/2022 10:34 AM  MELD score: 16 at 6/13/2022 10:34 AM  Calculated from:  Serum Creatinine: 0.69 mg/dL (Using min of 1 mg/dL) at 6/13/2022 10:34 AM  Serum Sodium: 142 mmol/L (Using max of 137 mmol/L) at 6/13/2022 10:34 AM  Total Bilirubin: 0.9 mg/dL (Using min of 1 mg/dL) at 6/13/2022 10:34 AM  INR(ratio): 2.25 at 6/13/2022 10:34 AM  Age: 51 years    Imaging: Reviewed in EHR    Endoscopy: Reviewed in EHR    Independently reviewed labs and imaging.     Assessment/Plan: Mr. 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     Was originally referred for LT evaluation - given his CHF and low MELD, did not pursue this. He is having pain related to his complex hernia, discussed he would need to have resolution of his ascites, and improvement in his cardiac status to be considered a candidate for surgery. Discussed with his weight he would at high risk for recurrence, he has been working to lose weight. TTE showed improvement in his cardiac function with further sobriety and medications but still LVEF 40-45%.     He is having chronic diarrhea, could be related to his bowel resection (142 cm distal small bowel resected). Random bx 12/2021 showing mild inactive chronic inflammation.  Colon appeared normal but was congested. Decreased rectal tone noted on exam as well.   Fetal calprotectin was normal.  His diarrhea did not improve with rifaximin for encephalopathy.  He is having to take Imodium several times a day and will have fecal incontinence if he misses. Less likely to be bile acid diarrhea since >100 cm were resected, but will trialed cholesytramine and it was not effective. Fatty acid diarrhea also possible. Rechecking infectious studies. No clear medication culprit. Recently established with Dr. Lord for this.     MRI liver 3/2022 to follow up liver nodules without enhancing lesions. Showing recurrent ascites. Have been increasing his diuretics, but unfortunately still having significant ascites. Apparently had bradycardia at his last paracentesis. Today his HR is in the 70s, with good blood pressure. EGD showing bigeminy, which he has had before per review of his cardiology visits. He has acute on chronic anemia likely 2/2 rectal bleeding, originally plan was for outpatient transfusion this week, but given his recurrent bigeminy with fatigue, would recommend he go to the ED for further evaluation and transfusion. Called ED for signout.     Pending ED evaluation, discussed not clear if ascites is worsened from progression of his liver disease (recommend MRI liver to evaluate for HCC given history of LR3 lesions and to evaluate for PVT), or cardiac (arrythmia or CHF driving ascites). He is not a TIPS candidate unless his cardiac function improves - in that case ascites management is limited to ascites/paracentesis. Rise in INR could be 2/2 hepatic congestion from cardiac issues. Will plan to increase lasix to 60 mg BID, continue eplerenone 50 mg daily. Paracentesis weekly PRN 8 L max with ~ 7.5 grams albumin replacement/L removed. Will send for total protein, albumin, cell count with next paracentesis.     Stop lisinopril. Will consider stopping Amlodipine/ACEi in the future given ascites. BB may need to be adjusted as well.     RTC 2 months     Sweetie Bain MD  MS  Hepatology/Liver Transplant  [facility]

## 2022-06-13 NOTE — LETTER
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      Initial History:     Patient states that he was diagnosed with liver disease and cirrhosis in September 2020.  He was admitted to the hospital at that time he was found to have extensive superior mesenteric venous thrombosis, complicated by ischemic bowel and had to undergo an exploratory laparotomy with small bowel resection.  He was placed on Eliquis postoperatively.  During surgery, he was found to have some ascites that was evacuated.     Since then he has had trouble with incisional hernias, and rectus diastasis.  He was seen by his general surgeon in March where he was noted to have a MELD of 18.  He was then sent to [facility] to be seen by surgery for consideration of incisional hernia repair.    Patient complains of episodes of leakage of fluid from his incision that happens intermittently.  This fluid is usually blood tinged but sometimes yellow. He is on 20 mg of furosemide daily, that was started because of his heart failure and his pedal edema.  He has also noted that every 2 to 3 days he has watery bowel movements, up to 12 times a day.  In between there was episodes he has about 2-3 soft bowel movements per day.  He denies any fevers or chills, abdominal pain, melena, or hematochezia.    His wife is also noted that he is having some memory issues.  He has not had any major episodes of confusion, and has not been admitted to the hospital with  hepatic encephalopathy.    Patient denies any prior history of liver disease. He has had abnormal liver enzymes and low platelets as far back as 2011. Past notes from his PCP had also documented excessive alcohol use, as well as, a hospitalization for overdose of Vicodin.     First paracentesis 6/26/2021 - 600 ml removed - 231 WBC, TP 1.4, Albumin 0.8.     Recent EGD with bx negative for celiac. Colon bx showing chronic inactive colitis. Still had loose stools after colonoscopy prep, not c/w overflow.  We have tried a several week course of rifaximin for encephalopathy, this did not help his diarrhea.  Fecal calprotectin was normal when checked December 2021    Liver MRI March 2 showing cirrhosis without any suspicious enhancing lesions.  Previously demonstrated hyperintense nonenhancing lesions are not well visualized in the current exam likely due to motion.  Also showed new small to moderate volume ascites.     Interval Events:  - Started having issues with ascites again - not drinking any alcohol. He had a 6 L paracentesis June 6, 8 liters paracentesis May 24, prior to that his last paracentesis was April 29 with 6 L was removed. During his last paracentesis they noted that his HR was in the mid 30s, later improved to 60s. Taking BB. Taking lasix 40 mg BID and eplerenone 50 mg daily. No history of a. Fib. He has does have a history of CHF. Today, he feels weak and has felt weak over the past few days. No chest pain. Hgb down to 6.8 from baseline in the 7s, reports some BRBPT at times  - Saw Dr. Lord for diarrhea - stopped cholestyramine because it wasn't helpful. Now back on high dose imodium.     ROS: 14 point ROS negative except for positives noted in HPI.    PMHx:  Past Medical History:   Diagnosis Date     Diabetes (H)     Type 2 DM, Insulin Use.      Hypertension    -- Type II diabetes Mellitus  -- Hypertension  -- Hyperlipidemia  -- CHF  -- GISELLE  -- SMV thrombus    PSHx:  Past Surgical History:   Procedure  Laterality Date     COLONOSCOPY N/A 12/9/2021    Procedure: COLONOSCOPY, WITH BIOPSY, WITH CLIPS;  Surgeon: Sweetie Bain MD;  [place] [facility] [place]     ESOPHAGOSCOPY, GASTROSCOPY, DUODENOSCOPY (EGD), COMBINED N/A 12/9/2021    Procedure: ESOPHAGOGASTRODUODENOSCOPY, WITH BIOPSY;  Surgeon: Sweetie Bain MD;  [place]: [facility] [place]      -- Exploratory laparotomy with small bowel resection 9/2020    FamHx:  Family History   Problem Relation Age of Onset     Substance Abuse Maternal Grandmother      Substance Abuse Brother      No family history of liver disease, liver cancer    SocHx:  Social History     Socioeconomic History     Marital status:      Spouse name: Not on file     Number of children: Not on file     Years of education: Not on file     Highest education level: Not on file   Occupational History     Not on file   Tobacco Use     Smoking status: Former Smoker     Types: Cigarettes     Smokeless tobacco: Never Used   Substance and Sexual Activity     Alcohol use: Not Currently     Comment: Quit 9/21/2020     Drug use: Never     Sexual activity: Not on file   Other Topics Concern     Parent/sibling w/ CABG, MI or angioplasty before 65F 55M? Not Asked   Social History Narrative     Not on file     Social Determinants of Health     Financial Resource Strain: Not on file   Food Insecurity: Not on file   Transportation Needs: Not on file   Physical Activity: Not on file   Stress: Not on file   Social Connections: Not on file   Intimate Partner Violence: Not on file   Housing Stability: Not on file   Patient previously worked with with [employer] via [place].  He is currently on disability due to pain from his hernias and discomfort.  Admits to significant alcohol use.  Used to drink about a liter of whiskey per day for about 20 years.  Denies any hospitalizations for intoxication or withdrawal, denies any DUIs, denies any prior treatments.  Last drink was 9/21/2020.  Denies any cravings, and is not in any treatments  "program at the moment.  Denies any tobacco use, denies any IV drug use.    Medications:  Current Facility-Administered Medications   Medication     EPINEPHrine (ADRENALIN) kit 0.3 mg     Current Outpatient Medications   Medication     amLODIPine-benazepril (LOTREL) 10-40 MG capsule     apixaban ANTICOAGULANT (ELIQUIS) 5 MG tablet     eplerenone (INSPRA) 25 MG tablet     eplerenone (INSPRA) 25 MG tablet     furosemide (LASIX) 20 MG tablet     furosemide (LASIX) 40 MG tablet     gabapentin (NEURONTIN) 600 MG tablet     insulin detemir (LEVEMIR PEN) 100 UNIT/ML pen     insulin pen needle (B-D U/F) 31G X 5 MM miscellaneous     metFORMIN (GLUCOPHAGE) 500 MG tablet     metoprolol succinate ER (TOPROL-XL) 100 MG 24 hr tablet     Multiple Vitamins-Minerals (SUPER THERA DAMARIS M) TABS     omeprazole (PRILOSEC) 20 MG DR capsule     ONETOUCH ULTRA test strip     simvastatin (ZOCOR) 20 MG tablet     furosemide (LASIX) 40 MG tablet     No OTCs, herbals    Allergies:  Allergies   Allergen Reactions     Minocycline Hives       Objective:  /55   Pulse 71   Ht 1.905 m (6' 3\")   Wt 133.4 kg (294 lb)   SpO2 100%   BMI 36.75 kg/m    Constitutional: pleasant man in NAD  Eyes: non icteric  Respiratory: Normal respiratory excursion   MSK: normal range of motion of visualized extremities  Abd: Midline hernia present, reducible, more distended than in the past  Skin: No jaundice  Psychiatric: normal mood and orientation    Labs:  Last Comprehensive Metabolic Panel:  Sodium   Date Value Ref Range Status   06/13/2022 142 133 - 144 mmol/L Final   06/15/2021 136 133 - 144 mmol/L Final     Potassium   Date Value Ref Range Status   06/13/2022 4.2 3.4 - 5.3 mmol/L Final   06/15/2021 4.1 3.4 - 5.3 mmol/L Final     Chloride   Date Value Ref Range Status   06/13/2022 109 94 - 109 mmol/L Final   06/15/2021 112 (H) 94 - 109 mmol/L Final     Carbon Dioxide   Date Value Ref Range Status   06/15/2021 25 20 - 32 mmol/L Final     Carbon Dioxide " (CO2)   Date Value Ref Range Status   06/13/2022 21 20 - 32 mmol/L Final     Anion Gap   Date Value Ref Range Status   06/13/2022 12 3 - 14 mmol/L Final   06/15/2021 <1 (L) 3 - 14 mmol/L Final     Glucose   Date Value Ref Range Status   06/13/2022 142 (H) 70 - 99 mg/dL Final   06/15/2021 123 (H) 70 - 99 mg/dL Final     Urea Nitrogen   Date Value Ref Range Status   06/13/2022 17 7 - 30 mg/dL Final   06/15/2021 8 7 - 30 mg/dL Final     Creatinine   Date Value Ref Range Status   06/13/2022 0.69 0.66 - 1.25 mg/dL Final   06/15/2021 0.63 (L) 0.66 - 1.25 mg/dL Final     GFR Estimate   Date Value Ref Range Status   06/13/2022 >90 >60 mL/min/1.73m2 Final     Comment:     Effective December 21, 2021 eGFRcr in adults is calculated using the 2021 CKD-EPI creatinine equation which includes age and gender (Jenn et al., NEJ, DOI: 10.1056/DOHKtq9635355)   06/15/2021 >90 >60 mL/min/[1.73_m2] Final     Comment:     Non  GFR Calc  Starting 12/18/2018, serum creatinine based estimated GFR (eGFR) will be   calculated using the Chronic Kidney Disease Epidemiology Collaboration   (CKD-EPI) equation.       Calcium   Date Value Ref Range Status   06/13/2022 8.4 (L) 8.5 - 10.1 mg/dL Final   06/15/2021 8.9 8.5 - 10.1 mg/dL Final     Bilirubin Total   Date Value Ref Range Status   06/13/2022 0.9 0.2 - 1.3 mg/dL Final   06/15/2021 0.8 0.2 - 1.3 mg/dL Final     Alkaline Phosphatase   Date Value Ref Range Status   06/13/2022 143 40 - 150 U/L Final   06/15/2021 126 40 - 150 U/L Final     ALT   Date Value Ref Range Status   06/13/2022 26 0 - 70 U/L Final   06/15/2021 36 0 - 70 U/L Final     AST   Date Value Ref Range Status   06/13/2022 29 0 - 45 U/L Final   06/15/2021 34 0 - 45 U/L Final       Lab Results   Component Value Date    WBC 4.7 06/15/2021     Lab Results   Component Value Date    RBC 3.89 06/15/2021     Lab Results   Component Value Date    HGB 8.6 06/15/2021     Lab Results   Component Value Date    HCT 31.0  06/15/2021     Lab Results   Component Value Date    MCV 80 06/15/2021     Lab Results   Component Value Date    MCH 22.1 06/15/2021     Lab Results   Component Value Date    MCHC 27.7 06/15/2021     Lab Results   Component Value Date    RDW 17.3 06/15/2021     Lab Results   Component Value Date     06/15/2021       INR   Date Value Ref Range Status   06/13/2022 2.25 (H) 0.85 - 1.15 Final   06/15/2021 1.58 (H) 0.86 - 1.14 Final        MELD-Na score: 16 at 6/13/2022 10:34 AM  MELD score: 16 at 6/13/2022 10:34 AM  Calculated from:  Serum Creatinine: 0.69 mg/dL (Using min of 1 mg/dL) at 6/13/2022 10:34 AM  Serum Sodium: 142 mmol/L (Using max of 137 mmol/L) at 6/13/2022 10:34 AM  Total Bilirubin: 0.9 mg/dL (Using min of 1 mg/dL) at 6/13/2022 10:34 AM  INR(ratio): 2.25 at 6/13/2022 10:34 AM  Age: 51 years    Imaging: Reviewed in EHR    Endoscopy: Reviewed in EHR    Independently reviewed labs and imaging.     Assessment/Plan: Mr. 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     Was originally referred for LT evaluation - given his CHF and low MELD, did not pursue this. He is having pain related to his complex hernia, discussed he would need to have resolution of his ascites, and improvement in his cardiac status to be considered a candidate for surgery. Discussed with his weight he would at high risk for recurrence, he has been working to lose weight. TTE showed improvement in his cardiac function with further sobriety and medications but still LVEF 40-45%.     He is having chronic diarrhea, could be related to his bowel resection (142 cm distal small bowel resected). Random bx 12/2021 showing mild inactive chronic inflammation.  Colon appeared  normal but was congested. Decreased rectal tone noted on exam as well.  Fetal calprotectin was normal.  His diarrhea did not improve with rifaximin for encephalopathy.  He is having to take Imodium several times a day and will have fecal incontinence if he misses. Less likely to be bile acid diarrhea since >100 cm were resected, but will trialed cholesytramine and it was not effective. Fatty acid diarrhea also possible. Rechecking infectious studies. No clear medication culprit. Recently established with Dr. Lord for this.     MRI liver 3/2022 to follow up liver nodules without enhancing lesions. Showing recurrent ascites. Have been increasing his diuretics, but unfortunately still having significant ascites. Apparently had bradycardia at his last paracentesis. Today his HR is in the 70s, with good blood pressure. EGD showing bigeminy, which he has had before per review of his cardiology visits. He has acute on chronic anemia likely 2/2 rectal bleeding, originally plan was for outpatient transfusion this week, but given his recurrent bigeminy with fatigue, would recommend he go to the ED for further evaluation and transfusion. Called ED for signout.     Pending ED evaluation, discussed not clear if ascites is worsened from progression of his liver disease (recommend MRI liver to evaluate for HCC given history of LR3 lesions and to evaluate for PVT), or cardiac (arrythmia or CHF driving ascites). He is not a TIPS candidate unless his cardiac function improves - in that case ascites management is limited to ascites/paracentesis. Rise in INR could be 2/2 hepatic congestion from cardiac issues. Will plan to increase lasix to 60 mg BID, continue eplerenone 50 mg daily. Paracentesis weekly PRN 8 L max with ~ 7.5 grams albumin replacement/L removed. Will send for total protein, albumin, cell count with next paracentesis.     Stop lisinopril. Will consider stopping Amlodipine/ACEi in the future given ascites. BB may need to  be adjusted as well.     RTC 2 months     Sweetie Bain MD MS  Hepatology/Liver Transplant  [facility]

## 2022-06-14 ENCOUNTER — APPOINTMENT (OUTPATIENT)
Dept: CARDIOLOGY | Facility: CLINIC | Age: 52
DRG: 433 | End: 2022-06-14
Attending: STUDENT IN AN ORGANIZED HEALTH CARE EDUCATION/TRAINING PROGRAM
Payer: COMMERCIAL

## 2022-06-14 ENCOUNTER — APPOINTMENT (OUTPATIENT)
Dept: MRI IMAGING | Facility: CLINIC | Age: 52
DRG: 433 | End: 2022-06-14
Attending: STUDENT IN AN ORGANIZED HEALTH CARE EDUCATION/TRAINING PROGRAM
Payer: COMMERCIAL

## 2022-06-14 LAB
ALBUMIN BODY FLUID SOURCE: NORMAL
ALBUMIN FLD-MCNC: 0.3 G/DL
ALBUMIN SERPL-MCNC: 2.5 G/DL (ref 3.4–5)
ALP SERPL-CCNC: 146 U/L (ref 40–150)
ALT SERPL W P-5'-P-CCNC: 30 U/L (ref 0–70)
ANION GAP SERPL CALCULATED.3IONS-SCNC: 11 MMOL/L (ref 3–14)
APPEARANCE FLD: CLEAR
AST SERPL W P-5'-P-CCNC: 34 U/L (ref 0–45)
ATRIAL RATE - MUSE: 67 BPM
BASOPHILS NFR FLD MANUAL: 1 %
BILIRUB DIRECT SERPL-MCNC: 0.4 MG/DL (ref 0–0.2)
BILIRUB SERPL-MCNC: 2.3 MG/DL (ref 0.2–1.3)
BUN SERPL-MCNC: 11 MG/DL (ref 7–30)
C COLI+JEJUNI+LARI FUSA STL QL NAA+PROBE: NOT DETECTED
C DIFF TOX B STL QL: NEGATIVE
CALCIUM SERPL-MCNC: 8.2 MG/DL (ref 8.5–10.1)
CELL COUNT BODY FLUID SOURCE: NORMAL
CHLORIDE BLD-SCNC: 108 MMOL/L (ref 94–109)
CO2 SERPL-SCNC: 20 MMOL/L (ref 20–32)
COLOR FLD: YELLOW
CREAT SERPL-MCNC: 0.66 MG/DL (ref 0.66–1.25)
DIASTOLIC BLOOD PRESSURE - MUSE: NORMAL MMHG
EC STX1 GENE STL QL NAA+PROBE: NOT DETECTED
EC STX2 GENE STL QL NAA+PROBE: NOT DETECTED
ERYTHROCYTE [DISTWIDTH] IN BLOOD BY AUTOMATED COUNT: 19 % (ref 10–15)
GFR SERPL CREATININE-BSD FRML MDRD: >90 ML/MIN/1.73M2
GLUCOSE BLD-MCNC: 171 MG/DL (ref 70–99)
GLUCOSE BLDC GLUCOMTR-MCNC: 153 MG/DL (ref 70–99)
GLUCOSE BLDC GLUCOMTR-MCNC: 169 MG/DL (ref 70–99)
GLUCOSE BLDC GLUCOMTR-MCNC: 186 MG/DL (ref 70–99)
GRAM STAIN RESULT: NORMAL
GRAM STAIN RESULT: NORMAL
HBA1C MFR BLD: 7.3 % (ref 0–5.6)
HCT VFR BLD AUTO: 29.1 % (ref 40–53)
HGB BLD-MCNC: 8.4 G/DL (ref 13.3–17.7)
INR PPP: 1.61 (ref 0.85–1.15)
INTERPRETATION ECG - MUSE: NORMAL
LD BODY BODY FLUID SOURCE: NORMAL
LDH FLD L TO P-CCNC: 34 U/L
LVEF ECHO: NORMAL
LYMPHOCYTES NFR FLD MANUAL: 8 %
MCH RBC QN AUTO: 23.6 PG (ref 26.5–33)
MCHC RBC AUTO-ENTMCNC: 28.9 G/DL (ref 31.5–36.5)
MCV RBC AUTO: 82 FL (ref 78–100)
MONOS+MACROS NFR FLD MANUAL: 91 %
NEUTS BAND NFR FLD MANUAL: 1 %
NOROV GI+II ORF1-ORF2 JNC STL QL NAA+PR: NOT DETECTED
P AXIS - MUSE: -6 DEGREES
PLATELET # BLD AUTO: 160 10E3/UL (ref 150–450)
POTASSIUM BLD-SCNC: 3.8 MMOL/L (ref 3.4–5.3)
PR INTERVAL - MUSE: 140 MS
PROT FLD-MCNC: 0.6 G/DL
PROT SERPL-MCNC: 7 G/DL (ref 6.8–8.8)
PROTEIN BODY FLUID SOURCE: NORMAL
QRS DURATION - MUSE: 100 MS
QT - MUSE: 478 MS
QTC - MUSE: 505 MS
R AXIS - MUSE: 3 DEGREES
RBC # BLD AUTO: 3.56 10E6/UL (ref 4.4–5.9)
RVA NSP5 STL QL NAA+PROBE: NOT DETECTED
SALMONELLA SP RPOD STL QL NAA+PROBE: NOT DETECTED
SHIGELLA SP+EIEC IPAH STL QL NAA+PROBE: NOT DETECTED
SODIUM SERPL-SCNC: 139 MMOL/L (ref 133–144)
SYSTOLIC BLOOD PRESSURE - MUSE: NORMAL MMHG
T AXIS - MUSE: 66 DEGREES
V CHOL+PARA RFBL+TRKH+TNAA STL QL NAA+PR: NOT DETECTED
VENTRICULAR RATE- MUSE: 67 BPM
WBC # BLD AUTO: 5.3 10E3/UL (ref 4–11)
WBC # FLD AUTO: 90 /UL
Y ENTERO RECN STL QL NAA+PROBE: NOT DETECTED

## 2022-06-14 PROCEDURE — 89051 BODY FLUID CELL COUNT: CPT | Performed by: STUDENT IN AN ORGANIZED HEALTH CARE EDUCATION/TRAINING PROGRAM

## 2022-06-14 PROCEDURE — 83036 HEMOGLOBIN GLYCOSYLATED A1C: CPT | Performed by: STUDENT IN AN ORGANIZED HEALTH CARE EDUCATION/TRAINING PROGRAM

## 2022-06-14 PROCEDURE — 87205 SMEAR GRAM STAIN: CPT | Performed by: STUDENT IN AN ORGANIZED HEALTH CARE EDUCATION/TRAINING PROGRAM

## 2022-06-14 PROCEDURE — 94660 CPAP INITIATION&MGMT: CPT

## 2022-06-14 PROCEDURE — 87075 CULTR BACTERIA EXCEPT BLOOD: CPT | Performed by: STUDENT IN AN ORGANIZED HEALTH CARE EDUCATION/TRAINING PROGRAM

## 2022-06-14 PROCEDURE — 87506 IADNA-DNA/RNA PROBE TQ 6-11: CPT | Performed by: STUDENT IN AN ORGANIZED HEALTH CARE EDUCATION/TRAINING PROGRAM

## 2022-06-14 PROCEDURE — 999N000157 HC STATISTIC RCP TIME EA 10 MIN

## 2022-06-14 PROCEDURE — 255N000002 HC RX 255 OP 636: Performed by: INTERNAL MEDICINE

## 2022-06-14 PROCEDURE — 82042 OTHER SOURCE ALBUMIN QUAN EA: CPT | Performed by: STUDENT IN AN ORGANIZED HEALTH CARE EDUCATION/TRAINING PROGRAM

## 2022-06-14 PROCEDURE — 0W9G3ZZ DRAINAGE OF PERITONEAL CAVITY, PERCUTANEOUS APPROACH: ICD-10-PCS | Performed by: STUDENT IN AN ORGANIZED HEALTH CARE EDUCATION/TRAINING PROGRAM

## 2022-06-14 PROCEDURE — 250N000013 HC RX MED GY IP 250 OP 250 PS 637: Performed by: STUDENT IN AN ORGANIZED HEALTH CARE EDUCATION/TRAINING PROGRAM

## 2022-06-14 PROCEDURE — 93306 TTE W/DOPPLER COMPLETE: CPT | Mod: 26 | Performed by: STUDENT IN AN ORGANIZED HEALTH CARE EDUCATION/TRAINING PROGRAM

## 2022-06-14 PROCEDURE — P9047 ALBUMIN (HUMAN), 25%, 50ML: HCPCS | Performed by: STUDENT IN AN ORGANIZED HEALTH CARE EDUCATION/TRAINING PROGRAM

## 2022-06-14 PROCEDURE — 84157 ASSAY OF PROTEIN OTHER: CPT | Performed by: STUDENT IN AN ORGANIZED HEALTH CARE EDUCATION/TRAINING PROGRAM

## 2022-06-14 PROCEDURE — 74183 MRI ABD W/O CNTR FLWD CNTR: CPT | Mod: 26 | Performed by: RADIOLOGY

## 2022-06-14 PROCEDURE — 80053 COMPREHEN METABOLIC PANEL: CPT | Performed by: STUDENT IN AN ORGANIZED HEALTH CARE EDUCATION/TRAINING PROGRAM

## 2022-06-14 PROCEDURE — 250N000013 HC RX MED GY IP 250 OP 250 PS 637: Performed by: INTERNAL MEDICINE

## 2022-06-14 PROCEDURE — 85027 COMPLETE CBC AUTOMATED: CPT | Performed by: STUDENT IN AN ORGANIZED HEALTH CARE EDUCATION/TRAINING PROGRAM

## 2022-06-14 PROCEDURE — 87493 C DIFF AMPLIFIED PROBE: CPT | Performed by: STUDENT IN AN ORGANIZED HEALTH CARE EDUCATION/TRAINING PROGRAM

## 2022-06-14 PROCEDURE — 255N000002 HC RX 255 OP 636: Performed by: STUDENT IN AN ORGANIZED HEALTH CARE EDUCATION/TRAINING PROGRAM

## 2022-06-14 PROCEDURE — 85610 PROTHROMBIN TIME: CPT | Performed by: STUDENT IN AN ORGANIZED HEALTH CARE EDUCATION/TRAINING PROGRAM

## 2022-06-14 PROCEDURE — 87070 CULTURE OTHR SPECIMN AEROBIC: CPT | Performed by: STUDENT IN AN ORGANIZED HEALTH CARE EDUCATION/TRAINING PROGRAM

## 2022-06-14 PROCEDURE — 82248 BILIRUBIN DIRECT: CPT | Performed by: STUDENT IN AN ORGANIZED HEALTH CARE EDUCATION/TRAINING PROGRAM

## 2022-06-14 PROCEDURE — 36415 COLL VENOUS BLD VENIPUNCTURE: CPT | Performed by: STUDENT IN AN ORGANIZED HEALTH CARE EDUCATION/TRAINING PROGRAM

## 2022-06-14 PROCEDURE — 74183 MRI ABD W/O CNTR FLWD CNTR: CPT

## 2022-06-14 PROCEDURE — 83615 LACTATE (LD) (LDH) ENZYME: CPT | Performed by: STUDENT IN AN ORGANIZED HEALTH CARE EDUCATION/TRAINING PROGRAM

## 2022-06-14 PROCEDURE — 250N000009 HC RX 250: Performed by: STUDENT IN AN ORGANIZED HEALTH CARE EDUCATION/TRAINING PROGRAM

## 2022-06-14 PROCEDURE — 120N000002 HC R&B MED SURG/OB UMMC

## 2022-06-14 PROCEDURE — A9585 GADOBUTROL INJECTION: HCPCS | Performed by: STUDENT IN AN ORGANIZED HEALTH CARE EDUCATION/TRAINING PROGRAM

## 2022-06-14 PROCEDURE — 49083 ABD PARACENTESIS W/IMAGING: CPT | Performed by: STUDENT IN AN ORGANIZED HEALTH CARE EDUCATION/TRAINING PROGRAM

## 2022-06-14 PROCEDURE — 99233 SBSQ HOSP IP/OBS HIGH 50: CPT | Mod: 25 | Performed by: STUDENT IN AN ORGANIZED HEALTH CARE EDUCATION/TRAINING PROGRAM

## 2022-06-14 PROCEDURE — 87015 SPECIMEN INFECT AGNT CONCNTJ: CPT | Performed by: STUDENT IN AN ORGANIZED HEALTH CARE EDUCATION/TRAINING PROGRAM

## 2022-06-14 PROCEDURE — 250N000011 HC RX IP 250 OP 636: Performed by: STUDENT IN AN ORGANIZED HEALTH CARE EDUCATION/TRAINING PROGRAM

## 2022-06-14 PROCEDURE — 250N000012 HC RX MED GY IP 250 OP 636 PS 637: Performed by: STUDENT IN AN ORGANIZED HEALTH CARE EDUCATION/TRAINING PROGRAM

## 2022-06-14 RX ORDER — GADOBUTROL 604.72 MG/ML
10 INJECTION INTRAVENOUS ONCE
Status: COMPLETED | OUTPATIENT
Start: 2022-06-14 | End: 2022-06-14

## 2022-06-14 RX ORDER — LIDOCAINE HYDROCHLORIDE 10 MG/ML
30 INJECTION, SOLUTION EPIDURAL; INFILTRATION; INTRACAUDAL; PERINEURAL ONCE
Status: DISCONTINUED | OUTPATIENT
Start: 2022-06-14 | End: 2022-06-15 | Stop reason: HOSPADM

## 2022-06-14 RX ORDER — FUROSEMIDE 20 MG
60 TABLET ORAL
Qty: 180 TABLET | Refills: 0 | Status: SHIPPED | OUTPATIENT
Start: 2022-06-14 | End: 2022-01-01

## 2022-06-14 RX ORDER — ALBUMIN (HUMAN) 12.5 G/50ML
50 SOLUTION INTRAVENOUS ONCE
Status: COMPLETED | OUTPATIENT
Start: 2022-06-14 | End: 2022-06-14

## 2022-06-14 RX ADMIN — HUMAN ALBUMIN MICROSPHERES AND PERFLUTREN 6 ML: 10; .22 INJECTION, SOLUTION INTRAVENOUS at 10:28

## 2022-06-14 RX ADMIN — INSULIN ASPART 1 UNITS: 100 INJECTION, SOLUTION INTRAVENOUS; SUBCUTANEOUS at 14:44

## 2022-06-14 RX ADMIN — FUROSEMIDE 60 MG: 40 TABLET ORAL at 16:54

## 2022-06-14 RX ADMIN — Medication 900 MG: at 14:49

## 2022-06-14 RX ADMIN — GADOBUTROL 10 ML: 604.72 INJECTION INTRAVENOUS at 16:36

## 2022-06-14 RX ADMIN — Medication 1 TABLET: at 08:45

## 2022-06-14 RX ADMIN — FUROSEMIDE 60 MG: 40 TABLET ORAL at 08:45

## 2022-06-14 RX ADMIN — PHYTONADIONE 10 MG: 10 INJECTION, EMULSION INTRAMUSCULAR; INTRAVENOUS; SUBCUTANEOUS at 08:45

## 2022-06-14 RX ADMIN — SIMVASTATIN 20 MG: 20 TABLET, FILM COATED ORAL at 08:45

## 2022-06-14 RX ADMIN — EPLERENONE 50 MG: 50 TABLET, FILM COATED ORAL at 08:44

## 2022-06-14 RX ADMIN — Medication 900 MG: at 09:34

## 2022-06-14 RX ADMIN — DULOXETINE HYDROCHLORIDE 60 MG: 60 CAPSULE, DELAYED RELEASE ORAL at 20:47

## 2022-06-14 RX ADMIN — ALBUMIN (HUMAN) 50 G: 0.25 INJECTION, SOLUTION INTRAVENOUS at 13:34

## 2022-06-14 RX ADMIN — INSULIN ASPART 1 UNITS: 100 INJECTION, SOLUTION INTRAVENOUS; SUBCUTANEOUS at 09:34

## 2022-06-14 RX ADMIN — PANTOPRAZOLE SODIUM 40 MG: 40 TABLET, DELAYED RELEASE ORAL at 20:47

## 2022-06-14 RX ADMIN — INSULIN ASPART 1 UNITS: 100 INJECTION, SOLUTION INTRAVENOUS; SUBCUTANEOUS at 19:18

## 2022-06-14 RX ADMIN — Medication 900 MG: at 20:47

## 2022-06-14 ASSESSMENT — ACTIVITIES OF DAILY LIVING (ADL)
ADLS_ACUITY_SCORE: 20

## 2022-06-14 NOTE — PLAN OF CARE
[id number]  No acute event, Afebrile, VSS.   Paracentesis done today, 6000 mL removed.   On tele monitor, SR with frequent PVCs.     NEURO: Alert and oriented x4.      RESPIRATORY: Room Air, denies dizziness, and SOB. Lungs sound, clear, equal bilateral.     CARDIO: VSS, denies dizziness, and extremity pain.      GI/:  Denies N/V, BS active, BM x1 (loose), AUOP.      SKIN: Intact.      ACTIVITY: Independent.      PAIN: Denies pain.    DLA: PIV, NS locked.     BG: Yes.       Continue monitoring.       Goal Outcome Evaluation:    Plan of Care Reviewed With: patient

## 2022-06-14 NOTE — PROGRESS NOTES
"[facility]    Progress Note - [place] 4     Date of Admission:  6/13/2022    Assessment & Plan          Blair Oliver is a 51 year old male with history of decompensated cirrhosis c/b recurrent ascites, chronic systolic CHF, acute mesenteric ischemia, ischemic bowel disease s/p bowel resection c/b incisional hernia, anomalous coronary artery origin, anemia, HTN, HLD, GISELLE, and DM2 who presented to the ED for fatigue and abdominal swelling. He was admitted on 6/13/2022 for anemia, fatigue and recurrent ascites.      Interval Changes  - S/p paracentesis drained 5 L, gave albumin 50g  - MRI of Liver: no evidence of hepatic mass concerning for hepatocellular carcinoma  - TTE with no acute changes    Decompensated Cirrhosis secondary to alcohol use disorder  Cirrhosis History: Decompensated cirrhosis 2/2 alcohol use complicated by ascites, portal hypertension, gastroesophageal varices and splenomegaly.   Etiology: Alcohol (in remission)   Primary Hepatologist: Sweetie Bingham MD  MELD-Na: 15  Portal hypertension: Yes, gastroesophageal varices and splenomegaly  HE: No Hospitalizations for HE  Ascites: Yes, last therapeutic paracentesis on 6/6/2022 drained 6 L.   Coagulopathy: INR of 1.61 on 6/14/2022  Varices: Grade 1 esophageal varices per Upper GI Endoscopy (12/9/21)  HCC Screening: Ultrasound (6/13) and Liver MRI (6/14) no evidence of HCC  Any Thrombosis: Ultrasound (6/13) and Liver MRI (6/14) no evidence of Thrombosis  Last US: 6/13/2022  Transplant Candidacy: \"Declined, Does Not Meet Criteria Medical on 6/15/2021\"              Plan              - PTA lasix 60 mg BID              - PTA eplerenone 50 mg every day               - S/P paracentesis 6L by CAPS team, SAAG > 1.1 consistent with transudat, no SBP              - MRI liver to evaluate for HCC complete              - Vitamin K challenge x 3 days (6/13-6/15): INR of 1.61 (6/14) from 2.13 (6/13)   - Trend " "hyperbilirubinemia, no evidence of biliary obstruction on US, low susp for hemolysis       MELD-Na score: 15 at 6/14/2022  5:53 AM  MELD score: 15 at 6/14/2022  5:53 AM  Calculated from:  Serum Creatinine: 0.66 mg/dL (Using min of 1 mg/dL) at 6/14/2022  5:53 AM  Serum Sodium: 139 mmol/L (Using max of 137 mmol/L) at 6/14/2022  5:53 AM  Total Bilirubin: 2.3 mg/dL at 6/14/2022  5:53 AM  INR(ratio): 1.61 at 6/14/2022  5:53 AM  Age: 51 years      Bradycardia  Found to have bradycardia to 30s in clinic 6/13. EKG with frequent PVCs. Per chart review, previously noted to be bradycardic with irrgular heart beat in 11/2020, evaluated by cardiology and subsequently lost to follow up. Again noted in 6/2021 with HR 59 on EKG. With his last paracentesis was noted to have HR in 30s. TTE on 6/14 with no concerning or acute findings. No additional episodes on tele during this admission.   - 2 wk event monitor outpatient and Outpatient Cardiology Followup  - Cont telemtry  - Hold PTA metoprolol   - Maintain K>4, Mg>2     Anemia, acute on chronic  On 6/13/22 admission found to have a hemoglobin of 6.8, given 2 units of pRBCs with repeat hgb of 8.4 on 6/14/22. No obvious sources of bleeding (hematochezia, melena, etc) though patient reports \"rust\" colored stools, known portal hypertension and varices with elevated INR on admission, but hemodynamically stable, less concerned for acute variceal bleed. Hemolysis possible with elevated bilirubin this AM, though LDH minimally elevated, AST normal, platelets stable, normal fibrinogen, less concerned for DIC.  - AM CBC  - Hold PTA apixaban  - Monitor for source of bleeding  - Ensure large bore PIV x2     Chronic Systolic Congestive Heart Failure   Last Echocardiogram with LVEF of 40-45%, normal diastolic function and RV function, no significant valvular abnormalities. Weight roughly stable though wife stated poor UOP at home. On physical exam there is noticeable peripheral edema, will " "continue with home dose diuretics and continually assess volume status. Denies orthopnea. TTE on 6/14 with no concerning or acute findings.   - PTA furosemide 60 mg BID  - PTA Eplerenone 50 mg every day      Hx of thrombus causing acute mesenteric ischemia, ischemic bowel disease s/p bowel resection c/b incisional hernia  Diarrhea  Poorly controlled diarrhea which has been ongoing.  - C diff, enteric panel  - Hold PTA apixaban as above  - Hold PTA loperamide      Type 2 Diabetes Mellitus   - Decrease PTA glargine from 65u to 50u  - Moderate Dose Sliding Scale  - Hold PTA Metformin 1000 mg BID     Hypertension  - Hold PTA Metoprolol Succinate  mg every day given bradycardia  - Hydralazine PRN for SBP>180     Hyperlipidemia   - PTA simvastatin 20 mg every day     Obstructive Sleep Apnea  - CPAP       Diet: Combination Diet High Kcal/High Protein Diet, ADULT  Fluid restriction 2000 ML FLUID    DVT Prophylaxis: PTA Apixaban held in the setting of possible bleeding  [place] Catheter: Not present  Fluids: None  Central Lines: None  Cardiac Monitoring: ACTIVE order. Indication: Bradycardias (48 hours)  Code Status: Full Code      Disposition Plan   Expected Discharge: 06/15/2022     Anticipated discharge location:  Awaiting care coordination huddle  Delays:           The patient's care was discussed with the Attending Physician, Dr. Roth .    CHANI MIGUEL  Medical Student  Hospitalist Service, [facility]  Securely message with the Vocera Web Console (learn more here)  Text page via [facility] Paging/Directory   Please see signed in provider for up to date coverage information      Clinically Significant Risk Factors Present on Admission             # Diabetes, type II: last A1C 7.3 % (Ref range: 0.0 - 5.6 %)  # Obesity: Estimated body mass index is 35.77 kg/m  as calculated from the following:    Height as of this encounter: 1.905 m (6' 3\").    Weight as of this " encounter: 129.8 kg (286 lb 3.2 oz).      I was present with the medical student who participated in the service and in the documentation of the note. I have verified the history and personally performed the physical exam and medical decision making. I agree with the assessment and plan of care as documented in the note.     May Owen MD  PGY-2 Internal Medicine    ______________________________________________________________________    Interval History     Delmer is ready to get out of the hospital but understands why the team would like him to stay an additional night. He was able to get the paracentesis today and says he is feeling much better since then. He does have some pain where is abdominal incisional hernia is located but overall is feeling much better. His appetite is normal, no shortness of breath, no chest pain.     Data reviewed today: I reviewed all medications, new labs and imaging results over the last 24 hours.    Physical Exam   Vital Signs: Temp: (!) 96.4  F (35.8  C) Temp src: Oral BP: 139/71 Pulse: 71   Resp: 20 SpO2: 96 % O2 Device: None (Room air)    Weight: 286 lbs 3.2 oz  General Appearance: Well appearing man in bed watching tv. Wife at bedside.   Respiratory: work of breathing is normal. Lungs are clear to auscultation bilaterally with good airmovement, no wheezes or crackles  Cardiovascular: Regular rate and rhytmn, Normal S1 S2, no murmurs  GI: Abdomen is obese but not distended, not tender to palpation, large hernia RLQ  Skin: no jaundice, no rashes or lesions on exposed skin  Other: Normal affect    Data   Recent Labs   Lab 06/14/22  1441 06/14/22  0856 06/14/22  0553 06/13/22  1832 06/13/22  1233 06/13/22  1034   WBC  --   --  5.3  --   --  4.8   HGB  --   --  8.4*  --   --  6.8*   MCV  --   --  82  --   --  80   PLT  --   --  160  --   --  154   INR  --   --  1.61*  --  2.13* 2.25*   NA  --   --  139  --   --  142   POTASSIUM  --   --  3.8  --   --  4.2   CHLORIDE  --   --  108   --   --  109   CO2  --   --  20  --   --  21   BUN  --   --  11  --   --  17   CR  --   --  0.66  --   --  0.69   ANIONGAP  --   --  11  --   --  12   DANIELLE  --   --  8.2*  --   --  8.4*   * 153* 171*   < >  --  142*   ALBUMIN  --   --  2.5*  --   --  2.5*   PROTTOTAL  --   --  7.0  --   --  6.8   BILITOTAL  --   --  2.3*  --   --  0.9   ALKPHOS  --   --  146  --   --  143   ALT  --   --  30  --   --  26   AST  --   --  34  --   --  29    < > = values in this interval not displayed.

## 2022-06-14 NOTE — PLAN OF CARE
"2300-0730: /68 (BP Location: Left arm)   Pulse 68   Temp (!) 96.2  F (35.7  C) (Axillary)   Resp 18   Ht 1.905 m (6' 3\")   Wt 132.6 kg (292 lb 5.3 oz)   SpO2 95%   BMI 36.54 kg/m        A&Ox4. Independent. q4 neuro checks intact, Denies N/V and SOB. Pain 2/10 but declined interventions. 2L fluid restriction, ice chips given. Urinal at bedside. MRI and paracentesis planned for today. Stool sample collected at 0600 and sent to lab. Continue with POC.   "

## 2022-06-14 NOTE — PROGRESS NOTES
Nursing Focus: Admission    D: Patient admitted from ED for anemia, fatigue, and ascites. Patient has a history of alcoholic cirrhosis, systolic CHF, ischemic bowel disease and T2DM.     I: Upon arrival to the unit patient was oriented to room, unit, and call light. Patient s height, weight, and vital signs were obtained. Allergies reviewed and allergy band applied. MD notified of patient s arrival on the unit. Adult AVS completed. Head to toe assessment completed. Full skin assessment completed. Education assessment completed. Care plan initiated.    A: Vital signs stable upon admission. Patient rates pain at 0/10. Patient s skin intact with distended abdomen. Hgb 6.8, needed another unit of RBCs. Was placed on telemetry monitoring when arrived on unit.     P: Continue to monitor patient s vital signs and intervene as needed. Continue with plan of care. Notify MD with any concerns or changes in patient status.

## 2022-06-14 NOTE — PLAN OF CARE
Goal Outcome Evaluation:    Patient arrived to 7D from ED around 2000. A&O x4, neuro checks q 4 hours intact. Denies pain, N/V, or SOB. VSS on RA. Patient is on telemetry monitoring due to bradycardia in ED. Patient received 1 unit of RBCs in ED and 1 on unit for Hgb 6.8. BG check at 2200 154. Mag replaced for 1.6. Patient has a 2L fluid restriction, starts over at midnight- strict I&Os. Will have MRI of abdomen in AM, checklist is complete. Also plans to have a paracentesis tomorrow. Up independently, urinal at bedside. Patient is on enteric precautions for C-diff rule out, needs sample sent down. Hat is in bathroom and patient is aware.

## 2022-06-14 NOTE — PROCEDURES
[facility]  CAPS PROCEDURE NOTE  Date of Admission:  6/13/2022  Consult Requested by: Medicine  Reason for Consult: management of symptomatic ascites    Indication/HPI: Abdominal distension    Pre-Procedure Diagnosis: Ascites    Post-Procedure Diagnosis: Ascites    Risk Assessment: Higher bleeding risk. Coagulopathy    [facility]    Paracentesis    Date/Time: 6/14/2022 5:44 PM  Performed by: Levon Lima MD  Authorized by: Levon Lima MD       UNIVERSAL PROTOCOL   Site Marked: Yes  Prior Images Obtained and Reviewed:  Yes  Required items: Required blood products, implants, devices and special equipment available    Patient identity confirmed:  Arm band and provided demographic data  NA - No sedation, light sedation, or local anesthesia  Confirmation Checklist:  Patient's identity using two indicators  Time out: Immediately prior to the procedure a time out was called    Universal Protocol: the Joint Commission Universal Protocol was followed    Preparation: Patient was prepped and draped in usual sterile fashion       ANESTHESIA    Anesthesia: Local infiltration  Local Anesthetic:  Lidocaine 1% without epinephrine      SEDATION    Patient Sedated: No    POST PROCEDURE DETAILS  Ultrasound guidance: yes  Fluid removed: 6000(ml)  Fluid appearance: clear        PROCEDURE    Patient Tolerance:  Patient tolerated the procedure well with no immediate complications  Length of time physician/provider present for 1:1 monitoring during sedation: 0        POC US Guide for Paracentesis     Impression  Unable to save images due to technical issues.  Active US guidance was used for this procedure and needle and catheter was visualized going into the peritoneum and in the final position.           Levon Lima MD  [facility]  Securely message with the Vocera Web Console (learn more  here)  Text page via [facility] Paging/Directory   Please see signed in provider for up to date coverage information

## 2022-06-15 ENCOUNTER — APPOINTMENT (OUTPATIENT)
Dept: CARDIOLOGY | Facility: CLINIC | Age: 52
DRG: 433 | End: 2022-06-15
Attending: STUDENT IN AN ORGANIZED HEALTH CARE EDUCATION/TRAINING PROGRAM
Payer: COMMERCIAL

## 2022-06-15 VITALS
DIASTOLIC BLOOD PRESSURE: 69 MMHG | WEIGHT: 273.1 LBS | BODY MASS INDEX: 33.96 KG/M2 | SYSTOLIC BLOOD PRESSURE: 129 MMHG | HEART RATE: 76 BPM | RESPIRATION RATE: 18 BRPM | TEMPERATURE: 98.2 F | OXYGEN SATURATION: 95 % | HEIGHT: 75 IN

## 2022-06-15 LAB
ALBUMIN SERPL-MCNC: 2.6 G/DL (ref 3.4–5)
ALP SERPL-CCNC: 125 U/L (ref 40–150)
ALT SERPL W P-5'-P-CCNC: 24 U/L (ref 0–70)
ANION GAP SERPL CALCULATED.3IONS-SCNC: 7 MMOL/L (ref 3–14)
AST SERPL W P-5'-P-CCNC: 23 U/L (ref 0–45)
BILIRUB DIRECT SERPL-MCNC: 0.4 MG/DL (ref 0–0.2)
BILIRUB SERPL-MCNC: 1.3 MG/DL (ref 0.2–1.3)
BLD PROD TYP BPU: NORMAL
BLOOD COMPONENT TYPE: NORMAL
BUN SERPL-MCNC: 9 MG/DL (ref 7–30)
CALCIUM SERPL-MCNC: 8.1 MG/DL (ref 8.5–10.1)
CHLORIDE BLD-SCNC: 108 MMOL/L (ref 94–109)
CO2 SERPL-SCNC: 24 MMOL/L (ref 20–32)
CODING SYSTEM: NORMAL
CREAT SERPL-MCNC: 0.63 MG/DL (ref 0.66–1.25)
CROSSMATCH: NORMAL
ERYTHROCYTE [DISTWIDTH] IN BLOOD BY AUTOMATED COUNT: 19 % (ref 10–15)
GFR SERPL CREATININE-BSD FRML MDRD: >90 ML/MIN/1.73M2
GLUCOSE BLD-MCNC: 279 MG/DL (ref 70–99)
GLUCOSE BLDC GLUCOMTR-MCNC: 266 MG/DL (ref 70–99)
GLUCOSE BLDC GLUCOMTR-MCNC: 326 MG/DL (ref 70–99)
HCT VFR BLD AUTO: 23.6 % (ref 40–53)
HGB BLD-MCNC: 7 G/DL (ref 13.3–17.7)
HGB BLD-MCNC: 8.9 G/DL (ref 13.3–17.7)
INR PPP: 1.56 (ref 0.85–1.15)
ISSUE DATE AND TIME: NORMAL
MCH RBC QN AUTO: 23.6 PG (ref 26.5–33)
MCHC RBC AUTO-ENTMCNC: 29.7 G/DL (ref 31.5–36.5)
MCV RBC AUTO: 80 FL (ref 78–100)
PLATELET # BLD AUTO: 109 10E3/UL (ref 150–450)
POTASSIUM BLD-SCNC: 3.6 MMOL/L (ref 3.4–5.3)
PROT SERPL-MCNC: 6 G/DL (ref 6.8–8.8)
RBC # BLD AUTO: 2.96 10E6/UL (ref 4.4–5.9)
RETICS # AUTO: 0.08 10E6/UL (ref 0.03–0.1)
RETICS/RBC NFR AUTO: 2.5 % (ref 0.5–2)
SODIUM SERPL-SCNC: 139 MMOL/L (ref 133–144)
UNIT ABO/RH: NORMAL
UNIT NUMBER: NORMAL
UNIT STATUS: NORMAL
UNIT TYPE ISBT: 6200
WBC # BLD AUTO: 3 10E3/UL (ref 4–11)

## 2022-06-15 PROCEDURE — 250N000009 HC RX 250: Performed by: STUDENT IN AN ORGANIZED HEALTH CARE EDUCATION/TRAINING PROGRAM

## 2022-06-15 PROCEDURE — 36415 COLL VENOUS BLD VENIPUNCTURE: CPT | Performed by: STUDENT IN AN ORGANIZED HEALTH CARE EDUCATION/TRAINING PROGRAM

## 2022-06-15 PROCEDURE — 99222 1ST HOSP IP/OBS MODERATE 55: CPT | Performed by: NURSE PRACTITIONER

## 2022-06-15 PROCEDURE — 85045 AUTOMATED RETICULOCYTE COUNT: CPT | Performed by: STUDENT IN AN ORGANIZED HEALTH CARE EDUCATION/TRAINING PROGRAM

## 2022-06-15 PROCEDURE — 93246 EXT ECG>7D<15D RECORDING: CPT

## 2022-06-15 PROCEDURE — 250N000013 HC RX MED GY IP 250 OP 250 PS 637: Performed by: STUDENT IN AN ORGANIZED HEALTH CARE EDUCATION/TRAINING PROGRAM

## 2022-06-15 PROCEDURE — 99239 HOSP IP/OBS DSCHRG MGMT >30: CPT | Mod: GC | Performed by: STUDENT IN AN ORGANIZED HEALTH CARE EDUCATION/TRAINING PROGRAM

## 2022-06-15 PROCEDURE — 82248 BILIRUBIN DIRECT: CPT | Performed by: STUDENT IN AN ORGANIZED HEALTH CARE EDUCATION/TRAINING PROGRAM

## 2022-06-15 PROCEDURE — 85610 PROTHROMBIN TIME: CPT | Performed by: STUDENT IN AN ORGANIZED HEALTH CARE EDUCATION/TRAINING PROGRAM

## 2022-06-15 PROCEDURE — 85018 HEMOGLOBIN: CPT | Performed by: STUDENT IN AN ORGANIZED HEALTH CARE EDUCATION/TRAINING PROGRAM

## 2022-06-15 PROCEDURE — 250N000011 HC RX IP 250 OP 636: Performed by: STUDENT IN AN ORGANIZED HEALTH CARE EDUCATION/TRAINING PROGRAM

## 2022-06-15 PROCEDURE — 250N000013 HC RX MED GY IP 250 OP 250 PS 637: Performed by: INTERNAL MEDICINE

## 2022-06-15 PROCEDURE — 250N000012 HC RX MED GY IP 250 OP 636 PS 637: Performed by: STUDENT IN AN ORGANIZED HEALTH CARE EDUCATION/TRAINING PROGRAM

## 2022-06-15 PROCEDURE — 999N000157 HC STATISTIC RCP TIME EA 10 MIN

## 2022-06-15 PROCEDURE — P9016 RBC LEUKOCYTES REDUCED: HCPCS | Performed by: STUDENT IN AN ORGANIZED HEALTH CARE EDUCATION/TRAINING PROGRAM

## 2022-06-15 PROCEDURE — 82310 ASSAY OF CALCIUM: CPT | Performed by: STUDENT IN AN ORGANIZED HEALTH CARE EDUCATION/TRAINING PROGRAM

## 2022-06-15 PROCEDURE — 999N000127 HC STATISTIC PERIPHERAL IV START W US GUIDANCE

## 2022-06-15 PROCEDURE — 85027 COMPLETE CBC AUTOMATED: CPT | Performed by: STUDENT IN AN ORGANIZED HEALTH CARE EDUCATION/TRAINING PROGRAM

## 2022-06-15 PROCEDURE — 94660 CPAP INITIATION&MGMT: CPT

## 2022-06-15 RX ORDER — POTASSIUM CHLORIDE 750 MG/1
40 TABLET, EXTENDED RELEASE ORAL ONCE
Status: COMPLETED | OUTPATIENT
Start: 2022-06-15 | End: 2022-06-15

## 2022-06-15 RX ORDER — GABAPENTIN 300 MG/1
900 CAPSULE ORAL 3 TIMES DAILY
Status: DISCONTINUED | OUTPATIENT
Start: 2022-06-15 | End: 2022-06-15 | Stop reason: HOSPADM

## 2022-06-15 RX ADMIN — PHYTONADIONE 10 MG: 10 INJECTION, EMULSION INTRAMUSCULAR; INTRAVENOUS; SUBCUTANEOUS at 08:29

## 2022-06-15 RX ADMIN — EPLERENONE 50 MG: 50 TABLET, FILM COATED ORAL at 08:29

## 2022-06-15 RX ADMIN — INSULIN GLARGINE 35 UNITS: 100 INJECTION, SOLUTION SUBCUTANEOUS at 13:02

## 2022-06-15 RX ADMIN — POTASSIUM CHLORIDE 40 MEQ: 750 TABLET, EXTENDED RELEASE ORAL at 10:16

## 2022-06-15 RX ADMIN — INSULIN ASPART 2 UNITS: 100 INJECTION, SOLUTION INTRAVENOUS; SUBCUTANEOUS at 00:14

## 2022-06-15 RX ADMIN — INSULIN ASPART 4 UNITS: 100 INJECTION, SOLUTION INTRAVENOUS; SUBCUTANEOUS at 08:31

## 2022-06-15 RX ADMIN — Medication 1 TABLET: at 08:29

## 2022-06-15 RX ADMIN — FUROSEMIDE 60 MG: 40 TABLET ORAL at 08:29

## 2022-06-15 RX ADMIN — GABAPENTIN 900 MG: 300 CAPSULE ORAL at 14:15

## 2022-06-15 RX ADMIN — SIMVASTATIN 20 MG: 20 TABLET, FILM COATED ORAL at 08:29

## 2022-06-15 RX ADMIN — GABAPENTIN 900 MG: 300 CAPSULE ORAL at 08:29

## 2022-06-15 ASSESSMENT — ACTIVITIES OF DAILY LIVING (ADL)
ADLS_ACUITY_SCORE: 20

## 2022-06-15 NOTE — PLAN OF CARE
Goal Outcome Evaluation:  Delmer has been afe and other vvs this am.  Hgb 7.0 and received 1 URBC with plan to recheck Hgb 1 hr after infusion completed.  INR decreasing with po Vit K.  Pulse 70-80 and will discharge with a heart monitor - EKG dept to apply.  Neuro intact and abd remains distended and irregular contour.  K+ 3.6 - 40meq KCL given per orders  BG this am 326 - Lantus has not been given this admission - ordered at 1/2 dose this am.  Plan to discharge to home later this afternoon.

## 2022-06-15 NOTE — CONSULTS
Hepatology Consultation    Blair Oliver   MRN# [id number]     Age: 51 year old YOB: 1970       Referring provider: Manjit Roth  Attending Hepatologist: Dr. Jurado  Consult requested for: anemia       Assessment and Recommendation:   Assessment:   Mr. 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       Recommendations:   1.  Acute on chronic anemia  2.  SMV thrombosis  3.  Iron deficiency  4.  Grade 1 esophageal varices  -Likely multifactorial- he does have indirect hyperbilirubinemia, elevated LDH, prior positive reticulocyte count and low iron saturations.  Has not had haptoglobin or peripheral smear in prior records.  Unable to obtain peripheral smear due to recent blood products.    - Suspect that he does have some aspect of possible [place] cell or spur cell anemia as well as iron deficiency given low MCV and history of bowel resection.  With his portal hypertensive gastropathy, he likely has slow oozing as well.  He may also have intermittent bleeding from colonic angiectasia's.  -Consider giving IV iron, may need to continue as an outpatient.  -No overt bleeding at this time, no plans for repeat endoscopy.  -No SMV thrombosis seen on recent MRI.  He has been on apixaban for 1.5 years.  Consider discussing with hematology length of treatment needed.  -EGD 12/2021 with grade 1 esophageal varices.  He is currently on a selective beta-blocker per cardiology-not for variceal prophylaxis.    5.  Ascites  6.  Ventral wall hernia  7.  Alcohol cirrhosis  -Recent paracentesis 6/14 without evidence of SBP.  If he develops repeat oozing from incisional site, he may need to be on prophylactic antibiotics.  -Encouraged him to  have lower volume removed weekly with paracentesis rather than large volume every 2 weeks.  He will continue as an outpatient  -Continue eplerenone and Lasix  -Recent echocardiogram with improvement in EF, will discuss with outpatient hepatologist for steps in TIPS evaluation.  -MELD 13.  Initial transplant evaluation closed due to low meld and CHF.  -MRI negative for liver lesions- prior LiRADS 3 lesion not seen  -Encouraged increase in oral intake of protein.    8. Diarrhea  -C. difficile and enteric bacteria negative.  Please send fecal elastase given pancreatic atrophy on MRI.  Patient will continue Imodium at home    Plan of care discussed with Dr. Jurado    Thank you for the opportunity to be involved in Blair Oliver care. Please call with any questions or concerns.     GABRIEL Calvin, CNP  Inpatient Hepatology [facility]  Text link               History of Present Illness:   Blair Oliver is a 51 year old male with a history of alcohol cirrhosis, abstinent since 9/21/20. 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 He has had leaking of ascites from his incisional site intermittently since his surgery, more prominent with increased ascites.  He has not had leaking for the last 1 month.  His ascites has been worsening requiring large-volume paracentesis of approximately 6 to 8 L every 1-2 weeks.  He has been receiving albumin following his paracentesis.  He has been on eplerenone and Lasix daily.    He reported to the ED team that he had had been having episodes of bright red blood per rectum.  He does have 10-12 loose soft stools daily.  He notes having an increase in his intake of Imodium prior to his  clinic visit on 6/13.  He denies significant stool output or  recent melena, hematochezia, hematemesis.  His abdominal pain has been related to his hernias having  hernia as well as when he has an increase in ascites.    Mr. Oliver denies any fevers, overt change in mentation, peripheral edema.  He has had decrease in oral intake at home and has not been pushing protein intake.  His weight has varied depending on ascites but has noted some decrease in muscle mass as well as some muscle wasting.      He has only had 1 other episode of blood transfusion in the past and does not receive consistently.  He does not take iron supplementation at home and does not increase his oral intake of iron.  He denies greasy stools or acholic stools.  He denies any bruising.  He has been on apixaban for a SMV thrombosis in 2020 and reports his cardiologist is the managing provider.  An MRI during this admission did not have evidence of hepatic lesions (prior MRI had Li RADS 3 lesion) and no  evidence of portal, splenic, SMV thrombosis.  He  has had a low iron saturation level in the past (8), current indirect hyperbilirubinemia, and elevated LDH.  No records in the chart for haptoglobin, or peripheral smear.  His last EGD 12/2021 did have evidence of grade 1 esophageal varices and portal hypertensive gastropathy.  A colonoscopy at the same time also had evidence of colonic angioectasias and significant congestion.    He was initially seen for liver transplant evaluation but episode closed due to low MELD and HFrEF.  Repeat echocardiogram inpatient did have improvement in his EF to 55 to 60% with normal RV function.  His heart function limited TIPS evaluation in the past.         Past Medical History:     Past Medical History:   Diagnosis Date     Alcoholic cirrhosis of liver with ascites (H)      Chronic systolic heart failure (H)      Diabetes (H)     Type 2 DM, Insulin Use.      Hypertension               Past Surgical History:     Past Surgical History:   Procedure Laterality Date      COLONOSCOPY N/A 12/9/2021    Procedure: COLONOSCOPY, WITH BIOPSY, WITH CLIPS;  Surgeon: Sweetie Bain MD;  Location: [facility] [place]     ESOPHAGOSCOPY, GASTROSCOPY, DUODENOSCOPY (EGD), COMBINED N/A 12/9/2021    Procedure: ESOPHAGOGASTRODUODENOSCOPY, WITH BIOPSY;  Surgeon: Sweetie Bain MD;  Location: [facility] OR              Social History:     Social History     Tobacco Use     Smoking status: Former Smoker     Types: Cigarettes     Smokeless tobacco: Never Used   Substance Use Topics     Alcohol use: Not Currently     Comment: Quit 9/21/2020             Family History:   The I have reviewed this patient's family history and updated it with pertinent information if needed.  Family History   Problem Relation Age of Onset     Substance Abuse Maternal Grandmother      Substance Abuse Brother                   Immunizations:     Immunization History   Administered Date(s) Administered     COVID-19,PF,Pfizer (12+ Yrs) 05/15/2021, 06/11/2021, 12/13/2021     Influenza Vaccine IM > 6 months Valent IIV4 (Alfuria,Fluzone) 09/16/2020, 09/27/2021            Allergies:     Allergies   Allergen Reactions     Minocycline Hives             Medications:   @  Facility-Administered Medications Prior to Admission   Medication Dose Route Frequency Provider Last Rate Last Admin     [DISCONTINUED] EPINEPHrine (ADRENALIN) kit 0.3 mg  0.3 mg Intramuscular Q5 Min PRN Sweetie Bain MD         Medications Prior to Admission   Medication Sig Dispense Refill Last Dose     amLODIPine-benazepril (LOTREL) 5-20 MG capsule Take 1 capsule by mouth every evening   6/12/2022 at Unknown time     apixaban ANTICOAGULANT (ELIQUIS) 5 MG tablet Take 5 mg by mouth 2 times daily    6/13/2022 at AM     DULoxetine (CYMBALTA) 60 MG capsule Take 60 mg by mouth every evening   6/12/2022 at Unknown time     eplerenone (INSPRA) 25 MG tablet Take 2 tablets (50 mg) by mouth daily 180 tablet 3 6/13/2022 at AM     gabapentin (NEURONTIN) 600 MG tablet Take 900 mg by mouth 3 times  "daily    6/13/2022 at AM     insulin glargine (LANTUS PEN) 100 UNIT/ML pen Inject 65 Units Subcutaneous every morning   6/13/2022 at AM     loperamide (IMODIUM) 2 MG capsule Take 12 mg by mouth 2 times daily        metFORMIN (GLUCOPHAGE) 1000 MG tablet Take 1,000 mg by mouth 2 times daily    6/13/2022 at AM     Multiple Vitamins-Minerals (SUPER THERA DAMARIS M) TABS Take 1 tablet by mouth daily   6/13/2022 at Unknown time     omeprazole (PRILOSEC) 20 MG DR capsule Take 20 mg by mouth every evening   6/12/2022 at Unknown time     simvastatin (ZOCOR) 20 MG tablet Take 20 mg by mouth daily    6/13/2022 at Unknown time     insulin pen needle (B-D U/F) 31G X 5 MM miscellaneous AS DIRECTED. BD UF MINI PEN NEEDLE [id number]: USE ONCE DAILY        ONETOUCH ULTRA test strip PLEASE SEE ATTACHED FOR DETAILED DIRECTIONS        [DISCONTINUED] furosemide (LASIX) 20 MG tablet Take 3 tablets (60 mg) by mouth daily for 90 days (Patient taking differently: Take 60 mg by mouth 2 times daily) 270 tablet 0 NOT STARTED     [DISCONTINUED] furosemide (LASIX) 40 MG tablet Take 40 mg by mouth 2 times daily   6/13/2022 at AM     [DISCONTINUED] metoprolol succinate ER (TOPROL-XL) 100 MG 24 hr tablet Take 100 mg by mouth daily    6/13/2022 at Unknown time   @          Review of Systems:    ROS: 10 point ROS neg other than the symptoms noted above in the HPI.          Physical Exam:   Blood pressure 134/59, pulse 72, temperature (!) 95.3  F (35.2  C), temperature source Oral, resp. rate 16, height 1.905 m (6' 3\"), weight 123.9 kg (273 lb 1.6 oz), SpO2 98 %. Body mass index is 34.14 kg/m .  Date 06/15/22 0700 - 06/16/22 0659   Shift 5836-7600 5985-6624 2021-3282 24 Hour Total   INTAKE   P.O. 740   740   Shift Total(mL/kg) 740(5.97)   740(5.97)   OUTPUT   Shift Total(mL/kg)       Weight (kg) 123.88 123.88 123.88 123.88     General: In no acute distress, mild facial muscle wasting  Neuro: AOx3, No asterixis  HEENT: PERRL Noscleral icterus, Nooral " lesions  Lymph:  Nocervical lymphadenoapthy  CV:  Skin warm and dry  Lungs:  Respirations even and nonlabored on room air  Abd: Mildly distended, Ventral wall hernia x2. Small area inferior to umbilicus with scab, no draining  Extrem: Noperipehral edema  Skin: Nojaundice  Psych: pleasant         Data:     Lab Results   Component Value Date    WBC 3.0 06/15/2022    WBC 4.7 06/15/2021     Lab Results   Component Value Date    RBC 2.96 06/15/2022    RBC 3.89 06/15/2021     Lab Results   Component Value Date    HGB 7.0 06/15/2022    HGB 8.6 06/15/2021     Lab Results   Component Value Date    HCT 23.6 06/15/2022    HCT 31.0 06/15/2021     No components found for: MCT  Lab Results   Component Value Date    MCV 80 06/15/2022    MCV 80 06/15/2021     Lab Results   Component Value Date    MCH 23.6 06/15/2022    MCH 22.1 06/15/2021     Lab Results   Component Value Date    MCHC 29.7 06/15/2022    MCHC 27.7 06/15/2021     Lab Results   Component Value Date    RDW 19.0 06/15/2022    RDW 17.3 06/15/2021     Lab Results   Component Value Date     06/15/2022     06/15/2021       Last Basic Metabolic Panel:  Lab Results   Component Value Date     06/15/2022     06/15/2021      Lab Results   Component Value Date    POTASSIUM 3.6 06/15/2022    POTASSIUM 4.1 06/15/2021     Lab Results   Component Value Date    CHLORIDE 108 06/15/2022    CHLORIDE 112 06/15/2021     Lab Results   Component Value Date    DANIELLE 8.1 06/15/2022    DANIELLE 8.9 06/15/2021     Lab Results   Component Value Date    CO2 24 06/15/2022    CO2 25 06/15/2021     Lab Results   Component Value Date    BUN 9 06/15/2022    BUN 8 06/15/2021     Lab Results   Component Value Date    CR 0.63 06/15/2022    CR 0.63 06/15/2021     Lab Results   Component Value Date     06/15/2022     06/15/2022     06/15/2021       Liver Function Studies -   Recent Labs   Lab Test 06/15/22  0430   PROTTOTAL 6.0*   ALBUMIN 2.6*   BILITOTAL 1.3    ALKPHOS 125   AST 23   ALT 24       Lab Results   Component Value Date    INR 1.56 06/15/2022       MELD-Na score: 13 at 6/15/2022  4:30 AM  MELD score: 13 at 6/15/2022  4:30 AM  Calculated from:  Serum Creatinine: 0.63 mg/dL (Using min of 1 mg/dL) at 6/15/2022  4:30 AM  Serum Sodium: 139 mmol/L (Using max of 137 mmol/L) at 6/15/2022  4:30 AM  Total Bilirubin: 1.3 mg/dL at 6/15/2022  4:30 AM  INR(ratio): 1.56 at 6/15/2022  4:30 AM  Age: 51 years           Previous Endoscopy:     Results for orders placed or performed during the hospital encounter of 12/09/21   UPPER GI ENDOSCOPY   Result Value Ref Range    Upper GI Endoscopy       [facility]  [address] [place], [place] [address]., [place] [address] [phone]     Endoscopy Department  _______________________________________________________________________________  Patient Name: Blair Oliver          Procedure Date: 12/9/2021 10:27 AM  MRN: [id number]                       Account Number: [id number]  YOB: 1970              Admit Type: Outpatient  Age: 51                               Room: Pro 5  Gender: Male                          Note Status: Finalized  Attending MD: Sweetie Bain MD       Pause for the Cause: Pause for the cause   completed.  Total Sedation Time:                    _______________________________________________________________________________     Procedure:             Upper GI endoscopy  Indications:           Cirrhosis with suspected esophageal varices, Diarrhea  Providers:             Sweetie Bain MD, Shona Smiley RN, Aracelis Mello CRNA  Patient Profile:       This is a 51 year old mal e. Refer to note in patient                          chart for documentation of history and physical.  Referring MD:            Medicines:             Monitored Anesthesia Care  Complications:         No immediate complications. Estimated blood loss:                           Minimal.  _______________________________________________________________________________  Procedure:             Pre-Anesthesia Assessment:                         - Prior to the procedure, a History and Physical was                          performed, and patient medications and allergies were                          reviewed. The patient is competent. The risks and                          benefits of the procedure and the sedation options and                          risks were discussed with the patient. All questions                          were answered and informed consent was obtained.                          Patient identification and proposed procedure were                          verified by the orb sician and the nurse in the                          pre-procedure area. Mental Status Examination: alert                          and oriented. Airway Examination: normal oropharyngeal                          airway and neck mobility. Respiratory Examination:                          clear to auscultation. CV Examination: normal.                          Prophylactic Antibiotics: The patient does not require                          prophylactic antibiotics. Prior Anticoagulants: The                          patient has taken Eliquis (apixaban), last dose was 2                          days prior to procedure. ASA Grade Assessment: III - A                          patient with severe systemic disease. After reviewing                          the risks and benefits, the patient was deemed in                          satisfactory condition to undergo the procedure. The                          anesthesia plan was to use monitored anesthesia care                          (MAC). Immediat ely prior to administration of                          medications, the patient was re-assessed for adequacy                          to receive sedatives. The heart rate, respiratory                          rate, oxygen  saturations, blood pressure, adequacy of                          pulmonary ventilation, and response to care were                          monitored throughout the procedure. The physical                          status of the patient was re-assessed after the                          procedure.                         After obtaining informed consent, the endoscope was                          passed under direct vision. Throughout the procedure,                          the patient's blood pressure, pulse, and oxygen                          saturations were monitored continuously. The Endoscope                          was introduced through the mouth, and advanced to the                          second part of duodenum. The upper GI endoscopy was                           accomplished without difficulty. The patient tolerated                          the procedure well.                                                                                   Findings:       Grade I varices were found in the middle third of the esophagus and in        the lower third of the esophagus.       Mild portal hypertensive gastropathy was found in the gastric fundus, on        the greater curvature of the gastric body and in the gastric antrum.       The cardia and gastric fundus were normal on retroflexion.       Mildly scalloped mucosa was found in the first portion of the duodenum.        Biopsies for histology were taken with a cold forceps for evaluation of        celiac disease.                                                                                   Impression:            - Grade I esophageal varices.                         - Portal hypertensive gastropathy.                         - Scalloped mucosa was found in the duodenum,                           suspicious for celiac disease. Biopsied.  Recommendation:        - Perform a colonoscopy today.                                                                                      Electronically Signed by: Dr. Sweetie Bain  ________________  Sweetie Bain MD  12/9/2021 1:12:38 PM  I was physically present for the entire viewing portion of the exam.  __________________________  Signature of teaching physician  Sweetie Bain MD  Number of Addenda: 0    Note Initiated On: 12/9/2021 10:27 AM  Scope In:  Scope Out:       Results for orders placed or performed during the hospital encounter of 12/09/21   COLONOSCOPY   Result Value Ref Range    COLONOSCOPY       [facility]  [address] [place], [place] [address]., [place] [address] [phone]     Endoscopy Department  _______________________________________________________________________________  Patient Name: Blair Oliver          Procedure Date: 12/9/2021 10:28 AM  MRN: [id number]                       Account Number: [id number]  YOB: 1970              Admit Type: Outpatient  Age: 51                               Room: Pro 5  Gender: Male                          Note Status: Finalized  Attending MD: Sweetie Bain MD       Pause for the Cause: Pause for the cause   completed.  Total Sedation Time:                    _______________________________________________________________________________     Procedure:             Colonoscopy  Indications:           Chronic diarrhea  Providers:             Sweetie Bain MD, Shona Smiley RN, Aracelis Mello CRNA  Patient Profile:       This is a 51 year old male. Refer to note in patient                           chart for documentation of history and physical.  Referring MD:            Medicines:             Propofol per Anesthesia  Complications:         No immediate complications. Estimated blood loss:                          Minimal.  _______________________________________________________________________________  Procedure:             Pre-Anesthesia Assessment:                         - Prior to the procedure, a History and Physical  was                          performed, and patient medications and allergies were                          reviewed. The patient is competent. The risks and                          benefits of the procedure and the sedation options and                          risks were discussed with the patient. All questions                          were answered and informed consent was obtained.                          Patient identification and proposed procedure were                          verified by the physician and the nurse in the                           pre-procedure area. Mental Status Examination: alert                          and oriented. Airway Examination: normal oropharyngeal                          airway and neck mobility. Respiratory Examination:                          clear to auscultation. CV Examination: normal.                          Prophylactic Antibiotics: The patient does not require                          prophylactic antibiotics. Prior Anticoagulants: The                          patient has taken Eliquis (apixaban), last dose was 2                          days prior to procedure. ASA Grade Assessment: III - A                          patient with severe systemic disease. After reviewing                          the risks and benefits, the patient was deemed in                          satisfactory condition to undergo the procedure. The                          anesthesia plan was to use monitored anesthesia care                          (MAC). Immediately prior to administration of                           medications, the patient was re-assessed for adequacy                          to receive sedatives. The heart rate, respiratory                          rate, oxygen saturations, blood pressure, adequacy of                          pulmonary ventilation, and response to care were                          monitored throughout the procedure. The physical                           status of the patient was re-assessed after the                          procedure.                         After obtaining informed consent, the colonoscope was                          passed under direct vision. Throughout the procedure,                          the patient's blood pressure, pulse, and oxygen                          saturations were monitored continuously. The                          Colonoscope was introduced through the anus and                          advanced to the cecum, identified by appendiceal                          orifice and ileocecal valve. The colonoscopy was                           performed without difficulty. The patient tolerated                          the procedure well. The quality of the bowel                          preparation was adequate to identify polyps 6 mm and                          larger in size.                                                                                   Findings:       The digital rectal exam findings include decreased sphincter tone.       A single small angioectasia without bleeding was found in the ascending        colon.       An area of moderately congested mucosa was found in the entire colon.        This was biopsied with a cold forceps for histology.       A 7 mm polyp was found in the sigmoid colon. The polyp was semi-sessile.        The polyp was removed with a cold snare. Resection and retrieval were        complete. To prevent bleeding after the polypectomy, two hemostatic        clips were successfully placed. There was no bleeding during, or at the        end, of the procedure.        Medium sized, non-bleeding rectal varices were found.       The exam was otherwise without abnormality on direct and retroflexion        views.                                                                                   Impression:            - Decreased sphincter tone found on digital rectal                          exam.                          - A single non-bleeding colonic angioectasia.                         - Congested mucosa in the entire examined colon.                          Biopsied.                         - One 7 mm polyp in the sigmoid colon, removed with a                          cold snare. Resected and retrieved. Clips were placed.                         - Rectal varices.                         - The examination was otherwise normal on direct and                          retroflexion views.  Recommendation:        - Discharge patient to home (with escort).                         - Resume previous diet.                         - Continue present medication s.                         - Await pathology results.                         - Repeat colonoscopy in 1 year for screening purposes.                         - Return to GI clinic as previously scheduled.                         - Resume Eliquis (apixaban) at prior dose in 2 days.                                                                                     Electronically Signed by: Dr. Sweetie Bain  ________________  Sweetie Bain MD  12/9/2021 1:57:19 PM  I was physically present for the entire viewing portion of the exam.  __________________________  Signature of teaching physician  Sweetie Bain MD  Number of Addenda: 0    Note Initiated On: 12/9/2021 10:28 AM  Scope In:  Scope Out:       No results found for this or any previous visit.      IMAGING:    MRI w/wo 6/14/22  IMPRESSION: No evidence of hepatic mass of concern for hepatocellular  carcinoma. Cirrhotic liver with nodular contour, extensive upper  abdominal ascites and small gastroesophageal varices.    US abd w doppler 6/13/22  Impression:   1. Cirrhotic configuration of liver with significant abdominal  ascites.  2. Limited grayscale evaluation of the right upper quadrant abdomen  due to significant overlying abdominal ascites.  3. Borderline gallbladder wall thickening, measuring up to 3  mm,  likely due to underlying cirrhosis and ascites. No pericholecystic  fluid or cholelithiasis.  4. Patent Doppler evaluation of the hepatic vasculature. No  sonographic evidence to suggest portal venous thrombosis.    Echo 6/13/22  Interpretation Summary  Left ventricular size, wall motion and function are normal. The ejection  fraction is 55-60%.  The right ventricle is normal size.  Global right ventricular function is normal.  No significant valvular abnormalities were noted.  No pericardial effusion is present.

## 2022-06-15 NOTE — PLAN OF CARE
[id number]:    A&Ox4. Independent. q4 neuro checks intact,  Denies pain, N/V and SOB. 2L fluid restriction. Urinal at bedside. LBM 6/14. PIV saline locked. Provider pg for hgb of 7.0 and waiting for orders.  Continue with POC.

## 2022-06-15 NOTE — PLAN OF CARE
Discharge  D: Orders for discharge and outpatient medications written.  I: Home medications and return to clinic schedule reviewed with patient. Discharge instructions and parameters for calling Health Care Provider reviewed. Patient left at 1545 accompanied by wife. Pt left with all belongings.   A: Patient/family verbalized understanding and was ready for discharge.   P: Patient instructed to  medications in Pharmacy. Follow up as scheduled.

## 2022-06-16 NOTE — DISCHARGE SUMMARY
[facility]  Discharge Summary - Medicine & Pediatrics       Date of Admission:  6/13/2022  Date of Discharge:  6/15/2022  3:48 PM  Discharging Provider: Dr. Roth  Discharge Service: Medicine Service, [facility] TEAM 4    Discharge Diagnoses   Decompensated Cirrhosis 2/2 Alcohol Use  Episodes of Bradycardia  Anemia, Acute on Chronic   Chronic Systolic Congestive Heart Failure  Hx of thrombus causing acute mesenteric ischemia, ischemic bowel disease s/p bowel resection c/b incisional hernia  Diarrhea, chronic  Type 2 Diabetes Mellitus  Hypertension   Hyperlipidemia   Obstructive Sleep Apnea    Follow-ups Needed After Discharge   Follow-up Appointments     Follow Up and recommended labs and tests      Please schedule an appointment to see your primary care provider in the   next week and have them check your hemoglobin.     Please see your cardiologist in the next 3-4 weeks for the episodes of   slow heart rate.     Please see your hepatologist at your next scheduled appointment.             Unresulted Labs Ordered in the Past 30 Days of this Admission       Date and Time Order Name Status Description    6/14/2022  8:26 AM Anaerobic bacterial culture Preliminary     6/14/2022  8:26 AM Ascites Fluid Aerobic Bacterial Culture Routine Preliminary     6/13/2022  2:26 PM Prepare red blood cells (unit) Preliminary         These results will be followed up by PCP    Discharge Disposition   Discharged to home  Condition at discharge: Stable    Hospital Course    Blair Martel was admitted on 6/13/2022 for anemia, fatigue and recurrent ascites.     Decompensated Cirrhosis secondary to alcohol use disorder  Presented with decompensated cirrhosis with recurrent ascites. Underwent paracentesis with 6L removed and improvement in symptoms. SAAG>1.1 consistent with ascites from portal hypertension, no evidence of SBP. We obtained MRI liver without evidence of HCC. Also underwent  "vitamin K challenge x 3 days with improvement of his INR. His home diuretics were continued.   - Continue PTA lasix 60 mg BID (increased to this dose at last hepatology appointment)  - Continue eplerenone 50 mg every day     Cirrhosis History: Decompensated cirrhosis 2/2 alcohol use complicated by ascites, portal hypertension, gastroesophageal varices and splenomegaly.   Etiology: Alcohol (in remission)   Primary Hepatologist: Sweetie Bingham MD  Portal hypertension: Yes, gastroesophageal varices and splenomegaly  HE: No Hospitalizations for HE  Ascites: Yes, last therapeutic paracentesis on 6/6/2022 drained 6 L.   Coagulopathy: INR of 1.61 on 6/14/2022 (s/p vit K)  Varices: Grade 1 esophageal varices per Upper GI Endoscopy (12/9/21)  HCC Screening: Ultrasound (6/13) and Liver MRI (6/14) no evidence of HCC  Any Thrombosis: Ultrasound (6/13) and Liver MRI (6/14) no evidence of Thrombosis  Last US: 6/13/2022  Transplant Candidacy: \"Declined, Does Not Meet Criteria Medical on 6/15/2021\"  MELD-Na score: 13 at 6/15/2022  4:30 AM  MELD score: 13 at 6/15/2022  4:30 AM  Calculated from:  Serum Creatinine: 0.63 mg/dL (Using min of 1 mg/dL) at 6/15/2022  4:30 AM  Serum Sodium: 139 mmol/L (Using max of 137 mmol/L) at 6/15/2022  4:30 AM  Total Bilirubin: 1.3 mg/dL at 6/15/2022  4:30 AM  INR(ratio): 1.56 at 6/15/2022  4:30 AM  Age: 51 years     Bradycardia  Found to have bradycardia to 30s in clinic 6/13. EKG with frequent PVCs. Per chart review, previously noted to be bradycardic with irrgular heart beat in 11/2020, evaluated by cardiology and subsequently lost to follow up. Again noted in 6/2021 with HR 59 on EKG. With his last paracentesis was noted to have HR in 30s. TTE on 6/14 with no concerning or acute findings. No additional episodes on tele during this admission.   - 2 wk Zio patch placed at discharge  - Outpatient Cardiology Followup to re-establish care  - Discontinue PTA metoprolol until seen by cardiology    Anemia, " acute on chronic  Thrombocytopenia  On 6/13/22 admission found to have a hemoglobin of 6.8, given 2 units of pRBCs with repeat hgb of 8.4 on 6/14/22. Evaluated by GI without concern for acute bleed. Labs not suggestive of hemolysis or DIC.  New thrombocytopenia most consistent with chronic liver disease.   - CBC check with primary care in 1 week   - Anemia work up with primary care in 2-3 months     Hx of thrombus causing acute mesenteric ischemia, ischemic bowel disease s/p bowel resection c/b incisional hernia  Discussed with hematology, recommended continuing anticoagulation.   - Continue PTA apixaban   - Follow up with hematology    Diarrhea, chronic  C diff and enteric panel negative this admission.  - Continue PTA loperamide     Chronic Systolic Congestive Heart Failure   Last Echocardiogram with LVEF of 40-45%, normal diastolic function and RV function, no significant valvular abnormalities. Weight roughly stable though wife stated poor UOP at home. On physical exam there is noticeable peripheral edema, will continue with home dose diuretics and continually assess volume status. Denies orthopnea. TTE on 6/14 with no concerning or acute findings.   - Furosemide/eplerenone  as above  - Stop metoprolol as above    Pseudo Hypocalcemia: POA, 2/2 low albumin Ca corrects to 8.9, normal   Hypoalbuminemia: POA, likely secondary to liver disease     Consultations This Hospital Stay   INTERNAL MEDICINE PROCEDURE TEAM ADULT IP CONSULT [place] - PARACENTESIS  GI LUMINAL ADULT IP CONSULT  NURSING TO CONSULT FOR VASCULAR ACCESS CARE IP CONSULT  INTERNAL MEDICINE PROCEDURE TEAM ADULT IP CONSULT [place] - PARACENTESIS    Code Status   Prior       The patient was discussed with Dr. Gonzalez Villar [address] [place] [address] [address] [place]  [address] [facility] [address]  Phone: [phone]    I was present with the medical student who participated in the service and in the documentation of  the note. I have verified the history and personally performed the physical exam and medical decision making. I agree with the assessment and plan of care as documented in the note.    May Owen MD  PGY-2 Internal Medicine    Physician Attestation   I, Manjit Roth MD, saw and evaluated this patient prior to discharge.  I discussed the patient with the medical student and resident, and agree with plan of care as documented in the note.      I personally reviewed vital signs, medications and labs.    I personally spent 45 minutes on discharge activities.    Manjit Roth MD  Date of Service (when I saw the patient): 6/15/2022      ______________________________________________________________________    Physical Exam   Vital Signs: Temp: 98.2  F (36.8  C) Temp src: Oral BP: 129/69 Pulse: 76   Resp: 18 SpO2: 95 %      Weight: 273 lbs 1.6 oz  General Appearance: No acute distress, comfortable appearing, wife at bedside  Respiratory: normal work of breathing, lungs clear to auscultation, good air movement throughout lung fields, no wheezes or crackles  Cardiovascular: Regular rate and rhythm. No murmurs, rubs, gallops. Trace peripheral edema present. Extremities well perfused.   GI: Abdomen is non-distended, RLQ large hernia, surgical scar midline  Skin: no jaundice, no rashes  Other: Normal affect, alert, awake, orientated X 3      Primary Care Physician   KAYLAN HAILE    Discharge Orders      CBC with platelets     Adult Cardiology Eval  Referral      Oncology/Hematology Adult Referral      Activity    Your activity upon discharge: Prior Activity Level     Follow Up and recommended labs and tests    Please schedule an appointment to see your primary care provider in the next week and have them check your hemoglobin.     Please see your cardiologist in the next 3-4 weeks for the episodes of slow heart rate.     Please see your hepatologist at your next scheduled appointment.     Reason for  your hospital stay    You were in the hospital for anemia, ascites, and slow heart rate. For your anemia we gave you blood and vitamin K to prevent bleeding. You will need further work up for your low red blood cell count with your primary care physician. For your ascites you had a paracentesis preformed. For your episodes of slow heart rate we will discontinue your metoprolol and send you with a ZioPatch to record your heart rhythm and rate for your cardiologist. You will also need to follow up with a blood doctor to determine the course of your apixaban.     Diet    Follow this diet upon discharge: Regular Diet, Please limit sodium to 2 grams per day       Significant Results and Procedures   Results for orders placed or performed during the hospital encounter of 06/13/22   US Abdomen Limited w Doppler Complete    Narrative    EXAMINATION: US ABDOMEN LIMITED WITH DOPPLER COMPLETE 6/13/2022 4:01  PM     COMPARISON: 10/4/2021    HISTORY: History of cirrhosis, evaluate for ascites and portal venous  thrombosis.    TECHNIQUE: The right upper quadrant of the abdomen was scanned in  standard fashion with specialized ultrasound transducer(s) using both  gray-scale, color Doppler, and spectral flow techniques.    Findings:  Pancreas: The pancreas is not well-visualized.    Liver: The liver measures 15.1 cm with increased echogenicity, coarse  in echotexture hepatic parenchyma, and significant surface nodularity.  Previously identified echogenic focus in the periphery of the right  hepatic lobe as seen on ultrasound dated 10/4/2021 is not well  appreciated on today's examination. No evidence of suspect focal  hepatic mass.    Gallbladder: The gallbladder is partially visualized with borderline  gallbladder wall thickening, measuring up to 3 mm. No pericholecystic  fluid or cholelithiasis.     Bile Ducts: The intrahepatic biliary system is of normal caliber.  The  common bile duct is not well-visualized.    Right Kidney:  Measures 13.2 cm in length with normal parenchymal  echogenicity and cortical thickness. No hydronephrosis.    Fluid: Significant abdominal ascites.    Extrahepatic portal vein flow is antegrade at 23 cm/second.  Right portal vein flow is antegrade, measuring 14 cm/second.  Left portal vein flow is antegrade, measuring 16 cm/second.    Flow in the hepatic artery is towards the liver and:  58 cm/second peak systolic  0.73 resistive index.     The splenic vein is patent and flow is towards the liver.  The left,  middle, and right hepatic veins are patent with flow towards the IVC.  The IVC is patent with flow towards the heart.       Impression    Impression:   1. Cirrhotic configuration of liver with significant abdominal  ascites.  2. Limited grayscale evaluation of the right upper quadrant abdomen  due to significant overlying abdominal ascites.  3. Borderline gallbladder wall thickening, measuring up to 3 mm,  likely due to underlying cirrhosis and ascites. No pericholecystic  fluid or cholelithiasis.  4. Patent Doppler evaluation of the hepatic vasculature. No  sonographic evidence to suggest portal venous thrombosis.    I have personally reviewed the examination and initial interpretation  and I agree with the findings.    KEYONNA CASTILLO MD         SYSTEM ID:  [id number]   MR Abdomen w/o & w Contrast    Narrative    MRI abdomen with and without contrast    INDICATION: Liver disease, chronic, hepatocellular carcinoma screening    COMPARISON: Ultrasound of abdomen 6/13/2022    Contrast: 10 mL intravenous Gadavist    FINDINGS: Marrow signal, CSF signal an cord signal appear grossly  unremarkable. Extensive ascites in upper abdomen. Nodular contour of  the liver. Spleen appears grossly normal. Lung bases appear grossly  normal. Kidneys appear grossly normal. Adrenals appear grossly normal.  Mild pancreatic atrophy. No adenopathy in the mesentery or  retroperitoneum. Abdominal aorta normal size. Portal vein patent.  SMV  and splenic vein patent. Mild gastroesophageal varices. Single  right-sided IVC appears patent and empties into the right atrium  unobstructed. Branch vessels from the abdominal aorta appear grossly  unremarkable this. Celiac axis supplies the hepatic artery as per  usual.  No focal hepatic masses or other areas of abnormal enhancement  concerning for hepatocellular carcinoma. Nondistended gallbladder.      Impression    IMPRESSION: No evidence of hepatic mass of concern for hepatocellular  carcinoma. Cirrhotic liver with nodular contour, extensive upper  abdominal ascites and small gastroesophageal varices.    DANIEL COBOS MD         SYSTEM ID:  [id number]   POC US Guide for Paracentesis    Impression    Unable to save images due to technical issues.  Active US guidance was used for this procedure and needle and catheter was visualized going into the peritoneum and in the final position.    Echo Complete     Value    LVEF  55-60%    Narrative    [id number]  [id number]  [id number]  [id number]^JAMSHID^CAMILO     [facility],[place]  Echocardiography Laboratory  [address] [place] [id number]     Name: RYAN RUIZ  MRN: [id number]  : 1970  Study Date: 2022 10:17 AM  Age: 51 yrs  Gender: Male  Patient Location: [facility], [place]  Reason For Study: Bradycardia - Sinus  Ordering Physician: CAMILO BREEN  Performed By: Leon Cuello RDCS     BSA: 2.5 m2  Height: 75 in  Weight: 268 lb  HR: 84  BP: 125/68 mmHg  ______________________________________________________________________________  Procedure  Complete Portable Echo Adult. Echocardiogram with two-dimensional, color and  spectral Doppler performed. Patient was given 6 ml mixture of 3 ml Optison and  6 ml saline. 3 ml wasted.  ______________________________________________________________________________  Interpretation Summary  Left ventricular size, wall motion and function are normal. The ejection  fraction is 55-60%.  The  right ventricle is normal size.  Global right ventricular function is normal.  No significant valvular abnormalities were noted.  No pericardial effusion is present.  ______________________________________________________________________________  Left Ventricle  Left ventricular size, wall motion and function are normal. The ejection  fraction is 55-60%. Left ventricular diastolic function is normal.     Right Ventricle  The right ventricle is normal size. Global right ventricular function is  normal.     Atria  The right atria appears normal. Mild left atrial enlargement is present.     Mitral Valve  The mitral valve is normal. Trace mitral insufficiency is present.     Aortic Valve  Aortic valve is normal in structure and function. The aortic valve is  tricuspid.     Tricuspid Valve  The tricuspid valve is normal. Trace tricuspid insufficiency is present.  Pulmonary artery systolic pressure cannot be assessed.     Pulmonic Valve  The pulmonic valve is normal.     Vessels  The aorta root is normal. The thoracic aorta is normal. The pulmonary artery  cannot be assessed. The inferior vena cava was normal in size with preserved  respiratory variability.     Pericardium  No pericardial effusion is present.     Compared to Previous Study  Previous study not available for comparison.  ______________________________________________________________________________  MMode/2D Measurements & Calculations     IVSd: 0.78 cm  LVIDd: 5.8 cm  LVIDs: 4.5 cm  LVPWd: 0.83 cm  FS: 22.9 %  LV mass(C)d: 177.6 grams  LV mass(C)dI: 71.5 grams/m2  LVOT diam: 2.1 cm  LVOT area: 3.5 cm2  LA Volume (BP): 93.8 ml  LA Volume Index (BP): 37.7 ml/m2  RWT: 0.29     Doppler Measurements & Calculations  MV E max martínez: 83.4 cm/sec  MV A max martínez: 62.1 cm/sec  MV E/A: 1.3  MV dec slope: 406.0 cm/sec2  E/E' av.2  Lateral E/e': 4.4  Medial E/e': 8.1     ______________________________________________________________________________  Report approved by:  MD Leonardo Whatley 06/14/2022 11:46 AM               Discharge Medications   Discharge Medication List as of 6/15/2022  3:32 PM        CONTINUE these medications which have CHANGED    Details   furosemide (LASIX) 20 MG tablet Take 3 tablets (60 mg) by mouth 2 times daily for 30 days, Disp-180 tablet, R-0, E-Prescribe           CONTINUE these medications which have NOT CHANGED    Details   amLODIPine-benazepril (LOTREL) 5-20 MG capsule Take 1 capsule by mouth every evening, Historical      apixaban ANTICOAGULANT (ELIQUIS) 5 MG tablet Take 5 mg by mouth 2 times daily , Historical      DULoxetine (CYMBALTA) 60 MG capsule Take 60 mg by mouth every evening, Historical      eplerenone (INSPRA) 25 MG tablet Take 2 tablets (50 mg) by mouth daily, Disp-180 tablet, R-3, E-Prescribe      gabapentin (NEURONTIN) 600 MG tablet Take 900 mg by mouth 3 times daily , Historical      insulin glargine (LANTUS PEN) 100 UNIT/ML pen Inject 65 Units Subcutaneous every morning, Historical      loperamide (IMODIUM) 2 MG capsule Take 12 mg by mouth 2 times daily, Historical      metFORMIN (GLUCOPHAGE) 1000 MG tablet Take 1,000 mg by mouth 2 times daily , Historical      Multiple Vitamins-Minerals (SUPER THERA DAMARIS M) TABS Take 1 tablet by mouth daily, Historical      omeprazole (PRILOSEC) 20 MG DR capsule Take 20 mg by mouth every evening, Historical      simvastatin (ZOCOR) 20 MG tablet Take 20 mg by mouth daily , Historical      insulin pen needle (B-D U/F) 31G X 5 MM miscellaneous AS DIRECTED. BD UF MINI PEN NEEDLE [id number]: USE ONCE DAILYHistorical      ONETOUCH ULTRA test strip PLEASE SEE ATTACHED FOR DETAILED DIRECTIONS, TOM, Historical           STOP taking these medications       metoprolol succinate ER (TOPROL-XL) 100 MG 24 hr tablet Comments:   Reason for Stopping:             Allergies   Allergies   Allergen Reactions     Minocycline Hives

## 2022-06-17 ENCOUNTER — TELEPHONE (OUTPATIENT)
Dept: GASTROENTEROLOGY | Facility: CLINIC | Age: 52
End: 2022-06-17
Payer: COMMERCIAL

## 2022-06-17 DIAGNOSIS — K70.31 ALCOHOLIC CIRRHOSIS OF LIVER WITH ASCITES (H): Primary | ICD-10-CM

## 2022-06-17 NOTE — TELEPHONE ENCOUNTER
----- Message from Sweetie Bain MD sent at 6/16/2022  9:12 PM CDT -----  Hey - can you update his paracentesis order to 8 L max with ~ 7.5 grams albumin/L removed?

## 2022-06-17 NOTE — TELEPHONE ENCOUNTER
Paracentesis order updated to 8 L max with 7.5 grams albumin/L removed.    Ave CURIEL LPN  Hepatology Clinic

## 2022-06-19 LAB — BACTERIA FLD CULT: NO GROWTH

## 2022-06-20 PROCEDURE — 99000 SPECIMEN HANDLING OFFICE-LAB: CPT | Performed by: PATHOLOGY

## 2022-06-20 PROCEDURE — 84302 ASSAY OF SWEAT SODIUM: CPT | Mod: 90 | Performed by: PATHOLOGY

## 2022-06-20 PROCEDURE — 82653 EL-1 FECAL QUANTITATIVE: CPT | Performed by: INTERNAL MEDICINE

## 2022-06-20 PROCEDURE — 84133 ASSAY OF URINE POTASSIUM: CPT | Mod: 90 | Performed by: PATHOLOGY

## 2022-06-21 LAB
BACTERIA FLD CULT: NORMAL
POTASSIUM STL-SCNC: NORMAL MMOL/L
SODIUM STL-SCNC: NORMAL MMOL/L

## 2022-06-22 LAB — ELASTASE PANC STL-MCNT: 490 UG/G

## 2022-07-06 ENCOUNTER — TRANSFERRED RECORDS (OUTPATIENT)
Dept: HEALTH INFORMATION MANAGEMENT | Facility: CLINIC | Age: 52
End: 2022-07-06

## 2022-07-18 ENCOUNTER — TRANSFERRED RECORDS (OUTPATIENT)
Dept: HEALTH INFORMATION MANAGEMENT | Facility: CLINIC | Age: 52
End: 2022-07-18

## 2022-07-20 ENCOUNTER — TRANSFERRED RECORDS (OUTPATIENT)
Dept: HEALTH INFORMATION MANAGEMENT | Facility: CLINIC | Age: 52
End: 2022-07-20

## 2022-07-23 ENCOUNTER — HEALTH MAINTENANCE LETTER (OUTPATIENT)
Age: 52
End: 2022-07-23

## 2022-08-01 ENCOUNTER — TELEPHONE (OUTPATIENT)
Dept: GASTROENTEROLOGY | Facility: CLINIC | Age: 52
End: 2022-08-01

## 2022-08-01 DIAGNOSIS — K70.31 ALCOHOLIC CIRRHOSIS OF LIVER WITH ASCITES (H): Primary | ICD-10-CM

## 2022-08-01 NOTE — TELEPHONE ENCOUNTER
Paracentesis orders faxed to [facility] [phone].    Lynn CURIEL LPN  Hepatology Clinic     [facility] Call Center    Phone Message    May a detailed message be left on voicemail: yes     Reason for Call: Other: [facility] was calling in to let us know that they received a fax from our office that was addressed to [facility] and not them. They believe they were faxed to them by mistake because they also cannot complete the orders that were sent to them because they do not do them at that location. They wanted to let us know because they shredded the information and wanted us to be aware they believe it was sent to the wrong location. Please look into fax that was sent to them (08/01/2022) and clarify who it was meant for, thank you!     Action Taken: Message routed to:  [facility] ([facility] Hep    Travel Screening: Not Applicable

## 2022-08-09 ENCOUNTER — OFFICE VISIT (OUTPATIENT)
Dept: GASTROENTEROLOGY | Facility: CLINIC | Age: 52
End: 2022-08-09
Attending: STUDENT IN AN ORGANIZED HEALTH CARE EDUCATION/TRAINING PROGRAM
Payer: COMMERCIAL

## 2022-08-09 ENCOUNTER — LAB (OUTPATIENT)
Dept: LAB | Facility: CLINIC | Age: 52
End: 2022-08-09
Payer: COMMERCIAL

## 2022-08-09 VITALS
SYSTOLIC BLOOD PRESSURE: 117 MMHG | DIASTOLIC BLOOD PRESSURE: 83 MMHG | WEIGHT: 286 LBS | OXYGEN SATURATION: 100 % | BODY MASS INDEX: 35.75 KG/M2 | HEART RATE: 73 BPM

## 2022-08-09 DIAGNOSIS — D64.9 ANEMIA: ICD-10-CM

## 2022-08-09 DIAGNOSIS — I50.22 CHRONIC SYSTOLIC CONGESTIVE HEART FAILURE (H): ICD-10-CM

## 2022-08-09 DIAGNOSIS — E66.01 MORBID OBESITY (H): ICD-10-CM

## 2022-08-09 DIAGNOSIS — D64.9 ANEMIA, UNSPECIFIED TYPE: ICD-10-CM

## 2022-08-09 DIAGNOSIS — K76.82 HEPATIC ENCEPHALOPATHY (H): ICD-10-CM

## 2022-08-09 DIAGNOSIS — K70.31 ALCOHOLIC CIRRHOSIS OF LIVER WITH ASCITES (H): Primary | ICD-10-CM

## 2022-08-09 DIAGNOSIS — K70.31 ALCOHOLIC CIRRHOSIS OF LIVER WITH ASCITES (H): ICD-10-CM

## 2022-08-09 LAB
ABO/RH(D): NORMAL
AFP SERPL-MCNC: 3.5 NG/ML
ALBUMIN SERPL-MCNC: 2.5 G/DL (ref 3.4–5)
ALP SERPL-CCNC: 141 U/L (ref 40–150)
ALT SERPL W P-5'-P-CCNC: 35 U/L (ref 0–70)
ANION GAP SERPL CALCULATED.3IONS-SCNC: 11 MMOL/L (ref 3–14)
ANTIBODY SCREEN: NEGATIVE
AST SERPL W P-5'-P-CCNC: 36 U/L (ref 0–45)
BILIRUB DIRECT SERPL-MCNC: 0.4 MG/DL (ref 0–0.2)
BILIRUB SERPL-MCNC: 1 MG/DL (ref 0.2–1.3)
BUN SERPL-MCNC: 10 MG/DL (ref 7–30)
CALCIUM SERPL-MCNC: 8.3 MG/DL (ref 8.5–10.1)
CHLORIDE BLD-SCNC: 112 MMOL/L (ref 94–109)
CO2 SERPL-SCNC: 23 MMOL/L (ref 20–32)
CREAT SERPL-MCNC: 0.67 MG/DL (ref 0.66–1.25)
ERYTHROCYTE [DISTWIDTH] IN BLOOD BY AUTOMATED COUNT: 22.4 % (ref 10–15)
GFR SERPL CREATININE-BSD FRML MDRD: >90 ML/MIN/1.73M2
GLUCOSE BLD-MCNC: 139 MG/DL (ref 70–99)
HCT VFR BLD AUTO: 32.4 % (ref 40–53)
HGB BLD-MCNC: 9.6 G/DL (ref 13.3–17.7)
INR PPP: 1.79 (ref 0.85–1.15)
MCH RBC QN AUTO: 27 PG (ref 26.5–33)
MCHC RBC AUTO-ENTMCNC: 29.6 G/DL (ref 31.5–36.5)
MCV RBC AUTO: 91 FL (ref 78–100)
PLATELET # BLD AUTO: 122 10E3/UL (ref 150–450)
POTASSIUM BLD-SCNC: 3.6 MMOL/L (ref 3.4–5.3)
PROT SERPL-MCNC: 7 G/DL (ref 6.8–8.8)
RBC # BLD AUTO: 3.56 10E6/UL (ref 4.4–5.9)
SODIUM SERPL-SCNC: 146 MMOL/L (ref 133–144)
SPECIMEN EXPIRATION DATE: NORMAL
WBC # BLD AUTO: 5.8 10E3/UL (ref 4–11)

## 2022-08-09 PROCEDURE — 85610 PROTHROMBIN TIME: CPT | Performed by: PATHOLOGY

## 2022-08-09 PROCEDURE — 99215 OFFICE O/P EST HI 40 MIN: CPT | Performed by: STUDENT IN AN ORGANIZED HEALTH CARE EDUCATION/TRAINING PROGRAM

## 2022-08-09 PROCEDURE — 82105 ALPHA-FETOPROTEIN SERUM: CPT | Performed by: STUDENT IN AN ORGANIZED HEALTH CARE EDUCATION/TRAINING PROGRAM

## 2022-08-09 PROCEDURE — 80053 COMPREHEN METABOLIC PANEL: CPT | Performed by: PATHOLOGY

## 2022-08-09 PROCEDURE — 36415 COLL VENOUS BLD VENIPUNCTURE: CPT | Performed by: PATHOLOGY

## 2022-08-09 PROCEDURE — 86901 BLOOD TYPING SEROLOGIC RH(D): CPT | Performed by: STUDENT IN AN ORGANIZED HEALTH CARE EDUCATION/TRAINING PROGRAM

## 2022-08-09 PROCEDURE — G0463 HOSPITAL OUTPT CLINIC VISIT: HCPCS

## 2022-08-09 PROCEDURE — 85027 COMPLETE CBC AUTOMATED: CPT | Performed by: PATHOLOGY

## 2022-08-09 PROCEDURE — 82248 BILIRUBIN DIRECT: CPT | Performed by: PATHOLOGY

## 2022-08-09 PROCEDURE — 86850 RBC ANTIBODY SCREEN: CPT | Performed by: STUDENT IN AN ORGANIZED HEALTH CARE EDUCATION/TRAINING PROGRAM

## 2022-08-09 RX ORDER — FUROSEMIDE 80 MG
40 TABLET ORAL 2 TIMES DAILY
Qty: 90 TABLET | Refills: 0 | Status: SHIPPED | OUTPATIENT
Start: 2022-08-09 | End: 2022-01-01

## 2022-08-09 RX ORDER — LACTULOSE 10 G/15ML
20 SOLUTION ORAL
COMMUNITY
Start: 2022-07-22 | End: 2022-01-01

## 2022-08-09 RX ORDER — LACTULOSE 20 G/30ML
20 SOLUTION ORAL DAILY
Qty: 473 ML | Refills: 3 | Status: SHIPPED | OUTPATIENT
Start: 2022-08-09 | End: 2022-08-19

## 2022-08-09 RX ORDER — EPLERENONE 25 MG/1
50 TABLET, FILM COATED ORAL 2 TIMES DAILY
Qty: 360 TABLET | Refills: 0 | Status: SHIPPED | OUTPATIENT
Start: 2022-08-09 | End: 2022-01-01

## 2022-08-09 RX ORDER — ASPIRIN 81 MG/1
81 TABLET, CHEWABLE ORAL DAILY
COMMUNITY
End: 2022-01-01

## 2022-08-09 RX ORDER — METOPROLOL SUCCINATE 50 MG/1
50 TABLET, EXTENDED RELEASE ORAL DAILY
COMMUNITY
End: 2022-01-01 | Stop reason: DRUGHIGH

## 2022-08-09 ASSESSMENT — PAIN SCALES - GENERAL: PAINLEVEL: SEVERE PAIN (6)

## 2022-08-09 NOTE — CONFIDENTIAL NOTE
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      Initial History:     Patient states that he was diagnosed with liver disease and cirrhosis in September 2020.  He was admitted to the hospital at that time he was found to have extensive superior mesenteric venous thrombosis, complicated by ischemic bowel and had to undergo an exploratory laparotomy with small bowel resection.  He was placed on Eliquis postoperatively.  During surgery, he was found to have some ascites that was evacuated.     Since then he has had trouble with incisional hernias, and rectus diastasis.  He was seen by his general surgeon in March where he was noted to have a MELD of 18.  He was then sent to [facility] to be seen by surgery for consideration of incisional hernia repair.    Patient complains of episodes of leakage of fluid from his incision that happens intermittently.  This fluid is usually blood tinged but sometimes yellow. He is on 20 mg of furosemide daily, that was started because of his heart failure and his pedal edema.  He has also noted that every 2 to 3 days he has watery bowel movements, up to 12 times a day.  In between there was episodes he has about 2-3 soft bowel movements per day.  He denies any fevers or chills, abdominal pain, melena, or hematochezia.    His wife is also noted that he is having some memory issues.  He has not had any major episodes of confusion, and has not been admitted to the hospital with hepatic encephalopathy.    Patient denies any prior history of liver disease. He has had abnormal liver  enzymes and low platelets as far back as 2011. Past notes from his PCP had also documented excessive alcohol use, as well as, a hospitalization for overdose of Vicodin.     First paracentesis 6/26/2021 - 600 ml removed - 231 WBC, TP 1.4, Albumin 0.8.     Recent EGD with bx negative for celiac. Colon bx showing chronic inactive colitis. Still had loose stools after colonoscopy prep, not c/w overflow.  We have tried a several week course of rifaximin for encephalopathy, this did not help his diarrhea.  Fecal calprotectin was normal when checked December 2021    Liver MRI March 2 showing cirrhosis without any suspicious enhancing lesions.  Previously demonstrated hyperintense nonenhancing lesions are not well visualized in the current exam likely due to motion.  Also showed new small to moderate volume ascites.     Interval Events:  - Started having issues with ascites again at the time of his last visit. MRI liver without evidence of PVT or HCC.  Sent to the hospital after his last visit 6/2022 for bradycardia Metoprolol was stopped due to bradycardia, EKG with frequent PVCs. TTE 6/2022 with improvement in his LVEF. 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 Repeat TTE 7/2022 showing LVEF 40-45%  - Taking lactulose intermittently, has not taken for a few days, wife thinks he is a little slower today  - Getting weekly paracentesis  - Currently taking Lasix 60 mg BID, Eplerenone 50 mg daily    ROS: 14 point ROS negative except for positives noted in HPI.    PMHx:  Past Medical History:   Diagnosis Date     Alcoholic cirrhosis of liver with ascites (H)      Chronic systolic heart failure (H)      Diabetes (H)     Type 2 DM, Insulin Use.      Hypertension    -- Type II diabetes Mellitus  -- Hypertension  -- Hyperlipidemia  -- CHF  -- GISELLE  -- SMV thrombus    PSHx:  Past Surgical History:   Procedure Laterality Date     COLONOSCOPY N/A 12/9/2021    Procedure: COLONOSCOPY, WITH  BIOPSY, WITH CLIPS;  Surgeon: Sweetie Bain MD;  Location: [facility] OR     ESOPHAGOSCOPY, GASTROSCOPY, DUODENOSCOPY (EGD), COMBINED N/A 12/9/2021    Procedure: ESOPHAGOGASTRODUODENOSCOPY, WITH BIOPSY;  Surgeon: Sweetie Bain MD;  Location: [facility] OR      -- Exploratory laparotomy with small bowel resection 9/2020    FamHx:  Family History   Problem Relation Age of Onset     Substance Abuse Maternal Grandmother      Substance Abuse Brother      No family history of liver disease, liver cancer    SocHx:  Social History     Socioeconomic History     Marital status:      Spouse name: Not on file     Number of children: Not on file     Years of education: Not on file     Highest education level: Not on file   Occupational History     Not on file   Tobacco Use     Smoking status: Former Smoker     Types: Cigarettes     Smokeless tobacco: Never Used   Substance and Sexual Activity     Alcohol use: Not Currently     Comment: Quit 9/21/2020     Drug use: Never     Sexual activity: Not on file   Other Topics Concern     Parent/sibling w/ CABG, MI or angioplasty before 65F 55M? Not Asked   Social History Narrative     Not on file     Social Determinants of Health     Financial Resource Strain: Not on file   Food Insecurity: Not on file   Transportation Needs: Not on file   Physical Activity: Not on file   Stress: Not on file   Social Connections: Not on file   Intimate Partner Violence: Not on file   Housing Stability: Not on file   Patient previously worked with with [employer].  He is currently on disability due to pain from his hernias and discomfort.  Admits to significant alcohol use.  Used to drink about a liter of whiskey per day for about 20 years.  Denies any hospitalizations for intoxication or withdrawal, denies any DUIs, denies any prior treatments.  Last drink was 9/21/2020.  Denies any cravings, and is not in any treatments program at the moment.  Denies any tobacco use, denies any IV drug  use.    Medications:  Current Outpatient Medications   Medication     apixaban ANTICOAGULANT (ELIQUIS) 5 MG tablet     aspirin (ASA) 81 MG chewable tablet     DULoxetine (CYMBALTA) 60 MG capsule     gabapentin (NEURONTIN) 600 MG tablet     insulin glargine (LANTUS PEN) 100 UNIT/ML pen     insulin pen needle (B-D U/F) 31G X 5 MM miscellaneous     lactulose (CHRONULAC) 10 GM/15ML solution     loperamide (IMODIUM) 2 MG capsule     metFORMIN (GLUCOPHAGE) 1000 MG tablet     metoprolol succinate ER (TOPROL XL) 50 MG 24 hr tablet     Multiple Vitamins-Minerals (SUPER THERA DAMARIS M) TABS     omeprazole (PRILOSEC) 20 MG DR capsule     ONETOUCH ULTRA test strip     amLODIPine-benazepril (LOTREL) 5-20 MG capsule     eplerenone (INSPRA) 25 MG tablet     furosemide (LASIX) 20 MG tablet     simvastatin (ZOCOR) 20 MG tablet     No current facility-administered medications for this visit.     No OTCs, herbals    Allergies:  Allergies   Allergen Reactions     Minocycline Hives       Objective:  /83 (BP Location: Right arm, Patient Position: Sitting, Cuff Size: Adult Large)   Pulse 73   Wt 129.7 kg (286 lb)   SpO2 100%   BMI 35.75 kg/m    Constitutional: pleasant man in NAD  Eyes: non icteric  Respiratory: Normal respiratory excursion   MSK: normal range of motion of visualized extremities  Abd: Midline hernia present, reducible, more distended than in the past  Skin: No jaundice  Psychiatric: normal mood and orientation    Labs:  Last Comprehensive Metabolic Panel:  Sodium   Date Value Ref Range Status   08/09/2022 146 (H) 133 - 144 mmol/L Final   06/15/2021 136 133 - 144 mmol/L Final     Potassium   Date Value Ref Range Status   08/09/2022 3.6 3.4 - 5.3 mmol/L Final   06/15/2021 4.1 3.4 - 5.3 mmol/L Final     Chloride   Date Value Ref Range Status   08/09/2022 112 (H) 94 - 109 mmol/L Final   06/15/2021 112 (H) 94 - 109 mmol/L Final     Carbon Dioxide   Date Value Ref Range Status   06/15/2021 25 20 - 32 mmol/L Final      Carbon Dioxide (CO2)   Date Value Ref Range Status   08/09/2022 23 20 - 32 mmol/L Final     Anion Gap   Date Value Ref Range Status   08/09/2022 11 3 - 14 mmol/L Final   06/15/2021 <1 (L) 3 - 14 mmol/L Final     Glucose   Date Value Ref Range Status   08/09/2022 139 (H) 70 - 99 mg/dL Final   06/15/2021 123 (H) 70 - 99 mg/dL Final     Urea Nitrogen   Date Value Ref Range Status   08/09/2022 10 7 - 30 mg/dL Final   06/15/2021 8 7 - 30 mg/dL Final     Creatinine   Date Value Ref Range Status   08/09/2022 0.67 0.66 - 1.25 mg/dL Final   06/15/2021 0.63 (L) 0.66 - 1.25 mg/dL Final     GFR Estimate   Date Value Ref Range Status   08/09/2022 >90 >60 mL/min/1.73m2 Final     Comment:     Effective December 21, 2021 eGFRcr in adults is calculated using the 2021 CKD-EPI creatinine equation which includes age and gender (Jenn et al., NEJ, DOI: 10.1056/SMRSll6075333)   06/15/2021 >90 >60 mL/min/[1.73_m2] Final     Comment:     Non  GFR Calc  Starting 12/18/2018, serum creatinine based estimated GFR (eGFR) will be   calculated using the Chronic Kidney Disease Epidemiology Collaboration   (CKD-EPI) equation.       Calcium   Date Value Ref Range Status   08/09/2022 8.3 (L) 8.5 - 10.1 mg/dL Final   06/15/2021 8.9 8.5 - 10.1 mg/dL Final     Bilirubin Total   Date Value Ref Range Status   08/09/2022 1.0 0.2 - 1.3 mg/dL Final   06/15/2021 0.8 0.2 - 1.3 mg/dL Final     Alkaline Phosphatase   Date Value Ref Range Status   08/09/2022 141 40 - 150 U/L Final   06/15/2021 126 40 - 150 U/L Final     ALT   Date Value Ref Range Status   08/09/2022 35 0 - 70 U/L Final   06/15/2021 36 0 - 70 U/L Final     AST   Date Value Ref Range Status   08/09/2022 36 0 - 45 U/L Final   06/15/2021 34 0 - 45 U/L Final       Lab Results   Component Value Date    WBC 4.7 06/15/2021     Lab Results   Component Value Date    RBC 3.89 06/15/2021     Lab Results   Component Value Date    HGB 8.6 06/15/2021     Lab Results   Component Value Date     HCT 31.0 06/15/2021     Lab Results   Component Value Date    MCV 80 06/15/2021     Lab Results   Component Value Date    MCH 22.1 06/15/2021     Lab Results   Component Value Date    MCHC 27.7 06/15/2021     Lab Results   Component Value Date    RDW 17.3 06/15/2021     Lab Results   Component Value Date     06/15/2021       INR   Date Value Ref Range Status   08/09/2022 1.79 (H) 0.85 - 1.15 Final   06/15/2021 1.58 (H) 0.86 - 1.14 Final        MELD-Na score: 13 at 8/9/2022  8:05 AM  MELD score: 13 at 8/9/2022  8:05 AM  Calculated from:  Serum Creatinine: 0.67 mg/dL (Using min of 1 mg/dL) at 8/9/2022  8:05 AM  Serum Sodium: 146 mmol/L (Using max of 137 mmol/L) at 8/9/2022  8:05 AM  Total Bilirubin: 1.0 mg/dL at 8/9/2022  8:05 AM  INR(ratio): 1.79 at 8/9/2022  8:05 AM  Age: 51 years    Imaging: Reviewed in EHR    Endoscopy: Reviewed in EHR    Independently reviewed labs and imaging.     Assessment/Plan: Mr. 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     Was originally referred for LT evaluation - given his CHF and low MELD, did not pursue this.    MRI liver 3/2022 to follow up liver nodules without enhancing lesions. Showing recurrent ascites. Have been increasing his diuretics, but unfortunately still having significant ascites. Was admitted to the hospital for weakness, found to have bigeminy after his last visit.  His diuretics were increased, beta-blocker was stopped.  His EF had improved during this admission.  He since follow-up with his cardiologist, who resumed his metoprolol for his high PVC burden thought to be contributing to some cardiomyopathy.  EF in the interim decreased down to 45%.  Discussed his case with his cardiologist at  [facility], will recommend a repeat echo in about a month to see if there is any improvement back on his metoprolol.  Think the benefits of metoprolol outweigh the risks currently with his refractory ascites.  We will also increase his diuretics today.  Discussed that if his EF improves, will refer for a TIPS.  Discussed that he is a borderline TIPS candidate, but does not have many other options for his refractory ascites.    - Increase lasix to 80 mg BID, eplerenone to 50 mg BID. Repeat labs on friday  - Paracentesis weekly PRN 8 L max with ~ 7.5 grams albumin replacement/L removed.   - Continue metoprolol 50 mg XL daily. Repeat TTE in about a month with his cardiologist, if improved LVEF will consider TIPS.  Discussed that if he is not a TIPS candidate, patients with refractory ascites suffer progressive decline in her function and are at high risk for death  - Recommend daily lactulose for his hepatic encephalopathy.  He has chronic diarrhea at baseline, with started daily dosing.  We will also try to get him rifaximin from [facility].  We will also enroll in complex care management    RTC 2 months     Sweetie Bain MD MS  Hepatology/Liver Transplant  [facility]    Approximately 25 minutes was spent for the visit with 35 minutes of non face-to-face time were spent in review of the patient's medical record on the day of the visit. This included review of previous: clinic visits, hospital records, lab results, imaging studies, and documentation.  The findings from this review are summarized in the above note.

## 2022-08-09 NOTE — LETTER
8/9/2022      RE: Blair Oliver  [address] [facility] [address]       No notes on file    Sweetie Bain MD

## 2022-08-09 NOTE — PATIENT INSTRUCTIONS
- Increase lasix to 80 mg twice a day  - Increase eplerenone to 50 mg twice a day  - Start taking daily lactulose  - Labs on Friday

## 2022-08-09 NOTE — NURSING NOTE
Chief Complaint   Patient presents with     RECHECK     Alcoholic cirrhosis     Blood pressure 117/83, pulse 73, weight 129.7 kg (286 lb), SpO2 100 %.    Rafael Coreas on 8/9/2022 at 9:29 AM

## 2022-08-09 NOTE — LETTER
8/9/2022         RE: Blair Oliver  [address] [facility] [address]        Dear Colleague,    Thank you for referring your patient, Blair Oliver, to the [facility] [place]. Please see a copy of my visit note below.    No notes on file    Again, thank you for allowing me to participate in the care of your patient.        Sincerely,        Sweetie Bain MD

## 2022-08-10 ENCOUNTER — PATIENT OUTREACH (OUTPATIENT)
Dept: GASTROENTEROLOGY | Facility: CLINIC | Age: 52
End: 2022-08-10

## 2022-08-10 DIAGNOSIS — K76.82 HEPATIC ENCEPHALOPATHY (H): Primary | ICD-10-CM

## 2022-08-10 DIAGNOSIS — K70.31 ALCOHOLIC CIRRHOSIS OF LIVER WITH ASCITES (H): ICD-10-CM

## 2022-08-10 NOTE — PROGRESS NOTES
Attempted to reach patient for check in, no answer, message left requesting call back, number provided.    Sharon Owusu, RN Care Coordinator  [facility]  Hepatology Clinic/Specialty Program

## 2022-08-11 ENCOUNTER — MYC MEDICAL ADVICE (OUTPATIENT)
Dept: GASTROENTEROLOGY | Facility: CLINIC | Age: 52
End: 2022-08-11

## 2022-08-11 NOTE — TELEPHONE ENCOUNTER
Spoke with pt over the phone - see patient outreach encounter from 8/10/22    Sharon Owusu, RN Care Coordinator  [facility]  Hepatology Clinic/Specialty Program

## 2022-08-11 NOTE — PROGRESS NOTES
"Spoke with pt to initiate care coordination and review updated plan of care from visit with Dr. Bain on 8/9:  - Increase water pills to epleronone 50mg BID and furosemide 80mg BID with labs recheck on Friday 8/12  - Continue weekly paracentesis  - Take lactulose 30mL at least once daily, send rx for rifaxamin to [facility] (faxed 8/10)  - TIPS consult pending repeat TTE with cardiology  - Follow-up in clinic in 2 months    Pt states that he lives in [place] in a split level home with his wife, mother, son, and granddaughter. His wife helps him with transportation and does his medication set-up for him, she states she uses a pill tray. Pt uses a cane to walk due to diabetic neuropathy and weakness related to liver disease.    Pt and wife states that they started new water pill doses and have lab appt scheduled at [facility] on Friday 8/12. Deny any negative side effects from water pills - no muscle cramping or symptoms of dehydration. Pt gets weekly gabbi done at [facility] ([place] last had this done yesterday with 8L removed, is set up for several weeks worth of future appts. Pt reports abdominal distention now is mild due to para yesterday, but that it can get \"pretty bad\". Also reports mild-moderate swelling in feet, ankles and calves. Advised pt to wear compression socks during the day, elevate legs when able to. Denies and fever, chills, or sweats. Denies any itching or jaundice.    Pt currently taking lactulose 30mL in the AM, also has chronic diarrhea and reports >12 stools daily. Pt's wife states that she notices that he starts to get confused when he doesn't take lactulose dose. Notified pt and wife that rx for rifaxamin has been faxed to [facility], pharmacy phone number provided for them to call to set up account. Verbalized understanding, state they will do this today. Denies any melena, hematemesis, or hematochezia.    Will check in with pt next week.    Sharon Owusu, " RN Care Coordinator  [facility]  Hepatology Clinic/Specialty Program

## 2022-08-12 ENCOUNTER — LAB (OUTPATIENT)
Dept: LAB | Facility: CLINIC | Age: 52
End: 2022-08-12
Payer: COMMERCIAL

## 2022-08-12 DIAGNOSIS — K70.31 ALCOHOLIC CIRRHOSIS OF LIVER WITH ASCITES (H): ICD-10-CM

## 2022-08-12 LAB
ANION GAP SERPL CALCULATED.3IONS-SCNC: 9 MMOL/L (ref 3–14)
BUN SERPL-MCNC: 10 MG/DL (ref 7–30)
CALCIUM SERPL-MCNC: 8.5 MG/DL (ref 8.5–10.1)
CHLORIDE BLD-SCNC: 110 MMOL/L (ref 94–109)
CO2 SERPL-SCNC: 24 MMOL/L (ref 20–32)
CREAT SERPL-MCNC: 0.66 MG/DL (ref 0.66–1.25)
GFR SERPL CREATININE-BSD FRML MDRD: >90 ML/MIN/1.73M2
GLUCOSE BLD-MCNC: 163 MG/DL (ref 70–99)
POTASSIUM BLD-SCNC: 3.9 MMOL/L (ref 3.4–5.3)
SODIUM SERPL-SCNC: 143 MMOL/L (ref 133–144)

## 2022-08-12 PROCEDURE — 36415 COLL VENOUS BLD VENIPUNCTURE: CPT

## 2022-08-12 PROCEDURE — 80048 BASIC METABOLIC PNL TOTAL CA: CPT

## 2022-08-16 ENCOUNTER — PATIENT OUTREACH (OUTPATIENT)
Dept: GASTROENTEROLOGY | Facility: CLINIC | Age: 52
End: 2022-08-16

## 2022-08-16 NOTE — PROGRESS NOTES
"Spoke with pt for check-in. Pt and wife state they set up account with [facility] but xifaxan will not arrive in the mail for 2-6 weeks. Wife states that she noticed that pt had been \"acting off\" when only taking one dose of lactulose daily, has had pt taking two doses of lactulose daily since yesterday. Will check in with pt and wife on Thursday to re-evaluate whether pt needs to further increase lactulose doing to prevent HE. Pt having >10 watery stools daily. Denies any fever, chills, or sweats. Denies any melena, hematochezia, or hematemesis    Reports that leg swelling has improved somewhat since increasing diuretics at last week's clinic visit with Dr. Bain, but state they have not noticed any change in abdominal fluid build-up. Pt scheduled for next paracentesis tomorrow at [place]. Notified pt and wife that Dr. Bain had sent [facility] to notify that pt's labs from Friday 8/12 looked good and there is room to adjust diuretics. Message sent to provider to notify of updates from pt and wife - will call pt back with provider recommendations. Discussed with pt that TIPS consult is pending repeat TTE with cardiology, pt stated he will call cardiology clinic tomorrow to set this up.    Sharon Owusu, RN Care Coordinator  [facility]  Hepatology Clinic/Specialty Program    "

## 2022-08-19 ENCOUNTER — PATIENT OUTREACH (OUTPATIENT)
Dept: GASTROENTEROLOGY | Facility: CLINIC | Age: 52
End: 2022-08-19

## 2022-08-19 ENCOUNTER — MYC MEDICAL ADVICE (OUTPATIENT)
Dept: GASTROENTEROLOGY | Facility: CLINIC | Age: 52
End: 2022-08-19

## 2022-08-19 DIAGNOSIS — K70.31 ALCOHOLIC CIRRHOSIS OF LIVER WITH ASCITES (H): ICD-10-CM

## 2022-08-19 RX ORDER — LACTULOSE 20 G/30ML
20 SOLUTION ORAL DAILY
Qty: 946 ML | Refills: 11 | Status: SHIPPED | OUTPATIENT
Start: 2022-08-19 | End: 2022-08-24

## 2022-08-19 NOTE — PROGRESS NOTES
Spoke with pt and spouse for check-in. They report that pt had paracentesis on 8/17 with 8L output, state that leg swelling has improved but still having abdominal distention post-paracentesis. Scheduled for next para on 8/24. Discussed importance of nutrition and maintaining low sodium, high protein diet with pt and wife. Denies any fever, sweats, or chills.    Pt reports that he is still taking lactulose twice daily but has had to skip a few doses over the last few days so will reassess early next week whether pt needs to further increase to three doses daily. Pt reports diarrhea as a result of chronic colitis and lactulose has been severe, reports he has had 8-9 bouts of diarrhea over the last 9 hours. States he is drinking adequate fluids, denies any weakness, dizziness, or excessive fatigue. Denies any melena, hematemesis, or hematochezia.    Will check in with pt early next week.    Sharon Owusu, RN Care Coordinator  [facility]  Hepatology Clinic/Specialty Program

## 2022-08-23 ENCOUNTER — PATIENT OUTREACH (OUTPATIENT)
Dept: GASTROENTEROLOGY | Facility: CLINIC | Age: 52
End: 2022-08-23

## 2022-08-24 ENCOUNTER — MYC MEDICAL ADVICE (OUTPATIENT)
Dept: GASTROENTEROLOGY | Facility: CLINIC | Age: 52
End: 2022-08-24

## 2022-08-24 DIAGNOSIS — K70.31 ALCOHOLIC CIRRHOSIS OF LIVER WITH ASCITES (H): ICD-10-CM

## 2022-08-24 RX ORDER — LACTULOSE 20 G/30ML
20 SOLUTION ORAL 3 TIMES DAILY
Qty: 2838 ML | Refills: 11 | Status: SHIPPED | OUTPATIENT
Start: 2022-08-24

## 2022-08-24 NOTE — TELEPHONE ENCOUNTER
Spoke with pt over the phone - see patient outreach encounter from 8/23/22. Advised pt that metoprolol rx is managed by his cardiologist and that he will need to contact their office for refills.    Sharon Owusu RN Care Coordinator  [facility]  Hepatology Clinic/Specialty Program

## 2022-08-24 NOTE — TELEPHONE ENCOUNTER
Spoke with pt regarding refill over the phone - see patient outreach encounter from 8/23/22.    Sharon Owusu, RN Care Coordinator  [facility]  Hepatology Clinic/Specialty Program

## 2022-08-24 NOTE — PROGRESS NOTES
"Spoke with pt for check in. Notified pt that lactulose refills were sent to pharmacy with larger fill quantity - should now be able to  30 day supply. Pt reports that he was supposed to have paracentesis today but appt was rescheduled for tomorrow at [facility] due to staffing issue - last paracentesis done 8/17 with 8L output. Pt reporting abdominal distention and lower extremity swelling - states \"I think they're going to take another 8 liters tomorrow\". Pt states he has continued to eat high-sodium foods, discussed with pt that high sodium intake will increase his fluid build-up between paracentesis treatments. Pt verbalized understanding, states he will continue to work on this.    Pt reports he has continued to take lactulose 30mL BID with >10 loose/watery stools daily. Denies any melena, hematochezia, or hematemesis. Denies any fever, sweats, or chills. Pt reports that he has been feeling very fatigued over the past few days, pt and wife state that pt experienced excessive fatigue prior to developing HE that led to his most recent hospitalization. Advised pt to start taking three doses daily immediately. Pt states he will start new dosing tomorrow.    Will follow-up with pt later this week.    Sharon Owusu, RN Care Coordinator  [facility]  Hepatology Clinic/Specialty Program        "

## 2022-08-26 NOTE — PROGRESS NOTES
Spoke with pt for check in. Pt is scheduled for consult with his cardiologist for repeat TTE to get clearance to proceed with TIPS consult. Pt reports that today will be his first day taking 3 doses of lactulose, had paracentesis appt yesterday at [facility] with 8L output. Denies any mental fogginess or confusion, but reports excessive fatigue. Rifaximin has not yet arrived from [facility]. Denies any melena, hematochezia, or hematemesis. Denies any fever, sweats, or chills.    Will check in with pt next week.    Sharon Owusu, RN Care Coordinator  [facility]  Hepatology Clinic/Specialty Program

## 2022-09-02 NOTE — PROGRESS NOTES
Attempted to reach patient for check in, no answer, message left requesting call back, number provided.    Sharon Owusu, RN Care Coordinator  [facility]  Hepatology Clinic/Specialty Program

## 2022-09-07 NOTE — PROGRESS NOTES
Message received from Dr. Bain to put in lab orders for CBC, CMP, iron, and ferritin. Called pt and spouse to notify lab orders have been placed, they state they will schedule next available lab appt.    Will check in with pt and spouse later this week/early next week when results are back.    Sharon Owusu, RN Care Coordinator  [facility]  Hepatology Clinic/Specialty Program

## 2022-09-07 NOTE — PROGRESS NOTES
"Spoke with pt and spouse for check in. Pt reports he had paracentesis appt earlier today with 5.2L output - this is improved from previous gabbi, typically has 7-8L drained weekly. Reports leg swelling is improving as well.    Pt has now been taking lactulose 30mL TID since the weekend, pt's wife states that he has been \"much more himself\" on this dosing, however she reports that pt has been having such frequent diarrhea that he has not been getting adequate sleep at night, she states he has been excessively fatigued and irritable for the last 2 days. Pt denied any melena, hematochezia, or hematemesis. Denied any fever, chills, or sweats. Pt and wife state that his current symptoms are similar to when he's been critically anemic in the past, requested lab orders be placed. Message sent to provider.    Sharon Owusu, RN Care Coordinator  [facility]  Hepatology Clinic/Specialty Program    "

## 2022-09-14 NOTE — PROGRESS NOTES
Attempted to reach patient for check in, no answer, message left requesting call back, number provided.    Shraon Owusu, RN Care Coordinator  [facility]  Hepatology Clinic/Specialty Program

## 2022-09-15 NOTE — PROGRESS NOTES
"Spoke with pt and spouse for check in. Notified that per Dr. Bain pt should start daily iron supplement, switch from lasix 80mg BID to torsemide 60mg BID, get BMP checked in 1 week. Pt and spouse verbalized understanding, in agreement with plan.    Pt continues with lactulose 30mL TID with >10 watery stools daily. Denies any mental fogginess, confusion, excessive irritability. Pt's spouse states he is \"much more himself\" on this dosing. They report that they received a call today from [facility] that rifaximin should be arriving in ~1 week. States that the excessive fatigue that pt was experiencing last week has improved. Pt and spouse asked about prescription for liquid imodium, advised pt that typically we do no recommend pts take imodium while on lactulose, pt and spouse state that they have been advised in the past that this is ok due to pt's chronic diarrhea, asked that writer send message to Dr. Bain regarding their concern. Message sent.    Will check in with pt next week.    Sharon Owusu, RN Care Coordinator  [facility]  Hepatology Clinic/Specialty Program    "

## 2022-09-28 NOTE — PROGRESS NOTES
"Spoke with pt and spouse for check in. Pt was discharged from [facility] yesterday, was hospitalized 9/23-9/27 due to confusion and abdominal pain, found to have SBP, severe hypokalemia, and melena, had EGD on 9/25 with variceal banding and APC treatment for GAVE. Pt states he is \"feeling better\", started a 10 day course of ciprofloxacin today for SBP. States that he has now received rifaximin from [facility] and was able to start taking that today as well in conjunction with lactulose 30mL TID. Pt's next f/u appt with Dr. Bain scheduled for 10/11. Message sent to Dr. Bain regarding any sooner follow-up related to hospitalization.    Sharon Owusu, RN Care Coordinator  [facility]  Hepatology Clinic/Specialty Program    "

## 2022-10-03 NOTE — PROGRESS NOTES
Spoke with pt and spouse for check in. Notified pt that Dr. Bain would like him to take ciprofloxacin 500mg once daily as SBP prophylaxis, get repeat CBC, CMP, and INR this week, and that we have ordered an ECHO so that we can move forward with TIPS eval. Pt verbalized understanding, will start cipro and have labs done this week.    Pt continues with lactulose 30mL TID and rifaximin 550mg BID, denies any mental fogginess or confusion. However, pt and spouse report that he has been excessively fatigued. Pt reports that he started having dark stools and sharp, intermittent abdominal pains on Friday 9/30. Had EGD with variceal banding during his hospitalization on 9/25. Advised pt to go to ED now due to symptoms, pt declined. Pt stated he would like to make it to his appt with his cardiologist today and would like to wait another day before going to ED. Advised pt and spouse that they need to go to ED immediately if pt develops any fever, worsening abdominal pain, black stool, or sri blood in stool. Pt and spouse verbalized understanding, stated they would follow this advice.    Will check in with pt tomorrow.    Sharon Owusu, RN Care Coordinator  [facility]  Hepatology Clinic/Specialty Program

## 2022-10-04 NOTE — PROGRESS NOTES
Spoke with pt for check in. Pt reports no change in symptoms since yesterday - continues to have abdominal pains that he reports is mostly with position changes (sitting to standing, etc), having dark brown stools, states he is feeling 'shaky'. Encouraged pt to be seen in ED, pt declined. Advised pt to go to ED immediately with any worsening symptoms, pt and spouse verbalized understanding. Pt scheduled for paracentesis tomorrow AM at [facility] - CMP, CBC, and INR orders faxed for pt to have completed during appt. Pt given scheduling number to have ECHO done at [facility], pt stated he will schedule this right away.    Will check in with pt later this week.    Sharon Owusu, RN Care Coordinator  [facility]  Hepatology Clinic/Specialty Program

## 2022-10-07 NOTE — PROGRESS NOTES
Spoke with pt for check in. Advised pt to start potassium 20 mEq BID per Dr. Bain due to K+ of 2.8 on lab check during paracentesis appt on Wed 10/5, recheck labs next week. Pt verbalized understanding, labs will be rechecked prior to follow-up visit with Dr. Bain on 10/11. Pt scheduled for ECHO for TIPS eval on 10/10.    Pt continues with lactulose TID and rifaximin BID, reports 6-8 watery rust brown stools daily. Still reporting fatigue, Hgb on 10/5 was 7.2 compared to 8.4 on 9/30. Provider notified. Still complaining of abdominal pain, states it is mostly at night when he is not wearing his abdominal binder for his hernias.    Will check in with pt during visit on 10/11.    Sharon Owusu, RN Care Coordinator  [facility]  Hepatology Clinic/Specialty Program

## 2022-10-11 NOTE — PROGRESS NOTES
Met with pt face to face during clinic follow-up visit with Dr. Bain today. Reviewed updated plan of care with pt -   - IR consult for TIPS eval placed due to improvement in ECHO from 10/10, schedule at soonest convenience  - K+ improved on potassium supplement, continue taking this long-term  - Schedule repeat EGD with APC in 1 month  - Continue torsemide 60/60, eplerenone 50/50  - Possibly start IV iron treatment pending ferritin lab result  - RTC in 3 months    Pt and spouse verbalized understanding, in agreement with plan. Pt states his symptoms remained the same over the weekend, has been very fatigued but denies any mental fogginess or confusion. Taking lactulose TID and rifaximin BID with 6-10 watery stools daily. Denies any fevers, sweats, or chills. Still reporting abdominal pain at night when he is not wearing abdominal binder for hernias.    Will check in with pt in 2-3 days.    Sharon Owusu, RN Care Coordinator  [facility]  Hepatology Clinic/Specialty Program

## 2022-10-11 NOTE — TELEPHONE ENCOUNTER
I called and spoke with Mr Oliver.  I have requested his July CT scan from [facility] to be pushed into PACs for review. I will schedule pt for TIPS consult once obtained and reviewed with IR provider.   DEEPTI De Paz RN, BSN  Interventional Radiology Nurse Coordinator   Phone:  [phone]

## 2022-10-11 NOTE — PROGRESS NOTES
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      Initial History:     Patient states that he was diagnosed with liver disease and cirrhosis in September 2020.  He was admitted to the hospital at that time he was found to have extensive superior mesenteric venous thrombosis, complicated by ischemic bowel and had to undergo an exploratory laparotomy with small bowel resection.  He was placed on Eliquis postoperatively.  During surgery, he was found to have some ascites that was evacuated.     Since then he has had trouble with incisional hernias, and rectus diastasis.  He was seen by his general surgeon in March where he was noted to have a MELD of 18.  He was then sent to [facility] to be seen by surgery for consideration of incisional hernia repair.    Patient complains of episodes of leakage of fluid from his incision that happens intermittently.  This fluid is usually blood tinged but sometimes yellow. He is on 20 mg of furosemide daily, that was started because of his heart failure and his pedal edema.  He has also noted that every 2 to 3 days he has watery bowel movements, up to 12 times a day.  In between there was episodes he has about 2-3 soft bowel movements per day.  He denies any fevers or chills, abdominal pain, melena, or hematochezia.    His wife is also noted that he is having some memory issues.  He has not had any major episodes of confusion, and has not been admitted to the hospital with hepatic encephalopathy.    Patient denies any prior history of liver disease.  He has had abnormal liver enzymes and low platelets as far back as 2011. Past notes from his PCP had also documented excessive alcohol use, as well as, a hospitalization for overdose of Vicodin.     First paracentesis 6/26/2021 - 600 ml removed - 231 WBC, TP 1.4, Albumin 0.8.     Recent EGD with bx negative for celiac. Colon bx showing chronic inactive colitis. Still had loose stools after colonoscopy prep, not c/w overflow.  We have tried a several week course of rifaximin for encephalopathy, this did not help his diarrhea.  Fecal calprotectin was normal when checked December 2021    Liver MRI March 2, 2022 showing cirrhosis without any suspicious enhancing lesions.  Previously demonstrated hyperintense nonenhancing lesions are not well visualized in the current exam likely due to motion.  Also showed new small to moderate volume ascites.     Started having issues with ascites again at the time of his last visit. MRI liver without evidence of PVT or HCC.  Sent to the hospital after his last visit 6/2022 for bradycardia Metoprolol was stopped due to bradycardia, EKG with frequent PVCs. TTE 6/2022 with improvement in his LVEF. 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 Repeat TTE 7/2022 showing LVEF 40-45%    Saw Dr. Lord for diarrhea - stopped cholestyramine because it wasn't helpful. Now back on high dose imodium.     Interval Events:  - Getting weekly gabbi, taking torsemide 60 mg BID and eplerenone 50 mg BID. Back on Metoprolol 50 mg BID - LVEF improved showing moderate LV dilation but LVEF improved back to 53%, or 55-60%. His cardiologists at [facility] is having him wear a Zio Patch soon with plan for cardiac MRI after this  - Hospitalized 9/23-27 for abdominal pain and confusion, found to have SBP. Is on cipro ppx. Underwent EGD where GAVE was treated with APC  - Taking lactulose and now rifaximin (chronic diarrhea at baseline), confusion is stable    ROS: 14 point ROS  negative except for positives noted in HPI.    PMHx:  Past Medical History:   Diagnosis Date     Alcoholic cirrhosis of liver with ascites (H)      Chronic systolic heart failure (H)      Diabetes (H)     Type 2 DM, Insulin Use.      Hypertension    -- Type II diabetes Mellitus  -- Hypertension  -- Hyperlipidemia  -- CHF  -- GISELLE  -- SMV thrombus    PSHx:  Past Surgical History:   Procedure Laterality Date     COLONOSCOPY N/A 12/9/2021    Procedure: COLONOSCOPY, WITH BIOPSY, WITH CLIPS;  Surgeon: Sweetie Bain MD;  [place] [facility] [place]     ESOPHAGOSCOPY, GASTROSCOPY, DUODENOSCOPY (EGD), COMBINED N/A 12/9/2021    Procedure: ESOPHAGOGASTRODUODENOSCOPY, WITH BIOPSY;  Surgeon: Sweetie Bain MD;  [place] [facility] [place]      -- Exploratory laparotomy with small bowel resection 9/2020    FamHx:  Family History   Problem Relation Age of Onset     Substance Abuse Maternal Grandmother      Substance Abuse Brother      No family history of liver disease, liver cancer    SocHx:  Social History     Socioeconomic History     Marital status:      Spouse name: Not on file     Number of children: Not on file     Years of education: Not on file     Highest education level: Not on file   Occupational History     Not on file   Tobacco Use     Smoking status: Former     Types: Cigarettes     Smokeless tobacco: Never   Substance and Sexual Activity     Alcohol use: Not Currently     Comment: Quit 9/21/2020     Drug use: Never     Sexual activity: Not on file   Other Topics Concern     Parent/sibling w/ CABG, MI or angioplasty before 65F 55M? Not Asked   Social History Narrative     Not on file     Social Determinants of Health     Financial Resource Strain: Not on file   Food Insecurity: Not on file   Transportation Needs: Not on file   Physical Activity: Not on file   Stress: Not on file   Social Connections: Not on file   Intimate Partner Violence: Not on file   Housing Stability: Not on file   Patient previously worked with with  [employer].  He is currently on disability due to pain from his hernias and discomfort.  Admits to significant alcohol use.  Used to drink about a liter of whiskey per day for about 20 years.  Denies any hospitalizations for intoxication or withdrawal, denies any DUIs, denies any prior treatments.  Last drink was 9/21/2020.  Denies any cravings, and is not in any treatments program at the moment.  Denies any tobacco use, denies any IV drug use.    Medications:  Current Outpatient Medications   Medication     apixaban ANTICOAGULANT (ELIQUIS) 5 MG tablet     aspirin (ASA) 81 MG chewable tablet     ciprofloxacin (CIPRO) 500 MG tablet     DULoxetine (CYMBALTA) 60 MG capsule     eplerenone (INSPRA) 25 MG tablet     eplerenone (INSPRA) 25 MG tablet     ferrous gluconate (FERGON) 324 (38 Fe) MG tablet     furosemide (LASIX) 20 MG tablet     furosemide (LASIX) 80 MG tablet     gabapentin (NEURONTIN) 600 MG tablet     insulin glargine (LANTUS PEN) 100 UNIT/ML pen     insulin pen needle (B-D U/F) 31G X 5 MM miscellaneous     lactulose (CHRONULAC) 10 GM/15ML solution     lactulose 20 GM/30ML SOLN     loperamide (IMODIUM A-D) 1 MG/7.5ML     loperamide (IMODIUM) 2 MG capsule     metFORMIN (GLUCOPHAGE) 1000 MG tablet     metoprolol succinate ER (TOPROL XL) 50 MG 24 hr tablet     Multiple Vitamins-Minerals (SUPER THERA DAMARIS M) TABS     omeprazole (PRILOSEC) 20 MG DR capsule     ONETOUCH ULTRA test strip     potassium chloride ER (KLOR-CON M) 20 MEQ CR tablet     rifaximin (XIFAXAN) 550 MG TABS tablet     simvastatin (ZOCOR) 20 MG tablet     torsemide (DEMADEX) 20 MG tablet     No current facility-administered medications for this visit.     No OTCs, herbals    Allergies:  Allergies   Allergen Reactions     Minocycline Hives       Objective:  There were no vitals taken for this visit.  Constitutional: pleasant man in NAD  Eyes: non icteric  Respiratory: Normal respiratory excursion   MSK: normal range of motion of visualized  extremities  Abd: Midline hernia present, reducible, more distended than in the past  Skin: No jaundice  Psychiatric: normal mood and orientation    Labs:  Last Comprehensive Metabolic Panel:  Sodium   Date Value Ref Range Status   09/11/2022 139 133 - 144 mmol/L Final   06/15/2021 136 133 - 144 mmol/L Final     Potassium   Date Value Ref Range Status   09/11/2022 4.0 3.4 - 5.3 mmol/L Final   06/15/2021 4.1 3.4 - 5.3 mmol/L Final     Chloride   Date Value Ref Range Status   09/11/2022 110 (H) 94 - 109 mmol/L Final   06/15/2021 112 (H) 94 - 109 mmol/L Final     Carbon Dioxide   Date Value Ref Range Status   06/15/2021 25 20 - 32 mmol/L Final     Carbon Dioxide (CO2)   Date Value Ref Range Status   09/11/2022 20 20 - 32 mmol/L Final     Anion Gap   Date Value Ref Range Status   09/11/2022 9 3 - 14 mmol/L Final   06/15/2021 <1 (L) 3 - 14 mmol/L Final     Glucose   Date Value Ref Range Status   09/11/2022 225 (H) 70 - 99 mg/dL Final   06/15/2021 123 (H) 70 - 99 mg/dL Final     Urea Nitrogen   Date Value Ref Range Status   09/11/2022 16 7 - 30 mg/dL Final   06/15/2021 8 7 - 30 mg/dL Final     Creatinine   Date Value Ref Range Status   09/11/2022 0.78 0.66 - 1.25 mg/dL Final   06/15/2021 0.63 (L) 0.66 - 1.25 mg/dL Final     GFR Estimate   Date Value Ref Range Status   09/11/2022 >90 >60 mL/min/1.73m2 Final     Comment:     Effective December 21, 2021 eGFRcr in adults is calculated using the 2021 CKD-EPI creatinine equation which includes age and gender (Jenn hilliard al., NEJM, DOI: 10.1056/UNYZxh6109976)   06/15/2021 >90 >60 mL/min/[1.73_m2] Final     Comment:     Non  GFR Calc  Starting 12/18/2018, serum creatinine based estimated GFR (eGFR) will be   calculated using the Chronic Kidney Disease Epidemiology Collaboration   (CKD-EPI) equation.       Calcium   Date Value Ref Range Status   09/11/2022 8.4 (L) 8.5 - 10.1 mg/dL Final   06/15/2021 8.9 8.5 - 10.1 mg/dL Final     Bilirubin Total   Date Value Ref  Range Status   09/11/2022 0.9 0.2 - 1.3 mg/dL Final   06/15/2021 0.8 0.2 - 1.3 mg/dL Final     Alkaline Phosphatase   Date Value Ref Range Status   09/11/2022 161 (H) 40 - 150 U/L Final   06/15/2021 126 40 - 150 U/L Final     ALT   Date Value Ref Range Status   09/11/2022 35 0 - 70 U/L Final   06/15/2021 36 0 - 70 U/L Final     AST   Date Value Ref Range Status   09/11/2022 28 0 - 45 U/L Final   06/15/2021 34 0 - 45 U/L Final       Lab Results   Component Value Date    WBC 4.7 06/15/2021     Lab Results   Component Value Date    RBC 3.89 06/15/2021     Lab Results   Component Value Date    HGB 8.6 06/15/2021     Lab Results   Component Value Date    HCT 31.0 06/15/2021     Lab Results   Component Value Date    MCV 80 06/15/2021     Lab Results   Component Value Date    MCH 22.1 06/15/2021     Lab Results   Component Value Date    MCHC 27.7 06/15/2021     Lab Results   Component Value Date    RDW 17.3 06/15/2021     Lab Results   Component Value Date     06/15/2021       INR   Date Value Ref Range Status   08/09/2022 1.79 (H) 0.85 - 1.15 Final   06/15/2021 1.58 (H) 0.86 - 1.14 Final        MELD-Na score: 13 at 8/9/2022  8:05 AM  MELD score: 13 at 8/9/2022  8:05 AM  Calculated from:  Serum Creatinine: 0.67 mg/dL (Using min of 1 mg/dL) at 8/9/2022  8:05 AM  Serum Sodium: 146 mmol/L (Using max of 137 mmol/L) at 8/9/2022  8:05 AM  Total Bilirubin: 1.0 mg/dL at 8/9/2022  8:05 AM  INR(ratio): 1.79 at 8/9/2022  8:05 AM  Age: 51 years    Imaging: Reviewed in EHR    Endoscopy: Reviewed in EHR    Independently reviewed labs and imaging.     Assessment/Plan: Mr. 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     Was originally referred for LT evaluation -  given his CHF and low MELD, did not pursue this.     LVEF improved with sobriety, then worsened with stopping BB. Metoprolol resumed, LVEF improved. Much of his CHF was related to PVCs, improved with treatment of this.     He is having chronic diarrhea, could be related to his bowel resection (142 cm distal small bowel resected). Random bx 12/2021 showing mild inactive chronic inflammation.  Colon appeared normal but was congested. Decreased rectal tone noted on exam as well.  Fetal calprotectin was normal.   He is having to take Imodium several times a day and will have fecal incontinence if he misses. Less likely to be bile acid diarrhea since >100 cm were resected, but will trialed cholesytramine and it was not effective. Fatty acid diarrhea also possible. Rechecking infectious studies. No clear medication culprit. Recently established with Dr. Lord for this.      MRI liver 3/2022 to follow up liver nodules without enhancing lesions. Showed patent portal vein, SMV and splenic vein.     Given his improved LVEF, will refer for TIPS evaluation. Discussed he is at higher risk given his history of CHF that is now medically well controlled. The alternative is continuing the current plan and patients with refractory ascites has a high risk of death in the upcoming year. His HE is well controlled currently. 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   - Continue metoprolol 50 mg XL  BID for cardiology - typically BBs are held in refractory ascites, and after SBP, but his metoprolol has been very helpful in improving his LVEF and should be continued  - Continue lactulose and rifaximin  - Continue ciprofloaxin for SBP ppx  - Repeat EGD with RFA of GAVE. Will also set up for IV iron given FARHANA    RTC 3 months in [place]  Yuri Bain MD MS  Hepatology/Liver Transplant  [facility]    Approximately 25 minutes was spent for the visit with 35 minutes of non face-to-face time were spent in review of the patient's medical record on the day of the visit. This included review of previous: clinic visits, hospital records, lab results, imaging studies, and documentation.  The findings from this review are summarized in the above note.

## 2022-10-11 NOTE — CONFIDENTIAL NOTE
MELD-Na score: 15 at 10/11/2022 10:39 AM  MELD score: 15 at 10/11/2022 10:39 AM  Calculated from:  Serum Creatinine: 0.92 mg/dL (Using min of 1 mg/dL) at 10/11/2022 10:39 AM  Serum Sodium: 138 mmol/L (Using max of 137 mmol/L) at 10/11/2022 10:39 AM  Total Bilirubin: 0.8 mg/dL (Using min of 1 mg/dL) at 10/11/2022 10:39 AM  INR(ratio): 2.15 at 10/11/2022 10:39 AM  Age: 52 years    Torsemide 60/60  Eplerenone 50/50

## 2022-10-11 NOTE — NURSING NOTE
"Chief Complaint   Patient presents with     RECHECK     Follow up with cirrhosis     BP 99/65   Pulse 65   Ht 1.905 m (6' 3\")   Wt 117.6 kg (259 lb 4.8 oz)   BMI 32.41 kg/m    Carleen Damon CMA on 10/11/2022 at 11:12 AM    "

## 2022-10-11 NOTE — LETTER
10/11/2022         RE: Blair Oliver  [address] [facility] [address]        Dear Colleague,    Thank you for referring your patient, Blair Oliver, to the [facility] [place]. Please see a copy of my visit note below.    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      Initial History:     Patient states that he was diagnosed with liver disease and cirrhosis in September 2020.  He was admitted to the hospital at that time he was found to have extensive superior mesenteric venous thrombosis, complicated by ischemic bowel and had to undergo an exploratory laparotomy with small bowel resection.  He was placed on Eliquis postoperatively.  During surgery, he was found to have some ascites that was evacuated.     Since then he has had trouble with incisional hernias, and rectus diastasis.  He was seen by his general surgeon in March where he was noted to have a MELD of 18.  He was then sent to [facility] to be seen by surgery for consideration of incisional hernia repair.    Patient complains of episodes of leakage of fluid from his incision that happens intermittently.  This fluid is usually blood tinged but sometimes yellow. He is on 20 mg of furosemide daily, that was started because of his heart failure and his pedal edema.  He has also noted that every 2 to 3 days he has watery bowel movements, up to 12 times a day.  In between there was episodes he has about 2-3 soft bowel movements per day.  He denies any fevers or chills,  abdominal pain, melena, or hematochezia.    His wife is also noted that he is having some memory issues.  He has not had any major episodes of confusion, and has not been admitted to the hospital with hepatic encephalopathy.    Patient denies any prior history of liver disease. He has had abnormal liver enzymes and low platelets as far back as 2011. Past notes from his PCP had also documented excessive alcohol use, as well as, a hospitalization for overdose of Vicodin.     First paracentesis 6/26/2021 - 600 ml removed - 231 WBC, TP 1.4, Albumin 0.8.     Recent EGD with bx negative for celiac. Colon bx showing chronic inactive colitis. Still had loose stools after colonoscopy prep, not c/w overflow.  We have tried a several week course of rifaximin for encephalopathy, this did not help his diarrhea.  Fecal calprotectin was normal when checked December 2021    Liver MRI March 2, 2022 showing cirrhosis without any suspicious enhancing lesions.  Previously demonstrated hyperintense nonenhancing lesions are not well visualized in the current exam likely due to motion.  Also showed new small to moderate volume ascites.     Started having issues with ascites again at the time of his last visit. MRI liver without evidence of PVT or HCC.  Sent to the hospital after his last visit 6/2022 for bradycardia Metoprolol was stopped due to bradycardia, EKG with frequent PVCs. TTE 6/2022 with improvement in his LVEF. 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 Repeat TTE 7/2022 showing LVEF 40-45%    Saw Dr. Lord for diarrhea - stopped cholestyramine because it wasn't helpful. Now back on high dose imodium.     Interval Events:  - Getting weekly gabbi, taking torsemide 60 mg BID and eplerenone 50 mg BID. Back on Metoprolol 50 mg BID - LVEF improved showing moderate LV dilation but LVEF improved back to 53%, or 55-60%. His cardiologists at [facility] is having him wear a Zio Patch soon with  plan for cardiac MRI after this  - Hospitalized 9/23-27 for abdominal pain and confusion, found to have SBP. Is on cipro ppx. 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    PMHx:  Past Medical History:   Diagnosis Date     Alcoholic cirrhosis of liver with ascites (H)      Chronic systolic heart failure (H)      Diabetes (H)     Type 2 DM, Insulin Use.      Hypertension    -- Type II diabetes Mellitus  -- Hypertension  -- Hyperlipidemia  -- CHF  -- GISELLE  -- SMV thrombus    PSHx:  Past Surgical History:   Procedure Laterality Date     COLONOSCOPY N/A 12/9/2021    Procedure: COLONOSCOPY, WITH BIOPSY, WITH CLIPS;  Surgeon: Sweetie Bain MD;  [place] [facility] [place]     ESOPHAGOSCOPY, GASTROSCOPY, DUODENOSCOPY (EGD), COMBINED N/A 12/9/2021    Procedure: ESOPHAGOGASTRODUODENOSCOPY, WITH BIOPSY;  Surgeon: Sweetie Bain MD;  [place] [facility] [place]      -- Exploratory laparotomy with small bowel resection 9/2020    FamHx:  Family History   Problem Relation Age of Onset     Substance Abuse Maternal Grandmother      Substance Abuse Brother      No family history of liver disease, liver cancer    SocHx:  Social History     Socioeconomic History     Marital status:      Spouse name: Not on file     Number of children: Not on file     Years of education: Not on file     Highest education level: Not on file   Occupational History     Not on file   Tobacco Use     Smoking status: Former     Types: Cigarettes     Smokeless tobacco: Never   Substance and Sexual Activity     Alcohol use: Not Currently     Comment: Quit 9/21/2020     Drug use: Never     Sexual activity: Not on file   Other Topics Concern     Parent/sibling w/ CABG, MI or angioplasty before 65F 55M? Not Asked   Social History Narrative     Not on file     Social Determinants of Health     Financial Resource Strain:  Not on file   Food Insecurity: Not on file   Transportation Needs: Not on file   Physical Activity: Not on file   Stress: Not on file   Social Connections: Not on file   Intimate Partner Violence: Not on file   Housing Stability: Not on file   Patient previously worked with with [email or web address].  He is currently on disability due to pain from his hernias and discomfort.  Admits to significant alcohol use.  Used to drink about a liter of whiskey per day for about 20 years.  Denies any hospitalizations for intoxication or withdrawal, denies any DUIs, denies any prior treatments.  Last drink was 9/21/2020.  Denies any cravings, and is not in any treatments program at the moment.  Denies any tobacco use, denies any IV drug use.    Medications:  Current Outpatient Medications   Medication     apixaban ANTICOAGULANT (ELIQUIS) 5 MG tablet     aspirin (ASA) 81 MG chewable tablet     ciprofloxacin (CIPRO) 500 MG tablet     DULoxetine (CYMBALTA) 60 MG capsule     eplerenone (INSPRA) 25 MG tablet     eplerenone (INSPRA) 25 MG tablet     ferrous gluconate (FERGON) 324 (38 Fe) MG tablet     furosemide (LASIX) 20 MG tablet     furosemide (LASIX) 80 MG tablet     gabapentin (NEURONTIN) 600 MG tablet     insulin glargine (LANTUS PEN) 100 UNIT/ML pen     insulin pen needle (B-D U/F) 31G X 5 MM miscellaneous     lactulose (CHRONULAC) 10 GM/15ML solution     lactulose 20 GM/30ML SOLN     loperamide (IMODIUM A-D) 1 MG/7.5ML     loperamide (IMODIUM) 2 MG capsule     metFORMIN (GLUCOPHAGE) 1000 MG tablet     metoprolol succinate ER (TOPROL XL) 50 MG 24 hr tablet     Multiple Vitamins-Minerals (SUPER THERA DAMARIS M) TABS     omeprazole (PRILOSEC) 20 MG DR capsule     ONETOUCH ULTRA test strip     potassium chloride ER (KLOR-CON M) 20 MEQ CR tablet     rifaximin (XIFAXAN) 550 MG TABS tablet     simvastatin (ZOCOR) 20 MG tablet     torsemide (DEMADEX) 20 MG tablet     No current facility-administered medications for this visit.     No OTCs,  herbals    Allergies:  Allergies   Allergen Reactions     Minocycline Hives       Objective:  There were no vitals taken for this visit.  Constitutional: pleasant man in NAD  Eyes: non icteric  Respiratory: Normal respiratory excursion   MSK: normal range of motion of visualized extremities  Abd: Midline hernia present, reducible, more distended than in the past  Skin: No jaundice  Psychiatric: normal mood and orientation    Labs:  Last Comprehensive Metabolic Panel:  Sodium   Date Value Ref Range Status   09/11/2022 139 133 - 144 mmol/L Final   06/15/2021 136 133 - 144 mmol/L Final     Potassium   Date Value Ref Range Status   09/11/2022 4.0 3.4 - 5.3 mmol/L Final   06/15/2021 4.1 3.4 - 5.3 mmol/L Final     Chloride   Date Value Ref Range Status   09/11/2022 110 (H) 94 - 109 mmol/L Final   06/15/2021 112 (H) 94 - 109 mmol/L Final     Carbon Dioxide   Date Value Ref Range Status   06/15/2021 25 20 - 32 mmol/L Final     Carbon Dioxide (CO2)   Date Value Ref Range Status   09/11/2022 20 20 - 32 mmol/L Final     Anion Gap   Date Value Ref Range Status   09/11/2022 9 3 - 14 mmol/L Final   06/15/2021 <1 (L) 3 - 14 mmol/L Final     Glucose   Date Value Ref Range Status   09/11/2022 225 (H) 70 - 99 mg/dL Final   06/15/2021 123 (H) 70 - 99 mg/dL Final     Urea Nitrogen   Date Value Ref Range Status   09/11/2022 16 7 - 30 mg/dL Final   06/15/2021 8 7 - 30 mg/dL Final     Creatinine   Date Value Ref Range Status   09/11/2022 0.78 0.66 - 1.25 mg/dL Final   06/15/2021 0.63 (L) 0.66 - 1.25 mg/dL Final     GFR Estimate   Date Value Ref Range Status   09/11/2022 >90 >60 mL/min/1.73m2 Final     Comment:     Effective December 21, 2021 eGFRcr in adults is calculated using the 2021 CKD-EPI creatinine equation which includes age and gender (Jenn hilliard al., NEJ, DOI: 10.1056/TSHUgk9914610)   06/15/2021 >90 >60 mL/min/[1.73_m2] Final     Comment:     Non  GFR Calc  Starting 12/18/2018, serum creatinine based estimated GFR  (eGFR) will be   calculated using the Chronic Kidney Disease Epidemiology Collaboration   (CKD-EPI) equation.       Calcium   Date Value Ref Range Status   09/11/2022 8.4 (L) 8.5 - 10.1 mg/dL Final   06/15/2021 8.9 8.5 - 10.1 mg/dL Final     Bilirubin Total   Date Value Ref Range Status   09/11/2022 0.9 0.2 - 1.3 mg/dL Final   06/15/2021 0.8 0.2 - 1.3 mg/dL Final     Alkaline Phosphatase   Date Value Ref Range Status   09/11/2022 161 (H) 40 - 150 U/L Final   06/15/2021 126 40 - 150 U/L Final     ALT   Date Value Ref Range Status   09/11/2022 35 0 - 70 U/L Final   06/15/2021 36 0 - 70 U/L Final     AST   Date Value Ref Range Status   09/11/2022 28 0 - 45 U/L Final   06/15/2021 34 0 - 45 U/L Final       Lab Results   Component Value Date    WBC 4.7 06/15/2021     Lab Results   Component Value Date    RBC 3.89 06/15/2021     Lab Results   Component Value Date    HGB 8.6 06/15/2021     Lab Results   Component Value Date    HCT 31.0 06/15/2021     Lab Results   Component Value Date    MCV 80 06/15/2021     Lab Results   Component Value Date    MCH 22.1 06/15/2021     Lab Results   Component Value Date    MCHC 27.7 06/15/2021     Lab Results   Component Value Date    RDW 17.3 06/15/2021     Lab Results   Component Value Date     06/15/2021       INR   Date Value Ref Range Status   08/09/2022 1.79 (H) 0.85 - 1.15 Final   06/15/2021 1.58 (H) 0.86 - 1.14 Final        MELD-Na score: 13 at 8/9/2022  8:05 AM  MELD score: 13 at 8/9/2022  8:05 AM  Calculated from:  Serum Creatinine: 0.67 mg/dL (Using min of 1 mg/dL) at 8/9/2022  8:05 AM  Serum Sodium: 146 mmol/L (Using max of 137 mmol/L) at 8/9/2022  8:05 AM  Total Bilirubin: 1.0 mg/dL at 8/9/2022  8:05 AM  INR(ratio): 1.79 at 8/9/2022  8:05 AM  Age: 51 years    Imaging: Reviewed in EHR    Endoscopy: Reviewed in EHR    Independently reviewed labs and imaging.     Assessment/Plan: Mr. 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     Was originally referred for LT evaluation - given his CHF and low MELD, did not pursue this.     LVEF improved with sobriety, then worsened with stopping BB. Metoprolol resumed, LVEF improved. Much of his CHF was related to PVCs, improved with treatment of this.     He is having chronic diarrhea, could be related to his bowel resection (142 cm distal small bowel resected). Random bx 12/2021 showing mild inactive chronic inflammation.  Colon appeared normal but was congested. Decreased rectal tone noted on exam as well.  Fetal calprotectin was normal.   He is having to take Imodium several times a day and will have fecal incontinence if he misses. Less likely to be bile acid diarrhea since >100 cm were resected, but will trialed cholesytramine and it was not effective. Fatty acid diarrhea also possible. Rechecking infectious studies. No clear medication culprit. Recently established with Dr. Lord for this.      MRI liver 3/2022 to follow up liver nodules without enhancing lesions. Showed patent portal vein, SMV and splenic vein.     Given his improved LVEF, will refer for TIPS evaluation. Discussed he is at higher risk given his history of CHF that is now medically well controlled. The alternative is continuing the current plan and patients with refractory ascites has a high risk of death in the upcoming year. His HE is well controlled currently. 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   - Continue metoprolol 50 mg XL  BID for cardiology - typically BBs are held in refractory  ascites, and after SBP, but his metoprolol has been very helpful in improving his LVEF and should be continued  - Continue lactulose and rifaximin  - Continue ciprofloaxin for SBP ppx  - Repeat EGD with RFA of GAVE. Will also set up for IV iron given FARHANA    RTC 3 months in [place]    Sweetie Bain MD MS  Hepatology/Liver Transplant  [facility]    Approximately 25 minutes was spent for the visit with 35 minutes of non face-to-face time were spent in review of the patient's medical record on the day of the visit. This included review of previous: clinic visits, hospital records, lab results, imaging studies, and documentation.  The findings from this review are summarized in the above note.

## 2022-10-12 NOTE — PROGRESS NOTES
Action 10.12.22 jm   Action Taken Received Inbasket imaging request from Zoe. Called [facility] image file room and requested CT's and US from 7.18.22. to present.    Imaging resolved to Pacs. Sent Zoe a message.

## 2022-10-13 NOTE — TELEPHONE ENCOUNTER
Screening Questions  BLUE  KIND OF PREP RED  LOCATION [review exclusion criteria] GREEN  SEDATION TYPE        Y Are you active on mychart?       Sweetie Bain MD in  HEPATOLOGY Ordering/Referring Provider?        [facility] What type of coverage do you have?      N Have you had a positive covid test in the last 90 days?     38.3 1. BMI  [BMI 40+ - review exclusion criteria]    Y  2. Are you able to give consent for your medical care? [IF NO,RN REVIEW]        N  3. Are you taking any prescription pain medications on a routine schedule?        3a. EXTENDED PREP What kind of prescription?     N 4. Do you have any chemical dependencies such as alcohol, street drugs, or methadone?    N 5. Do you have any history of post-traumatic stress syndrome, severe anxiety or history of psychosis?      **If yes 3- 5 , please schedule with MAC sedation.**          IF YES TO ANY 6 - 10 - HOSPITAL SETTING ONLY.     N 6.   Do you need assistance transferring?     N 7.   Have you had a heart or lung transplant?    N 8.   Are you currently on dialysis?   N 9.   Do you use daily home oxygen?   N 10. Do you take nitroglycerin?   10a.  If yes, how often?     11. [FEMALES]   Are you currently pregnant?    11a.  If yes, how many weeks? [ Greater than 12 weeks, OR NEEDED]    N 12. Do you have Pulmonary Hypertension? *NEED PAC APPT AT [facility]*     N 13. [review exclusion criteria]  Do you have any implantable devices in your body (pacemaker, defib, LVAD)?    N 14. In the past 6 months, have you had any heart related issues including cardiomyopathy or heart attack?     14a.  If yes, did it require cardiac stenting if so when?     N 15. Have you had a stroke or Transient ischemic attack (TIA - aka  mini stroke ) within 6 months?      Y 16. Do you have mod to severe Obstructive Sleep Apnea?  [Hospital only - Ok at [place]]    N 17. Do you have SEVERE AND UNCONTROLLED asthma? *NEED PAC APPT AT [facility]*     Y 18. Are you currently taking any blood  "thinners?     18a. If yes, inform patient to \"follow up w/ ordering provider for bridging instructions.\"    N 19. Do you take the medication Phentermine?    19a. If yes, \"Hold for 7 days before procedure.  Please consult your prescribing provider if you have questions about holding this medication.\"     N  20. Do you have chronic kidney disease?      Y  21. Do you have a diagnosis of diabetes?     N  22. On a regular basis do you go 3-5 days between bowel movements?      23. Preferred LOCAL Pharmacy for Pre Prescription    [ LIST ONLY ONE PHARMACY]     [id number] - [place], [place] - [address] AT [facility]        - CLOSING REMINDERS -    Informed patient they will need an adult    Cannot take any type of public or medical transportation alone    Conscious Sedation- Needs  for 6 hours after the procedure       MAC/General-Needs  for 24 hours after procedure    Pre-Procedure Covid test to be completed [[facility] PCR Testing Required]    Confirmed Nurse will call to complete assessment       - SCHEDULING DETAILS -     Jones  Surgeon    12/1/22  Date of Procedure  Upper Endoscopy [EGD]  Type of Procedure Scheduled   [facility] Location  Which Colonoscopy Prep was Sent?     CS Sedation Type     N PAC / Pre-op Required         Additional comments:            "

## 2022-10-13 NOTE — PROGRESS NOTES
Attempted to reach patient for check in, no answer, message left requesting call back, number provided. [employer] message sent as well.    Sharon Owusu, RN Care Coordinator  [facility]  Hepatology Clinic/Specialty Program

## 2022-10-19 NOTE — PROGRESS NOTES
Attempted to reach patient for check in, no answer, message left requesting call back, number provided.    IV iron orders sent to [facility] due to [facility] schedulin out until November. Left scheduling information in voicemail.    Sharon Owusu, RN Care Coordinator  [facility]  Hepatology Clinic/Specialty Program

## 2022-10-20 NOTE — TELEPHONE ENCOUNTER
[facility] Call Center    Phone Message    May a detailed message be left on voicemail: yes     Reason for Call: Other: Karen needs  clarification on albumin orders for pt. Please call back at [phone]     Action Taken: Other: hepa    Travel Screening: Not Applicable

## 2022-10-20 NOTE — TELEPHONE ENCOUNTER
Called and spoke with Karen with IR at [facility], albumin replacement orders clarified. No further action needed at this time.    Sharon Owusu, RN Care Coordinator  [facility]  Hepatology Clinic/Specialty Program

## 2022-10-25 NOTE — PROGRESS NOTES
Attempted to reach patient for check in, no answer, message left requesting call back, number provided.    Sharon Owusu, RN Care Coordinator  [facility]  Hepatology Clinic/Specialty Program

## 2022-10-26 NOTE — PROGRESS NOTES
Spoke with pt for check in. Pt had paracentesis appt today with 5L output, scheduled for next para neext week. Scheduled for TIPS consult visit with IR tomorrow. Repeat EGD with Dr. Jones on 12/1. cheduled for first iron infusion treatment on Monday 10/31 - will have Venofer 300mg x3 infusions at [facility].     Pt states he 'feels better' than he did at last clinic visit with Dr. Bain, reports that he has more energy most days, but states that his diarrhea has been worse. Is currently taking lactulose TID and rifaximin 550mg BID with 10-12 watery stools daily. States that he's been drinking more fluids and taking imodium (prescribed by Dr. Bain due to chronic diarrhea) but states the imodium 'only helps sometimes'. Pt's spouse states that pt has been irritable and has had several episodes of angry outbursts over the last few days. Advised pt and spouse to maintain current lactulose dosing, head to ER if HE symptoms worsen. Pt and spouse verbalized agreement.    Will plan to check in with pt early next week.    Sharon Owusu, RN Care Coordinator  [facility]  Hepatology Clinic/Specialty Program

## 2022-10-27 NOTE — PATIENT INSTRUCTIONS
Blair you have had your virtual visit today with Dr Bolton IR/Vascular to discuss TIPS procedure.     Plan:    We will contact you for scheduling of your TIPS possible paracentesis procedure.     You will need a PAC's appointment within 30 days of the procedure to clear you and this is your pre op appointment.      Please don't hesitate to reach out with any questions or concerns.     DEEPTI De Paz RN, BSN  Interventional Radiology Nurse Coordinator   Phone:  [phone]

## 2022-10-27 NOTE — NURSING NOTE
Chief Complaint   Patient presents with     Consult       Patient confirms medications and allergies are accurate via patients echeck in completion, and or denies any changes since last reviewed/verified.     Ann Watkins, Virtual Facilitator

## 2022-10-27 NOTE — PROGRESS NOTES
INTERVENTIONAL RADIOLOGY CONSULTATION    Name: Blair Oliver  Age: 52 year old   Referring Physician: Dr. Bain   REASON FOR REFERRAL: Evaluation for TIPS    HPI: Mr. Oliver is a 52-year-old male who is seen in consultation at the request of Dr. Sweetie Bingham regarding potential TIPS procedure.  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  Patient is undergoing paracentesis weekly with most recent volumes of 4 to 5 L removed.  Volumes were 8 L in early summer though this decreased with medical management.  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  Patient states that he has never been hospitalized for any major GI bleed.  He does have a follow-up endoscopy with Dr. Jones scheduled on December 1.    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  Since that time, patient has complained of painful incisional hernias and has had some surgical evaluation though this has not been repaired.  Patient did see Dr. Kendrick in May 2021 regarding his incisional hernias and he recommended against surgical repair due to the patient's cirrhosis and meld of approximately 18 at that time.  He did have MRI of the liver in March 2022 which showed that the portal vein, SMV and splenic vein were patent. Patient remains on Eliquis for this.     Patient describes 2 episodes of significant hepatic encephalopathy.  He states the last time was not as bad though the first time, he admitted to the hospital for management  in June 2022.  Otherwise, is controlled on rifaximin and lactulose.    Patient's medical history also significant for hypertension, hyperlipidemia, type 2 diabetes mellitus, obstructive sleep apnea and congestive heart failure.  He did have an echocardiogram on 10/10/2022 which showed global and regional left ventricular function was low normal with EF of 55%.  There was moderate left ventricular dilation.  Right ventricular function, chamber size, wall motion and thickness were normal.    PAST MEDICAL HISTORY:   Past Medical History:   Diagnosis Date     Alcoholic cirrhosis of liver with ascites (H)      Chronic systolic heart failure (H)      Diabetes (H)     Type 2 DM, Insulin Use.      Hypertension        PAST SURGICAL HISTORY:   Past Surgical History:   Procedure Laterality Date     COLONOSCOPY N/A 12/9/2021    Procedure: COLONOSCOPY, WITH BIOPSY, WITH CLIPS;  Surgeon: Sweetie Bain MD;  Location: [facility] OR     ESOPHAGOSCOPY, GASTROSCOPY, DUODENOSCOPY (EGD), COMBINED N/A 12/9/2021    Procedure: ESOPHAGOGASTRODUODENOSCOPY, WITH BIOPSY;  Surgeon: Sweetie Bain MD;  Location: [facility] OR       FAMILY HISTORY:   Family History   Problem Relation Age of Onset     Substance Abuse Maternal Grandmother      Substance Abuse Brother        SOCIAL HISTORY:   Social History     Tobacco Use     Smoking status: Former     Types: Cigarettes     Smokeless tobacco: Never   Substance Use Topics     Alcohol use: Not Currently     Comment: Quit 9/21/2020       PROBLEM LIST:   Patient Active Problem List    Diagnosis Date Noted     Diabetes mellitus, type 2 (H) 08/09/2022     Priority: Medium     Morbid obesity (H) 08/09/2022     Priority: Medium     Thrombosis mesenteric vessel (H) 06/15/2022     Priority: Medium     Anemia 06/13/2022     Priority: Medium     Bradycardia 06/13/2022     Priority: Medium     Alcoholic cirrhosis of liver with ascites (H) 06/13/2022     Priority: Medium     Fatigue, unspecified type 06/13/2022      Priority: Medium       MEDICATIONS:   Prescription Medications as of 10/26/2022       Rx Number Disp Refills Start End Last Dispensed Date Next Fill Date Owning Pharmacy    apixaban ANTICOAGULANT (ELIQUIS) 5 MG tablet    10/9/2020        Sig: Take 5 mg by mouth 2 times daily     Class: Historical    Route: Oral    aspirin (ASA) 81 MG chewable tablet            Sig: Take 81 mg by mouth daily    Class: Historical    Route: Oral    ciprofloxacin (CIPRO) 500 MG tablet  90 tablet 0 9/28/2022    [facility]/pharmacy #[id number] - [place], [place] - [address]    Sig: Take 1 tablet (500 mg) by mouth daily    Class: E-Prescribe    Route: Oral    DULoxetine (CYMBALTA) 60 MG capsule            Sig: Take 60 mg by mouth every evening    Class: Historical    Route: Oral    eplerenone (INSPRA) 25 MG tablet  360 tablet 1 10/11/2022    [facility]/pharmacy #[id number] - [place], [place] - [address]    Sig: Take 2 tablets (50 mg) by mouth 2 times daily    Class: E-Prescribe    Route: Oral    eplerenone (INSPRA) 25 MG tablet  180 tablet 3 5/10/2022 8/8/2022   [facility]/pharmacy #[id number]  [place], [place] - [address]    Sig: Take 2 tablets (50 mg) by mouth daily    Class: E-Prescribe    Route: Oral    ferrous gluconate (FERGON) 324 (38 Fe) MG tablet  90 tablet 3 9/13/2022 9/13/2023   [facility]/pharmacy #[id number]  [place] - [address]    Sig: Take 1 tablet (324 mg) by mouth daily (with breakfast)    Class: E-Prescribe    Route: Oral    gabapentin (NEURONTIN) 600 MG tablet    2/19/2021        Sig: Take 900 mg by mouth 3 times daily     Class: Historical    Route: Oral    insulin glargine (LANTUS PEN) 100 UNIT/ML pen            Sig: Inject 65 Units Subcutaneous every morning    Class: Historical    Route: Subcutaneous    insulin pen needle (B-D U/F) 31G X 5 MM miscellaneous    1/12/2021        Sig: AS DIRECTED. BD UF MINI PEN NEEDLE [id number]: USE  ONCE DAILY    Class: Historical    lactulose (CHRONULAC) 10 GM/15ML solution    7/22/2022        Sig: Take 20 g by mouth    Class: Historical    Route: Oral    lactulose 20 GM/30ML SOLN  2838 mL 11 8/24/2022    [facility]/pharmacy #[id number] - [place], [place] - [address]    Sig: Take 30 mLs (20 g) by mouth 3 times daily    Class: E-Prescribe    Route: Oral    loperamide (IMODIUM A-D) 1 MG/7.5ML  237 mL 1 9/15/2022    [facility]/pharmacy #[id number] - [place], [place] - [address]    Sig: Take 15 mLs (2 mg) by mouth 4 times daily as needed for diarrhea    Class: E-Prescribe    Route: Oral    loperamide (IMODIUM) 2 MG capsule            Sig: Take 12 mg by mouth 2 times daily    Class: Historical    Route: Oral    metFORMIN (GLUCOPHAGE) 1000 MG tablet    9/16/2020        Sig: Take 1,000 mg by mouth 2 times daily     Class: Historical    Route: Oral    metoprolol succinate ER (TOPROL XL) 50 MG 24 hr tablet    10/3/2022        Sig: Take 50 mg by mouth 2 times daily    Class: Historical    Route: Oral    Multiple Vitamins-Minerals (SUPER THERA DAMARIS M) TABS    9/29/2020        Sig: Take 1 tablet by mouth daily    Class: Historical    Route: Oral    omeprazole (PRILOSEC) 20 MG DR capsule    4/15/2021        Sig: Take 20 mg by mouth every evening    Class: Historical    Route: Oral    ONETOUCH ULTRA test strip    4/10/2021        Sig: PLEASE SEE ATTACHED FOR DETAILED DIRECTIONS    Class: Historical    potassium chloride ER (KLOR-CON M) 20 MEQ CR tablet  60 tablet 5 10/11/2022    [facility]/pharmacy #[id number] - [place], [place] - [address]    Sig: Take 1 tablet (20 mEq) by mouth 2 times daily    Class: E-Prescribe    Route: Oral    rifaximin (XIFAXAN) 550 MG TABS tablet  60 tablet 11 8/10/2022        Sig: Take 1 tablet (550 mg) by mouth 2 times daily    Class: Local Print    Route: Oral    Prior authorization: Approved    simvastatin (ZOCOR) 20 MG tablet    2/17/2021         Sig: Take 20 mg by mouth daily     Class: Historical    Route: Oral    torsemide (DEMADEX) 20 MG tablet  540 tablet 0 9/13/2022 12/12/2022   [facility]/pharmacy #[id number] - [place], [place] - [address]    Sig: Take 3 tablets (60 mg) by mouth 2 times daily for 90 days    Class: E-Prescribe    Route: Oral          ALLERGIES:   Minocycline    ROS:  As above.    Physical Examination: Video visit  VITALS:   There were no vitals taken for this visit.  GENERAL: Healthy, alert and no distress  SKIN: Visible skin clear.  No jaundice.  No significant rash, abnormal pigmentation or lesions.  PSYCH: Mentation appears normal, affect normal/bright, judgement and insight intact, normal speech and appearance well-groomed.  EYES: Eyes grossly normal to inspection.  Anicteric.  No discharge or erythema, or obvious scleral/conjunctival abnormalities.  RESP: No audible wheeze, cough, or visible cyanosis. No visible retractions or increased work of breathing.   NEURO: Cranial nerves grossly intact. Mentation and speech appropriate for age.  ABDOMEN: Abdomen does not appear overly distended or tense due to ascites.  There are prominent bulging right and left lower abdominal incisional hernias.      Labs:    BMP RESULTS:  Lab Results   Component Value Date     10/11/2022     06/15/2021    POTASSIUM 3.8 10/11/2022    POTASSIUM 4.0 09/11/2022    POTASSIUM 4.1 06/15/2021    CHLORIDE 106 10/11/2022    CHLORIDE 110 (H) 09/11/2022    CHLORIDE 112 (H) 06/15/2021    CO2 23 10/11/2022    CO2 20 09/11/2022    CO2 25 06/15/2021    ANIONGAP 9 10/11/2022    ANIONGAP 9 09/11/2022    ANIONGAP <1 (L) 06/15/2021     (H) 10/11/2022     (H) 09/11/2022     (H) 06/15/2021    BUN 10.9 10/11/2022    BUN 16 09/11/2022    BUN 8 06/15/2021    CR 0.92 10/11/2022    CR 0.63 (L) 06/15/2021    GFRESTIMATED >90 10/11/2022    GFRESTIMATED >90 06/15/2021    GFRESTBLACK >90 06/15/2021    DANIELLE 8.4 (L) 10/11/2022     DANIELLE 8.9 06/15/2021        CBC RESULTS:  Lab Results   Component Value Date    WBC 5.7 10/11/2022    WBC 4.7 06/15/2021    RBC 3.28 (L) 10/11/2022    RBC 3.89 (L) 06/15/2021    HGB 8.7 (L) 10/11/2022    HGB 8.6 (L) 06/15/2021    HCT 28.8 (L) 10/11/2022    HCT 31.0 (L) 06/15/2021    MCV 88 10/11/2022    MCV 80 06/15/2021    MCH 26.5 10/11/2022    MCH 22.1 (L) 06/15/2021    MCHC 30.2 (L) 10/11/2022    MCHC 27.7 (L) 06/15/2021    RDW 17.7 (H) 10/11/2022    RDW 17.3 (H) 06/15/2021     (L) 10/11/2022     (L) 06/15/2021       INR/PTT:  Lab Results   Component Value Date    INR 2.15 (H) 10/11/2022    INR 1.58 (H) 06/15/2021     MELD 10/11/2022 = 15 (creatinine 0.92, T bili 0.8, INR 2.15)    Diagnostic studies: Personally reviewed and interpreted  Contrast-enhanced CT abdomen and pelvis [facility] 7/18/2022:  1.  Small cirrhotic liver.  Large ascites.  2.  Main portal vein, right and left portal vein, splenic vein, superior mesenteric vein and hepatic veins are patent.  3.  Right ventricular and atrial size are normal.    Limited abdominal ultrasound with Doppler complete 6/13/2022:  1.  Cirrhotic liver.  Large ascites.  2.  Patent main portal vein.  Patent antegrade right and left portal veins.  Hepatic veins patent.    Assessment/Plan:  EtOH cirrhosis complicated by ascites, refractory to medical management.  Patient is a candidate for TIPS procedure.  The major factors that need to be considered are:  1. Risk for post TIPS encephalopathy, especially given prior significant encephalopathy requiring hospitalization.  This is controlled currently though will need to be monitored closely following TIPS.  2. Risk for cardiac decompensation post TIPS.  Has h/o CHF though most recent echo looked good and I see no history of right sided heart failure. Therefore likelihood is low but will obtain intraprocedural right atrial pressures prior to TIPS placement.     I had a thorough discussion with Mr. Oliver  regarding the TIPS procedure, its indications, alternatives, risks benefits.  This included a discussion of bleeding due to transcapsular puncture which can be life-threatening though it is relatively rare, infection/sepsis, progressive liver failure, and lower extremity edema.  Patient understands that while TIPS is generally very successful for reducing both the volume of ascites and frequency of paracentesis, that this can take several months to occur and occasionally may not respond as expected.    Following the above discussion, Mr. Oliver does wish to proceed.  His TIPS will be performed under general anesthesia with plan for post TIPS in addition though patient may discharge same day if he is doing well.    We will need the following preoperatively:  - PAC visit. Obtain TEG during PAC visit given elevated INR.   - At least 48-hour hold of his apixaban.  - Prescription for 20 to 30 mmHg graded compression stockings is provided for use postoperatively.    Michael Bolton MD  Interventional Radiology and Vascular Imaging Attending  Department of Radiology  [facility]              Alcoholic cirrhosis of liver with ascites (H)    - IR Transven Intrahepatic Portosyst Shunt; Future  - IR Paracentesis; Future  - Asymptomatic COVID-19 Virus (Coronavirus) by PCR Nasopharyngeal; Future     Review of the result(s) of each unique test - Contrast-enhanced CT scan abdomen and pelvis [facility] 7/18/2022.  Limited abdominal ultrasound with Doppler 6/13/2022.  Independent interpretation of a test performed by another physician/other qualified health care professional (not separately reported) - Contrast-enhanced CT scan abdomen and pelvis [facility] 7/18/2022.  Limited abdominal ultrasound with Doppler 6/13/2022                  Video-Visit Details     Type of service:  Video Visit     Video Start and End Time:Video start time 9:36 AM.  Video end time 10:10 AM.    Originating Location  (pt. Location): Home     Distant Location (provider location):  [facility] [place]      Platform used for Video Visit: Jacob SANTOYO  Patient Care Team:  Bubba Ovalle as PCP - General (Internal Medicine)  Abner Kendrick MD as Assigned Surgical Provider  Aron Thompson MD as Assigned Gastroenterology Provider  Michael Lord MD as MD (Gastroenterology)  Michael Lord MD as Assigned PCP  Sharon Owusu RN as Specialty Care Coordinator  ARON THOMPSON

## 2022-10-27 NOTE — PROGRESS NOTES
"[facility] Vascular      Type of Visit: Virtual: [employer]    Patient is here for a new visit to discuss alcoholilc cirrhosis of liver with ascites.     Vitals - Patient Reported  Weight (Patient Reported): 115.7 kg (255 lb)  Height (Patient Reported): 190.5 cm (6' 3\")  BMI (Based on Pt Reported Ht/Wt): 31.87  Pain Score: Severe Pain (6)  Pain Loc:  (Abdomen - hernias)    Patient states is currently in the [facility]: Yes    Questions patient would like addressed today are: Patient verbalized no questions/concerns, this has been communicated to the provider.     Refills are needed: No    How would you like to obtain your AVS? Monroe Watkins  "

## 2022-10-27 NOTE — LETTER
10/27/2022       RE: Blair Oliver  [address] [facility] [address]     Dear Colleague,    Thank you for referring your patient, Blair Oliver, to the [facility] VASCULAR [facility]IN[facility] [place] at [facility]. Please see a copy of my visit note below.      INTERVENTIONAL RADIOLOGY CONSULTATION    Name: Blair Oliver  Age: 52 year old   Referring Physician: Dr. Bain   REASON FOR REFERRAL: Evaluation for TIPS    HPI: Mr. Oliver is a 52-year-old male who is seen in consultation at the request of Dr. Sweetie Bingham regarding potential TIPS procedure.  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  Patient is undergoing paracentesis weekly with most recent volumes of 4 to 5 L removed.  Volumes were 8 L in early summer though this decreased with medical management.  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  Patient states that he has never been hospitalized for any major GI bleed.  He does have a follow-up endoscopy with Dr. Jones scheduled on December 1.    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  Since that time, patient has complained of painful incisional hernias and has had some surgical evaluation though this has not been repaired.  Patient did see Dr. Kendrick in May 2021 regarding his incisional hernias and he recommended against surgical repair due to the patient's cirrhosis and meld of approximately 18 at that time.  He did have  MRI of the liver in March 2022 which showed that the portal vein, SMV and splenic vein were patent. Patient remains on Eliquis for this.     Patient describes 2 episodes of significant hepatic encephalopathy.  He states the last time was not as bad though the first time, he admitted to the hospital for management in June 2022.  Otherwise, is controlled on rifaximin and lactulose.    Patient's medical history also significant for hypertension, hyperlipidemia, type 2 diabetes mellitus, obstructive sleep apnea and congestive heart failure.  He did have an echocardiogram on 10/10/2022 which showed global and regional left ventricular function was low normal with EF of 55%.  There was moderate left ventricular dilation.  Right ventricular function, chamber size, wall motion and thickness were normal.    PAST MEDICAL HISTORY:   Past Medical History:   Diagnosis Date     Alcoholic cirrhosis of liver with ascites (H)      Chronic systolic heart failure (H)      Diabetes (H)     Type 2 DM, Insulin Use.      Hypertension        PAST SURGICAL HISTORY:   Past Surgical History:   Procedure Laterality Date     COLONOSCOPY N/A 12/9/2021    Procedure: COLONOSCOPY, WITH BIOPSY, WITH CLIPS;  Surgeon: Sweetie Bain MD;  Location: [facility] OR     ESOPHAGOSCOPY, GASTROSCOPY, DUODENOSCOPY (EGD), COMBINED N/A 12/9/2021    Procedure: ESOPHAGOGASTRODUODENOSCOPY, WITH BIOPSY;  Surgeon: Sweetie Bain MD;  Location: [facility] OR       FAMILY HISTORY:   Family History   Problem Relation Age of Onset     Substance Abuse Maternal Grandmother      Substance Abuse Brother        SOCIAL HISTORY:   Social History     Tobacco Use     Smoking status: Former     Types: Cigarettes     Smokeless tobacco: Never   Substance Use Topics     Alcohol use: Not Currently     Comment: Quit 9/21/2020       PROBLEM LIST:   Patient Active Problem List    Diagnosis Date Noted     Diabetes mellitus, type 2 (H) 08/09/2022     Priority: Medium     Morbid obesity (H) 08/09/2022      Priority: Medium     Thrombosis mesenteric vessel (H) 06/15/2022     Priority: Medium     Anemia 06/13/2022     Priority: Medium     Bradycardia 06/13/2022     Priority: Medium     Alcoholic cirrhosis of liver with ascites (H) 06/13/2022     Priority: Medium     Fatigue, unspecified type 06/13/2022     Priority: Medium       MEDICATIONS:   Prescription Medications as of 10/26/2022       Rx Number Disp Refills Start End Last Dispensed Date Next Fill Date Owning Pharmacy    apixaban ANTICOAGULANT (ELIQUIS) 5 MG tablet    10/9/2020        Sig: Take 5 mg by mouth 2 times daily     Class: Historical    Route: Oral    aspirin (ASA) 81 MG chewable tablet            Sig: Take 81 mg by mouth daily    Class: Historical    Route: Oral    ciprofloxacin (CIPRO) 500 MG tablet  90 tablet 0 9/28/2022    [facility]pharmacy #[id number] - [place], [place] - [address] [place]    Sig: Take 1 tablet (500 mg) by mouth daily    Class: E-Prescribe    Route: Oral    DULoxetine (CYMBALTA) 60 MG capsule            Sig: Take 60 mg by mouth every evening    Class: Historical    Route: Oral    eplerenone (INSPRA) 25 MG tablet  360 tablet 1 10/11/2022    [facility]/pharmacy #[id number] - [place], [place] - [address] [place]    Sig: Take 2 tablets (50 mg) by mouth 2 times daily    Class: E-Prescribe    Route: Oral    eplerenone (INSPRA) 25 MG tablet  180 tablet 3 5/10/2022 8/8/2022   [facility]pharmacy #[id number] - [place], [place] - [address] [place]    Sig: Take 2 tablets (50 mg) by mouth daily    Class: E-Prescribe    Route: Oral    ferrous gluconate (FERGON) 324 (38 Fe) MG tablet  90 tablet 3 9/13/2022 9/13/2023   [id number] - [place], [place] - [address] [place]    Sig: Take 1 tablet (324 mg) by mouth daily (with breakfast)    Class: E-Prescribe    Route: Oral    gabapentin (NEURONTIN) 600 MG tablet    2/19/2021        Sig: Take 900 mg by mouth 3 times daily     Class:  Historical    Route: Oral    insulin glargine (LANTUS PEN) 100 UNIT/ML pen            Sig: Inject 65 Units Subcutaneous every morning    Class: Historical    Route: Subcutaneous    insulin pen needle (B-D U/F) 31G X 5 MM miscellaneous    1/12/2021        Sig: AS DIRECTED. BD UF MINI PEN NEEDLE [id number]: USE ONCE DAILY    Class: Historical    lactulose (CHRONULAC) 10 GM/15ML solution    7/22/2022        Sig: Take 20 g by mouth    Class: Historical    Route: Oral    lactulose 20 GM/30ML SOLN  2838 mL 11 8/24/2022    [facility] #[id number] - [place], [place] - [address] [place]    Sig: Take 30 mLs (20 g) by mouth 3 times daily    Class: E-Prescribe    Route: Oral    loperamide (IMODIUM A-D) 1 MG/7.5ML  237 mL 1 9/15/2022    [id number] - [place], [place] - [address] [place]    Sig: Take 15 mLs (2 mg) by mouth 4 times daily as needed for diarrhea    Class: E-Prescribe    Route: Oral    loperamide (IMODIUM) 2 MG capsule            Sig: Take 12 mg by mouth 2 times daily    Class: Historical    Route: Oral    metFORMIN (GLUCOPHAGE) 1000 MG tablet    9/16/2020        Sig: Take 1,000 mg by mouth 2 times daily     Class: Historical    Route: Oral    metoprolol succinate ER (TOPROL XL) 50 MG 24 hr tablet    10/3/2022        Sig: Take 50 mg by mouth 2 times daily    Class: Historical    Route: Oral    Multiple Vitamins-Minerals (SUPER THERA DAMARIS M) TABS    9/29/2020        Sig: Take 1 tablet by mouth daily    Class: Historical    Route: Oral    omeprazole (PRILOSEC) 20 MG DR capsule    4/15/2021        Sig: Take 20 mg by mouth every evening    Class: Historical    Route: Oral    ONETOUCH ULTRA test strip    4/10/2021        Sig: PLEASE SEE ATTACHED FOR DETAILED DIRECTIONS    Class: Historical    potassium chloride ER (KLOR-CON M) 20 MEQ CR tablet  60 tablet 5 10/11/2022    [id number] - [place], [place] - [address] [place]    Sig: Take 1 tablet (20  mEq) by mouth 2 times daily    Class: E-Prescribe    Route: Oral    rifaximin (XIFAXAN) 550 MG TABS tablet  60 tablet 11 8/10/2022        Sig: Take 1 tablet (550 mg) by mouth 2 times daily    Class: Local Print    Route: Oral    Prior authorization: Approved    simvastatin (ZOCOR) 20 MG tablet    2/17/2021        Sig: Take 20 mg by mouth daily     Class: Historical    Route: Oral    torsemide (DEMADEX) 20 MG tablet  540 tablet 0 9/13/2022 12/12/2022   [id number] - [email or web address], [place] - [address] [place]    Sig: Take 3 tablets (60 mg) by mouth 2 times daily for 90 days    Class: E-Prescribe    Route: Oral          ALLERGIES:   Minocycline    ROS:  As above.    Physical Examination: Video visit  VITALS:   There were no vitals taken for this visit.  GENERAL: Healthy, alert and no distress  SKIN: Visible skin clear.  No jaundice.  No significant rash, abnormal pigmentation or lesions.  PSYCH: Mentation appears normal, affect normal/bright, judgement and insight intact, normal speech and appearance well-groomed.  EYES: Eyes grossly normal to inspection.  Anicteric.  No discharge or erythema, or obvious scleral/conjunctival abnormalities.  RESP: No audible wheeze, cough, or visible cyanosis. No visible retractions or increased work of breathing.   NEURO: Cranial nerves grossly intact. Mentation and speech appropriate for age.  ABDOMEN: Abdomen does not appear overly distended or tense due to ascites.  There are prominent bulging right and left lower abdominal incisional hernias.      Labs:    BMP RESULTS:  Lab Results   Component Value Date     10/11/2022     06/15/2021    POTASSIUM 3.8 10/11/2022    POTASSIUM 4.0 09/11/2022    POTASSIUM 4.1 06/15/2021    CHLORIDE 106 10/11/2022    CHLORIDE 110 (H) 09/11/2022    CHLORIDE 112 (H) 06/15/2021    CO2 23 10/11/2022    CO2 20 09/11/2022    CO2 25 06/15/2021    ANIONGAP 9 10/11/2022    ANIONGAP 9 09/11/2022    ANIONGAP <1 (L) 06/15/2021      (H) 10/11/2022     (H) 09/11/2022     (H) 06/15/2021    BUN 10.9 10/11/2022    BUN 16 09/11/2022    BUN 8 06/15/2021    CR 0.92 10/11/2022    CR 0.63 (L) 06/15/2021    GFRESTIMATED >90 10/11/2022    GFRESTIMATED >90 06/15/2021    GFRESTBLACK >90 06/15/2021    DANIELLE 8.4 (L) 10/11/2022    DANIELLE 8.9 06/15/2021        CBC RESULTS:  Lab Results   Component Value Date    WBC 5.7 10/11/2022    WBC 4.7 06/15/2021    RBC 3.28 (L) 10/11/2022    RBC 3.89 (L) 06/15/2021    HGB 8.7 (L) 10/11/2022    HGB 8.6 (L) 06/15/2021    HCT 28.8 (L) 10/11/2022    HCT 31.0 (L) 06/15/2021    MCV 88 10/11/2022    MCV 80 06/15/2021    MCH 26.5 10/11/2022    MCH 22.1 (L) 06/15/2021    MCHC 30.2 (L) 10/11/2022    MCHC 27.7 (L) 06/15/2021    RDW 17.7 (H) 10/11/2022    RDW 17.3 (H) 06/15/2021     (L) 10/11/2022     (L) 06/15/2021       INR/PTT:  Lab Results   Component Value Date    INR 2.15 (H) 10/11/2022    INR 1.58 (H) 06/15/2021     MELD 10/11/2022 = 15 (creatinine 0.92, T bili 0.8, INR 2.15)    Diagnostic studies: Personally reviewed and interpreted  Contrast-enhanced CT abdomen and pelvis [facility] 7/18/2022:  1.  Small cirrhotic liver.  Large ascites.  2.  Main portal vein, right and left portal vein, splenic vein, superior mesenteric vein and hepatic veins are patent.  3.  Right ventricular and atrial size are normal.    Limited abdominal ultrasound with Doppler complete 6/13/2022:  1.  Cirrhotic liver.  Large ascites.  2.  Patent main portal vein.  Patent antegrade right and left portal veins.  Hepatic veins patent.    Assessment/Plan:  EtOH cirrhosis complicated by ascites, refractory to medical management.  Patient is a candidate for TIPS procedure.  The major factors that need to be considered are:  1. Risk for post TIPS encephalopathy, especially given prior significant encephalopathy requiring hospitalization.  This is controlled currently though will need to be monitored closely following  TIPS.  2. Risk for cardiac decompensation post TIPS.  Has h/o CHF though most recent echo looked good and I see no history of right sided heart failure. Therefore likelihood is low but will obtain intraprocedural right atrial pressures prior to TIPS placement.     I had a thorough discussion with Mr. Oliver regarding the TIPS procedure, its indications, alternatives, risks benefits.  This included a discussion of bleeding due to transcapsular puncture which can be life-threatening though it is relatively rare, infection/sepsis, progressive liver failure, and lower extremity edema.  Patient understands that while TIPS is generally very successful for reducing both the volume of ascites and frequency of paracentesis, that this can take several months to occur and occasionally may not respond as expected.    Following the above discussion, Mr. Oliver does wish to proceed.  His TIPS will be performed under general anesthesia with plan for post TIPS in addition though patient may discharge same day if he is doing well.    We will need the following preoperatively:  - PAC visit. Obtain TEG during PAC visit given elevated INR.   - At least 48-hour hold of his apixaban.  - Prescription for 20 to 30 mmHg graded compression stockings is provided for use postoperatively.    Michael Bolton MD  Interventional Radiology and Vascular Imaging Attending  Department of Radiology  [facility]              Alcoholic cirrhosis of liver with ascites (H)    - IR Transven Intrahepatic Portosyst Shunt; Future  - IR Paracentesis; Future  - Asymptomatic COVID-19 Virus (Coronavirus) by PCR Nasopharyngeal; Future     Review of the result(s) of each unique test - Contrast-enhanced CT scan abdomen and pelvis [facility] 7/18/2022.  Limited abdominal ultrasound with Doppler 6/13/2022.  Independent interpretation of a test performed by another physician/other qualified health care professional (not separately  "reported) - Contrast-enhanced CT scan abdomen and pelvis [facility] 7/18/2022.  Limited abdominal ultrasound with Doppler 6/13/2022                  Video-Visit Details     Type of service:  Video Visit     Video Start and End Time:Video start time 9:36 AM.  Video end time 10:10 AM.    Originating Location (pt. Location): Home     Distant Location (provider location):  [facility] VASCULAR CLINIC [place]      Platform used for Video Visit: Jacob       Patient Care Team:  Bubba Ovalle as PCP - General (Internal Medicine)  Abner Kendrick MD as Assigned Surgical Provider  Aron Thompson MD as Assigned Gastroenterology Provider  Michael Lord MD as MD (Gastroenterology)  Michael Lord MD as Assigned PCP  Sharon Owusu RN as Specialty Care Coordinator  ARON THOMPSON        [facility] Vascular      Type of Visit: Virtual: Jacob    Patient is here for a new visit to discuss alcoholilc cirrhosis of liver with ascites.     Vitals - Patient Reported  Weight (Patient Reported): 115.7 kg (255 lb)  Height (Patient Reported): 190.5 cm (6' 3\")  BMI (Based on Pt Reported Ht/Wt): 31.87  Pain Score: Severe Pain (6)  Pain Loc:  (Abdomen - hernias)    Patient states is currently in the [facility] [place]: Yes    Questions patient would like addressed today are: Patient verbalized no questions/concerns, this has been communicated to the provider.     Refills are needed: No    How would you like to obtain your AVS? Monroe Juarez Colling    Sincerely,  Michael Bolton MD  "

## 2022-10-28 NOTE — TELEPHONE ENCOUNTER
FUTURE VISIT INFORMATION      SURGERY INFORMATION:    Date: 22    Location: u gi    Surgeon:  Nile Cedillo MD    Anesthesia Type:  Moderate Sedation    Procedure: ESOPHAGOGASTRODUODENOSCOPY (EGD)    RECORDS REQUESTED FROM:       Primary Care Provider: Georges Ovalle MD  - [place]    Pertinent Medical History: bradycardia    Most recent EKG+ Tracin22- [facility]    Most recent ECHO: 10/10/22

## 2022-11-04 NOTE — PROGRESS NOTES
Attempted to reach patient for check in, no answer, message left requesting call back, number provided.    Sharon Owusu, RN Care Coordinator  [facility]  Hepatology Clinic/Specialty Program

## 2022-11-04 NOTE — PROGRESS NOTES
Spoke with pt for check in. Pt and spouse state that he continues to have 8-12 watery stools daily on lactulose 30mL TID and rifaximin 550mg BID. They also report that HE symptoms are worsening - pt has been more forgetful, spouse states he's 'not been himself'. Spouse also states that he's been looking increasingly worse over the past few days, 'paler and more skinny'. Due to excessive diarrhea and worsening confusion, advised pt and spouse to go to ED immediately. Pt and spouse verbalized understanding, will go to ED today.    Sharon Owusu, RN Care Coordinator  [facility]  Hepatology Clinic/Specialty Program

## 2022-11-04 NOTE — TELEPHONE ENCOUNTER
Sent [employer] (1st Attempt) for the patient to call back and schedule the following:    Appointment type: Return Liver  Provider: Dr. Bain  Return date: next available  Specialty phone number: [phone]  Additional appointment(s) needed: yes  Additonal Notes: labs are needed same day prior to the appointment.    Attempted to reach the patient, patient hung up phone. Sent a Obvious message.    There is availability early Feb 2023 at the [facility] for labs/appointment with Dr. Bain.    Sravani R/Procedure    [facility]   Neurology, NeuroSurgery, NeuroPsychology and Pain Management Specialties  [phone]

## 2022-11-11 NOTE — PROGRESS NOTES
Attempted to reach patient for check in, no answer, message left requesting call back, number provided.    Sharon Owusu, RN Care Coordinator  [facility]  Hepatology Clinic/Specialty Program

## 2022-11-15 NOTE — PROGRESS NOTES
Preoperative Assessment Center Medication History Note    Medication history completed on November 15, 2022 by this writer. See Epic admission navigator for prior to admission medications. Operating room staff will still need to confirm medications and last dose information on day of surgery.     Medication history interview sources  Patient interview: Yes, also with pt's wife who manages his meds.   Care Everywhere records: Yes  Surescripts pharmacy refill records: Yes  Other (if applicable):     Changes made to PTA medication list  Added: acetaminophen - uses rarely   Deleted: aspirin 81 mg (not taking), duplicate (incorrect dose) eplerenone, duplicate lactulose, duplicate incorrect loperamide, simvastatin (no longer taking),   Changed: gabapentin dose/sig,     Additional medication history information (including reliability of information, actions taken by pharmacist):    -- Re: Duloxetine - pt taking for neruopathy in hands and feet, very helpful for this but did discuss with his wife that this is not recommended in liver dysfunction, asked that they bring this up to their PCP and see if there might be a different option they could try.     -- did complete iron infusion series w/in the past couple weeks  -- on daily ciprofloxacin 500 mg.   -- No recent (within 30 days) course of systemic steroids  -- Reports being on blood thinning medications - eliquis - see other note.    -- Declines being on any other prescription or over-the-counter medications    Prior to Admission medications    Medication Sig Last Dose Taking? Auth Provider Long Term End Date   acetaminophen (TYLENOL) 500 MG tablet Take 500-1,000 mg by mouth every 8 hours as needed for mild pain Taking Yes Unknown, Entered By History     apixaban ANTICOAGULANT (ELIQUIS) 5 MG tablet Take 5 mg by mouth 2 times daily  Taking Yes Reported, Patient     ciprofloxacin (CIPRO) 500 MG tablet Take 1 tablet (500 mg) by mouth daily Taking Yes Sweetie Bain MD      DULoxetine (CYMBALTA) 60 MG capsule Take 60 mg by mouth every evening Taking Yes Unknown, Entered By History Yes    eplerenone (INSPRA) 25 MG tablet Take 2 tablets (50 mg) by mouth 2 times daily Taking Yes Sweetie Bain MD Yes    ferrous gluconate (FERGON) 324 (38 Fe) MG tablet Take 1 tablet (324 mg) by mouth daily (with breakfast) Taking Yes Sweetie Bain MD  9/13/23   gabapentin (NEURONTIN) 300 MG capsule Take 900 mg by mouth 2 times daily Taking Yes Unknown, Entered By History Yes    insulin glargine (LANTUS PEN) 100 UNIT/ML pen Inject 65 Units Subcutaneous every morning Taking Yes Unknown, Entered By History Yes    lactulose 20 GM/30ML SOLN Take 30 mLs (20 g) by mouth 3 times daily Taking Yes Sweetie Bain MD     loperamide (IMODIUM A-D) 1 MG/7.5ML Take 15 mLs (2 mg) by mouth 4 times daily as needed for diarrhea Taking Yes Sweetie Bain MD     metFORMIN (GLUCOPHAGE) 1000 MG tablet Take 1,000 mg by mouth 2 times daily  Taking Yes Reported, Patient Yes    metoprolol succinate ER (TOPROL XL) 50 MG 24 hr tablet Take 50 mg by mouth 2 times daily Taking Yes Reported, Patient Yes    Multiple Vitamins-Minerals (SUPER THERA DAMARIS M) TABS Take 1 tablet by mouth daily Taking Yes Reported, Patient     omeprazole (PRILOSEC) 20 MG DR capsule Take 20 mg by mouth every evening Taking Yes Reported, Patient     potassium chloride ER (KLOR-CON M) 20 MEQ CR tablet Take 1 tablet (20 mEq) by mouth 2 times daily Taking Yes Sweetie Bain MD     rifaximin (XIFAXAN) 550 MG TABS tablet Take 1 tablet (550 mg) by mouth 2 times daily Taking Yes Sweetie Bain MD     torsemide (DEMADEX) 20 MG tablet Take 3 tablets (60 mg) by mouth 2 times daily for 90 days Taking Yes Sweetie Bain MD Yes 12/12/22   COMPRESSION STOCKINGS Please measure and distribute 2 pair of 20mmHg - 30mmHg knee high open or closed toe compression stockings with extra refills as indicated or what insurance will allow .   Michael Bolton MD     insulin pen needle  (B-D U/F) 31G X 5 MM miscellaneous AS DIRECTED. BD UF MINI PEN NEEDLE [id number]: USE ONCE DAILY   Reported, Patient     ONETOUCH ULTRA test strip PLEASE SEE ATTACHED FOR DETAILED DIRECTIONS   Reported, Patient            Medication history completed by: Chucho Scott [facility]

## 2022-11-15 NOTE — H&P
Pre-Operative H & P     CC:  Preoperative exam to assess for increased cardiopulmonary risk while undergoing surgery and anesthesia.    Date of Encounter: 11/15/2022  Primary Care Physician:  Bubba Ovalle     Reason for visit: Pre-operative evaluation    HPI  Blair Oliver is a 52 year old male who presents for pre-operative H & P in preparation for  Procedure Information     Date/Time: 12/1/2022      Procedure: Esophagogastroduodenoscopy (EGD)    Anesthesia type: Moderate sedation    Pre-op diagnosis: Alcohol cirrhosis of liver with ascites    Location: [facility]    Providers: Nile Cedillo MD        Blair Oliver is a 52-year-old male with history of decompensated cirrhosis c/b recurrent ascites, ( being evaluated for transplant and TIPS procedure) chronic systolic CHF, acute mesenteric ischemia, ischemic bowel disease s/p bowel resection c/b incisional hernia, anomalous coronary artery origin, anemia, HTN, HLD, GISELLE, and DM2.  He is followed by Gastroenterology and most recent EGD with bx negative for celiac. Colon bx showing chronic inactive colitis. Patient continues to have loose stools. Was on a several week course of rifaximin for encephalopathy that did not help his diarrhea. 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 He  presents today in preparation for the above procedure with Dr. Robert Crowder.     History is obtained from the patient, his wife  and chart review    Hx of abnormal bleeding or anti-platelet use: no      Past Medical History  Past Medical History:   Diagnosis Date     Alcoholic cirrhosis of liver with ascites (H)      Chronic systolic heart failure (H)      Diabetes (H)     Type 2 DM, Insulin Use.      Hypertension        Past Surgical History  Past Surgical History:   Procedure Laterality Date     COLONOSCOPY N/A 12/9/2021    Procedure: COLONOSCOPY, WITH BIOPSY, WITH CLIPS;  Surgeon: Sweetie Bain MD;  Location:  [facility] OR     ESOPHAGOSCOPY, GASTROSCOPY, DUODENOSCOPY (EGD), COMBINED N/A 12/9/2021    Procedure: ESOPHAGOGASTRODUODENOSCOPY, WITH BIOPSY;  Surgeon: Sweetie Bain MD;  [place] [facility]       Prior to Admission Medications  Current Outpatient Medications   Medication Sig Dispense Refill     acetaminophen (TYLENOL) 500 MG tablet Take 500-1,000 mg by mouth every 8 hours as needed for mild pain       apixaban ANTICOAGULANT (ELIQUIS) 5 MG tablet Take 5 mg by mouth 2 times daily        ciprofloxacin (CIPRO) 500 MG tablet Take 1 tablet (500 mg) by mouth daily 90 tablet 0     DULoxetine (CYMBALTA) 60 MG capsule Take 60 mg by mouth every evening       eplerenone (INSPRA) 25 MG tablet Take 2 tablets (50 mg) by mouth 2 times daily 360 tablet 1     ferrous gluconate (FERGON) 324 (38 Fe) MG tablet Take 1 tablet (324 mg) by mouth daily (with breakfast) 90 tablet 3     gabapentin (NEURONTIN) 300 MG capsule Take 900 mg by mouth 2 times daily       insulin glargine (LANTUS PEN) 100 UNIT/ML pen Inject 65 Units Subcutaneous every morning       lactulose 20 GM/30ML SOLN Take 30 mLs (20 g) by mouth 3 times daily 2838 mL 11     loperamide (IMODIUM A-D) 1 MG/7.5ML Take 15 mLs (2 mg) by mouth 4 times daily as needed for diarrhea 237 mL 1     metFORMIN (GLUCOPHAGE) 1000 MG tablet Take 1,000 mg by mouth 2 times daily        metoprolol succinate ER (TOPROL XL) 50 MG 24 hr tablet Take 50 mg by mouth 2 times daily       Multiple Vitamins-Minerals (SUPER THERA DAMARIS M) TABS Take 1 tablet by mouth daily       omeprazole (PRILOSEC) 20 MG DR capsule Take 20 mg by mouth every evening       potassium chloride ER (KLOR-CON M) 20 MEQ CR tablet Take 1 tablet (20 mEq) by mouth 2 times daily 60 tablet 5     rifaximin (XIFAXAN) 550 MG TABS tablet Take 1 tablet (550 mg) by mouth 2 times daily 60 tablet 11     torsemide (DEMADEX) 20 MG tablet Take 3 tablets (60 mg) by mouth 2 times daily for 90 days 540 tablet 0     COMPRESSION STOCKINGS Please measure and  distribute 2 pair of 20mmHg - 30mmHg knee high open or closed toe compression stockings with extra refills as indicated or what insurance will allow . 2 each 4     insulin pen needle (B-D U/F) 31G X 5 MM miscellaneous AS DIRECTED. BD UF MINI PEN NEEDLE [id number]: USE ONCE DAILY       ONETOUCH ULTRA test strip PLEASE SEE ATTACHED FOR DETAILED DIRECTIONS         Allergies  Allergies   Allergen Reactions     Minocycline Hives       Social History  Social History     Socioeconomic History     Marital status:      Spouse name: Not on file     Number of children: Not on file     Years of education: Not on file     Highest education level: Not on file   Occupational History     Not on file   Tobacco Use     Smoking status: Former     Types: Cigarettes     Smokeless tobacco: Never   Substance and Sexual Activity     Alcohol use: Not Currently     Comment: Quit 9/21/2020     Drug use: Never     Sexual activity: Not on file   Other Topics Concern     Parent/sibling w/ CABG, MI or angioplasty before 65F 55M? Not Asked   Social History Narrative     Not on file     Social Determinants of Health     Financial Resource Strain: Not on file   Food Insecurity: Not on file   Transportation Needs: Not on file   Physical Activity: Not on file   Stress: Not on file   Social Connections: Not on file   Intimate Partner Violence: Not on file   Housing Stability: Not on file       Family History  Family History   Problem Relation Age of Onset     Substance Abuse Maternal Grandmother      Substance Abuse Brother        Review of Systems  The complete review of systems is negative other than noted in the HPI or here.   Anesthesia Evaluation   Pt has had prior anesthetic.     No history of anesthetic complications       ROS/MED HX  ENT/Pulmonary:     (+) sleep apnea, uses CPAP, tobacco use, Past use,     Neurologic:     (+) peripheral neuropathy, - pain, numbness, tingling feet and hands.     Cardiovascular: Comment: Hx of bradycardia-  Pulse average at home 60-70's  Chronic Lower extremity Edema    (+) Dyslipidemia hypertension-range: 120/90's/ ----CHF etiology: PVC's Last EF: 55% date: 10/2022 NYHA classification: II. dysrhythmias, PVCs, Previous cardiac testing   Echo: Date: 10/2022 Results:    Stress Test: Date: Results:    ECG Reviewed: Date: Results:    Cath: Date: Results:   (-) ALMODOVAR and orthopnea/PND   METS/Exercise Tolerance: 3 - Able to walk 1-2 blocks without stopping    Hematologic: Comments: Hx of thrombus causing acute mesenteric ischemia, ischemic bowel disease s/p bowel resection c/b incisional hernia (2020)  - Iron infusions 10/31, 11/3, 11/7     (+) History of blood clots, anemia, history of blood transfusion, no previous transfusion reaction,     Musculoskeletal:  - neg musculoskeletal ROS     GI/Hepatic: Comment: Diarrhea  Poorly controlled diarrhea which has been ongoing.  Decompensated cirrhosis of the Liver with Ascites  -Gastric Antral Vascular Ectasia    (+) GERD, liver disease,  Acute appendicitis:      Renal/Genitourinary:  - neg Renal ROS     Endo:     (+) type II DM, Last HgA1c: 7.3, date: 6/2022, Using insulin, - not using insulin pump. Normal glucose range: 110-150, Obesity,     Psychiatric/Substance Use:     (+) alcohol abuse     Infectious Disease: Comment: spontaneous bacterial peritonitis 9/2022      Malignancy:  - neg malignancy ROS     Other:            Virtual visit -  No vitals were obtained    Physical Exam  Constitutional: Awake, alert, cooperative, no apparent distress, and appears stated age.  Eyes: Pupils equal  HENT: Normocephalic  Respiratory: non labored breathing   Neurologic: Awake, alert, oriented to name, place and time.   Neuropsychiatric: Calm, cooperative. Normal affect.      Prior Labs/Diagnostic Studies   All labs and imaging personally reviewed   Lab Results   Component Value Date    WBC 5.7 10/11/2022    WBC 4.7 06/15/2021     Lab Results   Component Value Date    RBC 3.28 10/11/2022    RBC  3.89 06/15/2021     Lab Results   Component Value Date    HGB 8.7 10/11/2022    HGB 8.6 06/15/2021     Lab Results   Component Value Date    HCT 28.8 10/11/2022    HCT 31.0 06/15/2021     No components found for: MCT  Lab Results   Component Value Date    MCV 88 10/11/2022    MCV 80 06/15/2021     Lab Results   Component Value Date    MCH 26.5 10/11/2022    MCH 22.1 06/15/2021     Lab Results   Component Value Date    MCHC 30.2 10/11/2022    MCHC 27.7 06/15/2021     Lab Results   Component Value Date    RDW 17.7 10/11/2022    RDW 17.3 06/15/2021     Lab Results   Component Value Date     10/11/2022     06/15/2021     Last Comprehensive Metabolic Panel:  Sodium   Date Value Ref Range Status   10/11/2022 138 136 - 145 mmol/L Final   06/15/2021 136 133 - 144 mmol/L Final     Potassium   Date Value Ref Range Status   10/11/2022 3.8 3.4 - 5.3 mmol/L Final   09/11/2022 4.0 3.4 - 5.3 mmol/L Final   06/15/2021 4.1 3.4 - 5.3 mmol/L Final     Chloride   Date Value Ref Range Status   10/11/2022 106 98 - 107 mmol/L Final   09/11/2022 110 (H) 94 - 109 mmol/L Final   06/15/2021 112 (H) 94 - 109 mmol/L Final     Carbon Dioxide   Date Value Ref Range Status   06/15/2021 25 20 - 32 mmol/L Final     Carbon Dioxide (CO2)   Date Value Ref Range Status   10/11/2022 23 22 - 29 mmol/L Final   09/11/2022 20 20 - 32 mmol/L Final     Anion Gap   Date Value Ref Range Status   10/11/2022 9 7 - 15 mmol/L Final   09/11/2022 9 3 - 14 mmol/L Final   06/15/2021 <1 (L) 3 - 14 mmol/L Final     Glucose   Date Value Ref Range Status   10/11/2022 176 (H) 70 - 99 mg/dL Final   09/11/2022 225 (H) 70 - 99 mg/dL Final   06/15/2021 123 (H) 70 - 99 mg/dL Final     Urea Nitrogen   Date Value Ref Range Status   10/11/2022 10.9 6.0 - 20.0 mg/dL Final   09/11/2022 16 7 - 30 mg/dL Final   06/15/2021 8 7 - 30 mg/dL Final     Creatinine   Date Value Ref Range Status   10/11/2022 0.92 0.67 - 1.17 mg/dL Final   06/15/2021 0.63 (L) 0.66 - 1.25 mg/dL Final      GFR Estimate   Date Value Ref Range Status   10/11/2022 >90 >60 mL/min/1.73m2 Final     Comment:     Effective December 21, 2021 eGFRcr in adults is calculated using the 2021 CKD-EPI creatinine equation which includes age and gender (Jenn hilliard al., NEJM, DOI: 10.1056/ZGBFsk3504104)   06/15/2021 >90 >60 mL/min/[1.73_m2] Final     Comment:     Non  GFR Calc  Starting 12/18/2018, serum creatinine based estimated GFR (eGFR) will be   calculated using the Chronic Kidney Disease Epidemiology Collaboration   (CKD-EPI) equation.       Calcium   Date Value Ref Range Status   10/11/2022 8.4 (L) 8.6 - 10.0 mg/dL Final   06/15/2021 8.9 8.5 - 10.1 mg/dL Final     Lab Results   Component Value Date    AST 37 10/11/2022    AST 34 06/15/2021     Lab Results   Component Value Date    ALT 23 10/11/2022    ALT 36 06/15/2021     No results found for: BILICONJ   Lab Results   Component Value Date    BILITOTAL 0.8 10/11/2022    BILITOTAL 0.8 06/15/2021     Lab Results   Component Value Date    ALBUMIN 3.2 10/11/2022    ALBUMIN 2.8 09/11/2022    ALBUMIN 3.1 06/15/2021     Lab Results   Component Value Date    PROTTOTAL 6.6 10/11/2022    PROTTOTAL 7.5 06/15/2021      Lab Results   Component Value Date    ALKPHOS 186 10/11/2022    ALKPHOS 126 06/15/2021     INR   Date Value Ref Range Status   10/11/2022 2.15 (H) 0.85 - 1.15 Final   06/15/2021 1.58 (H) 0.86 - 1.14 Final      Lab Results   Component Value Date    A1C 7.3 06/14/2022    A1C 11.2 08/17/2011           EKG/ stress test - if available please see in ROS above   10/2022  Echo result w/o MOPS: Interpretation Summary Frequent ectopy during study making interpretation suboptimal.Global and regional left ventricular function is low-normal with an EF of55%.Moderate left ventricular dilation is present.Right ventricular function, chamber size, wall motion, and thickness arenormal.No clinically significant valvular pathology identified.No pericardial effusion is  present.This study was compared with the study from 6/14/22 .No significant changes noted.       No flowsheet data found.      The patient's records and results personally reviewed by this provider.     Outside records reviewed from: Care Everywhere      Assessment      Blair Oliver is a 52 year old male seen as a PAC referral for risk assessment and optimization for anesthesia.    Plan/Recommendations  Pt will be optimized for the proposed procedure.  See below for details on the assessment, risk, and preoperative recommendations    NEUROLOGY  - No history of TIA, CVA or seizure  -Post Op delirium risk factors:  High co-morbid index    ENT  - No current airway concerns.  Will need to be reassessed day of surgery.  Mallampati: Unable to assess  TM: Unable to assess    CARDIAC  CHF - LVEF improved with sobriety, then worsened with stopping BB. Metoprolol resumed, LVEF improved. Much of his CHF was related to PVCs, improved with treatment of this.   Last Echocardiogram with LVEF 55%%, normal diastolic function and RV function, no significant valvular abnormalities  - history of Bradycardia with PVC's   - Recent (July 2022) ZIO monitor  showed multiple episodes of ventricular tachycardia the longest was 14 beats nonsustained. Other episodes of SVT also noted. This was done when patient was off the metoprolol.    - METS (Metabolic Equivalents)  Patient performs 4 or more METS exercise without symptoms            Total Score: 0      RCRI-Moderate risk: Class 3  6.6% complication rate            Total Score: 2    RCRI: CHF    RCRI: Diabetes        PULMONARY  - Obstructive Sleep Apnea  GISELLE with home CPAP.  Patient will be instructed to bring their home CPAP device to the hospital with them.  - Denies asthma or inhaler use  - Tobacco History      History   Smoking Status     Former     Types: Cigarettes   Smokeless Tobacco     Never       GI/Liver  + GERD on PPI  Diarrhea  Poorly controlled diarrhea which has been  "ongoing.  Decompensated cirrhosis of the Liver with Ascites- Paracentesis weekly taking torsemide and eplerenone  - taking lactulose and rifaximin, confusion stable   Follows with Gastroenterologist.   -Gastric Antral Vascular Ectasia - above procedure now scheduled    PONV Low Risk  Total Score: 1           1 AN PONV: Patient is not a current smoker         Decompensated Cirrhosis secondary to alcohol use disorder  Cirrhosis History: Decompensated cirrhosis 2/2 alcohol use complicated by ascites, portal hypertension, gastroesophageal varices and splenomegaly.   - Weekly paracentesis- Taking torsemide and eplerenone.    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    Weight as of 10/11/22: 117.6 kg (259 lb 4.8 oz).  Obesity (BMI >30)  - Diabetes  Hemoglobin A1C (%)   Date Value   06/14/2022 7.3 (H)     Diabetes Type 2, insulin dependent. Hold morning oral hypoglycemic medications and short acting insulin DOS. Take 80% of last scheduled dose of long-acting insulin prior to procedure.  Recommend close monitoring of the patient's blood glucose levels throughout the perioperative period.    HEME    Hx of thrombus causing acute mesenteric ischemia, ischemic bowel disease s/p bowel resection c/b incisional hernia- on Eliquis  Will Hold 48 hours prior to procedure on 21/1 and continue to hold prior to his 12/5 procedure.       VTE Medium Risk 1.8%            Total Score: 5    VTE: Male    VTE: Pt history of VTE      - Coagulopathy second to Apixaban (Eliquis)  - Chronic Iron defcient anemia most recent Hgb 8.7 Underwent 5 iron transfusions last was 11/7.  Recommend perioperative use of blood conservation techniques intraoperatively and close " monitoring for postoperative bleeding.  A type and screen has been ordered for this patient   Will check CBC     MSK  Patient is NOT Frail            Total Score: 0          The patient is optimized for their procedure. AVS with information on surgery time/arrival time, meds and NPO status given by nursing staff. No further diagnostic testing indicated.    Please refer to the physical examination documented by the anesthesiologist in the anesthesia record on the day of surgery.    Video-Visit Details    Type of service:  Video Visit    Provider received verbal consent for a Video Visit from the patient? Yes    Video Start Time: 11:00 am  Video End Time (time video stopped): 11:20am    Originating Location (pt. Location): Home    Distant Location (provider location): Off-site    Mode of Communication:  Video Conference via [employer]  On the day of service:     Prep time: 40 minutes  Visit time: 20 minutes  Documentation time: 15 minutes  ------------------------------------------  Total time: 75 minutes      GABRIEL Villanueva CNP  Preoperative Assessment Center  [facility]  [facility]  Phone: [phone]  Fax: [phone]

## 2022-11-15 NOTE — PROGRESS NOTES
Anticoagulation Note - Preoperative Assessment Center (PAC) Pharmacist     Patient was interviewed on November 15, 2022 as a part of PAC clinic appointment. The purpose of this note is to document the perioperative anticoagulation plan outlined by the providers caring for Blair Oliver.     Current Regimen  Anticoagulation Regimen as of November 15, 2022: apixaban (ELIQUIS) 5 mg by mouth twice daily     Indication: extensive superior mesenteric venous thrombosis, complicated by ischemic bowel and had to undergo an exploratory laparotomy with small bowel resection in Sept 2020.   Prescriber:  Dr. Pitts  Expected Duration of therapy: indefinite   Current medications that may interact with this include: none  Creatinine   Date Value Ref Range Status   10/11/2022 0.92 0.67 - 1.17 mg/dL Final   06/15/2021 0.63 (L) 0.66 - 1.25 mg/dL Final       Perioperative plan  Blair Oliver is scheduled for ESOPHAGOGASTRODUODENOSCOPY (EGD) on 12/1/22 with Dr. Robert Crowder as well as TIPS procedure with Dr. Bolton on 12/5/22.  Patient was already aware of need to hold x48 hr pre op for both of these procedures.  This writer did call Dr. Dallas 11/15 and received a call back from ASTRID Dumas that patient can hold his Eliquis x2 days prior to his EGD on 12/1 (last dose on 11/28/22 PM) and just remain off of this until after his 12/5 TIPS procedure. This was relayed to patient's wife, Margarita, 11/16 at 09:05.       Resumption of anticoagulation after procedure will be based on surgery team assessment of bleeding risks and complications.  This plan may require re-assessment and modification by his primary team in the perioperative setting depending on patients clinical situation.        Chucho Scott, [facility]

## 2022-11-16 NOTE — PATIENT INSTRUCTIONS
Name:  Blair Oliver   MRN:  [id number]   :  1970   Today's Date:  2022     GI Lab procedures:    A representative from the GI Lab will contact you regarding arrival date and time.      You were seen today in the PAC Clinic.   (Pre-operative Anesthesia Assessment Center)  [address]  [address]   phone [phone]    You had a pre-operative assessment done. 22    Anesthesia recommendations for medications:    Hold Multivitamins for 7 days before procedure.     Hold Ibuprofen for 2 days before procedure.   Hold Naproxen for 2 days before procedure.       Special instructions for anticoagulation medications:    Hold Eliquis before procedure. Take last dose 22.     Please hold the following medications the day of procedure:    Eplerenone (Inspra)  Ferrous gluconate  Metformin (glucophage)  Potassium   Torsemide (demadex)      Please take these medications the day of procedure:    Take 52 units of Glargine (Lantus) insulin the morning of the procedure instead of 65 units.    Acetaminophen (tylenol) as needed  Ciprofloxacin (Cipro)  Gabapentin (Neurontin)  Metoprolol succinate   Rifaximin   Lactulose   Imodium           For questions or appointments, call:  [facility]: [phone], option 2.  Monday through Friday, 8 a.m. to 4:30 p.m.  (If it is after hours, please reach out to the clinic or provider you were scheduled with.)

## 2022-11-16 NOTE — OR NURSING
Writer called and spoke with pt's wife about pre procedure medication instructions. Writer also sent information to pt's [employer] account per request.

## 2022-11-16 NOTE — PROGRESS NOTES
Blair is a 52 year old who is being evaluated via a billable video visit.      How would you like to obtain your AVS? MyChart          HPI       Review of Systems         Objective    Vitals - Patient Reported  Pain Score: Severe Pain (7)        Physical Exam           CYNTHIA Barbosa LPN

## 2022-11-17 NOTE — TELEPHONE ENCOUNTER
FUTURE VISIT INFORMATION      SURGERY INFORMATION:    Date: 22    Location: [facility] or    Surgeon:  Michael Bolton MD    Anesthesia Type:  general    Procedure: ANESTHESIA OUT OF OR Transjugular intrahepatic portosystemic shunt (TIPS) procedure @0983  RECORDS REQUESTED FROM:         Primary Care Provider: Georges Ovalle MD  - Elsy     Pertinent Medical History: bradycardia     Most recent EKG+ Tracin22- [facility]     Most recent ECHO: 10/10/22

## 2022-11-22 NOTE — TELEPHONE ENCOUNTER
Attempted to contact patient regarding upcoming EGD procedure on 12.01.2022 for pre assessment questions. No answer.     Left message to return call to [phone] #4     Discuss at home rapid antigen COVID test 1-2 days prior to procedure.    Arrival time: 1400    Facility location: [facility]    Sedation type: CS    Indication for procedure: follow up GAVE s/p APC treatment    Anticoagulants: Eliquis. Holding interval of 2 days    Prep instructions sent via [facility]      Diabetic? Yes Inform patient to hold oral diabetic medications day of procedure if applicable.     Gretta Pendleton RN

## 2022-11-22 NOTE — TELEPHONE ENCOUNTER
Pre assessment questions completed for upcoming EGD procedure scheduled on 12/1/22    COVID policy reviewed. Patient to complete rapid antigen test one to two days before their scheduled procedure. Patient to bring photo of the results when they come in for their procedure.    Reviewed procedural arrival time 1400 and facility location [facility].    Designated  policy reviewed. Instructed to have someone stay 6 hours post procedure.     Reviewed EGD prep instructions.     Anticoagulation/blood thinners? Eliquis -- Pt is aware of 2 day holding policy, he will reach out to managing provider for 2 day holding approval.     Electronic implanted devices? None    Patient verbalized understanding and had no questions or concerns at this time.    Becky Dickson RN

## 2022-12-01 NOTE — OR NURSING
Pt tolerated EGD with variceal banding, very well with conscious sedation. Total of 3 bands were placed.

## 2022-12-01 NOTE — H&P
Blair Oliver  [id number]  male  52 year old      Reason for procedure/surgery: varices    Patient Active Problem List   Diagnosis     Anemia     Bradycardia     Alcoholic cirrhosis of liver with ascites (H)     Fatigue, unspecified type     Thrombosis mesenteric vessel (H)     Diabetes mellitus, type 2 (H)     Morbid obesity (H)       Past Surgical History:    Past Surgical History:   Procedure Laterality Date     COLONOSCOPY N/A 12/9/2021    Procedure: COLONOSCOPY, WITH BIOPSY, WITH CLIPS;  Surgeon: Sweetie Bain MD;  [place] [facility] [place]     ESOPHAGOSCOPY, GASTROSCOPY, DUODENOSCOPY (EGD), COMBINED N/A 12/9/2021    Procedure: ESOPHAGOGASTRODUODENOSCOPY, WITH BIOPSY;  Surgeon: Sweetie Bain MD;  [place] [facility] [place]       Past Medical History:   Past Medical History:   Diagnosis Date     Alcoholic cirrhosis of liver with ascites (H)      Chronic systolic heart failure (H)      Diabetes (H)     Type 2 DM, Insulin Use.      Hypertension        Social History:   Social History     Tobacco Use     Smoking status: Former     Types: Cigarettes     Smokeless tobacco: Never   Substance Use Topics     Alcohol use: Not Currently     Comment: Quit 9/21/2020       Family History:   Family History   Problem Relation Age of Onset     Substance Abuse Maternal Grandmother      Substance Abuse Brother        Allergies:   Allergies   Allergen Reactions     Minocycline Hives       Active Medications:   No current outpatient medications on file.       Systemic Review:   CONSTITUTIONAL: NEGATIVE for fever, chills, change in weight  ENT/MOUTH: NEGATIVE for ear, mouth and throat problems  RESP: NEGATIVE for significant cough or SOB  CV: NEGATIVE for chest pain, palpitations or peripheral edema    Physical Examination:   Vital Signs: /74   Pulse 59   Temp 97.7  F (36.5  C) (Oral)   Resp 16   SpO2 100%   GENERAL: healthy, alert and no distress  NECK: no adenopathy, no asymmetry, masses, or scars  RESP: lungs clear to auscultation  - no rales, rhonchi or wheezes  CV: regular rate and rhythm, normal S1 S2, no S3 or S4, no murmur, click or rub, no peripheral edema and peripheral pulses strong  ABDOMEN: soft, nontender, no hepatosplenomegaly, no masses and bowel sounds normal  MS: no gross musculoskeletal defects noted, no edema    Plan: Appropriate to proceed as scheduled.      Nile Jones MD  12/1/2022    PCP:  Bubba Ovalle

## 2022-12-02 NOTE — PATIENT INSTRUCTIONS
Preparing for Your Surgery      Name:  Blair Oliver   MRN:  [id number]   :  1970   Today's Date:  2022       Arriving for surgery:  Surgery date:  22  Arrival time:  6 am     Surgeries and procedures: Adult patients can have 2 visitors all through the surgery process.     Visiting hours: 8 a.m. to 8:30 p.m.     Hospital: Adult patients and children under age 18 can have 4 visitor at a time     No visitors under the age of 5 are allowed for hospital patients.  Double occupancy rooms: Patients can have only two visitors at a time.     Patients with disabilities: Can have a support person with them (family member, service provider     Or someone well informed about their needs) plus the allowed number of visitors     Patients confirmed or suspected to have symptoms of COVID 19 or flu:     No visitors allowed for adult patients.   Children (under age 18) can have 1 named visitor.     People who are sick or showing symptoms of COVID 19 or flu:    Are not allowed to visit patients--we can only make exceptions in special situations.       Please follow these guidelines for your visit:   Arrive wearing a mask over your mouth and nose; we will give you a medical mask to wear    If you arrive wearing a cloth mask.   Keep it on during your entire visit, even when in patient's room.   If you don't wear a mask we'll ask you to leave.     Clean your hands with alcohol hand . Do this when you arrive at and leave the building and patient room,    And again after you touch your mask or anything in the room.     You can t visit if you have a fever, cough, shortness of breath, muscle aches, headaches, sore throat    Or diarrhea      Stay 6 feet away from others during your visit and between visits     Go directly to and from the room you are visiting.     Stay in the patient s room during your visit. Limit going to other places in the hospital as much as possible     Leave bags and jackets at home or in  the car.     For everyone s health, please don t come and go during your visit. That includes for smoking   during your visit.     Please come to:     [facility] [address]  [address] [place]  [address]    -  Parking is available at the Patient Visitor Ramp on [facility].    -  When entering the hospital, you will be asked some Covid screening questions and directed to Patient Registration. Patient Registration will then direct you to the 3rd floor Surgery Waiting Room.  [phone]?     - ?If you are in need of directions, wheelchair or escort please stop at the Information Desk in the lobby.  Inform the information person that you are here for surgery; a wheelchair and escort to [address] will be provided.?     What can I eat or drink?  -  You may eat and drink normally up to 8 hours prior to arrival time. (Until 10 pm 12-4-22)  -  You may have clear liquids until 2 hours prior to arrival time. (Until 4 am)    Examples of clear liquids:  Water  Clear broth  Juices (apple, white grape, white cranberry  and cider) without pulp  Noncarbonated, powder based beverages  (lemonade and Jimenez-Aid)  Sodas (Sprite, 7-Up, ginger ale and seltzer)  Coffee or tea (without milk or cream)  Gatorade    -  No Alcohol for at least 24 hours before surgery.     Which medicines can I take?  Hold Aspirin for 7 days before surgery.   Hold Multivitamins for 7 days before surgery. Hold starting now if you have not already, and hold until surgery.  Hold Supplements for 7 days before surgery.  Hold Ibuprofen (Advil, Motrin) for 1 day before surgery--unless otherwise directed by surgeon.  Hold Naproxen (Aleve) for 4 days before surgery.    Patient reports Apixaban (Eliquis) is currently on hold.    -  DO NOT take these medications the day of surgery:  Metformin, Potassium, Torsemide (Demadex), Ferrous Gluconate, Lactulose, Loperamide (Imodium A-D),     -   PLEASE TAKE these medications the day of surgery:  Ciprofloxacin (Cipro), Eplerenone (Inspra), Gabapentin, Metoprolol, Rifaximin (Xifaxan), Tamsulosin (Flomax),  Acetaminophen (Tylenol) if needed    The morning of surgery, decrease the Glargine (Lantus) insulin to 52 units.    How do I prepare myself?  -  Bring CPAP.  - Please take 2 showers before surgery using Scrubcare or Hibiclens soap.    Use this soap only from the neck to your toes.     Leave the soap on your skin for one minute--then rinse thoroughly.      You may use your own shampoo and conditioner. No other hair products.   - Please remove all jewelry and body piercings.  - No lotions, deodorants or fragrance.  - Bring your ID and insurance card.    -If you have a Deep Brain Stimulator, Spinal Cord Stimulator, or any Neuro Stimulator device---you must bring the remote control to the hospital.      ALL PATIENTS GOING HOME THE SAME DAY OF SURGERY ARE REQUIRED TO HAVE A RESPONSIBLE ADULT TO DRIVE AND BE IN ATTENDANCE WITH THEM FOR 24 HOURS FOLLOWING SURGERY.      Questions or Concerns:    - For any questions regarding the day of surgery or your hospital stay, please contact the Pre Admission Nursing Office at [phone].       - If you have health changes between today and your surgery, please call your surgeon.       - For questions after surgery, please call your surgeons office.

## 2022-12-02 NOTE — PROGRESS NOTES
Blair is a 52 year old who is being evaluated via a billable video visit.      How would you like to obtain your AVS? MyChart      HPI       Review of Systems     Objective    Vitals - Patient Reported  Pain Score: Extreme Pain (8)        Physical Exam       CYNTHIA Barbosa LPN

## 2022-12-02 NOTE — H&P
Pre-Operative H & P     CC:  Preoperative exam to assess for increased cardiopulmonary risk while undergoing surgery and anesthesia.    Date of Encounter: 12/2/2022  Primary Care Physician:  Bubba Ovalle     Reason for visit:   Encounter Diagnoses   Name Primary?     Preop examination Yes     Alcoholic cirrhosis of liver with ascites (H)      Type 2 diabetes mellitus with diabetic neuropathy, with long-term current use of insulin (H)        HPI  Blair Oliver is a 52 year old male who presents for pre-operative H & P in preparation for  Procedure Information     Case: [id number] Date/Time: 12/05/22 0800    Procedure: ANESTHESIA OUT OF OR Transjugular intrahepatic portosystemic shunt (TIPS) procedure @0800 (Update)    Anesthesia type: General    Diagnosis: Alcoholic cirrhosis of liver with ascites (H) [K70.31]    Pre-op diagnosis: Alcoholic cirrhosis of liver with ascites (H) [K70.31]    Location:  OR  /  OR    Providers: GENERIC ANESTHESIA PROVIDER          Blair Oliver is a 52 year old male with CHF, history of 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  He has been having weekly paracentesis procedures for the last 9 months for his ascites.  He notes that he had his last 2 days ago and 6.5 liters was removed.  He underwent an EGD yesterday for banding of esophageal varices.  The above listed procedure has now been recommended for further management.       History is obtained from the patient and chart review    Hx of abnormal bleeding or anti-platelet use: none      Past Medical History  Past Medical History:   Diagnosis Date     Alcohol dependence in remission (H)      Alcoholic cirrhosis of liver with ascites (H)      Anemia      Chronic systolic heart failure (H)       Diabetes (H)     Type 2 DM, Insulin Use.      Esophageal varices (H)      Gastroesophageal reflux disease without esophagitis      Hepatic encephalopathy      Hypertension      Incisional hernia      Ischemic bowel disease (H)      Mixed hyperlipidemia        Past Surgical History  Past Surgical History:   Procedure Laterality Date     COLONOSCOPY N/A 12/9/2021    Procedure: COLONOSCOPY, WITH BIOPSY, WITH CLIPS;  Surgeon: Sweetie Bain MD;  Location: [facility] OR     ESOPHAGOSCOPY, GASTROSCOPY, DUODENOSCOPY (EGD), COMBINED N/A 12/9/2021    Procedure: ESOPHAGOGASTRODUODENOSCOPY, WITH BIOPSY;  Surgeon: Sweetie Bain MD;  Location: [facility] OR       Prior to Admission Medications  Current Outpatient Medications   Medication Sig Dispense Refill     acetaminophen (TYLENOL) 500 MG tablet Take 500-1,000 mg by mouth every 8 hours as needed for mild pain       apixaban ANTICOAGULANT (ELIQUIS) 5 MG tablet Take 5 mg by mouth 2 times daily        ciprofloxacin (CIPRO) 500 MG tablet Take 1 tablet (500 mg) by mouth daily 90 tablet 0     COMPRESSION STOCKINGS Please measure and distribute 2 pair of 20mmHg - 30mmHg knee high open or closed toe compression stockings with extra refills as indicated or what insurance will allow . 2 each 4     DULoxetine (CYMBALTA) 60 MG capsule Take 60 mg by mouth every evening       eplerenone (INSPRA) 25 MG tablet Take 2 tablets (50 mg) by mouth 2 times daily 360 tablet 1     ferrous gluconate (FERGON) 324 (38 Fe) MG tablet Take 1 tablet (324 mg) by mouth daily (with breakfast) 90 tablet 3     gabapentin (NEURONTIN) 300 MG capsule Take 900 mg by mouth 2 times daily       insulin glargine (LANTUS PEN) 100 UNIT/ML pen Inject 65 Units Subcutaneous every morning       insulin pen needle (B-D U/F) 31G X 5 MM miscellaneous AS DIRECTED. BD UF MINI PEN NEEDLE [id number]: USE ONCE DAILY       lactulose 20 GM/30ML SOLN Take 30 mLs (20 g) by mouth 3 times daily 2838 mL 11     loperamide (IMODIUM A-D) 1 MG/7.5ML Take  15 mLs (2 mg) by mouth 4 times daily as needed for diarrhea 237 mL 1     metFORMIN (GLUCOPHAGE) 1000 MG tablet Take 1,000 mg by mouth 2 times daily        metoprolol succinate ER (TOPROL XL) 50 MG 24 hr tablet Take 50 mg by mouth 2 times daily       Multiple Vitamins-Minerals (SUPER THERA DAMARIS M) TABS Take 1 tablet by mouth daily       omeprazole (PRILOSEC) 20 MG DR capsule Take 20 mg by mouth every evening       ONETOUCH ULTRA test strip PLEASE SEE ATTACHED FOR DETAILED DIRECTIONS       potassium chloride ER (KLOR-CON M) 20 MEQ CR tablet Take 1 tablet (20 mEq) by mouth 2 times daily 60 tablet 5     rifaximin (XIFAXAN) 550 MG TABS tablet Take 1 tablet (550 mg) by mouth 2 times daily 60 tablet 11     tamsulosin (FLOMAX) 0.4 MG capsule Take 0.4 mg by mouth daily       torsemide (DEMADEX) 20 MG tablet Take 3 tablets (60 mg) by mouth 2 times daily for 90 days 540 tablet 0       Allergies  Allergies   Allergen Reactions     Minocycline Hives       Social History  Social History     Socioeconomic History     Marital status:      Spouse name: Not on file     Number of children: Not on file     Years of education: Not on file     Highest education level: Not on file   Occupational History     Not on file   Tobacco Use     Smoking status: Former     Packs/day: 1.00     Years: 16.00     Pack years: 16.00     Types: Cigarettes     Quit date:      Years since quittin.9     Smokeless tobacco: Never   Substance and Sexual Activity     Alcohol use: Not Currently     Comment: Quit 2020     Drug use: Never     Sexual activity: Not on file   Other Topics Concern     Parent/sibling w/ CABG, MI or angioplasty before 65F 55M? Not Asked   Social History Narrative     Not on file     Social Determinants of Health     Financial Resource Strain: Not on file   Food Insecurity: Not on file   Transportation Needs: Not on file   Physical Activity: Not on file   Stress: Not on file   Social Connections: Not on file  "  Intimate Partner Violence: Not on file   Housing Stability: Not on file       Family History  Family History   Problem Relation Age of Onset     Deep Vein Thrombosis Mother      Pulmonary Embolism Mother      Substance Abuse Brother      Substance Abuse Maternal Grandmother      Anesthesia Reaction No family hx of        Review of Systems  The complete review of systems is negative other than noted in the HPI or here.   Anesthesia Evaluation   Pt has had prior anesthetic.     No history of anesthetic complications       ROS/MED HX  ENT/Pulmonary:     (+) sleep apnea, uses CPAP, tobacco use, Past use,  (-) asthma, COPD and recent URI   Neurologic:     (+) peripheral neuropathy, - pain, numbness, tingling feet and hands.     Cardiovascular: Comment: Hx of bradycardia- Pulse average at home 60-70's  Chronic Lower extremity Edema    (+) Dyslipidemia hypertension-range: 120/90's/ ----CHF etiology: PVC's Last EF: 55% date: 10/2022 NYHA classification: II. dysrhythmias, PVCs, Previous cardiac testing   Echo: Date: 10/2022 Results:  See H&P  Stress Test: Date: Results:    ECG Reviewed: Date: Results:    Cath: Date: Results:   (-) ALMODOVAR and orthopnea/PND   METS/Exercise Tolerance: 3 - Able to walk 1-2 blocks without stopping    Hematologic: Comments: Hx of thrombus causing acute mesenteric ischemia, ischemic bowel disease s/p bowel resection c/b incisional hernia (2020)  - Iron infusions 10/31, 11/3, 11/7     Elevated INRs    (+) History of blood clots, anemia, history of blood transfusion, no previous transfusion reaction,     Musculoskeletal:  - neg musculoskeletal ROS     GI/Hepatic: Comment: Diarrhea   Poorly controlled diarrhea which has been ongoing.  Decompensated cirrhosis of the Liver with Ascites  -Gastric Antral Vascular Ectasia    Incisional hernia \"has exploded into 3 hernias\"    (+) GERD, Asymptomatic on medication, liver disease,  Acute appendicitis:      Renal/Genitourinary:     (+) Nephrolithiasis ,   "   Endo:     (+) type II DM, Last HgA1c: 6.1, date: 12/2/22, Using insulin, - not using insulin pump. Normal glucose range: , not previously admitted for DM/DKA. Diabetic complications: neuropathy. Obesity,     Psychiatric/Substance Use:     (+) alcohol abuse     Infectious Disease: Comment: spontaneous bacterial peritonitis 9/2022   (-) Recent Fever   Malignancy:  - neg malignancy ROS     Other:            Virtual visit -  No vitals were obtained    Physical Exam  Constitutional: Awake, alert, cooperative, no apparent distress, and appears stated age.  Eyes: Pupils equal  HENT: Normocephalic  Respiratory: non labored breathing   Neurologic: Awake, alert, oriented to name, place and time.   Neuropsychiatric: Calm, cooperative. Normal affect.      Prior Labs/Diagnostic Studies   All labs and imaging personally reviewed         Echocardiogram  10/10/22  Interpretation Summary     Frequent ectopy during study making interpretation suboptimal.  Global and regional left ventricular function is low-normal with an EF of  55%.Moderate left ventricular dilation is present.  Right ventricular function, chamber size, wall motion, and thickness are  normal.  No clinically significant valvular pathology identified.  No pericardial effusion is present.  This study was compared with the study from 6/14/22 .  No significant changes noted.       EKG  6/2022  Sinus rhythm with occasional Premature ventricular complexes   Low voltage QRS   Prolonged QT   Abnormal ECG       Outside labs 11/17/22-11/20/22  CBC WITH AUTO DIFFERENTIAL  Order: [id number]   suggestion  Information displayed in this report may not trend or trigger automated decision support.      Ref Range & Units 12 d ago   WHITE BLOOD COUNT         4.5 - 11.0 thou/cu mm 5.4    RED BLOOD COUNT           4.30 - 5.90 mil/cu mm 2.71 Low     HEMOGLOBIN                13.5 - 17.5 g/dL 8.3 Low     HEMATOCRIT                37.0 - 53.0 % 26.8 Low     MCV                        80 - 100 fL 99    MCH                       26.0 - 34.0 pg 30.6    MCHC                      32.0 - 36.0 g/dL 31.0 Low     RDW                       11.5 - 15.5 % 20.6 High     PLATELET COUNT            140 - 440 thou/cu mm 75 Low     MPV                       6.5 - 11.0 fL 11.6 High     NRBC                      % 0.0    ABS NRBC thou /cu mm 0.0    % NEUT % 75.5    % LYMPH % 13.4    % MONO % 7.4    % EOS % 2.8    % BASO % 0.7    % IMMATURE GRAN (METAS,MYELOS,PROS) % 0.2    ABSOLUTE NEUTROPHILS      1.7 - 7.0 thou/cu mm 4.1    ABSOLUTE LYMPHOCYTES      0.9 - 2.9 thou/cu mm 0.7 Low     ABSOLUTE MONOCYTES        <0.9 thou/cu mm 0.4    ABSOLUTE EOSINOPHILS      <0.5 thou/cu mm 0.2    ABSOLUTE BASOPHILS        <0.3 thou/cu mm 0.0    ABSOLUTE IMMATURE GRANULOCYTES(METAS,MYELOS,PROS) <0.3 thou/cu mm 0.0    Specimen Collected: 11/20/22  5:30 AM Last Resulted: 11/20/22  5:59 AM   Received From: [facility]  Result Received: 11/28/22 10:25 AM     View Encounter      Received Information  MAGNESIUM  Order: [id number]   suggestion  Information displayed in this report may not trend or trigger automated decision support.      Ref Range & Units 12 d ago   MAGNESIUM 1.6 - 2.6 mg/dL 2.0    Specimen Collected: 11/20/22  5:30 AM Last Resulted: 11/20/22  6:08 AM   [facility] From: [facility]  Result Received: 11/28/22 10:25 AM     View Encounter      Received Information   Contains abnormal data BASIC METABOLIC PANEL  Order: [id number]   suggestion  Information displayed in this report may not trend or trigger automated decision support.      Ref Range & Units 12 d ago   SODIUM 135 - 145 mmol/L 140    POTASSIUM 3.5 - 5.0 mmol/L 3.8    CHLORIDE 98 - 110 mmol/L 119 High     CO2,TOTAL 21 - 31 mmol/L 13 Low     ANION GAP 5 - 18 8    GLUCOSE 65 - 100 mg/dL 129 High     CALCIUM 8.5 - 10.5 mg/dL 8.5    BUN 8 - 25 mg/dL 18    CREATININE 0.72 - 1.25 mg/dL 1.33 High     BUN/CREAT  RATIO           10 - 20 14    eGFR >90 mL/min/1.73m2 64 Low     Comment: As of 03/15/2022, eGFR is calculated by the CKD-EPI creatinine equation without race adjustment.  eGFR can be influenced by muscle mass, exercise, and diet.  The reported eGFR is an estimation only and is only applicable if the renal function is stable.   Specimen Collected: 11/20/22  5:30 AM Last Resulted: 11/20/22  6:06 AM   Received From: [facility]  Result Received: 11/28/22 10:25 AM     View Encounter      Received Information   Contains abnormal data PROTIME-INR  Order: [id number]   suggestion  Information displayed in this report may not trend or trigger automated decision support.      Ref Range & Units 2 wk ago   INR <1.3 2.2 High     PROTIME 12.0 - 13.8 sec 23.4 High          Labs today  Component      Latest Ref Rng & Units 12/2/2022   INR      0.85 - 1.15 1.73 (H)   Hemoglobin A1C      0.0 - 5.6 % 6.1 (H)         The patient's records and results personally reviewed by this provider.     Outside records reviewed from: Care Everywhere      Assessment      Blair Oliver is a 52 year old male seen as a PAC referral for risk assessment and optimization for anesthesia.    Plan/Recommendations  Pt will be optimized for the proposed procedure.  See below for details on the assessment, risk, and preoperative recommendations    NEUROLOGY  - No history of TIA, CVA or seizure   - has had 3 prior episodes of hepatic encephalopathy.  Now managed on lactulose.  Hold lactulose DOS      -Post Op delirium risk factors:  High co-morbid index    ENT  - No current airway concerns.  Will need to be reassessed day of surgery.  Mallampati: Unable to assess  TM: Unable to assess    CARDIAC  CHF - LVEF improved with sobriety, then worsened with stopping BB. Metoprolol resumed, LVEF improved. Much of his CHF was related to PVCs, improved with treatment of this.   Last Echocardiogram with LVEF 55%%, normal diastolic  "function and RV function, no significant valvular abnormalities  - history of Bradycardia with PVC's   - Recent (July 2022) ZIO monitor  showed multiple episodes of ventricular tachycardia the longest was 14 beats nonsustained. Other episodes of SVT also noted. This was done when patient was off the metoprolol.    - METS (Metabolic Equivalents) = 3  Patient CANNOT perform 4 METS exercise without symptoms            Total Score: 1    Functional Capacity: Unable to complete 4 METS      RCRI-Low risk: Class 2 0.9% complication rate            Total Score: 1    RCRI: Diabetes        PULMONARY  - Obstructive Sleep Apnea  GISELLE with home CPAP.  Patient will be instructed to bring their home CPAP device to the hospital with them.    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  Now on eliquis.    - following with hepatology for alcoholic cirrhosis of the liver with ascites, esophageal varices and encephalopathy.  Surgery planned as above.  - reports having a large incisional hernia.  Not a surgical candidate for that currently.   - chronic diarrhea  PONV Medium Risk  Total Score: 2           1 AN PONV: Patient is not a current smoker    1 AN PONV: Intended Post Op Opioids        /RENAL  - on flomax currently for kidney stones.  Denies flank pain or hematuria.      ENDOCRINE    - BMI: Estimated body mass index is 32.41 kg/m  as calculated from the following:    Height as of 10/11/22: 1.905 m (6' 3\").    Weight as of 10/11/22: 117.6 kg (259 lb 4.8 oz).  Obesity (BMI >30)  - Diabetes  Diabetes Type 2, insulin dependent. Hold morning oral hypoglycemic medications DOS. Take 80% of last scheduled dose of long-acting insulin prior to procedure. "  Recommend close monitoring of the patient's blood glucose levels throughout the perioperative period.  - last A1c was 6 months ago.  Recheck ordered today.     HEME  VTE High Risk 3%            Total Score: 9    VTE: Male    VTE: Pt history of VTE    VTE: Family Hx of VTE      - chronically elevated INR secondary to cirrhosis.  Will recheck today.  - Chronic anemia.  Most recent hgb 8.3.  Repeat CBC already ordered by surgical team for DOS  Recommend perioperative use of blood conservation techniques intraoperatively and close monitoring for postoperative bleeding.  A type and screen has been ordered for this patient    - eliquis already on hold due to EGD yesterday.  Will continue to hold through to this upcoming procedure.  See pharmD note.     PSYCH  - alcohol dependence in remission since 9/21/20        Different anesthesia methods/types have been discussed with the patient, but they are aware that the final plan will be decided by the assigned anesthesia provider on the date of service.      The patient is optimized for their procedure. AVS with information on surgery time/arrival time, meds and NPO status given by nursing staff. No further diagnostic testing indicated.    Virtual visit - Please refer to the physical examination documented by the anesthesiologist in the anesthesia record on the day of surgery.    Video-Visit Details    Type of service:  Video Visit    Provider received verbal consent for a Video Visit from the patient? Yes    Video Start Time: 0750  Video End Time (time video stopped): 0808    Originating Location (pt. Location): Home  Distant Location (provider location): Off-site    Mode of Communication:  Video Conference via [employer]     On the day of service:     Prep time: 20 minutes  Visit time: 18 minutes  Documentation time: 21 minutes  ------------------------------------------  Total time: 59 minutes      GABRIEL Camacho CNP  Preoperative Assessment Center  [facility]  [facility]  [facility]  Phone: [phone]  Fax: [phone]

## 2022-12-05 NOTE — OR NURSING
Procedure cancelled due to low potassium level. Pt has been here since 0614 till now at 1312.  Pt is disappointed, as is his family. They are going home and will wait further instructions. I encouraged him to check with his primary physician and ask about supplementation for the potassium and rechecking the levels.

## 2022-12-05 NOTE — ANESTHESIA PREPROCEDURE EVALUATION
Anesthesia Pre-Procedure Evaluation    Patient: Blair Oliver   MRN: [id number] : 1970        Procedure : Procedure(s):  ANESTHESIA OUT OF OR Transjugular intrahepatic portosystemic shunt (TIPS) procedure @0800          Past Medical History:   Diagnosis Date     Alcohol dependence in remission (H)      Alcoholic cirrhosis of liver with ascites (H)      Anemia      Chronic systolic heart failure (H)      Diabetes (H)     Type 2 DM, Insulin Use.      Esophageal varices (H)      Gastroesophageal reflux disease without esophagitis      Hepatic encephalopathy      Hypertension      Incisional hernia      Ischemic bowel disease (H)      Mixed hyperlipidemia       Past Surgical History:   Procedure Laterality Date     COLONOSCOPY N/A 2021    Procedure: COLONOSCOPY, WITH BIOPSY, WITH CLIPS;  Surgeon: Sweetie Bain MD;  Location: [facility] OR     ESOPHAGOSCOPY, GASTROSCOPY, DUODENOSCOPY (EGD), COMBINED N/A 2021    Procedure: ESOPHAGOGASTRODUODENOSCOPY, WITH BIOPSY;  Surgeon: Sweetie Bain MD;  Location: [facility] OR      Allergies   Allergen Reactions     Minocycline Hives      Social History     Tobacco Use     Smoking status: Former     Packs/day: 1.00     Years: 16.00     Pack years: 16.00     Types: Cigarettes     Quit date:      Years since quittin.9     Smokeless tobacco: Never   Substance Use Topics     Alcohol use: Not Currently     Comment: Quit 2020      Wt Readings from Last 1 Encounters:   22 116.7 kg (257 lb 4.4 oz)        Anesthesia Evaluation   Pt has had prior anesthetic. Type: General.        ROS/MED HX  ENT/Pulmonary:     (+) sleep apnea, uses CPAP,     Neurologic:     (+) peripheral neuropathy, - hands and feet.     Cardiovascular:     (+) hypertension-----Taking blood thinners Pt has received instructions: CHF dysrhythmias, PVCs, Irregular Heartbeat/Palpitations,  CAD: 55. : 55.   METS/Exercise Tolerance:     Hematologic:       Musculoskeletal:       GI/Hepatic:     (+)  GERD, Asymptomatic on medication, liver disease,     Renal/Genitourinary:     (+) Nephrolithiasis ,     Endo:     (+) type I DM, Obesity,     Psychiatric/Substance Use:       Infectious Disease:       Malignancy:       Other:            Physical Exam    Airway        Mallampati: I    Neck ROM: full     Respiratory Devices and Support         Dental       (+) partials      Cardiovascular   cardiovascular exam normal          Pulmonary   pulmonary exam normal                OUTSIDE LABS:  CBC:   Lab Results   Component Value Date    WBC 5.7 10/11/2022    WBC 5.7 09/11/2022    HGB 8.7 (L) 10/11/2022    HGB 8.6 (L) 09/11/2022    HCT 28.8 (L) 10/11/2022    HCT 28.4 (L) 09/11/2022     (L) 10/11/2022     (L) 09/11/2022     BMP:   Lab Results   Component Value Date     10/11/2022     09/11/2022    POTASSIUM 3.8 10/11/2022    POTASSIUM 4.0 09/11/2022    CHLORIDE 106 10/11/2022    CHLORIDE 110 (H) 09/11/2022    CO2 23 10/11/2022    CO2 20 09/11/2022    BUN 10.9 10/11/2022    BUN 16 09/11/2022    CR 0.92 10/11/2022    CR 0.78 09/11/2022     (H) 12/05/2022    GLC 87 12/01/2022     COAGS:   Lab Results   Component Value Date    INR 1.73 (H) 12/02/2022    FIBR 272 06/13/2022     POC: No results found for: BGM, HCG, HCGS  HEPATIC:   Lab Results   Component Value Date    ALBUMIN 3.2 (L) 10/11/2022    PROTTOTAL 6.6 10/11/2022    ALT 23 10/11/2022    AST 37 10/11/2022    ALKPHOS 186 (H) 10/11/2022    BILITOTAL 0.8 10/11/2022    PONCHO 53 (H) 06/13/2022     OTHER:   Lab Results   Component Value Date    A1C 6.1 (H) 12/02/2022    DANIELLE 8.4 (L) 10/11/2022    PHOS 3.9 06/15/2021    MAG 1.6 06/13/2022    TSH 1.34 06/13/2022       Anesthesia Plan    ASA Status:  3   NPO Status:  NPO Appropriate    Anesthesia Type: General.     - Airway: ETT   Induction: Intravenous.   Maintenance: Inhalation.   Techniques and Equipment:     - Lines/Monitors: 2nd IV, Arterial Line     Consents    Anesthesia Plan(s) and  associated risks, benefits, and realistic alternatives discussed. Questions answered and patient/representative(s) expressed understanding.     - Discussed: Risks, Benefits and Alternatives for BOTH SEDATION and the PROCEDURE were discussed     - Discussed with:  Patient    Use of blood products discussed: Yes.     Postoperative Care    Pain management: IV analgesics.   PONV prophylaxis: Ondansetron (or other 5HT-3), Dexamethasone or Solumedrol     Comments:    Other Comments: Pt with a potassium this am of 2.3. Case cancelled until K+ corrected. IR informed            Abdelrahman Feldman MD

## 2022-12-05 NOTE — IR NOTE
Patient Name: Blair Oliver  Medical Record Number: [id number]  Today's Date: 12/5/2022    Procedure: ***  Proceduralist: ***  Pathology present: na    Procedure Start: ***  Procedure end: ***  Sedation medications administered: General Anesthesia     Report given to: PACU  : german    Other Notes: Pt arrived to IR room 4 from . Consent reviewed. Pt denies any questions or concerns regarding procedure. Pt positioned supine and monitored per protocol.     Pt tolerated procedure without any noted complications.     Pt transferred to PACU.

## 2022-12-05 NOTE — PROGRESS NOTES
INTERVENTIONAL RADIOLOGY    Pt presented for TIPS today with new finding of hypokalemia (K 2.3).  Given 10 mEq K IV and 80 mEq PO with follow up K 2.8.  Discussed with anesthesiologist who requires K at least 3.2 to proceed.  Patient on torsemide and states he has taken K supplements as prescribed.      Case therefore cancelled.  Will message both his hepatologist (Dr. Sweetie Bingham) and cardiologist (Dr. Bell) to manage K and will reschedule.     Michael Bolton MD  Interventional Radiology and Vascular Imaging Attending  Department of Radiology  [facility]

## 2022-12-05 NOTE — OR NURSING
Handoff from [facility] RESENDIZ- potassium 2.3 this am- IR MD notified - waiting for orders for replacement prior to procedure      Followed up with Dr. Feldman about potassium and IR is to put in orders for replacement - will continue to monitor

## 2022-12-06 NOTE — PROGRESS NOTES
Attempted to reach patient for check in, no answer, message left requesting call back, number provided.    Sharon Owusu, RN Care Coordinator  [facility]  Hepatology Clinic/Specialty Program

## 2022-12-06 NOTE — PROGRESS NOTES
Per Dr. Bain, pt should hold diuretics, continue potassium supplement, and have MELD labs checked on Friday. Pt verbalized understanding, scheduled lab appt for Friday.    Will check in with pt on Monday.    Sharon Owusu RN Care Coordinator  [facility]  Hepatology Clinic/Specialty Program

## 2022-12-06 NOTE — PROGRESS NOTES
Spoke with pt and spouse for check in. Pt was scheduled for TIPS yesterday but had to cancel due to low potassium that could not be raised with potassium replacement. Spoke with pt regarding current medications - pt reports that he has continued with his daily potassium supplement and had a recent hospitalization at [place] from 11/17-11/20 (see notes via Care Everywhere) during which his eplerenone was discontinued by nephrology. Continues with torsemide 60mg BID. However, pt states that he had been holding his medications, including torsemide and potassium, for 3 days prior to TIPS. Message sent to Dr. Bain regarding updates.    Pt denies any recent melena, hematochezia, or hematemesis. Continues with lactulose and rifaximin regimen. Denies any recent abdominal pain other than positional pain related to his hernias. Continues with daily antibiotic for SBP prophylaxis.    Sharon Owusu, RN Care Coordinator  [facility]  Hepatology Clinic/Specialty Program

## 2022-12-12 NOTE — PROGRESS NOTES
Attempted to reach patient for check in, no answer, message left requesting call back, number provided.    Sharon Owusu, RN Care Coordinator  [facility]  Hepatology Clinic/Specialty Program

## 2022-12-12 NOTE — TELEPHONE ENCOUNTER
Screening Questions  BLUE  KIND OF PREP RED  LOCATION [review exclusion criteria] GREEN  SEDATION TYPE        y Are you active on mychart?       [facility] Ordering/Referring Provider?        [facility] What type of coverage do you have?      n Have you had a positive covid test in the last 14 days?     31.2 1. BMI  [BMI 40+ - review exclusion criteria]    y  2. Are you able to give consent for your medical care? [IF NO,RN REVIEW]        n  3. Are you taking any prescription pain medications on a routine schedule?      n  3a. EXTENDED PREP What kind of prescription?     n 4. Do you have any chemical dependencies such as alcohol, street drugs, or methadone?    n 5. Do you have any history of post-traumatic stress syndrome, severe anxiety or history of psychosis?      **If yes 3- 5 , please schedule with MAC sedation.**          IF YES TO ANY 6 - 10 - HOSPITAL SETTING ONLY.     n 6.   Do you need assistance transferring?     n 7.   Have you had a heart or lung transplant?    n 8.   Are you currently on dialysis?   n 9.   Do you use daily home oxygen?   n 10. Do you take nitroglycerin?   10a. n If yes, how often?     11. [FEMALES]  n Are you currently pregnant?    11a. n If yes, how many weeks? [ Greater than 12 weeks, OR NEEDED]    n 12. Do you have Pulmonary Hypertension? *NEED PAC APPT AT [facility]*     n 13. [review exclusion criteria]  Do you have any implantable devices in your body (pacemaker, defib, LVAD)?    n 14. In the past 6 months, have you had any heart related issues including cardiomyopathy or heart attack?     14a. n If yes, did it require cardiac stenting if so when?     n 15. Have you had a stroke or Transient ischemic attack (TIA - aka  mini stroke ) within 6 months?      y 16. Do you have mod to severe Obstructive Sleep Apnea?  [Hospital only - Ok at [place]]    n 17. Do you have SEVERE AND UNCONTROLLED asthma? *NEED PAC APPT AT [facility]*     y elequis 18. Are you currently taking any blood thinners?     18a. If  "yes, inform patient to \"follow up w/ ordering provider for bridging instructions.\"    n 19. Do you take the medication Phentermine?    19a. If yes, \"Hold for 7 days before procedure.  Please consult your prescribing provider if you have questions about holding this medication.\"     n  20. Do you have chronic kidney disease?      y  21. Do you have a diagnosis of diabetes?     n  22. On a regular basis do you go 3-5 days between bowel movements?      23. Preferred LOCAL Pharmacy for Pre Prescription    [ LIST ONLY ONE PHARMACY]     [id number] - [place], [place] - [address] [address] AT [facility]        - CLOSING REMINDERS -    Informed patient they will need an adult    Cannot take any type of public or medical transportation alone    Conscious Sedation- Needs  for 6 hours after the procedure       MAC/General-Needs  for 24 hours after procedure    Pre-Procedure Cov[facility] test [facility] be [facility]]    Confirmed Nurse will call to complete assessment       - SCHEDULING DETAILS -  y Hospital Setting Required? If yes, what is the exclusion?: GISELLE   Jones  Surgeon    1/26/23  Date of Procedure  Upper Endoscopy [EGD]  Type of Procedure Scheduled  [facility] Location        n Sedation Type     n PAC / Pre-op Required                 "

## 2022-12-13 NOTE — PROGRESS NOTES
Spoke with pt for check in. Advised pt that per Dr. Bain they should continue to hold their diuretics, increase potassium supplement to 30mEq BID, and have labs rechecked this week due to continued low K+ and elevated Cr. Pt and spouse verbalized understanding, will follow recommendations. Will have labs rechecked on Friday 12/16.     Updated paracentesis orders faxed to [facility] (decreased max drain to 6L, changed albumin ratio to 12.5g/L removed), called [facility] to concform orders received and entered. Pt's next para appt scheduled for tomorrow AM. Pt states that he has a protrusion side of his abdomen that he believes is a pocket of trapped ascitic fluid - reports it is soft and painful to touch. Advised pt to continue to monitor until tomorrow's paracentesis appt, go to ED with any fever, severe abdominal pain, or N/V. Pt verbalized understanding.    Pt continues with lactulose but has decreased to 30mL BID due to severe diarrhea. Continues with rifaximin. Denies confusion, but reports significant fatigue. Advised pt to go to ER with worsening or new symptoms, pt and spouse verbalized understanding.    Will check in with pt with next set of lab results.    Sharon Owusu, RN Care Coordinator  [facility]  Hepatology Clinic/Specialty Program

## 2022-12-20 NOTE — PROGRESS NOTES
Attempted to reach patient for check in, no answer, message left requesting call back, number provided.    Sharon Owusu, RN Care Coordinator  [facility]  Hepatology Clinic/Specialty Program

## 2022-12-21 NOTE — PROGRESS NOTES
Spoke with pt for check in. Discussed with pt recommendation to restart water pills at torsemide 60mg daily and eplerenone 25mg daily. Pt scheduled to recheck labs on Wed 12/28.     Pt reporting abdominal distention and leg swelling. Last paracentesis done on Wednesday 12/21 with 4.5L output. Discussed with pt interventions to decrease leg swelling - compression socks, elevating legs, reducing salt intake. Pt verbalized understanding.    Pt denies any fever or sweats. Denies any melena, hematochezia, or hematemesis. Currently taking lactulose BID along with rifaximin BID with 8-12 bouts of diarrhea daily. Denies any mental fogginess or confusion, but pt's spouse states his hands 'have been shaky' which she states typically happens prior to an episode of HE. Advised pt to start taking an extra half dose daily of lactulose, will check back in next week.    Sharon Owusu, RN Care Coordinator  [facility]  Hepatology Clinic/Specialty Program

## 2022-12-28 NOTE — TELEPHONE ENCOUNTER
DATE:  12/28/2022   TIME OF RECEIPT FROM LAB:  1:01PM  LAB TEST:  Hgb  LAB VALUE:  7.6  RESULTS GIVEN WITH READ-BACK TO (PROVIDER): Sweetie Bain MD    TIME LAB VALUE REPORTED TO PROVIDER:   1:02PM

## 2022-12-29 NOTE — PROGRESS NOTES
Per Dr. Bain, based on pt's lab from yesterday 12/28 pt should head to ED for lab recheck and likely IV potassium. Attempted to reach pt for check in, left message notifying pt he should head to ED, provided number for callback.    Sharon Owusu, RN Care Coordinator  [facility]  Hepatology Clinic/Specialty Program

## 2022-12-29 NOTE — PROGRESS NOTES
Spoke with pt and spouse - advised they head to ED now due to potassium 2.4. Pt states he has been feeling dizzy for the past few hours. Pt and spouse state they will head to ED now.     Sharon Owusu, RN Care Coordinator  [facility]  Hepatology Clinic/Specialty Program

## 2023-01-01 ENCOUNTER — REFERRAL (OUTPATIENT)
Dept: TRANSPLANT | Facility: CLINIC | Age: 53
End: 2023-01-01
Payer: COMMERCIAL

## 2023-01-01 ENCOUNTER — OFFICE VISIT (OUTPATIENT)
Dept: NEPHROLOGY | Facility: CLINIC | Age: 53
End: 2023-01-01
Attending: INTERNAL MEDICINE
Payer: COMMERCIAL

## 2023-01-01 ENCOUNTER — INFUSION THERAPY VISIT (OUTPATIENT)
Dept: INFUSION THERAPY | Facility: CLINIC | Age: 53
End: 2023-01-01
Attending: FAMILY MEDICINE
Payer: COMMERCIAL

## 2023-01-01 ENCOUNTER — TELEPHONE (OUTPATIENT)
Dept: TRANSPLANT | Facility: CLINIC | Age: 53
End: 2023-01-01
Payer: COMMERCIAL

## 2023-01-01 ENCOUNTER — OFFICE VISIT (OUTPATIENT)
Dept: GASTROENTEROLOGY | Facility: CLINIC | Age: 53
End: 2023-01-01
Attending: STUDENT IN AN ORGANIZED HEALTH CARE EDUCATION/TRAINING PROGRAM
Payer: COMMERCIAL

## 2023-01-01 ENCOUNTER — ANCILLARY PROCEDURE (OUTPATIENT)
Dept: CARDIOLOGY | Facility: CLINIC | Age: 53
End: 2023-01-01
Attending: STUDENT IN AN ORGANIZED HEALTH CARE EDUCATION/TRAINING PROGRAM
Payer: COMMERCIAL

## 2023-01-01 ENCOUNTER — TELEPHONE (OUTPATIENT)
Dept: GASTROENTEROLOGY | Facility: CLINIC | Age: 53
End: 2023-01-01

## 2023-01-01 ENCOUNTER — TELEPHONE (OUTPATIENT)
Dept: PHARMACY | Facility: CLINIC | Age: 53
End: 2023-01-01
Payer: COMMERCIAL

## 2023-01-01 ENCOUNTER — LAB (OUTPATIENT)
Dept: LAB | Facility: CLINIC | Age: 53
End: 2023-01-01
Payer: COMMERCIAL

## 2023-01-01 ENCOUNTER — PATIENT OUTREACH (OUTPATIENT)
Dept: GASTROENTEROLOGY | Facility: CLINIC | Age: 53
End: 2023-01-01
Payer: COMMERCIAL

## 2023-01-01 ENCOUNTER — PRE VISIT (OUTPATIENT)
Dept: SURGERY | Facility: CLINIC | Age: 53
End: 2023-01-01

## 2023-01-01 ENCOUNTER — DOCUMENTATION ONLY (OUTPATIENT)
Dept: PHARMACY | Facility: CLINIC | Age: 53
End: 2023-01-01

## 2023-01-01 ENCOUNTER — APPOINTMENT (OUTPATIENT)
Dept: INTERVENTIONAL RADIOLOGY/VASCULAR | Facility: CLINIC | Age: 53
DRG: 406 | End: 2023-01-01
Attending: RADIOLOGY
Payer: COMMERCIAL

## 2023-01-01 ENCOUNTER — APPOINTMENT (OUTPATIENT)
Dept: CT IMAGING | Facility: CLINIC | Age: 53
End: 2023-01-01
Attending: EMERGENCY MEDICINE
Payer: COMMERCIAL

## 2023-01-01 ENCOUNTER — OFFICE VISIT (OUTPATIENT)
Dept: TRANSPLANT | Facility: CLINIC | Age: 53
End: 2023-01-01
Attending: STUDENT IN AN ORGANIZED HEALTH CARE EDUCATION/TRAINING PROGRAM
Payer: COMMERCIAL

## 2023-01-01 ENCOUNTER — DOCUMENTATION ONLY (OUTPATIENT)
Dept: TRANSPLANT | Facility: CLINIC | Age: 53
End: 2023-01-01
Payer: COMMERCIAL

## 2023-01-01 ENCOUNTER — PATIENT OUTREACH (OUTPATIENT)
Dept: CARE COORDINATION | Facility: CLINIC | Age: 53
End: 2023-01-01
Payer: COMMERCIAL

## 2023-01-01 ENCOUNTER — HOSPITAL ENCOUNTER (INPATIENT)
Facility: CLINIC | Age: 53
LOS: 2 days | Discharge: HOME | DRG: 442 | End: 2023-02-26
Attending: EMERGENCY MEDICINE | Admitting: STUDENT IN AN ORGANIZED HEALTH CARE EDUCATION/TRAINING PROGRAM
Payer: COMMERCIAL

## 2023-01-01 ENCOUNTER — HEALTH MAINTENANCE LETTER (OUTPATIENT)
Age: 53
End: 2023-01-01

## 2023-01-01 ENCOUNTER — MYC MEDICAL ADVICE (OUTPATIENT)
Dept: TRANSPLANT | Facility: CLINIC | Age: 53
End: 2023-01-01
Payer: COMMERCIAL

## 2023-01-01 ENCOUNTER — TELEPHONE (OUTPATIENT)
Dept: TRANSPLANT | Facility: CLINIC | Age: 53
End: 2023-01-01

## 2023-01-01 ENCOUNTER — PATIENT OUTREACH (OUTPATIENT)
Dept: GASTROENTEROLOGY | Facility: CLINIC | Age: 53
End: 2023-01-01

## 2023-01-01 ENCOUNTER — PRE VISIT (OUTPATIENT)
Dept: NEPHROLOGY | Facility: CLINIC | Age: 53
End: 2023-01-01
Payer: COMMERCIAL

## 2023-01-01 ENCOUNTER — COMMITTEE REVIEW (OUTPATIENT)
Dept: TRANSPLANT | Facility: CLINIC | Age: 53
End: 2023-01-01

## 2023-01-01 ENCOUNTER — OFFICE VISIT (OUTPATIENT)
Dept: CARDIOLOGY | Facility: CLINIC | Age: 53
End: 2023-01-01
Attending: INTERNAL MEDICINE
Payer: COMMERCIAL

## 2023-01-01 ENCOUNTER — ANCILLARY PROCEDURE (OUTPATIENT)
Dept: GENERAL RADIOLOGY | Facility: CLINIC | Age: 53
End: 2023-01-01
Attending: STUDENT IN AN ORGANIZED HEALTH CARE EDUCATION/TRAINING PROGRAM
Payer: COMMERCIAL

## 2023-01-01 ENCOUNTER — TELEPHONE (OUTPATIENT)
Dept: RADIOLOGY | Facility: CLINIC | Age: 53
End: 2023-01-01
Payer: COMMERCIAL

## 2023-01-01 ENCOUNTER — VIRTUAL VISIT (OUTPATIENT)
Dept: RADIOLOGY | Facility: CLINIC | Age: 53
End: 2023-01-01
Attending: RADIOLOGY
Payer: COMMERCIAL

## 2023-01-01 ENCOUNTER — ANESTHESIA EVENT (OUTPATIENT)
Dept: SURGERY | Facility: CLINIC | Age: 53
DRG: 406 | End: 2023-01-01
Payer: COMMERCIAL

## 2023-01-01 ENCOUNTER — TELEPHONE (OUTPATIENT)
Dept: GASTROENTEROLOGY | Facility: CLINIC | Age: 53
End: 2023-01-01
Payer: COMMERCIAL

## 2023-01-01 ENCOUNTER — ANCILLARY PROCEDURE (OUTPATIENT)
Dept: ULTRASOUND IMAGING | Facility: CLINIC | Age: 53
End: 2023-01-01
Attending: INTERNAL MEDICINE
Payer: COMMERCIAL

## 2023-01-01 ENCOUNTER — DOCUMENTATION ONLY (OUTPATIENT)
Dept: GASTROENTEROLOGY | Facility: CLINIC | Age: 53
End: 2023-01-01

## 2023-01-01 ENCOUNTER — OFFICE VISIT (OUTPATIENT)
Dept: GASTROENTEROLOGY | Facility: CLINIC | Age: 53
End: 2023-01-01
Payer: COMMERCIAL

## 2023-01-01 ENCOUNTER — TELEPHONE (OUTPATIENT)
Dept: PHARMACY | Facility: CLINIC | Age: 53
End: 2023-01-01

## 2023-01-01 ENCOUNTER — ANESTHESIA (OUTPATIENT)
Dept: SURGERY | Facility: CLINIC | Age: 53
DRG: 406 | End: 2023-01-01
Payer: COMMERCIAL

## 2023-01-01 ENCOUNTER — HOSPITAL ENCOUNTER (OUTPATIENT)
Facility: CLINIC | Age: 53
End: 2023-01-01
Attending: INTERNAL MEDICINE | Admitting: INTERNAL MEDICINE
Payer: COMMERCIAL

## 2023-01-01 ENCOUNTER — ANESTHESIA (OUTPATIENT)
Dept: GASTROENTEROLOGY | Facility: CLINIC | Age: 53
DRG: 441 | End: 2023-01-01
Payer: COMMERCIAL

## 2023-01-01 ENCOUNTER — APPOINTMENT (OUTPATIENT)
Dept: ULTRASOUND IMAGING | Facility: CLINIC | Age: 53
DRG: 441 | End: 2023-01-01
Attending: STUDENT IN AN ORGANIZED HEALTH CARE EDUCATION/TRAINING PROGRAM
Payer: COMMERCIAL

## 2023-01-01 ENCOUNTER — LAB (OUTPATIENT)
Dept: LAB | Facility: CLINIC | Age: 53
End: 2023-01-01
Attending: INTERNAL MEDICINE
Payer: COMMERCIAL

## 2023-01-01 ENCOUNTER — DOCUMENTATION ONLY (OUTPATIENT)
Dept: PHARMACY | Facility: CLINIC | Age: 53
End: 2023-01-01
Payer: COMMERCIAL

## 2023-01-01 ENCOUNTER — CARE COORDINATION (OUTPATIENT)
Dept: CARDIOLOGY | Facility: CLINIC | Age: 53
End: 2023-01-01

## 2023-01-01 ENCOUNTER — VIRTUAL VISIT (OUTPATIENT)
Dept: SURGERY | Facility: CLINIC | Age: 53
End: 2023-01-01
Payer: COMMERCIAL

## 2023-01-01 ENCOUNTER — ANCILLARY PROCEDURE (OUTPATIENT)
Dept: GENERAL RADIOLOGY | Facility: CLINIC | Age: 53
End: 2023-01-01
Payer: COMMERCIAL

## 2023-01-01 ENCOUNTER — ANCILLARY PROCEDURE (OUTPATIENT)
Dept: BONE DENSITY | Facility: CLINIC | Age: 53
End: 2023-01-01
Attending: STUDENT IN AN ORGANIZED HEALTH CARE EDUCATION/TRAINING PROGRAM
Payer: COMMERCIAL

## 2023-01-01 ENCOUNTER — TELEPHONE (OUTPATIENT)
Dept: NEPHROLOGY | Facility: CLINIC | Age: 53
End: 2023-01-01
Payer: COMMERCIAL

## 2023-01-01 ENCOUNTER — ANESTHESIA EVENT (OUTPATIENT)
Dept: GASTROENTEROLOGY | Facility: CLINIC | Age: 53
DRG: 441 | End: 2023-01-01
Payer: COMMERCIAL

## 2023-01-01 ENCOUNTER — TELEPHONE (OUTPATIENT)
Dept: CARDIOLOGY | Facility: CLINIC | Age: 53
End: 2023-01-01

## 2023-01-01 ENCOUNTER — APPOINTMENT (OUTPATIENT)
Dept: OCCUPATIONAL THERAPY | Facility: CLINIC | Age: 53
DRG: 441 | End: 2023-01-01
Payer: COMMERCIAL

## 2023-01-01 ENCOUNTER — OFFICE VISIT (OUTPATIENT)
Dept: VASCULAR SURGERY | Facility: CLINIC | Age: 53
End: 2023-01-01
Payer: COMMERCIAL

## 2023-01-01 ENCOUNTER — LAB (OUTPATIENT)
Dept: LAB | Facility: CLINIC | Age: 53
End: 2023-01-01
Attending: STUDENT IN AN ORGANIZED HEALTH CARE EDUCATION/TRAINING PROGRAM
Payer: COMMERCIAL

## 2023-01-01 ENCOUNTER — HOSPITAL ENCOUNTER (OUTPATIENT)
Dept: MRI IMAGING | Facility: CLINIC | Age: 53
Discharge: HOME | End: 2023-05-01
Attending: STUDENT IN AN ORGANIZED HEALTH CARE EDUCATION/TRAINING PROGRAM | Admitting: STUDENT IN AN ORGANIZED HEALTH CARE EDUCATION/TRAINING PROGRAM
Payer: COMMERCIAL

## 2023-01-01 ENCOUNTER — HOSPITAL ENCOUNTER (EMERGENCY)
Facility: CLINIC | Age: 53
Discharge: HOME | End: 2023-04-21
Attending: EMERGENCY MEDICINE | Admitting: EMERGENCY MEDICINE
Payer: COMMERCIAL

## 2023-01-01 ENCOUNTER — TRANSFERRED RECORDS (OUTPATIENT)
Dept: HEALTH INFORMATION MANAGEMENT | Facility: CLINIC | Age: 53
End: 2023-01-01

## 2023-01-01 ENCOUNTER — HOSPITAL ENCOUNTER (INPATIENT)
Facility: CLINIC | Age: 53
LOS: 5 days | Discharge: HOME | DRG: 441 | End: 2023-01-28
Attending: STUDENT IN AN ORGANIZED HEALTH CARE EDUCATION/TRAINING PROGRAM | Admitting: STUDENT IN AN ORGANIZED HEALTH CARE EDUCATION/TRAINING PROGRAM
Payer: COMMERCIAL

## 2023-01-01 ENCOUNTER — HOSPITAL ENCOUNTER (INPATIENT)
Facility: CLINIC | Age: 53
LOS: 1 days | Discharge: HOME | DRG: 406 | End: 2023-03-14
Attending: RADIOLOGY | Admitting: RADIOLOGY
Payer: COMMERCIAL

## 2023-01-01 ENCOUNTER — APPOINTMENT (OUTPATIENT)
Dept: OCCUPATIONAL THERAPY | Facility: CLINIC | Age: 53
DRG: 441 | End: 2023-01-01
Attending: INTERNAL MEDICINE
Payer: COMMERCIAL

## 2023-01-01 ENCOUNTER — ANCILLARY PROCEDURE (OUTPATIENT)
Dept: ULTRASOUND IMAGING | Facility: CLINIC | Age: 53
End: 2023-01-01
Attending: STUDENT IN AN ORGANIZED HEALTH CARE EDUCATION/TRAINING PROGRAM
Payer: COMMERCIAL

## 2023-01-01 ENCOUNTER — ANCILLARY PROCEDURE (OUTPATIENT)
Dept: ULTRASOUND IMAGING | Facility: CLINIC | Age: 53
End: 2023-01-01
Attending: RADIOLOGY
Payer: COMMERCIAL

## 2023-01-01 VITALS
TEMPERATURE: 98 F | OXYGEN SATURATION: 100 % | WEIGHT: 256.4 LBS | DIASTOLIC BLOOD PRESSURE: 81 MMHG | SYSTOLIC BLOOD PRESSURE: 129 MMHG | BODY MASS INDEX: 32.05 KG/M2 | HEART RATE: 51 BPM

## 2023-01-01 VITALS
WEIGHT: 261.69 LBS | HEIGHT: 75 IN | OXYGEN SATURATION: 99 % | DIASTOLIC BLOOD PRESSURE: 66 MMHG | HEART RATE: 93 BPM | TEMPERATURE: 98.1 F | BODY MASS INDEX: 32.54 KG/M2 | RESPIRATION RATE: 16 BRPM | SYSTOLIC BLOOD PRESSURE: 123 MMHG

## 2023-01-01 VITALS
TEMPERATURE: 97 F | HEART RATE: 48 BPM | BODY MASS INDEX: 35.08 KG/M2 | WEIGHT: 282.1 LBS | SYSTOLIC BLOOD PRESSURE: 105 MMHG | DIASTOLIC BLOOD PRESSURE: 60 MMHG | OXYGEN SATURATION: 100 % | HEART RATE: 93 BPM | OXYGEN SATURATION: 98 % | DIASTOLIC BLOOD PRESSURE: 65 MMHG | HEIGHT: 75 IN | SYSTOLIC BLOOD PRESSURE: 121 MMHG

## 2023-01-01 VITALS
BODY MASS INDEX: 33.15 KG/M2 | TEMPERATURE: 98.2 F | DIASTOLIC BLOOD PRESSURE: 78 MMHG | OXYGEN SATURATION: 100 % | WEIGHT: 265.2 LBS | HEART RATE: 73 BPM | SYSTOLIC BLOOD PRESSURE: 123 MMHG

## 2023-01-01 VITALS — HEART RATE: 34 BPM | SYSTOLIC BLOOD PRESSURE: 104 MMHG | DIASTOLIC BLOOD PRESSURE: 41 MMHG

## 2023-01-01 VITALS
BODY MASS INDEX: 31.46 KG/M2 | OXYGEN SATURATION: 100 % | WEIGHT: 251.7 LBS | DIASTOLIC BLOOD PRESSURE: 56 MMHG | SYSTOLIC BLOOD PRESSURE: 96 MMHG | HEART RATE: 41 BPM

## 2023-01-01 VITALS
DIASTOLIC BLOOD PRESSURE: 82 MMHG | HEART RATE: 88 BPM | SYSTOLIC BLOOD PRESSURE: 129 MMHG | HEIGHT: 75 IN | WEIGHT: 267.9 LBS | OXYGEN SATURATION: 100 % | TEMPERATURE: 98.5 F | BODY MASS INDEX: 33.31 KG/M2 | RESPIRATION RATE: 18 BRPM

## 2023-01-01 VITALS
BODY MASS INDEX: 34.24 KG/M2 | SYSTOLIC BLOOD PRESSURE: 125 MMHG | TEMPERATURE: 97.8 F | DIASTOLIC BLOOD PRESSURE: 60 MMHG | WEIGHT: 275.35 LBS | HEART RATE: 60 BPM | HEIGHT: 75 IN | OXYGEN SATURATION: 97 % | RESPIRATION RATE: 18 BRPM

## 2023-01-01 VITALS
RESPIRATION RATE: 18 BRPM | TEMPERATURE: 97.7 F | OXYGEN SATURATION: 99 % | SYSTOLIC BLOOD PRESSURE: 108 MMHG | WEIGHT: 247.58 LBS | HEART RATE: 63 BPM | BODY MASS INDEX: 30.94 KG/M2 | DIASTOLIC BLOOD PRESSURE: 60 MMHG

## 2023-01-01 VITALS
DIASTOLIC BLOOD PRESSURE: 73 MMHG | OXYGEN SATURATION: 98 % | HEART RATE: 67 BPM | SYSTOLIC BLOOD PRESSURE: 119 MMHG | TEMPERATURE: 98.1 F | RESPIRATION RATE: 20 BRPM

## 2023-01-01 VITALS — HEART RATE: 80 BPM | SYSTOLIC BLOOD PRESSURE: 125 MMHG | DIASTOLIC BLOOD PRESSURE: 77 MMHG | RESPIRATION RATE: 18 BRPM

## 2023-01-01 DIAGNOSIS — D63.8 ANEMIA IN OTHER CHRONIC DISEASES CLASSIFIED ELSEWHERE: ICD-10-CM

## 2023-01-01 DIAGNOSIS — D64.9 ANEMIA: ICD-10-CM

## 2023-01-01 DIAGNOSIS — K70.31 ALCOHOLIC CIRRHOSIS OF LIVER WITH ASCITES (H): ICD-10-CM

## 2023-01-01 DIAGNOSIS — K70.31 ALCOHOLIC CIRRHOSIS OF LIVER WITH ASCITES (H): Primary | Chronic | ICD-10-CM

## 2023-01-01 DIAGNOSIS — D50.0 IRON DEFICIENCY ANEMIA DUE TO CHRONIC BLOOD LOSS: ICD-10-CM

## 2023-01-01 DIAGNOSIS — R79.0 LOW MAGNESIUM LEVEL: Primary | ICD-10-CM

## 2023-01-01 DIAGNOSIS — K76.82 HEPATIC ENCEPHALOPATHY (H): ICD-10-CM

## 2023-01-01 DIAGNOSIS — R74.8 ABNORMAL SERUM LEVEL OF ALKALINE PHOSPHATASE: ICD-10-CM

## 2023-01-01 DIAGNOSIS — K70.30 CIRRHOSIS, ALCOHOLIC (H): ICD-10-CM

## 2023-01-01 DIAGNOSIS — K74.60 CIRRHOSIS OF LIVER (H): ICD-10-CM

## 2023-01-01 DIAGNOSIS — K31.819 GAVE (GASTRIC ANTRAL VASCULAR ECTASIA): ICD-10-CM

## 2023-01-01 DIAGNOSIS — I49.3 PVC'S (PREMATURE VENTRICULAR CONTRACTIONS): ICD-10-CM

## 2023-01-01 DIAGNOSIS — K31.819 GAVE (GASTRIC ANTRAL VASCULAR ECTASIA): Primary | ICD-10-CM

## 2023-01-01 DIAGNOSIS — D64.9 ANEMIA, UNSPECIFIED TYPE: ICD-10-CM

## 2023-01-01 DIAGNOSIS — R18.8 ASCITES: ICD-10-CM

## 2023-01-01 DIAGNOSIS — N18.31 STAGE 3A CHRONIC KIDNEY DISEASE (H): ICD-10-CM

## 2023-01-01 DIAGNOSIS — K92.2 GASTROINTESTINAL HEMORRHAGE, UNSPECIFIED GASTROINTESTINAL HEMORRHAGE TYPE: ICD-10-CM

## 2023-01-01 DIAGNOSIS — Z01.818 PREOP EXAMINATION: Primary | ICD-10-CM

## 2023-01-01 DIAGNOSIS — K74.60 CIRRHOSIS OF LIVER (H): Primary | ICD-10-CM

## 2023-01-01 DIAGNOSIS — Z95.828 S/P TIPS (TRANSJUGULAR INTRAHEPATIC PORTOSYSTEMIC SHUNT): ICD-10-CM

## 2023-01-01 DIAGNOSIS — K70.31 ALCOHOLIC CIRRHOSIS OF LIVER WITH ASCITES (H): Primary | ICD-10-CM

## 2023-01-01 DIAGNOSIS — E83.42 HYPOMAGNESEMIA: ICD-10-CM

## 2023-01-01 DIAGNOSIS — E87.6 HYPOKALEMIA: ICD-10-CM

## 2023-01-01 DIAGNOSIS — R32 URINARY INCONTINENCE, UNSPECIFIED TYPE: ICD-10-CM

## 2023-01-01 DIAGNOSIS — D50.0 IRON DEFICIENCY ANEMIA DUE TO CHRONIC BLOOD LOSS: Primary | ICD-10-CM

## 2023-01-01 DIAGNOSIS — Z01.818 PREOP EXAMINATION: ICD-10-CM

## 2023-01-01 DIAGNOSIS — Z01.818 ENCOUNTER FOR PRE-TRANSPLANT EVALUATION FOR CHRONIC LIVER DISEASE: ICD-10-CM

## 2023-01-01 DIAGNOSIS — F10.21 ALCOHOL DEPENDENCE IN REMISSION (H): ICD-10-CM

## 2023-01-01 DIAGNOSIS — K55.069: ICD-10-CM

## 2023-01-01 DIAGNOSIS — N18.31 ANEMIA OF CHRONIC RENAL FAILURE, STAGE 3A (H): ICD-10-CM

## 2023-01-01 DIAGNOSIS — R32 URINARY INCONTINENCE, UNSPECIFIED TYPE: Primary | ICD-10-CM

## 2023-01-01 DIAGNOSIS — M81.0 OSTEOPOROSIS, UNSPECIFIED OSTEOPOROSIS TYPE, UNSPECIFIED PATHOLOGICAL FRACTURE PRESENCE: Primary | ICD-10-CM

## 2023-01-01 DIAGNOSIS — K70.30 ALCOHOLIC CIRRHOSIS (H): Primary | ICD-10-CM

## 2023-01-01 DIAGNOSIS — K70.30 ALCOHOLIC CIRRHOSIS (H): ICD-10-CM

## 2023-01-01 DIAGNOSIS — K76.6 PORTAL HYPERTENSION (H): ICD-10-CM

## 2023-01-01 DIAGNOSIS — R10.30 LOWER ABDOMINAL PAIN: ICD-10-CM

## 2023-01-01 DIAGNOSIS — I50.21 ACUTE HFREF (HEART FAILURE WITH REDUCED EJECTION FRACTION) (H): Primary | ICD-10-CM

## 2023-01-01 DIAGNOSIS — R79.89 ELEVATED SERUM CREATININE: Primary | ICD-10-CM

## 2023-01-01 DIAGNOSIS — I50.22 CHRONIC SYSTOLIC CONGESTIVE HEART FAILURE (H): ICD-10-CM

## 2023-01-01 DIAGNOSIS — Z01.818 PRE-OP EVALUATION: ICD-10-CM

## 2023-01-01 DIAGNOSIS — Z20.822 CONTACT WITH AND (SUSPECTED) EXPOSURE TO COVID-19: ICD-10-CM

## 2023-01-01 DIAGNOSIS — R94.30 ABNORMAL CARDIAC FUNCTION TEST: ICD-10-CM

## 2023-01-01 DIAGNOSIS — R00.1 BRADYCARDIA: Primary | ICD-10-CM

## 2023-01-01 DIAGNOSIS — R79.89 ELEVATED SERUM CREATININE: ICD-10-CM

## 2023-01-01 DIAGNOSIS — E55.9 VITAMIN D DEFICIENCY: Primary | ICD-10-CM

## 2023-01-01 DIAGNOSIS — D63.1 ANEMIA OF CHRONIC RENAL FAILURE, STAGE 3A (H): ICD-10-CM

## 2023-01-01 DIAGNOSIS — R00.8 BIGEMINAL PULSE: ICD-10-CM

## 2023-01-01 DIAGNOSIS — Z90.49 ACQUIRED ABSENCE OF INTESTINE: ICD-10-CM

## 2023-01-01 DIAGNOSIS — D64.9 ANEMIA, UNSPECIFIED TYPE: Primary | ICD-10-CM

## 2023-01-01 DIAGNOSIS — K70.31 ALCOHOLIC CIRRHOSIS OF LIVER WITH ASCITES (H): Chronic | ICD-10-CM

## 2023-01-01 DIAGNOSIS — R19.7 DIARRHEA, UNSPECIFIED TYPE: ICD-10-CM

## 2023-01-01 DIAGNOSIS — Z95.828 S/P TIPS (TRANSJUGULAR INTRAHEPATIC PORTOSYSTEMIC SHUNT): Primary | ICD-10-CM

## 2023-01-01 DIAGNOSIS — Z79.01 LONG TERM (CURRENT) USE OF ANTICOAGULANTS: ICD-10-CM

## 2023-01-01 DIAGNOSIS — I50.22 CHRONIC SYSTOLIC CONGESTIVE HEART FAILURE (H): Primary | ICD-10-CM

## 2023-01-01 DIAGNOSIS — K43.0 INCISIONAL HERNIA WITH OBSTRUCTION BUT NO GANGRENE: Primary | ICD-10-CM

## 2023-01-01 LAB
ABO/RH(D): NORMAL
ABSOLUTE NEUTROPHILS, BODY FLUID: 4.3 /UL
ABSOLUTE NEUTROPHILS, BODY FLUID: 4.9 /UL
ABSOLUTE NEUTROPHILS, BODY FLUID: 9.1 /UL
AFP SERPL-MCNC: 2.4 NG/ML
AFP SERPL-MCNC: 3.7 NG/ML
ALBUMIN BODY FLUID SOURCE: NORMAL
ALBUMIN BODY FLUID SOURCE: NORMAL
ALBUMIN FLD-MCNC: 0.3 G/DL
ALBUMIN FLD-MCNC: 0.3 G/DL
ALBUMIN MFR UR ELPH: 7.5 MG/DL (ref 1–14)
ALBUMIN MFR UR ELPH: <6 MG/DL (ref 1–14)
ALBUMIN SERPL BCG-MCNC: 2.5 G/DL (ref 3.5–5.2)
ALBUMIN SERPL BCG-MCNC: 2.9 G/DL (ref 3.5–5.2)
ALBUMIN SERPL BCG-MCNC: 3 G/DL (ref 3.5–5.2)
ALBUMIN SERPL BCG-MCNC: 3.1 G/DL (ref 3.5–5.2)
ALBUMIN SERPL BCG-MCNC: 3.2 G/DL (ref 3.5–5.2)
ALBUMIN SERPL BCG-MCNC: 3.2 G/DL (ref 3.5–5.2)
ALBUMIN SERPL BCG-MCNC: 3.3 G/DL (ref 3.5–5.2)
ALBUMIN SERPL BCG-MCNC: 3.3 G/DL (ref 3.5–5.2)
ALBUMIN SERPL BCG-MCNC: 3.5 G/DL (ref 3.5–5.2)
ALBUMIN SERPL-MCNC: 2.7 G/DL (ref 3.4–5)
ALBUMIN SERPL-MCNC: 2.8 G/DL (ref 3.4–5)
ALBUMIN SERPL-MCNC: 2.9 G/DL (ref 3.4–5)
ALBUMIN SERPL-MCNC: 2.9 G/DL (ref 3.4–5)
ALBUMIN SERPL-MCNC: 3 G/DL (ref 3.4–5)
ALBUMIN SERPL-MCNC: 3.1 G/DL (ref 3.4–5)
ALBUMIN SERPL-MCNC: 3.1 G/DL (ref 3.4–5)
ALBUMIN SERPL-MCNC: 3.2 G/DL (ref 3.4–5)
ALBUMIN SERPL-MCNC: 3.2 G/DL (ref 3.4–5)
ALBUMIN SERPL-MCNC: 3.4 G/DL (ref 3.4–5)
ALBUMIN SERPL-MCNC: 3.6 G/DL (ref 3.4–5)
ALBUMIN SERPL-MCNC: 3.6 G/DL (ref 3.4–5)
ALBUMIN UR-MCNC: NEGATIVE MG/DL
ALP SERPL-CCNC: 119 U/L (ref 40–129)
ALP SERPL-CCNC: 129 U/L (ref 40–129)
ALP SERPL-CCNC: 134 U/L (ref 40–129)
ALP SERPL-CCNC: 143 U/L (ref 40–129)
ALP SERPL-CCNC: 154 U/L (ref 40–129)
ALP SERPL-CCNC: 162 U/L (ref 40–150)
ALP SERPL-CCNC: 168 U/L (ref 40–129)
ALP SERPL-CCNC: 171 U/L (ref 40–129)
ALP SERPL-CCNC: 172 U/L (ref 40–129)
ALP SERPL-CCNC: 180 U/L (ref 40–129)
ALP SERPL-CCNC: 187 U/L (ref 40–150)
ALP SERPL-CCNC: 187 U/L (ref 40–150)
ALP SERPL-CCNC: 189 U/L (ref 40–129)
ALP SERPL-CCNC: 189 U/L (ref 40–129)
ALP SERPL-CCNC: 196 U/L (ref 40–150)
ALP SERPL-CCNC: 197 U/L (ref 40–129)
ALP SERPL-CCNC: 200 U/L (ref 40–129)
ALP SERPL-CCNC: 215 U/L (ref 40–150)
ALP SERPL-CCNC: 217 U/L (ref 40–150)
ALP SERPL-CCNC: 220 U/L (ref 40–129)
ALP SERPL-CCNC: 224 U/L (ref 40–150)
ALP SERPL-CCNC: 224 U/L (ref 40–150)
ALP SERPL-CCNC: 232 U/L (ref 40–150)
ALP SERPL-CCNC: 250 U/L (ref 40–150)
ALP SERPL-CCNC: 254 U/L (ref 40–150)
ALP SERPL-CCNC: 255 U/L (ref 40–129)
ALP SERPL-CCNC: 267 U/L (ref 40–150)
ALP SERPL-CCNC: 284 U/L (ref 40–150)
ALT SERPL W P-5'-P-CCNC: 11 U/L (ref 10–50)
ALT SERPL W P-5'-P-CCNC: 11 U/L (ref 10–50)
ALT SERPL W P-5'-P-CCNC: 12 U/L (ref 10–50)
ALT SERPL W P-5'-P-CCNC: 13 U/L (ref 10–50)
ALT SERPL W P-5'-P-CCNC: 14 U/L (ref 10–50)
ALT SERPL W P-5'-P-CCNC: 15 U/L (ref 10–50)
ALT SERPL W P-5'-P-CCNC: 16 U/L (ref 10–50)
ALT SERPL W P-5'-P-CCNC: 17 U/L (ref 10–50)
ALT SERPL W P-5'-P-CCNC: 19 U/L (ref 10–50)
ALT SERPL W P-5'-P-CCNC: 20 U/L (ref 10–50)
ALT SERPL W P-5'-P-CCNC: 22 U/L (ref 0–70)
ALT SERPL W P-5'-P-CCNC: 23 U/L (ref 0–70)
ALT SERPL W P-5'-P-CCNC: 23 U/L (ref 0–70)
ALT SERPL W P-5'-P-CCNC: 25 U/L (ref 0–70)
ALT SERPL W P-5'-P-CCNC: 26 U/L (ref 0–70)
ALT SERPL W P-5'-P-CCNC: 26 U/L (ref 0–70)
ALT SERPL W P-5'-P-CCNC: 28 U/L (ref 0–70)
ALT SERPL W P-5'-P-CCNC: 28 U/L (ref 0–70)
ALT SERPL W P-5'-P-CCNC: 29 U/L (ref 0–70)
ALT SERPL W P-5'-P-CCNC: 30 U/L (ref 0–70)
ALT SERPL W P-5'-P-CCNC: 35 U/L (ref 0–70)
ALT SERPL W P-5'-P-CCNC: 37 U/L (ref 0–70)
ALT SERPL W P-5'-P-CCNC: 40 U/L (ref 0–70)
AMMONIA PLAS-SCNC: 119 UMOL/L (ref 16–60)
AMMONIA PLAS-SCNC: 42 UMOL/L (ref 16–60)
AMMONIA PLAS-SCNC: 95 UMOL/L (ref 16–60)
AMORPH CRY #/AREA URNS HPF: ABNORMAL /HPF
AMPHETAMINES SERPL QL SCN: NEGATIVE NG/ML
ANION GAP SERPL CALCULATED.3IONS-SCNC: 1 MMOL/L (ref 3–14)
ANION GAP SERPL CALCULATED.3IONS-SCNC: 1 MMOL/L (ref 3–14)
ANION GAP SERPL CALCULATED.3IONS-SCNC: 10 MMOL/L (ref 3–14)
ANION GAP SERPL CALCULATED.3IONS-SCNC: 10 MMOL/L (ref 7–15)
ANION GAP SERPL CALCULATED.3IONS-SCNC: 10 MMOL/L (ref 7–15)
ANION GAP SERPL CALCULATED.3IONS-SCNC: 11 MMOL/L (ref 7–15)
ANION GAP SERPL CALCULATED.3IONS-SCNC: 12 MMOL/L (ref 7–15)
ANION GAP SERPL CALCULATED.3IONS-SCNC: 13 MMOL/L (ref 7–15)
ANION GAP SERPL CALCULATED.3IONS-SCNC: 14 MMOL/L (ref 7–15)
ANION GAP SERPL CALCULATED.3IONS-SCNC: 14 MMOL/L (ref 7–15)
ANION GAP SERPL CALCULATED.3IONS-SCNC: 15 MMOL/L (ref 7–15)
ANION GAP SERPL CALCULATED.3IONS-SCNC: 3 MMOL/L (ref 3–14)
ANION GAP SERPL CALCULATED.3IONS-SCNC: 4 MMOL/L (ref 3–14)
ANION GAP SERPL CALCULATED.3IONS-SCNC: 6 MMOL/L (ref 3–14)
ANION GAP SERPL CALCULATED.3IONS-SCNC: 7 MMOL/L (ref 3–14)
ANION GAP SERPL CALCULATED.3IONS-SCNC: 8 MMOL/L (ref 3–14)
ANION GAP SERPL CALCULATED.3IONS-SCNC: 9 MMOL/L (ref 3–14)
ANION GAP SERPL CALCULATED.3IONS-SCNC: 9 MMOL/L (ref 7–15)
ANNOTATION COMMENT IMP: NORMAL
ANTIBODY SCREEN: NEGATIVE
APPEARANCE FLD: CLEAR
APPEARANCE UR: ABNORMAL
APPEARANCE UR: CLEAR
APTT PPP: 40 SECONDS (ref 22–38)
APTT PPP: 41 SECONDS (ref 22–38)
APTT PPP: 41 SECONDS (ref 22–38)
AST SERPL W P-5'-P-CCNC: 21 U/L (ref 10–50)
AST SERPL W P-5'-P-CCNC: 22 U/L (ref 0–45)
AST SERPL W P-5'-P-CCNC: 24 U/L (ref 0–45)
AST SERPL W P-5'-P-CCNC: 25 U/L (ref 10–50)
AST SERPL W P-5'-P-CCNC: 26 U/L (ref 10–50)
AST SERPL W P-5'-P-CCNC: 26 U/L (ref 10–50)
AST SERPL W P-5'-P-CCNC: 27 U/L (ref 0–45)
AST SERPL W P-5'-P-CCNC: 28 U/L (ref 0–45)
AST SERPL W P-5'-P-CCNC: 29 U/L (ref 10–50)
AST SERPL W P-5'-P-CCNC: 30 U/L (ref 0–45)
AST SERPL W P-5'-P-CCNC: 30 U/L (ref 10–50)
AST SERPL W P-5'-P-CCNC: 30 U/L (ref 10–50)
AST SERPL W P-5'-P-CCNC: 31 U/L (ref 0–45)
AST SERPL W P-5'-P-CCNC: 31 U/L (ref 0–45)
AST SERPL W P-5'-P-CCNC: 31 U/L (ref 10–50)
AST SERPL W P-5'-P-CCNC: 32 U/L (ref 10–50)
AST SERPL W P-5'-P-CCNC: 32 U/L (ref 10–50)
AST SERPL W P-5'-P-CCNC: 35 U/L (ref 0–45)
AST SERPL W P-5'-P-CCNC: 36 U/L (ref 0–45)
AST SERPL W P-5'-P-CCNC: 36 U/L (ref 0–45)
AST SERPL W P-5'-P-CCNC: 36 U/L (ref 10–50)
AST SERPL W P-5'-P-CCNC: 37 U/L (ref 0–45)
AST SERPL W P-5'-P-CCNC: 37 U/L (ref 0–45)
AST SERPL W P-5'-P-CCNC: 39 U/L (ref 0–45)
AST SERPL W P-5'-P-CCNC: 39 U/L (ref 0–45)
AST SERPL W P-5'-P-CCNC: 44 U/L (ref 0–45)
ATRIAL RATE - MUSE: 54 BPM
ATRIAL RATE - MUSE: 63 BPM
ATRIAL RATE - MUSE: 65 BPM
ATRIAL RATE - MUSE: 65 BPM
ATRIAL RATE - MUSE: 70 BPM
ATRIAL RATE - MUSE: 73 BPM
BACTERIA FLD CULT: NO GROWTH
BACTERIA FLD CULT: NO GROWTH
BACTERIA FLD CULT: NORMAL
BARBITURATES SERPL QL SCN: NEGATIVE NG/ML
BASOPHILS # BLD AUTO: 0 10E3/UL (ref 0–0.2)
BASOPHILS # BLD AUTO: 0.1 10E3/UL (ref 0–0.2)
BASOPHILS NFR BLD AUTO: 1 %
BENZODIAZ SERPL QL SCN: NEGATIVE NG/ML
BILIRUB DIRECT SERPL-MCNC: 0.3 MG/DL (ref 0–0.3)
BILIRUB DIRECT SERPL-MCNC: 0.31 MG/DL (ref 0–0.3)
BILIRUB DIRECT SERPL-MCNC: 0.4 MG/DL (ref 0–0.2)
BILIRUB DIRECT SERPL-MCNC: 0.5 MG/DL (ref 0–0.2)
BILIRUB DIRECT SERPL-MCNC: 0.6 MG/DL (ref 0–0.2)
BILIRUB DIRECT SERPL-MCNC: 0.6 MG/DL (ref 0–0.2)
BILIRUB DIRECT SERPL-MCNC: 0.7 MG/DL (ref 0–0.2)
BILIRUB DIRECT SERPL-MCNC: 0.97 MG/DL (ref 0–0.3)
BILIRUB DIRECT SERPL-MCNC: 0.99 MG/DL (ref 0–0.3)
BILIRUB DIRECT SERPL-MCNC: 1.2 MG/DL (ref 0–0.2)
BILIRUB SERPL-MCNC: 0.6 MG/DL
BILIRUB SERPL-MCNC: 0.6 MG/DL
BILIRUB SERPL-MCNC: 0.7 MG/DL
BILIRUB SERPL-MCNC: 0.7 MG/DL
BILIRUB SERPL-MCNC: 0.8 MG/DL
BILIRUB SERPL-MCNC: 0.9 MG/DL (ref 0.2–1.3)
BILIRUB SERPL-MCNC: 1 MG/DL (ref 0.2–1.3)
BILIRUB SERPL-MCNC: 1.1 MG/DL
BILIRUB SERPL-MCNC: 1.1 MG/DL
BILIRUB SERPL-MCNC: 1.1 MG/DL (ref 0.2–1.3)
BILIRUB SERPL-MCNC: 1.2 MG/DL
BILIRUB SERPL-MCNC: 1.2 MG/DL (ref 0.2–1.3)
BILIRUB SERPL-MCNC: 1.3 MG/DL
BILIRUB SERPL-MCNC: 1.3 MG/DL (ref 0.2–1.3)
BILIRUB SERPL-MCNC: 1.4 MG/DL (ref 0.2–1.3)
BILIRUB SERPL-MCNC: 1.6 MG/DL
BILIRUB SERPL-MCNC: 1.6 MG/DL (ref 0.2–1.3)
BILIRUB SERPL-MCNC: 1.7 MG/DL (ref 0.2–1.3)
BILIRUB SERPL-MCNC: 2 MG/DL (ref 0.2–1.3)
BILIRUB SERPL-MCNC: 2 MG/DL (ref 0.2–1.3)
BILIRUB SERPL-MCNC: 2.1 MG/DL
BILIRUB SERPL-MCNC: 2.1 MG/DL (ref 0.2–1.3)
BILIRUB SERPL-MCNC: 2.3 MG/DL
BILIRUB SERPL-MCNC: 2.4 MG/DL (ref 0.2–1.3)
BILIRUB SERPL-MCNC: 2.5 MG/DL
BILIRUB UR QL STRIP: NEGATIVE
BLD PROD TYP BPU: NORMAL
BLOOD COMPONENT TYPE: NORMAL
BUN SERPL-MCNC: 14 MG/DL (ref 7–30)
BUN SERPL-MCNC: 14.7 MG/DL (ref 6–20)
BUN SERPL-MCNC: 15 MG/DL (ref 7–30)
BUN SERPL-MCNC: 16 MG/DL (ref 6–20)
BUN SERPL-MCNC: 16 MG/DL (ref 7–30)
BUN SERPL-MCNC: 16 MG/DL (ref 7–30)
BUN SERPL-MCNC: 17 MG/DL (ref 7–30)
BUN SERPL-MCNC: 17.6 MG/DL (ref 6–20)
BUN SERPL-MCNC: 18.9 MG/DL (ref 6–20)
BUN SERPL-MCNC: 19.5 MG/DL (ref 6–20)
BUN SERPL-MCNC: 20 MG/DL (ref 7–30)
BUN SERPL-MCNC: 20 MG/DL (ref 7–30)
BUN SERPL-MCNC: 22 MG/DL (ref 7–30)
BUN SERPL-MCNC: 22.7 MG/DL (ref 6–20)
BUN SERPL-MCNC: 23 MG/DL (ref 7–30)
BUN SERPL-MCNC: 23 MG/DL (ref 7–30)
BUN SERPL-MCNC: 24.1 MG/DL (ref 6–20)
BUN SERPL-MCNC: 24.3 MG/DL (ref 6–20)
BUN SERPL-MCNC: 25 MG/DL (ref 7–30)
BUN SERPL-MCNC: 26.4 MG/DL (ref 6–20)
BUN SERPL-MCNC: 27.7 MG/DL (ref 6–20)
BUN SERPL-MCNC: 28 MG/DL (ref 6–20)
BUN SERPL-MCNC: 28 MG/DL (ref 6–20)
BUN SERPL-MCNC: 28.3 MG/DL (ref 6–20)
BUN SERPL-MCNC: 30 MG/DL (ref 6–20)
BUN SERPL-MCNC: 31 MG/DL (ref 6–20)
BUPRENORPHINE SERPL-MCNC: NEGATIVE NG/ML
CA-I BLD-MCNC: 4.6 MG/DL (ref 4.4–5.2)
CALCIUM SERPL-MCNC: 7.3 MG/DL (ref 8.6–10)
CALCIUM SERPL-MCNC: 7.8 MG/DL (ref 8.5–10.1)
CALCIUM SERPL-MCNC: 7.9 MG/DL (ref 8.6–10)
CALCIUM SERPL-MCNC: 8 MG/DL (ref 8.5–10.1)
CALCIUM SERPL-MCNC: 8 MG/DL (ref 8.6–10)
CALCIUM SERPL-MCNC: 8 MG/DL (ref 8.6–10)
CALCIUM SERPL-MCNC: 8.1 MG/DL (ref 8.5–10.1)
CALCIUM SERPL-MCNC: 8.1 MG/DL (ref 8.6–10)
CALCIUM SERPL-MCNC: 8.2 MG/DL (ref 8.5–10.1)
CALCIUM SERPL-MCNC: 8.2 MG/DL (ref 8.5–10.1)
CALCIUM SERPL-MCNC: 8.2 MG/DL (ref 8.6–10)
CALCIUM SERPL-MCNC: 8.2 MG/DL (ref 8.6–10)
CALCIUM SERPL-MCNC: 8.3 MG/DL (ref 8.5–10.1)
CALCIUM SERPL-MCNC: 8.3 MG/DL (ref 8.6–10)
CALCIUM SERPL-MCNC: 8.3 MG/DL (ref 8.6–10)
CALCIUM SERPL-MCNC: 8.4 MG/DL (ref 8.5–10.1)
CALCIUM SERPL-MCNC: 8.4 MG/DL (ref 8.5–10.1)
CALCIUM SERPL-MCNC: 8.5 MG/DL (ref 8.6–10)
CALCIUM SERPL-MCNC: 8.6 MG/DL (ref 8.5–10.1)
CALCIUM SERPL-MCNC: 8.6 MG/DL (ref 8.5–10.1)
CALCIUM SERPL-MCNC: 8.6 MG/DL (ref 8.6–10)
CALCIUM SERPL-MCNC: 8.7 MG/DL (ref 8.5–10.1)
CALCIUM SERPL-MCNC: 8.9 MG/DL (ref 8.6–10)
CALCIUM SERPL-MCNC: 9 MG/DL (ref 8.6–10)
CANNABINOIDS SERPL QL SCN: NEGATIVE NG/ML
CAOX CRY #/AREA URNS HPF: ABNORMAL /HPF
CELL COUNT BODY FLUID SOURCE: NORMAL
CHLORIDE BLD-SCNC: 111 MMOL/L (ref 94–109)
CHLORIDE BLD-SCNC: 111 MMOL/L (ref 94–109)
CHLORIDE BLD-SCNC: 112 MMOL/L (ref 94–109)
CHLORIDE BLD-SCNC: 112 MMOL/L (ref 94–109)
CHLORIDE BLD-SCNC: 114 MMOL/L (ref 94–109)
CHLORIDE BLD-SCNC: 115 MMOL/L (ref 94–109)
CHLORIDE BLD-SCNC: 116 MMOL/L (ref 94–109)
CHLORIDE BLD-SCNC: 119 MMOL/L (ref 94–109)
CHLORIDE SERPL-SCNC: 105 MMOL/L (ref 98–107)
CHLORIDE SERPL-SCNC: 107 MMOL/L (ref 98–107)
CHLORIDE SERPL-SCNC: 109 MMOL/L (ref 98–107)
CHLORIDE SERPL-SCNC: 111 MMOL/L (ref 98–107)
CHLORIDE SERPL-SCNC: 111 MMOL/L (ref 98–107)
CHLORIDE SERPL-SCNC: 112 MMOL/L (ref 98–107)
CHLORIDE SERPL-SCNC: 113 MMOL/L (ref 98–107)
CHLORIDE SERPL-SCNC: 113 MMOL/L (ref 98–107)
CHLORIDE SERPL-SCNC: 114 MMOL/L (ref 98–107)
CHOLEST SERPL-MCNC: 81 MG/DL
CMV IGG SERPL IA-ACNC: <0.2 U/ML
CMV IGG SERPL IA-ACNC: NORMAL
CO2 SERPL-SCNC: 17 MMOL/L (ref 20–32)
CO2 SERPL-SCNC: 17 MMOL/L (ref 20–32)
CO2 SERPL-SCNC: 18 MMOL/L (ref 20–32)
CO2 SERPL-SCNC: 18 MMOL/L (ref 20–32)
CO2 SERPL-SCNC: 20 MMOL/L (ref 20–32)
CO2 SERPL-SCNC: 23 MMOL/L (ref 20–32)
CO2 SERPL-SCNC: 23 MMOL/L (ref 20–32)
CO2 SERPL-SCNC: 24 MMOL/L (ref 20–32)
CO2 SERPL-SCNC: 25 MMOL/L (ref 20–32)
COCAINE SERPL QL SCN: NEGATIVE NG/ML
CODING SYSTEM: NORMAL
COLOR FLD: COLORLESS
COLOR FLD: COLORLESS
COLOR FLD: YELLOW
COLOR UR AUTO: ABNORMAL
COLOR UR AUTO: NORMAL
COLOR UR AUTO: YELLOW
COLOR UR AUTO: YELLOW
CREAT SERPL-MCNC: 1.08 MG/DL (ref 0.67–1.17)
CREAT SERPL-MCNC: 1.11 MG/DL (ref 0.66–1.25)
CREAT SERPL-MCNC: 1.13 MG/DL (ref 0.67–1.17)
CREAT SERPL-MCNC: 1.13 MG/DL (ref 0.67–1.17)
CREAT SERPL-MCNC: 1.14 MG/DL (ref 0.66–1.25)
CREAT SERPL-MCNC: 1.14 MG/DL (ref 0.66–1.25)
CREAT SERPL-MCNC: 1.17 MG/DL (ref 0.66–1.25)
CREAT SERPL-MCNC: 1.21 MG/DL (ref 0.66–1.25)
CREAT SERPL-MCNC: 1.22 MG/DL (ref 0.66–1.25)
CREAT SERPL-MCNC: 1.27 MG/DL (ref 0.67–1.17)
CREAT SERPL-MCNC: 1.3 MG/DL (ref 0.66–1.25)
CREAT SERPL-MCNC: 1.3 MG/DL (ref 0.66–1.25)
CREAT SERPL-MCNC: 1.3 MG/DL (ref 0.67–1.17)
CREAT SERPL-MCNC: 1.39 MG/DL (ref 0.66–1.25)
CREAT SERPL-MCNC: 1.48 MG/DL (ref 0.66–1.25)
CREAT SERPL-MCNC: 1.57 MG/DL (ref 0.66–1.25)
CREAT SERPL-MCNC: 1.58 MG/DL (ref 0.66–1.25)
CREAT SERPL-MCNC: 1.58 MG/DL (ref 0.66–1.25)
CREAT SERPL-MCNC: 1.7 MG/DL (ref 0.67–1.17)
CREAT SERPL-MCNC: 1.71 MG/DL (ref 0.67–1.17)
CREAT SERPL-MCNC: 1.77 MG/DL (ref 0.66–1.25)
CREAT SERPL-MCNC: 1.79 MG/DL (ref 0.67–1.17)
CREAT SERPL-MCNC: 1.85 MG/DL (ref 0.67–1.17)
CREAT SERPL-MCNC: 1.88 MG/DL (ref 0.67–1.17)
CREAT SERPL-MCNC: 1.9 MG/DL (ref 0.67–1.17)
CREAT SERPL-MCNC: 1.9 MG/DL (ref 0.67–1.17)
CREAT SERPL-MCNC: 1.96 MG/DL (ref 0.67–1.17)
CREAT SERPL-MCNC: 1.98 MG/DL (ref 0.67–1.17)
CREAT SERPL-MCNC: 2.04 MG/DL (ref 0.67–1.17)
CREAT UR-MCNC: 21.2 MG/DL
CREAT UR-MCNC: 99.3 MG/DL
CROSSMATCH: NORMAL
DEPRECATED CALCIDIOL+CALCIFEROL SERPL-MC: 11 UG/L (ref 20–75)
DEPRECATED HCO3 PLAS-SCNC: 12 MMOL/L (ref 22–29)
DEPRECATED HCO3 PLAS-SCNC: 13 MMOL/L (ref 22–29)
DEPRECATED HCO3 PLAS-SCNC: 14 MMOL/L (ref 22–29)
DEPRECATED HCO3 PLAS-SCNC: 15 MMOL/L (ref 22–29)
DEPRECATED HCO3 PLAS-SCNC: 17 MMOL/L (ref 22–29)
DEPRECATED HCO3 PLAS-SCNC: 20 MMOL/L (ref 22–29)
DEPRECATED HCO3 PLAS-SCNC: 21 MMOL/L (ref 22–29)
DEPRECATED HCO3 PLAS-SCNC: 22 MMOL/L (ref 22–29)
DEPRECATED HCO3 PLAS-SCNC: 22 MMOL/L (ref 22–29)
DEPRECATED HCO3 PLAS-SCNC: 24 MMOL/L (ref 22–29)
DEPRECATED HCO3 PLAS-SCNC: 24 MMOL/L (ref 22–29)
DIASTOLIC BLOOD PRESSURE - MUSE: NORMAL MMHG
EBV VCA IGG SER IA-ACNC: 389 U/ML
EBV VCA IGG SER IA-ACNC: POSITIVE
EOSINOPHIL # BLD AUTO: 0.1 10E3/UL (ref 0–0.7)
EOSINOPHIL # BLD AUTO: 0.2 10E3/UL (ref 0–0.7)
EOSINOPHIL NFR BLD AUTO: 2 %
EOSINOPHIL NFR BLD AUTO: 2 %
EOSINOPHIL NFR BLD AUTO: 3 %
EOSINOPHIL NFR BLD AUTO: 4 %
ERYTHROCYTE [DISTWIDTH] IN BLOOD BY AUTOMATED COUNT: 14.5 % (ref 10–15)
ERYTHROCYTE [DISTWIDTH] IN BLOOD BY AUTOMATED COUNT: 16.9 % (ref 10–15)
ERYTHROCYTE [DISTWIDTH] IN BLOOD BY AUTOMATED COUNT: 17 % (ref 10–15)
ERYTHROCYTE [DISTWIDTH] IN BLOOD BY AUTOMATED COUNT: 17.2 % (ref 10–15)
ERYTHROCYTE [DISTWIDTH] IN BLOOD BY AUTOMATED COUNT: 17.2 % (ref 10–15)
ERYTHROCYTE [DISTWIDTH] IN BLOOD BY AUTOMATED COUNT: 17.4 % (ref 10–15)
ERYTHROCYTE [DISTWIDTH] IN BLOOD BY AUTOMATED COUNT: 17.8 % (ref 10–15)
ERYTHROCYTE [DISTWIDTH] IN BLOOD BY AUTOMATED COUNT: 17.9 % (ref 10–15)
ERYTHROCYTE [DISTWIDTH] IN BLOOD BY AUTOMATED COUNT: 18.8 % (ref 10–15)
ERYTHROCYTE [DISTWIDTH] IN BLOOD BY AUTOMATED COUNT: 18.9 % (ref 10–15)
ERYTHROCYTE [DISTWIDTH] IN BLOOD BY AUTOMATED COUNT: 18.9 % (ref 10–15)
ERYTHROCYTE [DISTWIDTH] IN BLOOD BY AUTOMATED COUNT: 19.1 % (ref 10–15)
ERYTHROCYTE [DISTWIDTH] IN BLOOD BY AUTOMATED COUNT: 19.3 % (ref 10–15)
ERYTHROCYTE [DISTWIDTH] IN BLOOD BY AUTOMATED COUNT: 19.8 % (ref 10–15)
ERYTHROCYTE [DISTWIDTH] IN BLOOD BY AUTOMATED COUNT: 19.9 % (ref 10–15)
ERYTHROCYTE [DISTWIDTH] IN BLOOD BY AUTOMATED COUNT: 20 % (ref 10–15)
ERYTHROCYTE [DISTWIDTH] IN BLOOD BY AUTOMATED COUNT: 20 % (ref 10–15)
ERYTHROCYTE [DISTWIDTH] IN BLOOD BY AUTOMATED COUNT: 20.6 % (ref 10–15)
ERYTHROCYTE [DISTWIDTH] IN BLOOD BY AUTOMATED COUNT: 20.8 % (ref 10–15)
ERYTHROCYTE [DISTWIDTH] IN BLOOD BY AUTOMATED COUNT: 20.9 % (ref 10–15)
ETHYL GLUCURONIDE UR QL SCN: NEGATIVE NG/ML
FERRITIN SERPL-MCNC: 118 NG/ML (ref 26–388)
FERRITIN SERPL-MCNC: 136 NG/ML (ref 26–388)
FERRITIN SERPL-MCNC: 146 NG/ML (ref 26–388)
FERRITIN SERPL-MCNC: 185 NG/ML (ref 26–388)
FERRITIN SERPL-MCNC: 263 NG/ML (ref 31–409)
FERRITIN SERPL-MCNC: 293 NG/ML (ref 31–409)
FERRITIN SERPL-MCNC: 60 NG/ML (ref 26–388)
FERRITIN SERPL-MCNC: 75 NG/ML (ref 26–388)
FIBRINOGEN PPP-MCNC: 180 MG/DL (ref 170–490)
FIBRINOGEN PPP-MCNC: 183 MG/DL (ref 170–490)
FLEXIBLE SIGMOIDOSCOPY: NORMAL
GAMMA INTERFERON BACKGROUND BLD IA-ACNC: 0.04 IU/ML
GFR SERPL CREATININE-BSD FRML MDRD: 38 ML/MIN/1.73M2
GFR SERPL CREATININE-BSD FRML MDRD: 39 ML/MIN/1.73M2
GFR SERPL CREATININE-BSD FRML MDRD: 40 ML/MIN/1.73M2
GFR SERPL CREATININE-BSD FRML MDRD: 41 ML/MIN/1.73M2
GFR SERPL CREATININE-BSD FRML MDRD: 41 ML/MIN/1.73M2
GFR SERPL CREATININE-BSD FRML MDRD: 42 ML/MIN/1.73M2
GFR SERPL CREATININE-BSD FRML MDRD: 43 ML/MIN/1.73M2
GFR SERPL CREATININE-BSD FRML MDRD: 44 ML/MIN/1.73M2
GFR SERPL CREATININE-BSD FRML MDRD: 45 ML/MIN/1.73M2
GFR SERPL CREATININE-BSD FRML MDRD: 47 ML/MIN/1.73M2
GFR SERPL CREATININE-BSD FRML MDRD: 47 ML/MIN/1.73M2
GFR SERPL CREATININE-BSD FRML MDRD: 52 ML/MIN/1.73M2
GFR SERPL CREATININE-BSD FRML MDRD: 56 ML/MIN/1.73M2
GFR SERPL CREATININE-BSD FRML MDRD: 60 ML/MIN/1.73M2
GFR SERPL CREATININE-BSD FRML MDRD: 65 ML/MIN/1.73M2
GFR SERPL CREATININE-BSD FRML MDRD: 67 ML/MIN/1.73M2
GFR SERPL CREATININE-BSD FRML MDRD: 70 ML/MIN/1.73M2
GFR SERPL CREATININE-BSD FRML MDRD: 71 ML/MIN/1.73M2
GFR SERPL CREATININE-BSD FRML MDRD: 74 ML/MIN/1.73M2
GFR SERPL CREATININE-BSD FRML MDRD: 76 ML/MIN/1.73M2
GFR SERPL CREATININE-BSD FRML MDRD: 76 ML/MIN/1.73M2
GFR SERPL CREATININE-BSD FRML MDRD: 77 ML/MIN/1.73M2
GFR SERPL CREATININE-BSD FRML MDRD: 77 ML/MIN/1.73M2
GFR SERPL CREATININE-BSD FRML MDRD: 79 ML/MIN/1.73M2
GFR SERPL CREATININE-BSD FRML MDRD: 82 ML/MIN/1.73M2
GLUCOSE BLD-MCNC: 118 MG/DL (ref 70–99)
GLUCOSE BLD-MCNC: 124 MG/DL (ref 70–99)
GLUCOSE BLD-MCNC: 155 MG/DL (ref 70–99)
GLUCOSE BLD-MCNC: 183 MG/DL (ref 70–99)
GLUCOSE BLD-MCNC: 203 MG/DL (ref 70–99)
GLUCOSE BLD-MCNC: 207 MG/DL (ref 70–99)
GLUCOSE BLD-MCNC: 213 MG/DL (ref 70–99)
GLUCOSE BLD-MCNC: 224 MG/DL (ref 70–99)
GLUCOSE BLD-MCNC: 251 MG/DL (ref 70–99)
GLUCOSE BLD-MCNC: 258 MG/DL (ref 70–99)
GLUCOSE BLD-MCNC: 64 MG/DL (ref 70–99)
GLUCOSE BLD-MCNC: 87 MG/DL (ref 70–99)
GLUCOSE BLD-MCNC: 92 MG/DL (ref 70–99)
GLUCOSE BLDC GLUCOMTR-MCNC: 103 MG/DL (ref 70–99)
GLUCOSE BLDC GLUCOMTR-MCNC: 106 MG/DL (ref 70–99)
GLUCOSE BLDC GLUCOMTR-MCNC: 107 MG/DL (ref 70–99)
GLUCOSE BLDC GLUCOMTR-MCNC: 113 MG/DL (ref 70–99)
GLUCOSE BLDC GLUCOMTR-MCNC: 123 MG/DL (ref 70–99)
GLUCOSE BLDC GLUCOMTR-MCNC: 136 MG/DL (ref 70–99)
GLUCOSE BLDC GLUCOMTR-MCNC: 145 MG/DL (ref 70–99)
GLUCOSE BLDC GLUCOMTR-MCNC: 148 MG/DL (ref 70–99)
GLUCOSE BLDC GLUCOMTR-MCNC: 151 MG/DL (ref 70–99)
GLUCOSE BLDC GLUCOMTR-MCNC: 151 MG/DL (ref 70–99)
GLUCOSE BLDC GLUCOMTR-MCNC: 163 MG/DL (ref 70–99)
GLUCOSE BLDC GLUCOMTR-MCNC: 179 MG/DL (ref 70–99)
GLUCOSE BLDC GLUCOMTR-MCNC: 186 MG/DL (ref 70–99)
GLUCOSE BLDC GLUCOMTR-MCNC: 191 MG/DL (ref 70–99)
GLUCOSE BLDC GLUCOMTR-MCNC: 200 MG/DL (ref 70–99)
GLUCOSE BLDC GLUCOMTR-MCNC: 203 MG/DL (ref 70–99)
GLUCOSE BLDC GLUCOMTR-MCNC: 216 MG/DL (ref 70–99)
GLUCOSE BLDC GLUCOMTR-MCNC: 60 MG/DL (ref 70–99)
GLUCOSE BLDC GLUCOMTR-MCNC: 76 MG/DL (ref 70–99)
GLUCOSE BLDC GLUCOMTR-MCNC: 78 MG/DL (ref 70–99)
GLUCOSE BLDC GLUCOMTR-MCNC: 80 MG/DL (ref 70–99)
GLUCOSE BLDC GLUCOMTR-MCNC: 85 MG/DL (ref 70–99)
GLUCOSE BLDC GLUCOMTR-MCNC: 85 MG/DL (ref 70–99)
GLUCOSE BLDC GLUCOMTR-MCNC: 91 MG/DL (ref 70–99)
GLUCOSE BLDC GLUCOMTR-MCNC: 96 MG/DL (ref 70–99)
GLUCOSE SERPL-MCNC: 105 MG/DL (ref 70–99)
GLUCOSE SERPL-MCNC: 110 MG/DL (ref 70–99)
GLUCOSE SERPL-MCNC: 134 MG/DL (ref 70–99)
GLUCOSE SERPL-MCNC: 152 MG/DL (ref 70–99)
GLUCOSE SERPL-MCNC: 164 MG/DL (ref 70–99)
GLUCOSE SERPL-MCNC: 166 MG/DL (ref 70–99)
GLUCOSE SERPL-MCNC: 184 MG/DL (ref 70–99)
GLUCOSE SERPL-MCNC: 198 MG/DL (ref 70–99)
GLUCOSE SERPL-MCNC: 223 MG/DL (ref 70–99)
GLUCOSE SERPL-MCNC: 240 MG/DL (ref 70–99)
GLUCOSE SERPL-MCNC: 247 MG/DL (ref 70–99)
GLUCOSE SERPL-MCNC: 331 MG/DL (ref 70–99)
GLUCOSE SERPL-MCNC: 79 MG/DL (ref 70–99)
GLUCOSE SERPL-MCNC: 88 MG/DL (ref 70–99)
GLUCOSE SERPL-MCNC: 89 MG/DL (ref 70–99)
GLUCOSE UR STRIP-MCNC: 50 MG/DL
GLUCOSE UR STRIP-MCNC: NEGATIVE MG/DL
GRAM STAIN RESULT: NORMAL
GRAM STAIN RESULT: NORMAL
HAV IGG SER QL IA: NONREACTIVE
HBA1C MFR BLD: 5.6 %
HBV CORE AB SERPL QL IA: NONREACTIVE
HBV SURFACE AB SERPL IA-ACNC: 0.93 M[IU]/ML
HBV SURFACE AB SERPL IA-ACNC: NONREACTIVE M[IU]/ML
HBV SURFACE AG SERPL QL IA: NONREACTIVE
HCT VFR BLD AUTO: 20.7 % (ref 40–53)
HCT VFR BLD AUTO: 20.8 % (ref 40–53)
HCT VFR BLD AUTO: 21.1 % (ref 40–53)
HCT VFR BLD AUTO: 21.5 % (ref 40–53)
HCT VFR BLD AUTO: 22.3 % (ref 40–53)
HCT VFR BLD AUTO: 22.5 % (ref 40–53)
HCT VFR BLD AUTO: 22.5 % (ref 40–53)
HCT VFR BLD AUTO: 22.6 % (ref 40–53)
HCT VFR BLD AUTO: 24.1 % (ref 40–53)
HCT VFR BLD AUTO: 24.2 % (ref 40–53)
HCT VFR BLD AUTO: 24.2 % (ref 40–53)
HCT VFR BLD AUTO: 25.6 % (ref 40–53)
HCT VFR BLD AUTO: 26.2 % (ref 40–53)
HCT VFR BLD AUTO: 26.3 % (ref 40–53)
HCT VFR BLD AUTO: 26.3 % (ref 40–53)
HCT VFR BLD AUTO: 27.3 % (ref 40–53)
HCT VFR BLD AUTO: 27.9 % (ref 40–53)
HCT VFR BLD AUTO: 28.9 % (ref 40–53)
HCT VFR BLD AUTO: 29.1 % (ref 40–53)
HCT VFR BLD AUTO: 29.1 % (ref 40–53)
HCT VFR BLD AUTO: 29.3 % (ref 40–53)
HCT VFR BLD AUTO: 29.4 % (ref 40–53)
HCT VFR BLD AUTO: 29.6 % (ref 40–53)
HCT VFR BLD AUTO: 29.7 % (ref 40–53)
HCT VFR BLD AUTO: 29.9 % (ref 40–53)
HCT VFR BLD AUTO: 30.2 % (ref 40–53)
HCT VFR BLD AUTO: 30.5 % (ref 40–53)
HCT VFR BLD AUTO: 31.1 % (ref 40–53)
HCT VFR BLD AUTO: 32.4 % (ref 40–53)
HCT VFR BLD AUTO: 32.5 % (ref 40–53)
HCT VFR BLD AUTO: 33.3 % (ref 40–53)
HCT VFR BLD AUTO: 33.8 % (ref 40–53)
HCV AB SERPL QL IA: NONREACTIVE
HDLC SERPL-MCNC: 41 MG/DL
HGB BLD-MCNC: 10 G/DL (ref 13.3–17.7)
HGB BLD-MCNC: 10 G/DL (ref 13.3–17.7)
HGB BLD-MCNC: 10.1 G/DL (ref 13.3–17.7)
HGB BLD-MCNC: 10.2 G/DL (ref 13.3–17.7)
HGB BLD-MCNC: 10.5 G/DL (ref 13.3–17.7)
HGB BLD-MCNC: 10.8 G/DL (ref 13.3–17.7)
HGB BLD-MCNC: 11.1 G/DL (ref 13.3–17.7)
HGB BLD-MCNC: 6.3 G/DL (ref 13.3–17.7)
HGB BLD-MCNC: 6.4 G/DL (ref 13.3–17.7)
HGB BLD-MCNC: 6.4 G/DL (ref 13.3–17.7)
HGB BLD-MCNC: 6.5 G/DL (ref 13.3–17.7)
HGB BLD-MCNC: 6.6 G/DL (ref 13.3–17.7)
HGB BLD-MCNC: 6.7 G/DL (ref 13.3–17.7)
HGB BLD-MCNC: 6.7 G/DL (ref 13.3–17.7)
HGB BLD-MCNC: 7.1 G/DL (ref 13.3–17.7)
HGB BLD-MCNC: 7.3 G/DL (ref 13.3–17.7)
HGB BLD-MCNC: 7.4 G/DL (ref 13.3–17.7)
HGB BLD-MCNC: 7.7 G/DL (ref 13.3–17.7)
HGB BLD-MCNC: 7.7 G/DL (ref 13.3–17.7)
HGB BLD-MCNC: 8.1 G/DL (ref 13.3–17.7)
HGB BLD-MCNC: 8.1 G/DL (ref 13.3–17.7)
HGB BLD-MCNC: 8.3 G/DL (ref 13.3–17.7)
HGB BLD-MCNC: 8.4 G/DL (ref 13.3–17.7)
HGB BLD-MCNC: 8.4 G/DL (ref 13.3–17.7)
HGB BLD-MCNC: 8.5 G/DL (ref 13.3–17.7)
HGB BLD-MCNC: 8.6 G/DL (ref 13.3–17.7)
HGB BLD-MCNC: 8.7 G/DL (ref 13.3–17.7)
HGB BLD-MCNC: 8.8 G/DL (ref 13.3–17.7)
HGB BLD-MCNC: 9 G/DL (ref 13.3–17.7)
HGB BLD-MCNC: 9.1 G/DL (ref 13.3–17.7)
HGB BLD-MCNC: 9.3 G/DL (ref 13.3–17.7)
HGB BLD-MCNC: 9.4 G/DL (ref 13.3–17.7)
HGB BLD-MCNC: 9.6 G/DL (ref 13.3–17.7)
HGB BLD-MCNC: 9.7 G/DL (ref 13.3–17.7)
HGB BLD-MCNC: 9.9 G/DL (ref 13.3–17.7)
HGB UR QL STRIP: ABNORMAL
HGB UR QL STRIP: NEGATIVE
HIV 1+2 AB+HIV1 P24 AG SERPL QL IA: NONREACTIVE
HOLD SPECIMEN: NORMAL
HYALINE CASTS: 13 /LPF
HYALINE CASTS: 8 /LPF
IMM GRANULOCYTES # BLD: 0 10E3/UL
IMM GRANULOCYTES NFR BLD: 0 %
INR PPP: 1.55 (ref 0.85–1.15)
INR PPP: 1.58 (ref 0.85–1.15)
INR PPP: 1.6 (ref 0.85–1.15)
INR PPP: 1.61 (ref 0.85–1.15)
INR PPP: 1.61 (ref 0.85–1.15)
INR PPP: 1.63 (ref 0.85–1.15)
INR PPP: 1.64 (ref 0.85–1.15)
INR PPP: 1.65 (ref 0.85–1.15)
INR PPP: 1.66 (ref 0.85–1.15)
INR PPP: 1.69 (ref 0.85–1.15)
INR PPP: 1.74 (ref 0.85–1.15)
INR PPP: 1.8 (ref 0.85–1.15)
INR PPP: 1.92 (ref 0.85–1.15)
INR PPP: 1.93 (ref 0.85–1.15)
INR PPP: 1.95 (ref 0.85–1.15)
INR PPP: 2.07 (ref 0.85–1.15)
INR PPP: 2.21 (ref 0.85–1.15)
INR PPP: 2.22 (ref 0.85–1.15)
INR PPP: 2.25 (ref 0.85–1.15)
INR PPP: 2.31 (ref 0.85–1.15)
INR PPP: 2.41 (ref 0.85–1.15)
INR PPP: 2.41 (ref 0.85–1.15)
INR PPP: 2.47 (ref 0.85–1.15)
INR PPP: 2.51 (ref 0.85–1.15)
INR PPP: 2.59 (ref 0.85–1.15)
INR PPP: 2.8 (ref 0.85–1.15)
INTERPRETATION ECG - MUSE: NORMAL
IRON BINDING CAPACITY (ROCHE): 185 UG/DL (ref 240–430)
IRON BINDING CAPACITY (ROCHE): 195 UG/DL (ref 240–430)
IRON BINDING CAPACITY (ROCHE): 202 UG/DL (ref 240–430)
IRON SATN MFR SERPL: 100 % (ref 15–46)
IRON SATN MFR SERPL: 20 % (ref 15–46)
IRON SATN MFR SERPL: 26 % (ref 15–46)
IRON SATN MFR SERPL: 36 % (ref 15–46)
IRON SATN MFR SERPL: 41 % (ref 15–46)
IRON SATN MFR SERPL: 47 % (ref 15–46)
IRON SATN MFR SERPL: 49 % (ref 15–46)
IRON SATN MFR SERPL: 52 % (ref 15–46)
IRON SATN MFR SERPL: 55 % (ref 15–46)
IRON SERPL-MCNC: 101 UG/DL (ref 61–157)
IRON SERPL-MCNC: 125 UG/DL (ref 35–180)
IRON SERPL-MCNC: 149 UG/DL (ref 35–180)
IRON SERPL-MCNC: 40 UG/DL (ref 61–157)
IRON SERPL-MCNC: 53 UG/DL (ref 35–180)
IRON SERPL-MCNC: 53 UG/DL (ref 35–180)
IRON SERPL-MCNC: 71 UG/DL (ref 35–180)
IRON SERPL-MCNC: 78 UG/DL (ref 35–180)
IRON SERPL-MCNC: 91 UG/DL (ref 61–157)
ISSUE DATE AND TIME: NORMAL
KETONES UR STRIP-MCNC: NEGATIVE MG/DL
LABORATORY REPORT: NORMAL
LABORATORY REPORT: NORMAL
LACTATE SERPL-SCNC: 0.9 MMOL/L (ref 0.7–2)
LACTATE SERPL-SCNC: 1.7 MMOL/L (ref 0.7–2)
LACTATE SERPL-SCNC: 2 MMOL/L (ref 0.7–2)
LDLC SERPL CALC-MCNC: 22 MG/DL
LEUKOCYTE ESTERASE UR QL STRIP: ABNORMAL
LEUKOCYTE ESTERASE UR QL STRIP: ABNORMAL
LEUKOCYTE ESTERASE UR QL STRIP: NEGATIVE
LEUKOCYTE ESTERASE UR QL STRIP: NEGATIVE
LIPASE SERPL-CCNC: 51 U/L (ref 13–60)
LIPASE SERPL-CCNC: 52 U/L (ref 13–60)
LVEF ECHO: NORMAL
LYMPHOCYTES # BLD AUTO: 0.5 10E3/UL (ref 0.8–5.3)
LYMPHOCYTES # BLD AUTO: 0.6 10E3/UL (ref 0.8–5.3)
LYMPHOCYTES # BLD AUTO: 0.6 10E3/UL (ref 0.8–5.3)
LYMPHOCYTES # BLD AUTO: 0.8 10E3/UL (ref 0.8–5.3)
LYMPHOCYTES # BLD AUTO: 0.9 10E3/UL (ref 0.8–5.3)
LYMPHOCYTES # BLD AUTO: 1 10E3/UL (ref 0.8–5.3)
LYMPHOCYTES # BLD AUTO: 1.2 10E3/UL (ref 0.8–5.3)
LYMPHOCYTES NFR BLD AUTO: 10 %
LYMPHOCYTES NFR BLD AUTO: 14 %
LYMPHOCYTES NFR BLD AUTO: 15 %
LYMPHOCYTES NFR BLD AUTO: 18 %
LYMPHOCYTES NFR BLD AUTO: 18 %
LYMPHOCYTES NFR BLD AUTO: 23 %
LYMPHOCYTES NFR BLD AUTO: 23 %
LYMPHOCYTES NFR FLD MANUAL: 14 %
LYMPHOCYTES NFR FLD MANUAL: 3 %
LYMPHOCYTES NFR FLD MANUAL: 38 %
M TB IFN-G BLD-IMP: NEGATIVE
M TB IFN-G CD4+ BCKGRND COR BLD-ACNC: 4.3 IU/ML
MAGNESIUM SERPL-MCNC: 1.2 MG/DL (ref 1.7–2.3)
MAGNESIUM SERPL-MCNC: 1.3 MG/DL (ref 1.6–2.3)
MAGNESIUM SERPL-MCNC: 1.6 MG/DL (ref 1.6–2.3)
MAGNESIUM SERPL-MCNC: 1.6 MG/DL (ref 1.7–2.3)
MAGNESIUM SERPL-MCNC: 1.8 MG/DL (ref 1.7–2.3)
MAGNESIUM SERPL-MCNC: 1.9 MG/DL (ref 1.7–2.3)
MCH RBC QN AUTO: 30.9 PG (ref 26.5–33)
MCH RBC QN AUTO: 30.9 PG (ref 26.5–33)
MCH RBC QN AUTO: 31.3 PG (ref 26.5–33)
MCH RBC QN AUTO: 31.4 PG (ref 26.5–33)
MCH RBC QN AUTO: 31.5 PG (ref 26.5–33)
MCH RBC QN AUTO: 31.8 PG (ref 26.5–33)
MCH RBC QN AUTO: 31.8 PG (ref 26.5–33)
MCH RBC QN AUTO: 32.2 PG (ref 26.5–33)
MCH RBC QN AUTO: 32.3 PG (ref 26.5–33)
MCH RBC QN AUTO: 32.3 PG (ref 26.5–33)
MCH RBC QN AUTO: 32.4 PG (ref 26.5–33)
MCH RBC QN AUTO: 32.4 PG (ref 26.5–33)
MCH RBC QN AUTO: 32.8 PG (ref 26.5–33)
MCH RBC QN AUTO: 32.9 PG (ref 26.5–33)
MCH RBC QN AUTO: 32.9 PG (ref 26.5–33)
MCH RBC QN AUTO: 33 PG (ref 26.5–33)
MCH RBC QN AUTO: 33.3 PG (ref 26.5–33)
MCH RBC QN AUTO: 34.4 PG (ref 26.5–33)
MCHC RBC AUTO-ENTMCNC: 28.4 G/DL (ref 31.5–36.5)
MCHC RBC AUTO-ENTMCNC: 29.3 G/DL (ref 31.5–36.5)
MCHC RBC AUTO-ENTMCNC: 29.6 G/DL (ref 31.5–36.5)
MCHC RBC AUTO-ENTMCNC: 29.8 G/DL (ref 31.5–36.5)
MCHC RBC AUTO-ENTMCNC: 29.9 G/DL (ref 31.5–36.5)
MCHC RBC AUTO-ENTMCNC: 30.7 G/DL (ref 31.5–36.5)
MCHC RBC AUTO-ENTMCNC: 30.8 G/DL (ref 31.5–36.5)
MCHC RBC AUTO-ENTMCNC: 30.8 G/DL (ref 31.5–36.5)
MCHC RBC AUTO-ENTMCNC: 30.9 G/DL (ref 31.5–36.5)
MCHC RBC AUTO-ENTMCNC: 31.1 G/DL (ref 31.5–36.5)
MCHC RBC AUTO-ENTMCNC: 31.6 G/DL (ref 31.5–36.5)
MCHC RBC AUTO-ENTMCNC: 31.7 G/DL (ref 31.5–36.5)
MCHC RBC AUTO-ENTMCNC: 31.8 G/DL (ref 31.5–36.5)
MCHC RBC AUTO-ENTMCNC: 32.1 G/DL (ref 31.5–36.5)
MCHC RBC AUTO-ENTMCNC: 32.2 G/DL (ref 31.5–36.5)
MCHC RBC AUTO-ENTMCNC: 32.3 G/DL (ref 31.5–36.5)
MCHC RBC AUTO-ENTMCNC: 32.4 G/DL (ref 31.5–36.5)
MCHC RBC AUTO-ENTMCNC: 32.4 G/DL (ref 31.5–36.5)
MCHC RBC AUTO-ENTMCNC: 32.8 G/DL (ref 31.5–36.5)
MCHC RBC AUTO-ENTMCNC: 34.2 G/DL (ref 31.5–36.5)
MCV RBC AUTO: 100 FL (ref 78–100)
MCV RBC AUTO: 101 FL (ref 78–100)
MCV RBC AUTO: 102 FL (ref 78–100)
MCV RBC AUTO: 102 FL (ref 78–100)
MCV RBC AUTO: 103 FL (ref 78–100)
MCV RBC AUTO: 104 FL (ref 78–100)
MCV RBC AUTO: 104 FL (ref 78–100)
MCV RBC AUTO: 105 FL (ref 78–100)
MCV RBC AUTO: 105 FL (ref 78–100)
MCV RBC AUTO: 106 FL (ref 78–100)
MCV RBC AUTO: 107 FL (ref 78–100)
MCV RBC AUTO: 108 FL (ref 78–100)
MCV RBC AUTO: 108 FL (ref 78–100)
MCV RBC AUTO: 113 FL (ref 78–100)
MCV RBC AUTO: 96 FL (ref 78–100)
MCV RBC AUTO: 99 FL (ref 78–100)
METHADONE SERPL QL SCN: NEGATIVE NG/ML
METHAMPHET SERPL QL: NEGATIVE NG/ML
MITOGEN IGNF BCKGRD COR BLD-ACNC: 0 IU/ML
MITOGEN IGNF BCKGRD COR BLD-ACNC: 0 IU/ML
MONOCYTES # BLD AUTO: 0.3 10E3/UL (ref 0–1.3)
MONOCYTES # BLD AUTO: 0.4 10E3/UL (ref 0–1.3)
MONOCYTES # BLD AUTO: 0.5 10E3/UL (ref 0–1.3)
MONOCYTES # BLD AUTO: 0.6 10E3/UL (ref 0–1.3)
MONOCYTES # BLD AUTO: 0.7 10E3/UL (ref 0–1.3)
MONOCYTES NFR BLD AUTO: 10 %
MONOCYTES NFR BLD AUTO: 10 %
MONOCYTES NFR BLD AUTO: 11 %
MONOCYTES NFR BLD AUTO: 12 %
MONOCYTES NFR BLD AUTO: 13 %
MONOCYTES NFR BLD AUTO: 8 %
MONOCYTES NFR BLD AUTO: 9 %
MONOS+MACROS NFR FLD MANUAL: 81 %
MONOS+MACROS NFR FLD MANUAL: NORMAL %
MONOS+MACROS NFR FLD MANUAL: NORMAL %
MUCOUS THREADS #/AREA URNS LPF: PRESENT /LPF
MUCOUS THREADS #/AREA URNS LPF: PRESENT /LPF
NEUTROPHILS # BLD AUTO: 2.1 10E3/UL (ref 1.6–8.3)
NEUTROPHILS # BLD AUTO: 2.4 10E3/UL (ref 1.6–8.3)
NEUTROPHILS # BLD AUTO: 2.6 10E3/UL (ref 1.6–8.3)
NEUTROPHILS # BLD AUTO: 2.6 10E3/UL (ref 1.6–8.3)
NEUTROPHILS # BLD AUTO: 3.7 10E3/UL (ref 1.6–8.3)
NEUTROPHILS # BLD AUTO: 4.1 10E3/UL (ref 1.6–8.3)
NEUTROPHILS # BLD AUTO: 4.8 10E3/UL (ref 1.6–8.3)
NEUTROPHILS NFR BLD AUTO: 59 %
NEUTROPHILS NFR BLD AUTO: 62 %
NEUTROPHILS NFR BLD AUTO: 68 %
NEUTROPHILS NFR BLD AUTO: 69 %
NEUTROPHILS NFR BLD AUTO: 69 %
NEUTROPHILS NFR BLD AUTO: 74 %
NEUTROPHILS NFR BLD AUTO: 78 %
NEUTS BAND NFR FLD MANUAL: 16 %
NEUTS BAND NFR FLD MANUAL: 31 %
NEUTS BAND NFR FLD MANUAL: 57 %
NITRATE UR QL: NEGATIVE
NONHDLC SERPL-MCNC: 40 MG/DL
NRBC # BLD AUTO: 0 10E3/UL
NRBC BLD AUTO-RTO: 0 /100
OPIATES SERPL QL SCN: NEGATIVE NG/ML
OTHER CELLS FLD MANUAL: 29 %
OTHER CELLS FLD MANUAL: 32 %
OXYCODONE SERPL QL: NEGATIVE NG/ML
P AXIS - MUSE: -3 DEGREES
P AXIS - MUSE: 0 DEGREES
P AXIS - MUSE: 10 DEGREES
P AXIS - MUSE: 12 DEGREES
P AXIS - MUSE: NORMAL DEGREES
P AXIS - MUSE: NORMAL DEGREES
PCP SERPL QL SCN: NEGATIVE NG/ML
PH UR STRIP: 5 [PH] (ref 5–7)
PH UR STRIP: 5.5 [PH] (ref 5–7)
PHOSPHATE SERPL-MCNC: 3 MG/DL (ref 2.5–4.5)
PHOSPHATE SERPL-MCNC: 3.1 MG/DL (ref 2.5–4.5)
PHOSPHATE SERPL-MCNC: 4.9 MG/DL (ref 2.5–4.5)
PHOSPHATE SERPL-MCNC: 5 MG/DL (ref 2.5–4.5)
PLATELET # BLD AUTO: 104 10E3/UL (ref 150–450)
PLATELET # BLD AUTO: 116 10E3/UL (ref 150–450)
PLATELET # BLD AUTO: 129 10E3/UL (ref 150–450)
PLATELET # BLD AUTO: 59 10E3/UL (ref 150–450)
PLATELET # BLD AUTO: 61 10E3/UL (ref 150–450)
PLATELET # BLD AUTO: 61 10E3/UL (ref 150–450)
PLATELET # BLD AUTO: 62 10E3/UL (ref 150–450)
PLATELET # BLD AUTO: 63 10E3/UL (ref 150–450)
PLATELET # BLD AUTO: 64 10E3/UL (ref 150–450)
PLATELET # BLD AUTO: 64 10E3/UL (ref 150–450)
PLATELET # BLD AUTO: 71 10E3/UL (ref 150–450)
PLATELET # BLD AUTO: 71 10E3/UL (ref 150–450)
PLATELET # BLD AUTO: 77 10E3/UL (ref 150–450)
PLATELET # BLD AUTO: 77 10E3/UL (ref 150–450)
PLATELET # BLD AUTO: 83 10E3/UL (ref 150–450)
PLATELET # BLD AUTO: 85 10E3/UL (ref 150–450)
PLATELET # BLD AUTO: 87 10E3/UL (ref 150–450)
PLATELET # BLD AUTO: 87 10E3/UL (ref 150–450)
PLATELET # BLD AUTO: 88 10E3/UL (ref 150–450)
PLATELET # BLD AUTO: 89 10E3/UL (ref 150–450)
PLPETH BLD-MCNC: <10 NG/ML
POPETH BLD-MCNC: <10 NG/ML
POTASSIUM BLD-SCNC: 3.1 MMOL/L (ref 3.4–5.3)
POTASSIUM BLD-SCNC: 3.4 MMOL/L (ref 3.4–5.3)
POTASSIUM BLD-SCNC: 3.6 MMOL/L (ref 3.4–5.3)
POTASSIUM BLD-SCNC: 3.8 MMOL/L (ref 3.4–5.3)
POTASSIUM BLD-SCNC: 4.1 MMOL/L (ref 3.4–5.3)
POTASSIUM BLD-SCNC: 4.2 MMOL/L (ref 3.4–5.3)
POTASSIUM BLD-SCNC: 4.3 MMOL/L (ref 3.4–5.3)
POTASSIUM BLD-SCNC: 4.4 MMOL/L (ref 3.4–5.3)
POTASSIUM BLD-SCNC: 4.7 MMOL/L (ref 3.4–5.3)
POTASSIUM SERPL-SCNC: 3.2 MMOL/L (ref 3.4–5.3)
POTASSIUM SERPL-SCNC: 3.3 MMOL/L (ref 3.4–5.3)
POTASSIUM SERPL-SCNC: 3.3 MMOL/L (ref 3.4–5.3)
POTASSIUM SERPL-SCNC: 3.5 MMOL/L (ref 3.4–5.3)
POTASSIUM SERPL-SCNC: 3.5 MMOL/L (ref 3.4–5.3)
POTASSIUM SERPL-SCNC: 3.6 MMOL/L (ref 3.4–5.3)
POTASSIUM SERPL-SCNC: 3.6 MMOL/L (ref 3.4–5.3)
POTASSIUM SERPL-SCNC: 3.9 MMOL/L (ref 3.4–5.3)
POTASSIUM SERPL-SCNC: 3.9 MMOL/L (ref 3.4–5.3)
POTASSIUM SERPL-SCNC: 4.1 MMOL/L (ref 3.4–5.3)
POTASSIUM SERPL-SCNC: 4.2 MMOL/L (ref 3.4–5.3)
POTASSIUM SERPL-SCNC: 4.3 MMOL/L (ref 3.4–5.3)
POTASSIUM SERPL-SCNC: 4.4 MMOL/L (ref 3.4–5.3)
POTASSIUM SERPL-SCNC: 4.4 MMOL/L (ref 3.4–5.3)
POTASSIUM SERPL-SCNC: 4.9 MMOL/L (ref 3.4–5.3)
PR INTERVAL - MUSE: 134 MS
PR INTERVAL - MUSE: 136 MS
PR INTERVAL - MUSE: 154 MS
PR INTERVAL - MUSE: 172 MS
PR INTERVAL - MUSE: 186 MS
PR INTERVAL - MUSE: 192 MS
PROT FLD-MCNC: 0.4 G/DL
PROT FLD-MCNC: 0.4 G/DL
PROT SERPL-MCNC: 4.5 G/DL (ref 6.4–8.3)
PROT SERPL-MCNC: 4.8 G/DL (ref 6.4–8.3)
PROT SERPL-MCNC: 4.9 G/DL (ref 6.4–8.3)
PROT SERPL-MCNC: 5.1 G/DL (ref 6.4–8.3)
PROT SERPL-MCNC: 5.1 G/DL (ref 6.4–8.3)
PROT SERPL-MCNC: 5.1 G/DL (ref 6.8–8.8)
PROT SERPL-MCNC: 5.2 G/DL (ref 6.4–8.3)
PROT SERPL-MCNC: 5.3 G/DL (ref 6.4–8.3)
PROT SERPL-MCNC: 5.4 G/DL (ref 6.4–8.3)
PROT SERPL-MCNC: 5.5 G/DL (ref 6.4–8.3)
PROT SERPL-MCNC: 5.5 G/DL (ref 6.4–8.3)
PROT SERPL-MCNC: 5.5 G/DL (ref 6.8–8.8)
PROT SERPL-MCNC: 5.5 G/DL (ref 6.8–8.8)
PROT SERPL-MCNC: 5.6 G/DL (ref 6.8–8.8)
PROT SERPL-MCNC: 5.7 G/DL (ref 6.4–8.3)
PROT SERPL-MCNC: 5.7 G/DL (ref 6.4–8.3)
PROT SERPL-MCNC: 5.8 G/DL (ref 6.4–8.3)
PROT SERPL-MCNC: 5.8 G/DL (ref 6.8–8.8)
PROT SERPL-MCNC: 5.8 G/DL (ref 6.8–8.8)
PROT SERPL-MCNC: 6 G/DL (ref 6.8–8.8)
PROT SERPL-MCNC: 6.1 G/DL (ref 6.8–8.8)
PROT SERPL-MCNC: 6.2 G/DL (ref 6.8–8.8)
PROT SERPL-MCNC: 6.3 G/DL (ref 6.8–8.8)
PROT SERPL-MCNC: 6.4 G/DL (ref 6.8–8.8)
PROT SERPL-MCNC: 6.4 G/DL (ref 6.8–8.8)
PROT SERPL-MCNC: 6.7 G/DL (ref 6.8–8.8)
PROT/CREAT 24H UR: 0.08 MG/MG CR (ref 0–0.2)
PROT/CREAT 24H UR: NORMAL MG/G{CREAT}
PROTEIN BODY FLUID SOURCE: NORMAL
PROTEIN BODY FLUID SOURCE: NORMAL
PSA SERPL-MCNC: 0.08 NG/ML (ref 0–3.5)
QRS DURATION - MUSE: 86 MS
QRS DURATION - MUSE: 92 MS
QRS DURATION - MUSE: 98 MS
QRS DURATION - MUSE: 98 MS
QT - MUSE: 452 MS
QT - MUSE: 464 MS
QT - MUSE: 474 MS
QT - MUSE: 476 MS
QT - MUSE: 506 MS
QT - MUSE: 512 MS
QTC - MUSE: 428 MS
QTC - MUSE: 495 MS
QTC - MUSE: 522 MS
QTC - MUSE: 522 MS
QTC - MUSE: 523 MS
QTC - MUSE: 546 MS
QUANTIFERON MITOGEN: 4.34 IU/ML
QUANTIFERON NIL TUBE: 0.04 IU/ML
QUANTIFERON TB1 TUBE: 0.04 IU/ML
QUANTIFERON TB2 TUBE: 0.04
R AXIS - MUSE: -13 DEGREES
R AXIS - MUSE: -14 DEGREES
R AXIS - MUSE: -15 DEGREES
R AXIS - MUSE: -25 DEGREES
R AXIS - MUSE: 25 DEGREES
R AXIS - MUSE: 30 DEGREES
RADIOLOGIST FLAGS: NORMAL
RBC # BLD AUTO: 1.92 10E6/UL (ref 4.4–5.9)
RBC # BLD AUTO: 1.95 10E6/UL (ref 4.4–5.9)
RBC # BLD AUTO: 1.98 10E6/UL (ref 4.4–5.9)
RBC # BLD AUTO: 1.99 10E6/UL (ref 4.4–5.9)
RBC # BLD AUTO: 2 10E6/UL (ref 4.4–5.9)
RBC # BLD AUTO: 2.17 10E6/UL (ref 4.4–5.9)
RBC # BLD AUTO: 2.23 10E6/UL (ref 4.4–5.9)
RBC # BLD AUTO: 2.29 10E6/UL (ref 4.4–5.9)
RBC # BLD AUTO: 2.34 10E6/UL (ref 4.4–5.9)
RBC # BLD AUTO: 2.58 10E6/UL (ref 4.4–5.9)
RBC # BLD AUTO: 2.59 10E6/UL (ref 4.4–5.9)
RBC # BLD AUTO: 2.6 10E6/UL (ref 4.4–5.9)
RBC # BLD AUTO: 2.62 10E6/UL (ref 4.4–5.9)
RBC # BLD AUTO: 2.86 10E6/UL (ref 4.4–5.9)
RBC # BLD AUTO: 2.91 10E6/UL (ref 4.4–5.9)
RBC # BLD AUTO: 2.91 10E6/UL (ref 4.4–5.9)
RBC # BLD AUTO: 2.93 10E6/UL (ref 4.4–5.9)
RBC # BLD AUTO: 3.03 10E6/UL (ref 4.4–5.9)
RBC # BLD AUTO: 3.23 10E6/UL (ref 4.4–5.9)
RBC # BLD AUTO: 3.37 10E6/UL (ref 4.4–5.9)
RBC URINE: 1 /HPF
RBC URINE: 7 /HPF
RBC URINE: <1 /HPF
SARS-COV-2 RNA RESP QL NAA+PROBE: NEGATIVE
SARS-COV-2 RNA RESP QL NAA+PROBE: POSITIVE
SODIUM SERPL-SCNC: 136 MMOL/L (ref 136–145)
SODIUM SERPL-SCNC: 137 MMOL/L (ref 136–145)
SODIUM SERPL-SCNC: 137 MMOL/L (ref 136–145)
SODIUM SERPL-SCNC: 138 MMOL/L (ref 136–145)
SODIUM SERPL-SCNC: 139 MMOL/L (ref 133–144)
SODIUM SERPL-SCNC: 139 MMOL/L (ref 136–145)
SODIUM SERPL-SCNC: 139 MMOL/L (ref 136–145)
SODIUM SERPL-SCNC: 140 MMOL/L (ref 133–144)
SODIUM SERPL-SCNC: 140 MMOL/L (ref 133–144)
SODIUM SERPL-SCNC: 140 MMOL/L (ref 136–145)
SODIUM SERPL-SCNC: 141 MMOL/L (ref 133–144)
SODIUM SERPL-SCNC: 141 MMOL/L (ref 136–145)
SODIUM SERPL-SCNC: 142 MMOL/L (ref 133–144)
SODIUM SERPL-SCNC: 143 MMOL/L (ref 133–144)
SODIUM SERPL-SCNC: 143 MMOL/L (ref 133–144)
SODIUM SERPL-SCNC: 143 MMOL/L (ref 136–145)
SODIUM SERPL-SCNC: 144 MMOL/L (ref 133–144)
SODIUM SERPL-SCNC: 145 MMOL/L (ref 133–144)
SODIUM SERPL-SCNC: 145 MMOL/L (ref 136–145)
SP GR UR STRIP: 1.01 (ref 1–1.03)
SP GR UR STRIP: 1.02 (ref 1–1.03)
SPECIMEN EXPIRATION DATE: NORMAL
SQUAMOUS EPITHELIAL: 1 /HPF
SQUAMOUS EPITHELIAL: 2 /HPF
SQUAMOUS EPITHELIAL: <1 /HPF
SYSTOLIC BLOOD PRESSURE - MUSE: NORMAL MMHG
T AXIS - MUSE: 52 DEGREES
T AXIS - MUSE: 57 DEGREES
T AXIS - MUSE: 64 DEGREES
T AXIS - MUSE: 64 DEGREES
T AXIS - MUSE: 74 DEGREES
T AXIS - MUSE: 81 DEGREES
T PALLIDUM AB SER QL: NONREACTIVE
TIBC SERPL-MCNC: 108 UG/DL (ref 240–430)
TIBC SERPL-MCNC: 128 UG/DL (ref 240–430)
TIBC SERPL-MCNC: 149 UG/DL (ref 240–430)
TIBC SERPL-MCNC: 216 UG/DL (ref 240–430)
TIBC SERPL-MCNC: 241 UG/DL (ref 240–430)
TIBC SERPL-MCNC: 273 UG/DL (ref 240–430)
TRANSFERRIN SERPL-MCNC: 109 MG/DL (ref 200–360)
TRIGL SERPL-MCNC: 92 MG/DL
TROPONIN T SERPL HS-MCNC: 121 NG/L
TROPONIN T SERPL HS-MCNC: 57 NG/L
TROPONIN T SERPL HS-MCNC: 99 NG/L
TROPONIN T SERPL HS-MCNC: 99 NG/L
UNIT ABO/RH: NORMAL
UNIT NUMBER: NORMAL
UNIT STATUS: NORMAL
UNIT TYPE ISBT: 5100
UNIT TYPE ISBT: 6200
UPPER GI ENDOSCOPY: NORMAL
UPPER GI ENDOSCOPY: NORMAL
UROBILINOGEN UR STRIP-MCNC: NORMAL MG/DL
VENTRICULAR RATE- MUSE: 54 BPM
VENTRICULAR RATE- MUSE: 63 BPM
VENTRICULAR RATE- MUSE: 65 BPM
VENTRICULAR RATE- MUSE: 70 BPM
VENTRICULAR RATE- MUSE: 73 BPM
VENTRICULAR RATE- MUSE: 76 BPM
WBC # BLD AUTO: 3 10E3/UL (ref 4–11)
WBC # BLD AUTO: 3.1 10E3/UL (ref 4–11)
WBC # BLD AUTO: 3.7 10E3/UL (ref 4–11)
WBC # BLD AUTO: 3.8 10E3/UL (ref 4–11)
WBC # BLD AUTO: 4 10E3/UL (ref 4–11)
WBC # BLD AUTO: 4.3 10E3/UL (ref 4–11)
WBC # BLD AUTO: 4.3 10E3/UL (ref 4–11)
WBC # BLD AUTO: 4.4 10E3/UL (ref 4–11)
WBC # BLD AUTO: 4.4 10E3/UL (ref 4–11)
WBC # BLD AUTO: 4.7 10E3/UL (ref 4–11)
WBC # BLD AUTO: 5 10E3/UL (ref 4–11)
WBC # BLD AUTO: 5 10E3/UL (ref 4–11)
WBC # BLD AUTO: 5.1 10E3/UL (ref 4–11)
WBC # BLD AUTO: 5.5 10E3/UL (ref 4–11)
WBC # BLD AUTO: 6.6 10E3/UL (ref 4–11)
WBC # BLD AUTO: 6.9 10E3/UL (ref 4–11)
WBC # BLD AUTO: 7 10E3/UL (ref 4–11)
WBC # BLD AUTO: 8.1 10E3/UL (ref 4–11)
WBC # FLD AUTO: 16 /UL
WBC # FLD AUTO: 16 /UL
WBC # FLD AUTO: 27 /UL
WBC URINE: 1 /HPF
WBC URINE: 1 /HPF
WBC URINE: 5 /HPF

## 2023-01-01 PROCEDURE — G0463 HOSPITAL OUTPT CLINIC VISIT: HCPCS | Performed by: INTERNAL MEDICINE

## 2023-01-01 PROCEDURE — 120N000002 HC R&B MED SURG/OB UMMC

## 2023-01-01 PROCEDURE — 84484 ASSAY OF TROPONIN QUANT: CPT | Performed by: EMERGENCY MEDICINE

## 2023-01-01 PROCEDURE — 86850 RBC ANTIBODY SCREEN: CPT | Mod: 90 | Performed by: PATHOLOGY

## 2023-01-01 PROCEDURE — 85610 PROTHROMBIN TIME: CPT

## 2023-01-01 PROCEDURE — 83735 ASSAY OF MAGNESIUM: CPT | Performed by: NURSE PRACTITIONER

## 2023-01-01 PROCEDURE — P9016 RBC LEUKOCYTES REDUCED: HCPCS | Performed by: NURSE PRACTITIONER

## 2023-01-01 PROCEDURE — 250N000013 HC RX MED GY IP 250 OP 250 PS 637: Performed by: INTERNAL MEDICINE

## 2023-01-01 PROCEDURE — 36415 COLL VENOUS BLD VENIPUNCTURE: CPT | Performed by: EMERGENCY MEDICINE

## 2023-01-01 PROCEDURE — 80053 COMPREHEN METABOLIC PANEL: CPT

## 2023-01-01 PROCEDURE — 93010 ELECTROCARDIOGRAM REPORT: CPT | Performed by: NURSE PRACTITIONER

## 2023-01-01 PROCEDURE — 99214 OFFICE O/P EST MOD 30 MIN: CPT | Mod: VID | Performed by: RADIOLOGY

## 2023-01-01 PROCEDURE — 85018 HEMOGLOBIN: CPT

## 2023-01-01 PROCEDURE — 85610 PROTHROMBIN TIME: CPT | Performed by: INTERNAL MEDICINE

## 2023-01-01 PROCEDURE — 84157 ASSAY OF PROTEIN OTHER: CPT | Performed by: STUDENT IN AN ORGANIZED HEALTH CARE EDUCATION/TRAINING PROGRAM

## 2023-01-01 PROCEDURE — 86665 EPSTEIN-BARR CAPSID VCA: CPT | Performed by: STUDENT IN AN ORGANIZED HEALTH CARE EDUCATION/TRAINING PROGRAM

## 2023-01-01 PROCEDURE — 99233 SBSQ HOSP IP/OBS HIGH 50: CPT | Performed by: NURSE PRACTITIONER

## 2023-01-01 PROCEDURE — 82248 BILIRUBIN DIRECT: CPT | Performed by: PATHOLOGY

## 2023-01-01 PROCEDURE — 36415 COLL VENOUS BLD VENIPUNCTURE: CPT | Performed by: INTERNAL MEDICINE

## 2023-01-01 PROCEDURE — 49083 ABD PARACENTESIS W/IMAGING: CPT | Mod: GC | Performed by: INTERNAL MEDICINE

## 2023-01-01 PROCEDURE — 83550 IRON BINDING TEST: CPT

## 2023-01-01 PROCEDURE — 83735 ASSAY OF MAGNESIUM: CPT | Performed by: INTERNAL MEDICINE

## 2023-01-01 PROCEDURE — 45330 DIAGNOSTIC SIGMOIDOSCOPY: CPT | Performed by: INTERNAL MEDICINE

## 2023-01-01 PROCEDURE — 99231 SBSQ HOSP IP/OBS SF/LOW 25: CPT | Mod: GC | Performed by: INTERNAL MEDICINE

## 2023-01-01 PROCEDURE — 99285 EMERGENCY DEPT VISIT HI MDM: CPT | Mod: 25 | Performed by: NURSE PRACTITIONER

## 2023-01-01 PROCEDURE — 36415 COLL VENOUS BLD VENIPUNCTURE: CPT

## 2023-01-01 PROCEDURE — 0W9G3ZZ DRAINAGE OF PERITONEAL CAVITY, PERCUTANEOUS APPROACH: ICD-10-PCS | Performed by: INTERNAL MEDICINE

## 2023-01-01 PROCEDURE — 74176 CT ABD & PELVIS W/O CONTRAST: CPT

## 2023-01-01 PROCEDURE — 99000 SPECIMEN HANDLING OFFICE-LAB: CPT | Performed by: PATHOLOGY

## 2023-01-01 PROCEDURE — 83540 ASSAY OF IRON: CPT

## 2023-01-01 PROCEDURE — 250N000012 HC RX MED GY IP 250 OP 636 PS 637: Performed by: INTERNAL MEDICINE

## 2023-01-01 PROCEDURE — 250N000013 HC RX MED GY IP 250 OP 250 PS 637: Performed by: STUDENT IN AN ORGANIZED HEALTH CARE EDUCATION/TRAINING PROGRAM

## 2023-01-01 PROCEDURE — 250N000013 HC RX MED GY IP 250 OP 250 PS 637: Performed by: NURSE PRACTITIONER

## 2023-01-01 PROCEDURE — G0500 MOD SEDAT ENDO SERVICE >5YRS: HCPCS | Performed by: INTERNAL MEDICINE

## 2023-01-01 PROCEDURE — 80053 COMPREHEN METABOLIC PANEL: CPT | Performed by: INTERNAL MEDICINE

## 2023-01-01 PROCEDURE — 80053 COMPREHEN METABOLIC PANEL: CPT | Performed by: STUDENT IN AN ORGANIZED HEALTH CARE EDUCATION/TRAINING PROGRAM

## 2023-01-01 PROCEDURE — 258N000003 HC RX IP 258 OP 636: Performed by: NURSE PRACTITIONER

## 2023-01-01 PROCEDURE — P9045 ALBUMIN (HUMAN), 5%, 250 ML: HCPCS | Mod: JZ | Performed by: NURSE ANESTHETIST, CERTIFIED REGISTERED

## 2023-01-01 PROCEDURE — 250N000011 HC RX IP 250 OP 636: Performed by: INTERNAL MEDICINE

## 2023-01-01 PROCEDURE — 93975 VASCULAR STUDY: CPT

## 2023-01-01 PROCEDURE — 99205 OFFICE O/P NEW HI 60 MIN: CPT | Performed by: TRANSPLANT SURGERY

## 2023-01-01 PROCEDURE — C1769 GUIDE WIRE: HCPCS

## 2023-01-01 PROCEDURE — 86901 BLOOD TYPING SEROLOGIC RH(D): CPT

## 2023-01-01 PROCEDURE — 86923 COMPATIBILITY TEST ELECTRIC: CPT | Performed by: NURSE PRACTITIONER

## 2023-01-01 PROCEDURE — 36415 COLL VENOUS BLD VENIPUNCTURE: CPT | Performed by: NURSE PRACTITIONER

## 2023-01-01 PROCEDURE — 87205 SMEAR GRAM STAIN: CPT | Performed by: INTERNAL MEDICINE

## 2023-01-01 PROCEDURE — 86708 HEPATITIS A ANTIBODY: CPT | Performed by: STUDENT IN AN ORGANIZED HEALTH CARE EDUCATION/TRAINING PROGRAM

## 2023-01-01 PROCEDURE — 85730 THROMBOPLASTIN TIME PARTIAL: CPT | Performed by: STUDENT IN AN ORGANIZED HEALTH CARE EDUCATION/TRAINING PROGRAM

## 2023-01-01 PROCEDURE — 36415 COLL VENOUS BLD VENIPUNCTURE: CPT | Performed by: PATHOLOGY

## 2023-01-01 PROCEDURE — 80053 COMPREHEN METABOLIC PANEL: CPT | Performed by: PATHOLOGY

## 2023-01-01 PROCEDURE — 99291 CRITICAL CARE FIRST HOUR: CPT | Mod: 25 | Performed by: EMERGENCY MEDICINE

## 2023-01-01 PROCEDURE — 99153 MOD SED SAME PHYS/QHP EA: CPT | Performed by: INTERNAL MEDICINE

## 2023-01-01 PROCEDURE — 83735 ASSAY OF MAGNESIUM: CPT

## 2023-01-01 PROCEDURE — 82728 ASSAY OF FERRITIN: CPT

## 2023-01-01 PROCEDURE — 99207 PR APP CREDIT; MD BILLING SHARED VISIT: CPT | Performed by: STUDENT IN AN ORGANIZED HEALTH CARE EDUCATION/TRAINING PROGRAM

## 2023-01-01 PROCEDURE — 99205 OFFICE O/P NEW HI 60 MIN: CPT | Performed by: INTERNAL MEDICINE

## 2023-01-01 PROCEDURE — 250N000011 HC RX IP 250 OP 636: Performed by: NURSE PRACTITIONER

## 2023-01-01 PROCEDURE — 99232 SBSQ HOSP IP/OBS MODERATE 35: CPT | Performed by: INTERNAL MEDICINE

## 2023-01-01 PROCEDURE — 82140 ASSAY OF AMMONIA: CPT | Performed by: NURSE PRACTITIONER

## 2023-01-01 PROCEDURE — 99207 PR STATISTIC IV PUSH SINGLE INITIAL SUBSTANCE: CPT | Performed by: STUDENT IN AN ORGANIZED HEALTH CARE EDUCATION/TRAINING PROGRAM

## 2023-01-01 PROCEDURE — 82248 BILIRUBIN DIRECT: CPT

## 2023-01-01 PROCEDURE — 36415 COLL VENOUS BLD VENIPUNCTURE: CPT | Performed by: STUDENT IN AN ORGANIZED HEALTH CARE EDUCATION/TRAINING PROGRAM

## 2023-01-01 PROCEDURE — 85014 HEMATOCRIT: CPT | Performed by: STUDENT IN AN ORGANIZED HEALTH CARE EDUCATION/TRAINING PROGRAM

## 2023-01-01 PROCEDURE — 85014 HEMATOCRIT: CPT | Performed by: PATHOLOGY

## 2023-01-01 PROCEDURE — C9113 INJ PANTOPRAZOLE SODIUM, VIA: HCPCS | Performed by: NURSE PRACTITIONER

## 2023-01-01 PROCEDURE — 83605 ASSAY OF LACTIC ACID: CPT | Performed by: NURSE PRACTITIONER

## 2023-01-01 PROCEDURE — 250N000009 HC RX 250: Performed by: REGISTERED NURSE

## 2023-01-01 PROCEDURE — 82105 ALPHA-FETOPROTEIN SERUM: CPT

## 2023-01-01 PROCEDURE — 999N000248 HC STATISTIC IV INSERT WITH US BY RN

## 2023-01-01 PROCEDURE — 96368 THER/DIAG CONCURRENT INF: CPT | Performed by: NURSE PRACTITIONER

## 2023-01-01 PROCEDURE — 99207 PR NO CHARGE COORDINATED CARE PS: CPT

## 2023-01-01 PROCEDURE — P9016 RBC LEUKOCYTES REDUCED: HCPCS | Performed by: STUDENT IN AN ORGANIZED HEALTH CARE EDUCATION/TRAINING PROGRAM

## 2023-01-01 PROCEDURE — 0W9G3ZZ DRAINAGE OF PERITONEAL CAVITY, PERCUTANEOUS APPROACH: ICD-10-PCS | Performed by: RADIOLOGY

## 2023-01-01 PROCEDURE — 250N000011 HC RX IP 250 OP 636: Performed by: ANESTHESIOLOGY

## 2023-01-01 PROCEDURE — 258N000003 HC RX IP 258 OP 636: Performed by: NURSE ANESTHETIST, CERTIFIED REGISTERED

## 2023-01-01 PROCEDURE — 85018 HEMOGLOBIN: CPT | Performed by: NURSE PRACTITIONER

## 2023-01-01 PROCEDURE — 89050 BODY FLUID CELL COUNT: CPT | Performed by: STUDENT IN AN ORGANIZED HEALTH CARE EDUCATION/TRAINING PROGRAM

## 2023-01-01 PROCEDURE — 86900 BLOOD TYPING SEROLOGIC ABO: CPT

## 2023-01-01 PROCEDURE — 99233 SBSQ HOSP IP/OBS HIGH 50: CPT | Performed by: INTERNAL MEDICINE

## 2023-01-01 PROCEDURE — 80321 ALCOHOLS BIOMARKERS 1OR 2: CPT | Performed by: INTERNAL MEDICINE

## 2023-01-01 PROCEDURE — 250N000011 HC RX IP 250 OP 636: Mod: JA | Performed by: STUDENT IN AN ORGANIZED HEALTH CARE EDUCATION/TRAINING PROGRAM

## 2023-01-01 PROCEDURE — 0DJD8ZZ INSPECTION OF LOWER INTESTINAL TRACT, VIA NATURAL OR ARTIFICIAL OPENING ENDOSCOPIC: ICD-10-PCS | Performed by: INTERNAL MEDICINE

## 2023-01-01 PROCEDURE — 85025 COMPLETE CBC W/AUTO DIFF WBC: CPT | Performed by: EMERGENCY MEDICINE

## 2023-01-01 PROCEDURE — 93010 ELECTROCARDIOGRAM REPORT: CPT | Performed by: EMERGENCY MEDICINE

## 2023-01-01 PROCEDURE — P9016 RBC LEUKOCYTES REDUCED: HCPCS | Performed by: INTERNAL MEDICINE

## 2023-01-01 PROCEDURE — 99285 EMERGENCY DEPT VISIT HI MDM: CPT | Mod: 25 | Performed by: EMERGENCY MEDICINE

## 2023-01-01 PROCEDURE — 85027 COMPLETE CBC AUTOMATED: CPT | Performed by: NURSE PRACTITIONER

## 2023-01-01 PROCEDURE — 85610 PROTHROMBIN TIME: CPT | Performed by: PATHOLOGY

## 2023-01-01 PROCEDURE — 93306 TTE W/DOPPLER COMPLETE: CPT | Performed by: STUDENT IN AN ORGANIZED HEALTH CARE EDUCATION/TRAINING PROGRAM

## 2023-01-01 PROCEDURE — 82140 ASSAY OF AMMONIA: CPT

## 2023-01-01 PROCEDURE — 99214 OFFICE O/P EST MOD 30 MIN: CPT | Performed by: INTERNAL MEDICINE

## 2023-01-01 PROCEDURE — 86706 HEP B SURFACE ANTIBODY: CPT | Performed by: STUDENT IN AN ORGANIZED HEALTH CARE EDUCATION/TRAINING PROGRAM

## 2023-01-01 PROCEDURE — 86923 COMPATIBILITY TEST ELECTRIC: CPT | Performed by: INTERNAL MEDICINE

## 2023-01-01 PROCEDURE — 86850 RBC ANTIBODY SCREEN: CPT

## 2023-01-01 PROCEDURE — 99000 SPECIMEN HANDLING OFFICE-LAB: CPT

## 2023-01-01 PROCEDURE — 99223 1ST HOSP IP/OBS HIGH 75: CPT | Mod: 25 | Performed by: NURSE PRACTITIONER

## 2023-01-01 PROCEDURE — 250N000011 HC RX IP 250 OP 636: Performed by: STUDENT IN AN ORGANIZED HEALTH CARE EDUCATION/TRAINING PROGRAM

## 2023-01-01 PROCEDURE — 0DJ08ZZ INSPECTION OF UPPER INTESTINAL TRACT, VIA NATURAL OR ARTIFICIAL OPENING ENDOSCOPIC: ICD-10-PCS | Performed by: INTERNAL MEDICINE

## 2023-01-01 PROCEDURE — 85018 HEMOGLOBIN: CPT | Performed by: PATHOLOGY

## 2023-01-01 PROCEDURE — 87075 CULTR BACTERIA EXCEPT BLOOD: CPT | Performed by: STUDENT IN AN ORGANIZED HEALTH CARE EDUCATION/TRAINING PROGRAM

## 2023-01-01 PROCEDURE — 258N000001 HC RX 258: Performed by: STUDENT IN AN ORGANIZED HEALTH CARE EDUCATION/TRAINING PROGRAM

## 2023-01-01 PROCEDURE — 83605 ASSAY OF LACTIC ACID: CPT | Performed by: EMERGENCY MEDICINE

## 2023-01-01 PROCEDURE — 250N000011 HC RX IP 250 OP 636: Performed by: RADIOLOGY

## 2023-01-01 PROCEDURE — 99222 1ST HOSP IP/OBS MODERATE 55: CPT | Mod: GC | Performed by: INTERNAL MEDICINE

## 2023-01-01 PROCEDURE — 82042 OTHER SOURCE ALBUMIN QUAN EA: CPT | Performed by: STUDENT IN AN ORGANIZED HEALTH CARE EDUCATION/TRAINING PROGRAM

## 2023-01-01 PROCEDURE — P9016 RBC LEUKOCYTES REDUCED: HCPCS | Performed by: EMERGENCY MEDICINE

## 2023-01-01 PROCEDURE — 85025 COMPLETE CBC W/AUTO DIFF WBC: CPT

## 2023-01-01 PROCEDURE — 06183J4 BYPASS PORTAL VEIN TO HEPATIC VEIN WITH SYNTHETIC SUBSTITUTE, PERCUTANEOUS APPROACH: ICD-10-PCS | Performed by: RADIOLOGY

## 2023-01-01 PROCEDURE — 84100 ASSAY OF PHOSPHORUS: CPT | Performed by: HOSPITALIST

## 2023-01-01 PROCEDURE — A9585 GADOBUTROL INJECTION: HCPCS | Performed by: STUDENT IN AN ORGANIZED HEALTH CARE EDUCATION/TRAINING PROGRAM

## 2023-01-01 PROCEDURE — 250N000024 HC ISOFLURANE, PER MIN

## 2023-01-01 PROCEDURE — 85730 THROMBOPLASTIN TIME PARTIAL: CPT | Performed by: NURSE PRACTITIONER

## 2023-01-01 PROCEDURE — 99285 EMERGENCY DEPT VISIT HI MDM: CPT | Mod: 25

## 2023-01-01 PROCEDURE — P9047 ALBUMIN (HUMAN), 25%, 50ML: HCPCS | Performed by: INTERNAL MEDICINE

## 2023-01-01 PROCEDURE — 83690 ASSAY OF LIPASE: CPT | Performed by: EMERGENCY MEDICINE

## 2023-01-01 PROCEDURE — 85610 PROTHROMBIN TIME: CPT | Performed by: EMERGENCY MEDICINE

## 2023-01-01 PROCEDURE — 74176 CT ABD & PELVIS W/O CONTRAST: CPT | Mod: 26 | Performed by: RADIOLOGY

## 2023-01-01 PROCEDURE — 93005 ELECTROCARDIOGRAM TRACING: CPT | Performed by: NURSE PRACTITIONER

## 2023-01-01 PROCEDURE — 85027 COMPLETE CBC AUTOMATED: CPT

## 2023-01-01 PROCEDURE — 999N000127 HC STATISTIC PERIPHERAL IV START W US GUIDANCE

## 2023-01-01 PROCEDURE — 999N000147 HC STATISTIC PT IP EVAL DEFER

## 2023-01-01 PROCEDURE — 93005 ELECTROCARDIOGRAM TRACING: CPT | Performed by: EMERGENCY MEDICINE

## 2023-01-01 PROCEDURE — 82962 GLUCOSE BLOOD TEST: CPT

## 2023-01-01 PROCEDURE — 250N000011 HC RX IP 250 OP 636: Mod: JA | Performed by: NURSE PRACTITIONER

## 2023-01-01 PROCEDURE — 99222 1ST HOSP IP/OBS MODERATE 55: CPT | Mod: 25 | Performed by: INTERNAL MEDICINE

## 2023-01-01 PROCEDURE — 82140 ASSAY OF AMMONIA: CPT | Performed by: EMERGENCY MEDICINE

## 2023-01-01 PROCEDURE — 83550 IRON BINDING TEST: CPT | Performed by: PATHOLOGY

## 2023-01-01 PROCEDURE — 85384 FIBRINOGEN ACTIVITY: CPT | Performed by: INTERNAL MEDICINE

## 2023-01-01 PROCEDURE — C1887 CATHETER, GUIDING: HCPCS

## 2023-01-01 PROCEDURE — 84155 ASSAY OF PROTEIN SERUM: CPT | Performed by: NURSE PRACTITIONER

## 2023-01-01 PROCEDURE — 250N000011 HC RX IP 250 OP 636: Performed by: NURSE ANESTHETIST, CERTIFIED REGISTERED

## 2023-01-01 PROCEDURE — 85384 FIBRINOGEN ACTIVITY: CPT | Performed by: NURSE PRACTITIONER

## 2023-01-01 PROCEDURE — 86901 BLOOD TYPING SEROLOGIC RH(D): CPT | Mod: 90 | Performed by: PATHOLOGY

## 2023-01-01 PROCEDURE — 97140 MANUAL THERAPY 1/> REGIONS: CPT | Mod: GO | Performed by: OCCUPATIONAL THERAPIST

## 2023-01-01 PROCEDURE — 87340 HEPATITIS B SURFACE AG IA: CPT | Performed by: STUDENT IN AN ORGANIZED HEALTH CARE EDUCATION/TRAINING PROGRAM

## 2023-01-01 PROCEDURE — 82040 ASSAY OF SERUM ALBUMIN: CPT

## 2023-01-01 PROCEDURE — 83605 ASSAY OF LACTIC ACID: CPT | Performed by: INTERNAL MEDICINE

## 2023-01-01 PROCEDURE — G0463 HOSPITAL OUTPT CLINIC VISIT: HCPCS

## 2023-01-01 PROCEDURE — 80321 ALCOHOLS BIOMARKERS 1OR 2: CPT | Mod: 90 | Performed by: PATHOLOGY

## 2023-01-01 PROCEDURE — 120N000003 HC R&B IMCU UMMC

## 2023-01-01 PROCEDURE — 36592 COLLECT BLOOD FROM PICC: CPT | Performed by: INTERNAL MEDICINE

## 2023-01-01 PROCEDURE — 99207 PR NO CHARGE LOS: CPT

## 2023-01-01 PROCEDURE — 85014 HEMATOCRIT: CPT

## 2023-01-01 PROCEDURE — 83036 HEMOGLOBIN GLYCOSYLATED A1C: CPT | Performed by: INTERNAL MEDICINE

## 2023-01-01 PROCEDURE — C9113 INJ PANTOPRAZOLE SODIUM, VIA: HCPCS | Performed by: STUDENT IN AN ORGANIZED HEALTH CARE EDUCATION/TRAINING PROGRAM

## 2023-01-01 PROCEDURE — 255N000002 HC RX 255 OP 636: Performed by: RADIOLOGY

## 2023-01-01 PROCEDURE — 86704 HEP B CORE ANTIBODY TOTAL: CPT | Performed by: STUDENT IN AN ORGANIZED HEALTH CARE EDUCATION/TRAINING PROGRAM

## 2023-01-01 PROCEDURE — 81003 URINALYSIS AUTO W/O SCOPE: CPT | Performed by: PATHOLOGY

## 2023-01-01 PROCEDURE — 85027 COMPLETE CBC AUTOMATED: CPT | Performed by: INTERNAL MEDICINE

## 2023-01-01 PROCEDURE — 93975 VASCULAR STUDY: CPT | Mod: 26 | Performed by: STUDENT IN AN ORGANIZED HEALTH CARE EDUCATION/TRAINING PROGRAM

## 2023-01-01 PROCEDURE — 43244 EGD VARICES LIGATION: CPT | Performed by: INTERNAL MEDICINE

## 2023-01-01 PROCEDURE — C1874 STENT, COATED/COV W/DEL SYS: HCPCS

## 2023-01-01 PROCEDURE — G0103 PSA SCREENING: HCPCS | Performed by: PATHOLOGY

## 2023-01-01 PROCEDURE — 86803 HEPATITIS C AB TEST: CPT | Performed by: STUDENT IN AN ORGANIZED HEALTH CARE EDUCATION/TRAINING PROGRAM

## 2023-01-01 PROCEDURE — 84295 ASSAY OF SERUM SODIUM: CPT

## 2023-01-01 PROCEDURE — G0463 HOSPITAL OUTPT CLINIC VISIT: HCPCS | Performed by: TRANSPLANT SURGERY

## 2023-01-01 PROCEDURE — 97535 SELF CARE MNGMENT TRAINING: CPT | Mod: GO | Performed by: OCCUPATIONAL THERAPIST

## 2023-01-01 PROCEDURE — 85018 HEMOGLOBIN: CPT | Performed by: INTERNAL MEDICINE

## 2023-01-01 PROCEDURE — 99215 OFFICE O/P EST HI 40 MIN: CPT | Performed by: STUDENT IN AN ORGANIZED HEALTH CARE EDUCATION/TRAINING PROGRAM

## 2023-01-01 PROCEDURE — 370N000017 HC ANESTHESIA TECHNICAL FEE, PER MIN: Performed by: INTERNAL MEDICINE

## 2023-01-01 PROCEDURE — 97165 OT EVAL LOW COMPLEX 30 MIN: CPT | Mod: GO | Performed by: OCCUPATIONAL THERAPIST

## 2023-01-01 PROCEDURE — 370N000017 HC ANESTHESIA TECHNICAL FEE, PER MIN

## 2023-01-01 PROCEDURE — 89051 BODY FLUID CELL COUNT: CPT | Performed by: INTERNAL MEDICINE

## 2023-01-01 PROCEDURE — 93308 TTE F-UP OR LMTD: CPT | Mod: 26 | Performed by: EMERGENCY MEDICINE

## 2023-01-01 PROCEDURE — 82728 ASSAY OF FERRITIN: CPT | Performed by: PATHOLOGY

## 2023-01-01 PROCEDURE — 85027 COMPLETE CBC AUTOMATED: CPT | Performed by: PATHOLOGY

## 2023-01-01 PROCEDURE — 87070 CULTURE OTHR SPECIMN AEROBIC: CPT | Performed by: STUDENT IN AN ORGANIZED HEALTH CARE EDUCATION/TRAINING PROGRAM

## 2023-01-01 PROCEDURE — 85025 COMPLETE CBC W/AUTO DIFF WBC: CPT | Performed by: INTERNAL MEDICINE

## 2023-01-01 PROCEDURE — 99223 1ST HOSP IP/OBS HIGH 75: CPT | Mod: AI | Performed by: NURSE PRACTITIONER

## 2023-01-01 PROCEDURE — 84484 ASSAY OF TROPONIN QUANT: CPT | Performed by: INTERNAL MEDICINE

## 2023-01-01 PROCEDURE — 86923 COMPATIBILITY TEST ELECTRIC: CPT | Performed by: STUDENT IN AN ORGANIZED HEALTH CARE EDUCATION/TRAINING PROGRAM

## 2023-01-01 PROCEDURE — 250N000012 HC RX MED GY IP 250 OP 636 PS 637: Performed by: STUDENT IN AN ORGANIZED HEALTH CARE EDUCATION/TRAINING PROGRAM

## 2023-01-01 PROCEDURE — 82248 BILIRUBIN DIRECT: CPT | Performed by: NURSE PRACTITIONER

## 2023-01-01 PROCEDURE — C9113 INJ PANTOPRAZOLE SODIUM, VIA: HCPCS | Performed by: EMERGENCY MEDICINE

## 2023-01-01 PROCEDURE — 99214 OFFICE O/P EST MOD 30 MIN: CPT | Performed by: STUDENT IN AN ORGANIZED HEALTH CARE EDUCATION/TRAINING PROGRAM

## 2023-01-01 PROCEDURE — 86644 CMV ANTIBODY: CPT | Performed by: STUDENT IN AN ORGANIZED HEALTH CARE EDUCATION/TRAINING PROGRAM

## 2023-01-01 PROCEDURE — 250N000011 HC RX IP 250 OP 636: Performed by: EMERGENCY MEDICINE

## 2023-01-01 PROCEDURE — 999N000141 HC STATISTIC PRE-PROCEDURE NURSING ASSESSMENT

## 2023-01-01 PROCEDURE — 86901 BLOOD TYPING SEROLOGIC RH(D): CPT | Performed by: NURSE PRACTITIONER

## 2023-01-01 PROCEDURE — 82330 ASSAY OF CALCIUM: CPT | Performed by: NURSE PRACTITIONER

## 2023-01-01 PROCEDURE — 85610 PROTHROMBIN TIME: CPT | Performed by: NURSE PRACTITIONER

## 2023-01-01 PROCEDURE — 82247 BILIRUBIN TOTAL: CPT

## 2023-01-01 PROCEDURE — 83735 ASSAY OF MAGNESIUM: CPT | Performed by: HOSPITALIST

## 2023-01-01 PROCEDURE — 272N000504 HC NEEDLE CR4

## 2023-01-01 PROCEDURE — 85014 HEMATOCRIT: CPT | Performed by: INTERNAL MEDICINE

## 2023-01-01 PROCEDURE — 258N000003 HC RX IP 258 OP 636: Performed by: STUDENT IN AN ORGANIZED HEALTH CARE EDUCATION/TRAINING PROGRAM

## 2023-01-01 PROCEDURE — 37182 INSERT HEPATIC SHUNT (TIPS): CPT

## 2023-01-01 PROCEDURE — 84466 ASSAY OF TRANSFERRIN: CPT | Performed by: STUDENT IN AN ORGANIZED HEALTH CARE EDUCATION/TRAINING PROGRAM

## 2023-01-01 PROCEDURE — 85025 COMPLETE CBC W/AUTO DIFF WBC: CPT | Performed by: STUDENT IN AN ORGANIZED HEALTH CARE EDUCATION/TRAINING PROGRAM

## 2023-01-01 PROCEDURE — 99213 OFFICE O/P EST LOW 20 MIN: CPT | Performed by: RADIOLOGY

## 2023-01-01 PROCEDURE — 86850 RBC ANTIBODY SCREEN: CPT | Performed by: EMERGENCY MEDICINE

## 2023-01-01 PROCEDURE — 93975 VASCULAR STUDY: CPT | Mod: GC | Performed by: RADIOLOGY

## 2023-01-01 PROCEDURE — 96374 THER/PROPH/DIAG INJ IV PUSH: CPT

## 2023-01-01 PROCEDURE — 80321 ALCOHOLS BIOMARKERS 1OR 2: CPT | Mod: 90

## 2023-01-01 PROCEDURE — 99204 OFFICE O/P NEW MOD 45 MIN: CPT | Mod: VID | Performed by: PHYSICIAN ASSISTANT

## 2023-01-01 PROCEDURE — 82306 VITAMIN D 25 HYDROXY: CPT | Performed by: STUDENT IN AN ORGANIZED HEALTH CARE EDUCATION/TRAINING PROGRAM

## 2023-01-01 PROCEDURE — 999N000044 HC STATISTIC CVC DRESSING CHANGE

## 2023-01-01 PROCEDURE — 37182 INSERT HEPATIC SHUNT (TIPS): CPT | Mod: GC | Performed by: RADIOLOGY

## 2023-01-01 PROCEDURE — 82042 OTHER SOURCE ALBUMIN QUAN EA: CPT | Performed by: INTERNAL MEDICINE

## 2023-01-01 PROCEDURE — 96376 TX/PRO/DX INJ SAME DRUG ADON: CPT

## 2023-01-01 PROCEDURE — 93005 ELECTROCARDIOGRAM TRACING: CPT

## 2023-01-01 PROCEDURE — 96365 THER/PROPH/DIAG IV INF INIT: CPT | Performed by: NURSE PRACTITIONER

## 2023-01-01 PROCEDURE — 80053 COMPREHEN METABOLIC PANEL: CPT | Performed by: EMERGENCY MEDICINE

## 2023-01-01 PROCEDURE — 710N000012 HC RECOVERY PHASE 2, PER MINUTE

## 2023-01-01 PROCEDURE — U0003 INFECTIOUS AGENT DETECTION BY NUCLEIC ACID (DNA OR RNA); SEVERE ACUTE RESPIRATORY SYNDROME CORONAVIRUS 2 (SARS-COV-2) (CORONAVIRUS DISEASE [COVID-19]), AMPLIFIED PROBE TECHNIQUE, MAKING USE OF HIGH THROUGHPUT TECHNOLOGIES AS DESCRIBED BY CMS-2020-01-R: HCPCS | Performed by: EMERGENCY MEDICINE

## 2023-01-01 PROCEDURE — 710N000010 HC RECOVERY PHASE 1, LEVEL 2, PER MIN

## 2023-01-01 PROCEDURE — 82565 ASSAY OF CREATININE: CPT

## 2023-01-01 PROCEDURE — 255N000002 HC RX 255 OP 636: Performed by: STUDENT IN AN ORGANIZED HEALTH CARE EDUCATION/TRAINING PROGRAM

## 2023-01-01 PROCEDURE — 85610 PROTHROMBIN TIME: CPT | Performed by: STUDENT IN AN ORGANIZED HEALTH CARE EDUCATION/TRAINING PROGRAM

## 2023-01-01 PROCEDURE — 93018 CV STRESS TEST I&R ONLY: CPT | Performed by: RADIOLOGY

## 2023-01-01 PROCEDURE — 86901 BLOOD TYPING SEROLOGIC RH(D): CPT | Performed by: EMERGENCY MEDICINE

## 2023-01-01 PROCEDURE — 49083 ABD PARACENTESIS W/IMAGING: CPT | Performed by: INTERNAL MEDICINE

## 2023-01-01 PROCEDURE — 82947 ASSAY GLUCOSE BLOOD QUANT: CPT | Performed by: NURSE PRACTITIONER

## 2023-01-01 PROCEDURE — 81001 URINALYSIS AUTO W/SCOPE: CPT | Performed by: PATHOLOGY

## 2023-01-01 PROCEDURE — 84100 ASSAY OF PHOSPHORUS: CPT | Performed by: PATHOLOGY

## 2023-01-01 PROCEDURE — 272N000302 HC DEVICE INFLATION CR5

## 2023-01-01 PROCEDURE — 93308 TTE F-UP OR LMTD: CPT

## 2023-01-01 PROCEDURE — 86481 TB AG RESPONSE T-CELL SUSP: CPT | Performed by: STUDENT IN AN ORGANIZED HEALTH CARE EDUCATION/TRAINING PROGRAM

## 2023-01-01 PROCEDURE — P9047 ALBUMIN (HUMAN), 25%, 50ML: HCPCS | Performed by: STUDENT IN AN ORGANIZED HEALTH CARE EDUCATION/TRAINING PROGRAM

## 2023-01-01 PROCEDURE — 84100 ASSAY OF PHOSPHORUS: CPT

## 2023-01-01 PROCEDURE — 80307 DRUG TEST PRSMV CHEM ANLYZR: CPT | Mod: 90 | Performed by: PATHOLOGY

## 2023-01-01 PROCEDURE — 81001 URINALYSIS AUTO W/SCOPE: CPT | Performed by: STUDENT IN AN ORGANIZED HEALTH CARE EDUCATION/TRAINING PROGRAM

## 2023-01-01 PROCEDURE — 75563 CARD MRI W/STRESS IMG & DYE: CPT | Mod: 26 | Performed by: RADIOLOGY

## 2023-01-01 PROCEDURE — 80061 LIPID PANEL: CPT | Performed by: PATHOLOGY

## 2023-01-01 PROCEDURE — 258N000003 HC RX IP 258 OP 636: Performed by: INTERNAL MEDICINE

## 2023-01-01 PROCEDURE — 96365 THER/PROPH/DIAG IV INF INIT: CPT | Performed by: EMERGENCY MEDICINE

## 2023-01-01 PROCEDURE — 86900 BLOOD TYPING SEROLOGIC ABO: CPT | Mod: 90 | Performed by: PATHOLOGY

## 2023-01-01 PROCEDURE — 06L38CZ OCCLUSION OF ESOPHAGEAL VEIN WITH EXTRALUMINAL DEVICE, VIA NATURAL OR ARTIFICIAL OPENING ENDOSCOPIC: ICD-10-PCS | Performed by: INTERNAL MEDICINE

## 2023-01-01 PROCEDURE — 43235 EGD DIAGNOSTIC BRUSH WASH: CPT | Performed by: INTERNAL MEDICINE

## 2023-01-01 PROCEDURE — 96375 TX/PRO/DX INJ NEW DRUG ADDON: CPT | Performed by: NURSE PRACTITIONER

## 2023-01-01 PROCEDURE — G0463 HOSPITAL OUTPT CLINIC VISIT: HCPCS | Performed by: STUDENT IN AN ORGANIZED HEALTH CARE EDUCATION/TRAINING PROGRAM

## 2023-01-01 PROCEDURE — 99215 OFFICE O/P EST HI 40 MIN: CPT | Performed by: INTERNAL MEDICINE

## 2023-01-01 PROCEDURE — 84156 ASSAY OF PROTEIN URINE: CPT | Performed by: PATHOLOGY

## 2023-01-01 PROCEDURE — C1725 CATH, TRANSLUMIN NON-LASER: HCPCS

## 2023-01-01 PROCEDURE — U0003 INFECTIOUS AGENT DETECTION BY NUCLEIC ACID (DNA OR RNA); SEVERE ACUTE RESPIRATORY SYNDROME CORONAVIRUS 2 (SARS-COV-2) (CORONAVIRUS DISEASE [COVID-19]), AMPLIFIED PROBE TECHNIQUE, MAKING USE OF HIGH THROUGHPUT TECHNOLOGIES AS DESCRIBED BY CMS-2020-01-R: HCPCS | Performed by: STUDENT IN AN ORGANIZED HEALTH CARE EDUCATION/TRAINING PROGRAM

## 2023-01-01 PROCEDURE — 93010 ELECTROCARDIOGRAM REPORT: CPT | Performed by: INTERNAL MEDICINE

## 2023-01-01 PROCEDURE — 84157 ASSAY OF PROTEIN OTHER: CPT | Performed by: INTERNAL MEDICINE

## 2023-01-01 PROCEDURE — 85730 THROMBOPLASTIN TIME PARTIAL: CPT | Performed by: EMERGENCY MEDICINE

## 2023-01-01 PROCEDURE — 272N000500 HC NEEDLE CR2

## 2023-01-01 PROCEDURE — 93016 CV STRESS TEST SUPVJ ONLY: CPT | Performed by: RADIOLOGY

## 2023-01-01 PROCEDURE — 49083 ABD PARACENTESIS W/IMAGING: CPT | Mod: GC | Performed by: RADIOLOGY

## 2023-01-01 PROCEDURE — 77080 DXA BONE DENSITY AXIAL: CPT

## 2023-01-01 PROCEDURE — 86780 TREPONEMA PALLIDUM: CPT | Performed by: STUDENT IN AN ORGANIZED HEALTH CARE EDUCATION/TRAINING PROGRAM

## 2023-01-01 PROCEDURE — 84100 ASSAY OF PHOSPHORUS: CPT | Performed by: INTERNAL MEDICINE

## 2023-01-01 PROCEDURE — 99418 PROLNG IP/OBS E/M EA 15 MIN: CPT | Performed by: NURSE PRACTITIONER

## 2023-01-01 PROCEDURE — 49083 ABD PARACENTESIS W/IMAGING: CPT

## 2023-01-01 PROCEDURE — 83550 IRON BINDING TEST: CPT | Performed by: INTERNAL MEDICINE

## 2023-01-01 PROCEDURE — 75563 CARD MRI W/STRESS IMG & DYE: CPT

## 2023-01-01 PROCEDURE — 86923 COMPATIBILITY TEST ELECTRIC: CPT | Performed by: EMERGENCY MEDICINE

## 2023-01-01 PROCEDURE — 250N000011 HC RX IP 250 OP 636: Performed by: REGISTERED NURSE

## 2023-01-01 PROCEDURE — G0480 DRUG TEST DEF 1-7 CLASSES: HCPCS | Mod: 90 | Performed by: PATHOLOGY

## 2023-01-01 PROCEDURE — 250N000009 HC RX 250: Performed by: NURSE ANESTHETIST, CERTIFIED REGISTERED

## 2023-01-01 PROCEDURE — 36592 COLLECT BLOOD FROM PICC: CPT

## 2023-01-01 PROCEDURE — 99239 HOSP IP/OBS DSCHRG MGMT >30: CPT | Performed by: INTERNAL MEDICINE

## 2023-01-01 PROCEDURE — 250N000013 HC RX MED GY IP 250 OP 250 PS 637: Performed by: EMERGENCY MEDICINE

## 2023-01-01 PROCEDURE — 999N000128 HC STATISTIC PERIPHERAL IV START W/O US GUIDANCE

## 2023-01-01 PROCEDURE — 96375 TX/PRO/DX INJ NEW DRUG ADDON: CPT | Performed by: EMERGENCY MEDICINE

## 2023-01-01 PROCEDURE — 99223 1ST HOSP IP/OBS HIGH 75: CPT | Performed by: STUDENT IN AN ORGANIZED HEALTH CARE EDUCATION/TRAINING PROGRAM

## 2023-01-01 PROCEDURE — 71046 X-RAY EXAM CHEST 2 VIEWS: CPT | Mod: GC | Performed by: RADIOLOGY

## 2023-01-01 PROCEDURE — 83540 ASSAY OF IRON: CPT | Performed by: PATHOLOGY

## 2023-01-01 RX ORDER — EPINEPHRINE 1 MG/ML
0.3 INJECTION, SOLUTION, CONCENTRATE INTRAVENOUS EVERY 5 MIN PRN
Status: CANCELLED | OUTPATIENT
Start: 2023-01-01

## 2023-01-01 RX ORDER — PANTOPRAZOLE SODIUM 40 MG/1
40 TABLET, DELAYED RELEASE ORAL
Status: DISCONTINUED | OUTPATIENT
Start: 2023-01-01 | End: 2023-01-01 | Stop reason: HOSPADM

## 2023-01-01 RX ORDER — ONDANSETRON 2 MG/ML
4 INJECTION INTRAMUSCULAR; INTRAVENOUS EVERY 6 HOURS PRN
Status: DISCONTINUED | OUTPATIENT
Start: 2023-01-01 | End: 2023-01-01 | Stop reason: HOSPADM

## 2023-01-01 RX ORDER — DIPHENHYDRAMINE HYDROCHLORIDE 50 MG/ML
50 INJECTION INTRAMUSCULAR; INTRAVENOUS
Status: DISCONTINUED | OUTPATIENT
Start: 2023-01-01 | End: 2023-01-01 | Stop reason: HOSPADM

## 2023-01-01 RX ORDER — POTASSIUM CHLORIDE 1500 MG/1
60 TABLET, EXTENDED RELEASE ORAL ONCE
Status: COMPLETED | OUTPATIENT
Start: 2023-01-01 | End: 2023-01-01

## 2023-01-01 RX ORDER — SODIUM CHLORIDE, SODIUM LACTATE, POTASSIUM CHLORIDE, CALCIUM CHLORIDE 600; 310; 30; 20 MG/100ML; MG/100ML; MG/100ML; MG/100ML
INJECTION, SOLUTION INTRAVENOUS CONTINUOUS PRN
Status: DISCONTINUED | OUTPATIENT
Start: 2023-01-01 | End: 2023-01-01

## 2023-01-01 RX ORDER — NICOTINE POLACRILEX 4 MG
15-30 LOZENGE BUCCAL
Status: DISCONTINUED | OUTPATIENT
Start: 2023-01-01 | End: 2023-01-01 | Stop reason: HOSPADM

## 2023-01-01 RX ORDER — NALOXONE HYDROCHLORIDE 0.4 MG/ML
0.2 INJECTION, SOLUTION INTRAMUSCULAR; INTRAVENOUS; SUBCUTANEOUS
Status: DISCONTINUED | OUTPATIENT
Start: 2023-01-01 | End: 2023-01-01 | Stop reason: HOSPADM

## 2023-01-01 RX ORDER — ACYCLOVIR 200 MG/1
0-1 CAPSULE ORAL
Status: DISCONTINUED | OUTPATIENT
Start: 2023-01-01 | End: 2023-01-01 | Stop reason: HOSPADM

## 2023-01-01 RX ORDER — POTASSIUM CHLORIDE 750 MG/1
40 TABLET, EXTENDED RELEASE ORAL DAILY
Qty: 360 TABLET | Refills: 0 | Status: SHIPPED | OUTPATIENT
Start: 2023-01-01

## 2023-01-01 RX ORDER — FENTANYL CITRATE 50 UG/ML
INJECTION, SOLUTION INTRAMUSCULAR; INTRAVENOUS PRN
Status: DISCONTINUED | OUTPATIENT
Start: 2023-01-01 | End: 2023-01-01

## 2023-01-01 RX ORDER — ONDANSETRON 2 MG/ML
INJECTION INTRAMUSCULAR; INTRAVENOUS PRN
Status: DISCONTINUED | OUTPATIENT
Start: 2023-01-01 | End: 2023-01-01

## 2023-01-01 RX ORDER — LIDOCAINE 40 MG/G
CREAM TOPICAL
Status: CANCELLED | OUTPATIENT
Start: 2023-01-01

## 2023-01-01 RX ORDER — CALCIUM GLUCONATE 20 MG/ML
1 INJECTION, SOLUTION INTRAVENOUS ONCE
Status: COMPLETED | OUTPATIENT
Start: 2023-01-01 | End: 2023-01-01

## 2023-01-01 RX ORDER — ALBUMIN (HUMAN) 12.5 G/50ML
25 SOLUTION INTRAVENOUS ONCE
Status: COMPLETED | OUTPATIENT
Start: 2023-01-01 | End: 2023-01-01

## 2023-01-01 RX ORDER — HEPARIN SODIUM,PORCINE 10 UNIT/ML
5 VIAL (ML) INTRAVENOUS
Status: CANCELLED | OUTPATIENT
Start: 2023-01-01

## 2023-01-01 RX ORDER — SODIUM CHLORIDE, SODIUM LACTATE, POTASSIUM CHLORIDE, CALCIUM CHLORIDE 600; 310; 30; 20 MG/100ML; MG/100ML; MG/100ML; MG/100ML
INJECTION, SOLUTION INTRAVENOUS CONTINUOUS
Status: DISCONTINUED | OUTPATIENT
Start: 2023-01-01 | End: 2023-01-01 | Stop reason: HOSPADM

## 2023-01-01 RX ORDER — POTASSIUM CHLORIDE 7.45 MG/ML
10 INJECTION INTRAVENOUS
Status: DISCONTINUED | OUTPATIENT
Start: 2023-01-01 | End: 2023-01-01

## 2023-01-01 RX ORDER — LIDOCAINE HYDROCHLORIDE 20 MG/ML
INJECTION, SOLUTION INFILTRATION; PERINEURAL PRN
Status: DISCONTINUED | OUTPATIENT
Start: 2023-01-01 | End: 2023-01-01

## 2023-01-01 RX ORDER — FERROUS GLUCONATE 324(38)MG
324 TABLET ORAL
Status: DISCONTINUED | OUTPATIENT
Start: 2023-01-01 | End: 2023-01-01 | Stop reason: HOSPADM

## 2023-01-01 RX ORDER — METHYLPREDNISOLONE SODIUM SUCCINATE 125 MG/2ML
125 INJECTION, POWDER, LYOPHILIZED, FOR SOLUTION INTRAMUSCULAR; INTRAVENOUS
Status: DISCONTINUED | OUTPATIENT
Start: 2023-01-01 | End: 2023-01-01 | Stop reason: HOSPADM

## 2023-01-01 RX ORDER — LIDOCAINE 40 MG/G
CREAM TOPICAL
Status: DISCONTINUED | OUTPATIENT
Start: 2023-01-01 | End: 2023-01-01 | Stop reason: HOSPADM

## 2023-01-01 RX ORDER — EPLERENONE 50 MG/1
50 TABLET, FILM COATED ORAL DAILY
Status: DISCONTINUED | OUTPATIENT
Start: 2023-01-01 | End: 2023-01-01 | Stop reason: HOSPADM

## 2023-01-01 RX ORDER — METOPROLOL SUCCINATE 50 MG/1
50 TABLET, EXTENDED RELEASE ORAL 2 TIMES DAILY
Status: DISCONTINUED | OUTPATIENT
Start: 2023-01-01 | End: 2023-01-01

## 2023-01-01 RX ORDER — ALBUMIN (HUMAN) 12.5 G/50ML
50 SOLUTION INTRAVENOUS ONCE
Status: COMPLETED | OUTPATIENT
Start: 2023-01-01 | End: 2023-01-01

## 2023-01-01 RX ORDER — POLYETHYLENE GLYCOL 3350 17 G/17G
17 POWDER, FOR SOLUTION ORAL DAILY PRN
Status: DISCONTINUED | OUTPATIENT
Start: 2023-01-01 | End: 2023-01-01 | Stop reason: HOSPADM

## 2023-01-01 RX ORDER — ONDANSETRON 4 MG/1
4 TABLET, ORALLY DISINTEGRATING ORAL EVERY 6 HOURS PRN
Status: DISCONTINUED | OUTPATIENT
Start: 2023-01-01 | End: 2023-01-01 | Stop reason: HOSPADM

## 2023-01-01 RX ORDER — ALBUTEROL SULFATE 0.83 MG/ML
2.5 SOLUTION RESPIRATORY (INHALATION)
Status: CANCELLED | OUTPATIENT
Start: 2023-01-01

## 2023-01-01 RX ORDER — METOPROLOL SUCCINATE 50 MG/1
50 TABLET, EXTENDED RELEASE ORAL DAILY
Status: DISCONTINUED | OUTPATIENT
Start: 2023-01-01 | End: 2023-01-01

## 2023-01-01 RX ORDER — MEPERIDINE HYDROCHLORIDE 25 MG/ML
25 INJECTION INTRAMUSCULAR; INTRAVENOUS; SUBCUTANEOUS EVERY 30 MIN PRN
Status: CANCELLED | OUTPATIENT
Start: 2023-01-01

## 2023-01-01 RX ORDER — DIPHENHYDRAMINE HYDROCHLORIDE 50 MG/ML
50 INJECTION INTRAMUSCULAR; INTRAVENOUS
Status: CANCELLED
Start: 2023-01-01

## 2023-01-01 RX ORDER — HYDROMORPHONE HYDROCHLORIDE 1 MG/ML
0.5 INJECTION, SOLUTION INTRAMUSCULAR; INTRAVENOUS; SUBCUTANEOUS ONCE
Status: COMPLETED | OUTPATIENT
Start: 2023-01-01 | End: 2023-01-01

## 2023-01-01 RX ORDER — ATROPINE SULFATE 0.1 MG/ML
0.5 INJECTION INTRAVENOUS
Status: DISCONTINUED | OUTPATIENT
Start: 2023-01-01 | End: 2023-01-01

## 2023-01-01 RX ORDER — CEFTRIAXONE 1 G/1
1 INJECTION, POWDER, FOR SOLUTION INTRAMUSCULAR; INTRAVENOUS DAILY
Status: DISCONTINUED | OUTPATIENT
Start: 2023-01-01 | End: 2023-01-01

## 2023-01-01 RX ORDER — FENTANYL CITRATE 50 UG/ML
INJECTION, SOLUTION INTRAMUSCULAR; INTRAVENOUS PRN
Status: DISCONTINUED | OUTPATIENT
Start: 2023-01-01 | End: 2023-01-01 | Stop reason: HOSPADM

## 2023-01-01 RX ORDER — HYDROMORPHONE HYDROCHLORIDE 1 MG/ML
0.4 INJECTION, SOLUTION INTRAMUSCULAR; INTRAVENOUS; SUBCUTANEOUS EVERY 5 MIN PRN
Status: DISCONTINUED | OUTPATIENT
Start: 2023-01-01 | End: 2023-01-01 | Stop reason: HOSPADM

## 2023-01-01 RX ORDER — ERGOCALCIFEROL 1.25 MG/1
50000 CAPSULE, LIQUID FILLED ORAL WEEKLY
Qty: 12 CAPSULE | Refills: 0 | Status: SHIPPED | OUTPATIENT
Start: 2023-01-01

## 2023-01-01 RX ORDER — DIPHENHYDRAMINE HYDROCHLORIDE 50 MG/ML
25-50 INJECTION INTRAMUSCULAR; INTRAVENOUS
Status: DISCONTINUED | OUTPATIENT
Start: 2023-01-01 | End: 2023-01-01 | Stop reason: HOSPADM

## 2023-01-01 RX ORDER — REGADENOSON 0.08 MG/ML
0.4 INJECTION, SOLUTION INTRAVENOUS ONCE
Status: COMPLETED | OUTPATIENT
Start: 2023-01-01 | End: 2023-01-01

## 2023-01-01 RX ORDER — GABAPENTIN 300 MG/1
300 CAPSULE ORAL 2 TIMES DAILY
Status: DISCONTINUED | OUTPATIENT
Start: 2023-01-01 | End: 2023-01-01 | Stop reason: HOSPADM

## 2023-01-01 RX ORDER — ALBUTEROL SULFATE 90 UG/1
1-2 AEROSOL, METERED RESPIRATORY (INHALATION)
Status: CANCELLED
Start: 2023-01-01

## 2023-01-01 RX ORDER — HEPARIN SODIUM (PORCINE) LOCK FLUSH IV SOLN 100 UNIT/ML 100 UNIT/ML
5 SOLUTION INTRAVENOUS
Status: CANCELLED | OUTPATIENT
Start: 2023-01-01

## 2023-01-01 RX ORDER — CHOLECALCIFEROL (VITAMIN D3) 50 MCG
1 TABLET ORAL DAILY
Qty: 90 TABLET | Refills: 3 | Status: SHIPPED | OUTPATIENT
Start: 2023-01-01

## 2023-01-01 RX ORDER — MAGNESIUM SULFATE HEPTAHYDRATE 40 MG/ML
4 INJECTION, SOLUTION INTRAVENOUS ONCE
Status: COMPLETED | OUTPATIENT
Start: 2023-01-01 | End: 2023-01-01

## 2023-01-01 RX ORDER — METHYLPREDNISOLONE SODIUM SUCCINATE 125 MG/2ML
125 INJECTION, POWDER, LYOPHILIZED, FOR SOLUTION INTRAMUSCULAR; INTRAVENOUS
Status: CANCELLED
Start: 2023-01-01

## 2023-01-01 RX ORDER — CALCIUM CARBONATE 500(1250)
1 TABLET ORAL 2 TIMES DAILY
Qty: 180 TABLET | Refills: 3 | Status: SHIPPED | OUTPATIENT
Start: 2023-01-01

## 2023-01-01 RX ORDER — CIPROFLOXACIN 500 MG/1
500 TABLET, FILM COATED ORAL DAILY
Status: DISCONTINUED | OUTPATIENT
Start: 2023-01-01 | End: 2023-01-01 | Stop reason: HOSPADM

## 2023-01-01 RX ORDER — SODIUM CHLORIDE, SODIUM LACTATE, POTASSIUM CHLORIDE, CALCIUM CHLORIDE 600; 310; 30; 20 MG/100ML; MG/100ML; MG/100ML; MG/100ML
INJECTION, SOLUTION INTRAVENOUS CONTINUOUS
Status: CANCELLED | OUTPATIENT
Start: 2023-01-01

## 2023-01-01 RX ORDER — METOPROLOL SUCCINATE 50 MG/1
50 TABLET, EXTENDED RELEASE ORAL DAILY
Qty: 30 TABLET | Refills: 1 | Status: SHIPPED | OUTPATIENT
Start: 2023-01-01 | End: 2023-01-01

## 2023-01-01 RX ORDER — NALOXONE HYDROCHLORIDE 0.4 MG/ML
0.4 INJECTION, SOLUTION INTRAMUSCULAR; INTRAVENOUS; SUBCUTANEOUS
Status: DISCONTINUED | OUTPATIENT
Start: 2023-01-01 | End: 2023-01-01 | Stop reason: HOSPADM

## 2023-01-01 RX ORDER — FENTANYL CITRATE 50 UG/ML
50 INJECTION, SOLUTION INTRAMUSCULAR; INTRAVENOUS EVERY 5 MIN PRN
Status: DISCONTINUED | OUTPATIENT
Start: 2023-01-01 | End: 2023-01-01 | Stop reason: HOSPADM

## 2023-01-01 RX ORDER — ONDANSETRON 4 MG/1
4 TABLET, ORALLY DISINTEGRATING ORAL EVERY 30 MIN PRN
Status: DISCONTINUED | OUTPATIENT
Start: 2023-01-01 | End: 2023-01-01 | Stop reason: HOSPADM

## 2023-01-01 RX ORDER — LACTULOSE 10 G/15ML
20 SOLUTION ORAL 3 TIMES DAILY
Status: DISCONTINUED | OUTPATIENT
Start: 2023-01-01 | End: 2023-01-01 | Stop reason: HOSPADM

## 2023-01-01 RX ORDER — DEXTROSE MONOHYDRATE 25 G/50ML
25-50 INJECTION, SOLUTION INTRAVENOUS
Status: DISCONTINUED | OUTPATIENT
Start: 2023-01-01 | End: 2023-01-01 | Stop reason: HOSPADM

## 2023-01-01 RX ORDER — POTASSIUM CHLORIDE 1.5 G/1.58G
40 POWDER, FOR SOLUTION ORAL ONCE
Status: COMPLETED | OUTPATIENT
Start: 2023-01-01 | End: 2023-01-01

## 2023-01-01 RX ORDER — HEPARIN SODIUM (PORCINE) LOCK FLUSH IV SOLN 100 UNIT/ML 100 UNIT/ML
5 SOLUTION INTRAVENOUS ONCE
Status: COMPLETED | OUTPATIENT
Start: 2023-01-01 | End: 2023-01-01

## 2023-01-01 RX ORDER — SIMETHICONE 80 MG
80 TABLET,CHEWABLE ORAL EVERY 6 HOURS PRN
Status: DISCONTINUED | OUTPATIENT
Start: 2023-01-01 | End: 2023-01-01 | Stop reason: HOSPADM

## 2023-01-01 RX ORDER — ONDANSETRON 2 MG/ML
4 INJECTION INTRAMUSCULAR; INTRAVENOUS EVERY 30 MIN PRN
Status: DISCONTINUED | OUTPATIENT
Start: 2023-01-01 | End: 2023-01-01 | Stop reason: HOSPADM

## 2023-01-01 RX ORDER — METOPROLOL SUCCINATE 50 MG/1
50 TABLET, EXTENDED RELEASE ORAL DAILY
Qty: 90 TABLET | Refills: 0 | Status: ON HOLD | OUTPATIENT
Start: 2023-01-01 | End: 2023-01-01

## 2023-01-01 RX ORDER — SODIUM CHLORIDE 9 MG/ML
INJECTION, SOLUTION INTRAVENOUS CONTINUOUS
Status: DISCONTINUED | OUTPATIENT
Start: 2023-01-01 | End: 2023-01-01 | Stop reason: HOSPADM

## 2023-01-01 RX ORDER — ATROPINE SULFATE 0.1 MG/ML
0.5 INJECTION INTRAVENOUS EVERY 5 MIN PRN
Status: DISCONTINUED | OUTPATIENT
Start: 2023-01-01 | End: 2023-01-01 | Stop reason: HOSPADM

## 2023-01-01 RX ORDER — MULTIPLE VITAMINS W/ MINERALS TAB 9MG-400MCG
1 TAB ORAL DAILY
Status: DISCONTINUED | OUTPATIENT
Start: 2023-01-01 | End: 2023-01-01 | Stop reason: HOSPADM

## 2023-01-01 RX ORDER — TORSEMIDE 20 MG/1
20 TABLET ORAL DAILY
Qty: 90 TABLET | Refills: 3 | Status: SHIPPED | OUTPATIENT
Start: 2023-01-01

## 2023-01-01 RX ORDER — OCTREOTIDE ACETATE 50 UG/ML
50 INJECTION, SOLUTION INTRAVENOUS; SUBCUTANEOUS ONCE
Status: COMPLETED | OUTPATIENT
Start: 2023-01-01 | End: 2023-01-01

## 2023-01-01 RX ORDER — CEFTRIAXONE 1 G/1
1 INJECTION, POWDER, FOR SOLUTION INTRAMUSCULAR; INTRAVENOUS ONCE
Status: COMPLETED | OUTPATIENT
Start: 2023-01-01 | End: 2023-01-01

## 2023-01-01 RX ORDER — ACETAMINOPHEN 500 MG
500 TABLET ORAL EVERY 6 HOURS PRN
Status: DISCONTINUED | OUTPATIENT
Start: 2023-01-01 | End: 2023-01-01 | Stop reason: HOSPADM

## 2023-01-01 RX ORDER — AMINOPHYLLINE 25 MG/ML
100 INJECTION, SOLUTION INTRAVENOUS ONCE
Status: COMPLETED | OUTPATIENT
Start: 2023-01-01 | End: 2023-01-01

## 2023-01-01 RX ORDER — SPIRONOLACTONE 25 MG/1
25 TABLET ORAL DAILY
Qty: 30 TABLET | Refills: 3 | Status: SHIPPED | OUTPATIENT
Start: 2023-01-01 | End: 2023-01-01 | Stop reason: SINTOL

## 2023-01-01 RX ORDER — MULTIVITAMIN,THERAPEUTIC
1 TABLET ORAL DAILY
Status: DISCONTINUED | OUTPATIENT
Start: 2023-01-01 | End: 2023-01-01 | Stop reason: HOSPADM

## 2023-01-01 RX ORDER — DULOXETIN HYDROCHLORIDE 30 MG/1
60 CAPSULE, DELAYED RELEASE ORAL EVERY EVENING
Status: DISCONTINUED | OUTPATIENT
Start: 2023-01-01 | End: 2023-01-01 | Stop reason: HOSPADM

## 2023-01-01 RX ORDER — TORSEMIDE 20 MG/1
60 TABLET ORAL DAILY
Status: DISCONTINUED | OUTPATIENT
Start: 2023-01-01 | End: 2023-01-01 | Stop reason: HOSPADM

## 2023-01-01 RX ORDER — HEPARIN SODIUM,PORCINE 10 UNIT/ML
5-20 VIAL (ML) INTRAVENOUS DAILY PRN
Status: CANCELLED | OUTPATIENT
Start: 2023-01-01

## 2023-01-01 RX ORDER — MIDODRINE HYDROCHLORIDE 5 MG/1
5 TABLET ORAL 3 TIMES DAILY
Qty: 90 TABLET | Refills: 3 | Status: SHIPPED | OUTPATIENT
Start: 2023-01-01 | End: 2023-01-01

## 2023-01-01 RX ORDER — DIPHENHYDRAMINE HCL 25 MG
25 CAPSULE ORAL
Status: DISCONTINUED | OUTPATIENT
Start: 2023-01-01 | End: 2023-01-01 | Stop reason: HOSPADM

## 2023-01-01 RX ORDER — PROPOFOL 10 MG/ML
INJECTION, EMULSION INTRAVENOUS CONTINUOUS PRN
Status: DISCONTINUED | OUTPATIENT
Start: 2023-01-01 | End: 2023-01-01

## 2023-01-01 RX ORDER — PROPOFOL 10 MG/ML
INJECTION, EMULSION INTRAVENOUS PRN
Status: DISCONTINUED | OUTPATIENT
Start: 2023-01-01 | End: 2023-01-01

## 2023-01-01 RX ORDER — METOPROLOL SUCCINATE 50 MG/1
50 TABLET, EXTENDED RELEASE ORAL DAILY
Status: DISCONTINUED | OUTPATIENT
Start: 2023-01-01 | End: 2023-01-01 | Stop reason: HOSPADM

## 2023-01-01 RX ORDER — IODIXANOL 320 MG/ML
100 INJECTION, SOLUTION INTRAVASCULAR ONCE
Status: COMPLETED | OUTPATIENT
Start: 2023-01-01 | End: 2023-01-01

## 2023-01-01 RX ORDER — ACETAMINOPHEN 500 MG
500 TABLET ORAL EVERY 8 HOURS PRN
Status: DISCONTINUED | OUTPATIENT
Start: 2023-01-01 | End: 2023-01-01 | Stop reason: HOSPADM

## 2023-01-01 RX ORDER — TORSEMIDE 20 MG/1
60 TABLET ORAL DAILY
Status: CANCELLED | OUTPATIENT
Start: 2023-01-01

## 2023-01-01 RX ORDER — PREGABALIN 50 MG/1
100 CAPSULE ORAL 2 TIMES DAILY
COMMUNITY

## 2023-01-01 RX ORDER — CEFTRIAXONE 1 G/1
1 INJECTION, POWDER, FOR SOLUTION INTRAMUSCULAR; INTRAVENOUS EVERY 24 HOURS
Status: DISCONTINUED | OUTPATIENT
Start: 2023-01-01 | End: 2023-01-01 | Stop reason: HOSPADM

## 2023-01-01 RX ORDER — SPIRONOLACTONE 50 MG/1
50 TABLET, FILM COATED ORAL DAILY
Qty: 90 TABLET | Refills: 1 | Status: SHIPPED | OUTPATIENT
Start: 2023-01-01

## 2023-01-01 RX ORDER — POTASSIUM CHLORIDE 750 MG/1
30 TABLET, EXTENDED RELEASE ORAL 2 TIMES DAILY
Status: DISCONTINUED | OUTPATIENT
Start: 2023-01-01 | End: 2023-01-01 | Stop reason: HOSPADM

## 2023-01-01 RX ORDER — HYDROMORPHONE HYDROCHLORIDE 1 MG/ML
0.2 INJECTION, SOLUTION INTRAMUSCULAR; INTRAVENOUS; SUBCUTANEOUS EVERY 5 MIN PRN
Status: DISCONTINUED | OUTPATIENT
Start: 2023-01-01 | End: 2023-01-01 | Stop reason: HOSPADM

## 2023-01-01 RX ORDER — GADOBUTROL 604.72 MG/ML
30 INJECTION INTRAVENOUS ONCE
Status: COMPLETED | OUTPATIENT
Start: 2023-01-01 | End: 2023-01-01

## 2023-01-01 RX ORDER — METOPROLOL SUCCINATE 50 MG/1
50 TABLET, EXTENDED RELEASE ORAL ONCE
Status: COMPLETED | OUTPATIENT
Start: 2023-01-01 | End: 2023-01-01

## 2023-01-01 RX ORDER — DULOXETIN HYDROCHLORIDE 60 MG/1
60 CAPSULE, DELAYED RELEASE ORAL EVERY EVENING
Status: DISCONTINUED | OUTPATIENT
Start: 2023-01-01 | End: 2023-01-01 | Stop reason: HOSPADM

## 2023-01-01 RX ORDER — AMOXICILLIN 250 MG
2 CAPSULE ORAL 2 TIMES DAILY PRN
Status: DISCONTINUED | OUTPATIENT
Start: 2023-01-01 | End: 2023-01-01 | Stop reason: HOSPADM

## 2023-01-01 RX ORDER — GABAPENTIN 300 MG/1
900 CAPSULE ORAL 2 TIMES DAILY
Status: DISCONTINUED | OUTPATIENT
Start: 2023-01-01 | End: 2023-01-01 | Stop reason: HOSPADM

## 2023-01-01 RX ORDER — ACETAMINOPHEN 500 MG
500 TABLET ORAL EVERY 8 HOURS PRN
COMMUNITY
Start: 2023-01-01

## 2023-01-01 RX ORDER — MIDODRINE HYDROCHLORIDE 10 MG/1
10 TABLET ORAL 3 TIMES DAILY
Qty: 90 TABLET | Refills: 3 | Status: SHIPPED | OUTPATIENT
Start: 2023-01-01 | End: 2023-01-01

## 2023-01-01 RX ORDER — EPLERENONE 25 MG/1
50 TABLET, FILM COATED ORAL DAILY
COMMUNITY
Start: 2023-01-01 | End: 2023-01-01

## 2023-01-01 RX ORDER — DEXAMETHASONE SODIUM PHOSPHATE 4 MG/ML
INJECTION, SOLUTION INTRA-ARTICULAR; INTRALESIONAL; INTRAMUSCULAR; INTRAVENOUS; SOFT TISSUE PRN
Status: DISCONTINUED | OUTPATIENT
Start: 2023-01-01 | End: 2023-01-01

## 2023-01-01 RX ORDER — METOPROLOL SUCCINATE 50 MG/1
50 TABLET, EXTENDED RELEASE ORAL ONCE
Status: DISCONTINUED | OUTPATIENT
Start: 2023-01-01 | End: 2023-01-01

## 2023-01-01 RX ORDER — CEFAZOLIN SODIUM/WATER 2 G/20 ML
2 SYRINGE (ML) INTRAVENOUS
Status: COMPLETED | OUTPATIENT
Start: 2023-01-01 | End: 2023-01-01

## 2023-01-01 RX ORDER — FENTANYL CITRATE 50 UG/ML
25 INJECTION, SOLUTION INTRAMUSCULAR; INTRAVENOUS EVERY 5 MIN PRN
Status: DISCONTINUED | OUTPATIENT
Start: 2023-01-01 | End: 2023-01-01 | Stop reason: HOSPADM

## 2023-01-01 RX ORDER — VASOPRESSIN IN 0.9 % NACL 2 UNIT/2ML
SYRINGE (ML) INTRAVENOUS PRN
Status: DISCONTINUED | OUTPATIENT
Start: 2023-01-01 | End: 2023-01-01

## 2023-01-01 RX ORDER — POTASSIUM CHLORIDE 29.8 MG/ML
20 INJECTION INTRAVENOUS
Status: DISCONTINUED | OUTPATIENT
Start: 2023-01-01 | End: 2023-01-01

## 2023-01-01 RX ORDER — POTASSIUM CHLORIDE 1.5 G/1.58G
40 POWDER, FOR SOLUTION ORAL 2 TIMES DAILY
Status: DISCONTINUED | OUTPATIENT
Start: 2023-01-01 | End: 2023-01-01 | Stop reason: HOSPADM

## 2023-01-01 RX ORDER — AMOXICILLIN 250 MG
1 CAPSULE ORAL 2 TIMES DAILY PRN
Status: DISCONTINUED | OUTPATIENT
Start: 2023-01-01 | End: 2023-01-01 | Stop reason: HOSPADM

## 2023-01-01 RX ORDER — OXYCODONE HYDROCHLORIDE 5 MG/1
5 TABLET ORAL EVERY 4 HOURS PRN
Status: DISCONTINUED | OUTPATIENT
Start: 2023-01-01 | End: 2023-01-01 | Stop reason: HOSPADM

## 2023-01-01 RX ORDER — EPLERENONE 25 MG/1
25 TABLET, FILM COATED ORAL DAILY
Qty: 90 TABLET | Refills: 3 | Status: SHIPPED | OUTPATIENT
Start: 2023-01-01 | End: 2023-01-01

## 2023-01-01 RX ORDER — MAGNESIUM OXIDE 400 MG/1
400 TABLET ORAL 2 TIMES DAILY
Qty: 90 TABLET | Refills: 3 | Status: SHIPPED | OUTPATIENT
Start: 2023-01-01 | End: 2023-01-01

## 2023-01-01 RX ORDER — TORSEMIDE 20 MG/1
60 TABLET ORAL DAILY
Qty: 270 TABLET | Refills: 1 | Status: SHIPPED | OUTPATIENT
Start: 2023-01-01 | End: 2023-01-01

## 2023-01-01 RX ORDER — DIAZEPAM 5 MG
5 TABLET ORAL EVERY 30 MIN PRN
Status: DISCONTINUED | OUTPATIENT
Start: 2023-01-01 | End: 2023-01-01 | Stop reason: HOSPADM

## 2023-01-01 RX ORDER — GABAPENTIN 300 MG/1
900 CAPSULE ORAL 2 TIMES DAILY
Status: DISCONTINUED | OUTPATIENT
Start: 2023-01-01 | End: 2023-01-01

## 2023-01-01 RX ORDER — CAFFEINE CITRATE 20 MG/ML
60 SOLUTION INTRAVENOUS
Status: DISCONTINUED | OUTPATIENT
Start: 2023-01-01 | End: 2023-01-01 | Stop reason: HOSPADM

## 2023-01-01 RX ORDER — HEPARIN SODIUM 200 [USP'U]/100ML
1 INJECTION, SOLUTION INTRAVENOUS CONTINUOUS PRN
Status: DISCONTINUED | OUTPATIENT
Start: 2023-01-01 | End: 2023-01-01 | Stop reason: HOSPADM

## 2023-01-01 RX ORDER — ALBUTEROL SULFATE 90 UG/1
2 AEROSOL, METERED RESPIRATORY (INHALATION) EVERY 5 MIN PRN
Status: DISCONTINUED | OUTPATIENT
Start: 2023-01-01 | End: 2023-01-01 | Stop reason: HOSPADM

## 2023-01-01 RX ORDER — POTASSIUM CHLORIDE 750 MG/1
40 TABLET, EXTENDED RELEASE ORAL 2 TIMES DAILY
Qty: 720 TABLET | Refills: 3 | Status: SHIPPED | OUTPATIENT
Start: 2023-01-01 | End: 2023-01-01

## 2023-01-01 RX ORDER — MAGNESIUM OXIDE 400 MG/1
400 TABLET ORAL 2 TIMES DAILY
Qty: 60 TABLET | Refills: 3 | Status: SHIPPED | OUTPATIENT
Start: 2023-01-01 | End: 2023-01-01

## 2023-01-01 RX ORDER — ONDANSETRON 2 MG/ML
4 INJECTION INTRAMUSCULAR; INTRAVENOUS
Status: DISCONTINUED | OUTPATIENT
Start: 2023-01-01 | End: 2023-01-01 | Stop reason: HOSPADM

## 2023-01-01 RX ADMIN — FERROUS GLUCONATE 324 MG: 324 TABLET ORAL at 09:45

## 2023-01-01 RX ADMIN — TORSEMIDE 60 MG: 20 TABLET ORAL at 09:49

## 2023-01-01 RX ADMIN — ALBUMIN HUMAN 50 G: 0.25 SOLUTION INTRAVENOUS at 15:32

## 2023-01-01 RX ADMIN — SODIUM CHLORIDE, POTASSIUM CHLORIDE, SODIUM LACTATE AND CALCIUM CHLORIDE: 600; 310; 30; 20 INJECTION, SOLUTION INTRAVENOUS at 09:12

## 2023-01-01 RX ADMIN — ACETAMINOPHEN 500 MG: 500 TABLET ORAL at 20:46

## 2023-01-01 RX ADMIN — OCTREOTIDE ACETATE 50 MCG: 50 INJECTION, SOLUTION INTRAVENOUS; SUBCUTANEOUS at 20:13

## 2023-01-01 RX ADMIN — POTASSIUM CHLORIDE 40 MEQ: 1.5 POWDER, FOR SOLUTION ORAL at 05:53

## 2023-01-01 RX ADMIN — MIDAZOLAM 1 MG: 1 INJECTION INTRAMUSCULAR; INTRAVENOUS at 09:18

## 2023-01-01 RX ADMIN — LIDOCAINE HYDROCHLORIDE 50 MG: 20 INJECTION, SOLUTION INFILTRATION; PERINEURAL at 15:50

## 2023-01-01 RX ADMIN — OXYCODONE HYDROCHLORIDE 5 MG: 5 TABLET ORAL at 19:42

## 2023-01-01 RX ADMIN — OXYCODONE HYDROCHLORIDE 5 MG: 5 TABLET ORAL at 07:52

## 2023-01-01 RX ADMIN — CEFTRIAXONE SODIUM 1 G: 1 INJECTION, POWDER, FOR SOLUTION INTRAMUSCULAR; INTRAVENOUS at 07:43

## 2023-01-01 RX ADMIN — EPLERENONE 50 MG: 50 TABLET, FILM COATED ORAL at 07:42

## 2023-01-01 RX ADMIN — RIFAXIMIN 550 MG: 550 TABLET ORAL at 09:50

## 2023-01-01 RX ADMIN — APIXABAN 5 MG: 5 TABLET, FILM COATED ORAL at 19:42

## 2023-01-01 RX ADMIN — MIDAZOLAM 1 MG: 1 INJECTION INTRAMUSCULAR; INTRAVENOUS at 09:21

## 2023-01-01 RX ADMIN — PHENYLEPHRINE HYDROCHLORIDE 150 MCG: 10 INJECTION INTRAVENOUS at 09:34

## 2023-01-01 RX ADMIN — OXYCODONE HYDROCHLORIDE 5 MG: 5 TABLET ORAL at 15:34

## 2023-01-01 RX ADMIN — CIPROFLOXACIN 500 MG: 500 TABLET, FILM COATED ORAL at 08:21

## 2023-01-01 RX ADMIN — THERA TABS 1 TABLET: TAB at 07:35

## 2023-01-01 RX ADMIN — GABAPENTIN 300 MG: 300 CAPSULE ORAL at 08:21

## 2023-01-01 RX ADMIN — LACTULOSE 20 G: 20 SOLUTION ORAL at 08:27

## 2023-01-01 RX ADMIN — SODIUM CHLORIDE 1000 ML: 9 INJECTION, SOLUTION INTRAVENOUS at 20:13

## 2023-01-01 RX ADMIN — ACETAMINOPHEN 500 MG: 500 TABLET ORAL at 14:30

## 2023-01-01 RX ADMIN — DULOXETINE HYDROCHLORIDE 60 MG: 30 CAPSULE, DELAYED RELEASE PELLETS ORAL at 19:42

## 2023-01-01 RX ADMIN — OXYCODONE HYDROCHLORIDE 2.5 MG: 5 TABLET ORAL at 22:49

## 2023-01-01 RX ADMIN — MICONAZOLE NITRATE: 20 POWDER TOPICAL at 08:22

## 2023-01-01 RX ADMIN — OXYCODONE HYDROCHLORIDE 5 MG: 5 TABLET ORAL at 14:30

## 2023-01-01 RX ADMIN — RIFAXIMIN 550 MG: 550 TABLET ORAL at 01:23

## 2023-01-01 RX ADMIN — LACTULOSE 20 G: 20 SOLUTION ORAL at 15:34

## 2023-01-01 RX ADMIN — OCTREOTIDE ACETATE 50 MCG/HR: 200 INJECTION INTRAVENOUS at 20:15

## 2023-01-01 RX ADMIN — APIXABAN 5 MG: 5 TABLET, FILM COATED ORAL at 10:04

## 2023-01-01 RX ADMIN — PANTOPRAZOLE SODIUM 40 MG: 40 INJECTION, POWDER, FOR SOLUTION INTRAVENOUS at 07:36

## 2023-01-01 RX ADMIN — PANTOPRAZOLE SODIUM 40 MG: 40 INJECTION, POWDER, FOR SOLUTION INTRAVENOUS at 08:20

## 2023-01-01 RX ADMIN — GABAPENTIN 300 MG: 300 CAPSULE ORAL at 19:41

## 2023-01-01 RX ADMIN — METOPROLOL SUCCINATE 50 MG: 50 TABLET, EXTENDED RELEASE ORAL at 08:21

## 2023-01-01 RX ADMIN — ATROPINE SULFATE 0.5 MG: 0.1 INJECTION INTRAVENOUS at 20:00

## 2023-01-01 RX ADMIN — ONDANSETRON 4 MG: 4 TABLET, ORALLY DISINTEGRATING ORAL at 20:15

## 2023-01-01 RX ADMIN — TORSEMIDE 60 MG: 20 TABLET ORAL at 07:41

## 2023-01-01 RX ADMIN — RIFAXIMIN 550 MG: 550 TABLET ORAL at 07:41

## 2023-01-01 RX ADMIN — DULOXETINE HYDROCHLORIDE 60 MG: 30 CAPSULE, DELAYED RELEASE PELLETS ORAL at 20:55

## 2023-01-01 RX ADMIN — GABAPENTIN 300 MG: 300 CAPSULE ORAL at 08:23

## 2023-01-01 RX ADMIN — HYDROMORPHONE HYDROCHLORIDE 0.4 MG: 1 INJECTION, SOLUTION INTRAMUSCULAR; INTRAVENOUS; SUBCUTANEOUS at 13:17

## 2023-01-01 RX ADMIN — PROPOFOL 80 MG: 10 INJECTION, EMULSION INTRAVENOUS at 09:31

## 2023-01-01 RX ADMIN — CEFTRIAXONE SODIUM 1 G: 1 INJECTION, POWDER, FOR SOLUTION INTRAMUSCULAR; INTRAVENOUS at 12:25

## 2023-01-01 RX ADMIN — Medication 0.5 UNITS: at 09:46

## 2023-01-01 RX ADMIN — THERA TABS 1 TABLET: TAB at 08:23

## 2023-01-01 RX ADMIN — OXYCODONE HYDROCHLORIDE 2.5 MG: 5 TABLET ORAL at 09:45

## 2023-01-01 RX ADMIN — GABAPENTIN 900 MG: 300 CAPSULE ORAL at 01:23

## 2023-01-01 RX ADMIN — CEFTRIAXONE SODIUM 1 G: 1 INJECTION, POWDER, FOR SOLUTION INTRAMUSCULAR; INTRAVENOUS at 13:24

## 2023-01-01 RX ADMIN — PHENYLEPHRINE HYDROCHLORIDE 100 MCG: 10 INJECTION INTRAVENOUS at 11:27

## 2023-01-01 RX ADMIN — ACETAMINOPHEN 500 MG: 500 TABLET ORAL at 15:53

## 2023-01-01 RX ADMIN — OXYCODONE HYDROCHLORIDE 5 MG: 5 TABLET ORAL at 19:44

## 2023-01-01 RX ADMIN — MICONAZOLE NITRATE: 20 POWDER TOPICAL at 19:45

## 2023-01-01 RX ADMIN — LACTULOSE 20 G: 20 SOLUTION ORAL at 09:46

## 2023-01-01 RX ADMIN — PANTOPRAZOLE SODIUM 40 MG: 40 INJECTION, POWDER, FOR SOLUTION INTRAVENOUS at 08:24

## 2023-01-01 RX ADMIN — DEXAMETHASONE SODIUM PHOSPHATE 4 MG: 4 INJECTION, SOLUTION INTRA-ARTICULAR; INTRALESIONAL; INTRAMUSCULAR; INTRAVENOUS; SOFT TISSUE at 16:13

## 2023-01-01 RX ADMIN — RIFAXIMIN 550 MG: 550 TABLET ORAL at 19:42

## 2023-01-01 RX ADMIN — RIFAXIMIN 550 MG: 550 TABLET ORAL at 21:06

## 2023-01-01 RX ADMIN — FENTANYL CITRATE 50 MCG: 50 INJECTION, SOLUTION INTRAMUSCULAR; INTRAVENOUS at 13:07

## 2023-01-01 RX ADMIN — OXYCODONE HYDROCHLORIDE 5 MG: 5 TABLET ORAL at 01:33

## 2023-01-01 RX ADMIN — PHENYLEPHRINE HYDROCHLORIDE 150 MCG: 10 INJECTION INTRAVENOUS at 10:19

## 2023-01-01 RX ADMIN — FENTANYL CITRATE 50 MCG: 50 INJECTION, SOLUTION INTRAMUSCULAR; INTRAVENOUS at 10:04

## 2023-01-01 RX ADMIN — OXYCODONE HYDROCHLORIDE 5 MG: 5 TABLET ORAL at 08:21

## 2023-01-01 RX ADMIN — THERA TABS 1 TABLET: TAB at 09:49

## 2023-01-01 RX ADMIN — INSULIN GLARGINE 10 UNITS: 100 INJECTION, SOLUTION SUBCUTANEOUS at 12:34

## 2023-01-01 RX ADMIN — SODIUM CHLORIDE 1000 MG: 9 INJECTION, SOLUTION INTRAVENOUS at 19:15

## 2023-01-01 RX ADMIN — MICONAZOLE NITRATE: 20 POWDER TOPICAL at 19:43

## 2023-01-01 RX ADMIN — PANTOPRAZOLE SODIUM 40 MG: 40 TABLET, DELAYED RELEASE ORAL at 08:27

## 2023-01-01 RX ADMIN — PHENYLEPHRINE HYDROCHLORIDE 100 MCG: 10 INJECTION INTRAVENOUS at 16:05

## 2023-01-01 RX ADMIN — HYDROMORPHONE HYDROCHLORIDE 0.2 MG: 1 INJECTION, SOLUTION INTRAMUSCULAR; INTRAVENOUS; SUBCUTANEOUS at 13:36

## 2023-01-01 RX ADMIN — PHENYLEPHRINE HYDROCHLORIDE 150 MCG: 10 INJECTION INTRAVENOUS at 16:08

## 2023-01-01 RX ADMIN — GABAPENTIN 900 MG: 300 CAPSULE ORAL at 09:45

## 2023-01-01 RX ADMIN — MICONAZOLE NITRATE: 20 POWDER TOPICAL at 09:52

## 2023-01-01 RX ADMIN — DEXTROSE MONOHYDRATE 12.5 G: 25 INJECTION, SOLUTION INTRAVENOUS at 16:12

## 2023-01-01 RX ADMIN — GABAPENTIN 300 MG: 300 CAPSULE ORAL at 20:55

## 2023-01-01 RX ADMIN — EPLERENONE 50 MG: 50 TABLET, FILM COATED ORAL at 08:21

## 2023-01-01 RX ADMIN — INSULIN GLARGINE 30 UNITS: 100 INJECTION, SOLUTION SUBCUTANEOUS at 07:42

## 2023-01-01 RX ADMIN — CALCIUM GLUCONATE 1 G: 20 INJECTION, SOLUTION INTRAVENOUS at 01:20

## 2023-01-01 RX ADMIN — ACETAMINOPHEN 500 MG: 500 TABLET ORAL at 01:33

## 2023-01-01 RX ADMIN — PANTOPRAZOLE SODIUM 40 MG: 40 TABLET, DELAYED RELEASE ORAL at 09:45

## 2023-01-01 RX ADMIN — METOPROLOL SUCCINATE 50 MG: 50 TABLET, EXTENDED RELEASE ORAL at 15:42

## 2023-01-01 RX ADMIN — PANTOPRAZOLE SODIUM 80 MG: 40 INJECTION, POWDER, FOR SOLUTION INTRAVENOUS at 20:10

## 2023-01-01 RX ADMIN — CIPROFLOXACIN 500 MG: 500 TABLET, FILM COATED ORAL at 09:50

## 2023-01-01 RX ADMIN — PANTOPRAZOLE SODIUM 40 MG: 40 INJECTION, POWDER, FOR SOLUTION INTRAVENOUS at 23:11

## 2023-01-01 RX ADMIN — CEFTRIAXONE SODIUM 1 G: 1 INJECTION, POWDER, FOR SOLUTION INTRAMUSCULAR; INTRAVENOUS at 15:23

## 2023-01-01 RX ADMIN — PANTOPRAZOLE SODIUM 40 MG: 40 INJECTION, POWDER, FOR SOLUTION INTRAVENOUS at 19:41

## 2023-01-01 RX ADMIN — DULOXETINE HYDROCHLORIDE 60 MG: 30 CAPSULE, DELAYED RELEASE PELLETS ORAL at 01:23

## 2023-01-01 RX ADMIN — DEXTROSE MONOHYDRATE 50 ML: 25 INJECTION, SOLUTION INTRAVENOUS at 17:04

## 2023-01-01 RX ADMIN — DULOXETINE HYDROCHLORIDE 60 MG: 30 CAPSULE, DELAYED RELEASE PELLETS ORAL at 22:05

## 2023-01-01 RX ADMIN — LACTULOSE 20 G: 20 SOLUTION ORAL at 07:35

## 2023-01-01 RX ADMIN — FENTANYL CITRATE 150 MCG: 50 INJECTION, SOLUTION INTRAMUSCULAR; INTRAVENOUS at 09:26

## 2023-01-01 RX ADMIN — LACTULOSE 20 G: 20 SOLUTION ORAL at 20:56

## 2023-01-01 RX ADMIN — RIFAXIMIN 550 MG: 550 TABLET ORAL at 11:25

## 2023-01-01 RX ADMIN — PANTOPRAZOLE SODIUM 40 MG: 40 INJECTION, POWDER, LYOPHILIZED, FOR SOLUTION INTRAVENOUS at 21:07

## 2023-01-01 RX ADMIN — FENTANYL CITRATE 50 MCG: 50 INJECTION, SOLUTION INTRAMUSCULAR; INTRAVENOUS at 12:42

## 2023-01-01 RX ADMIN — LACTULOSE 20 G: 20 SOLUTION ORAL at 07:41

## 2023-01-01 RX ADMIN — OCTREOTIDE ACETATE 50 MCG: 50 INJECTION, SOLUTION INTRAVENOUS; SUBCUTANEOUS at 16:09

## 2023-01-01 RX ADMIN — METOPROLOL SUCCINATE 50 MG: 50 TABLET, EXTENDED RELEASE ORAL at 11:22

## 2023-01-01 RX ADMIN — RIFAXIMIN 550 MG: 550 TABLET ORAL at 20:44

## 2023-01-01 RX ADMIN — MAGNESIUM SULFATE HEPTAHYDRATE 3 G: 500 INJECTION, SOLUTION INTRAMUSCULAR; INTRAVENOUS at 23:03

## 2023-01-01 RX ADMIN — THERA TABS 1 TABLET: TAB at 07:41

## 2023-01-01 RX ADMIN — EPLERENONE 50 MG: 50 TABLET, FILM COATED ORAL at 09:50

## 2023-01-01 RX ADMIN — MICONAZOLE NITRATE: 20 POWDER TOPICAL at 08:23

## 2023-01-01 RX ADMIN — ACETAMINOPHEN 500 MG: 500 TABLET ORAL at 08:35

## 2023-01-01 RX ADMIN — ACETAMINOPHEN 500 MG: 500 TABLET ORAL at 08:21

## 2023-01-01 RX ADMIN — SUGAMMADEX 200 MG: 100 INJECTION, SOLUTION INTRAVENOUS at 11:43

## 2023-01-01 RX ADMIN — GABAPENTIN 300 MG: 300 CAPSULE ORAL at 09:50

## 2023-01-01 RX ADMIN — CEFTRIAXONE SODIUM 1 G: 1 INJECTION, POWDER, FOR SOLUTION INTRAMUSCULAR; INTRAVENOUS at 08:35

## 2023-01-01 RX ADMIN — MICONAZOLE NITRATE: 20 POWDER TOPICAL at 07:42

## 2023-01-01 RX ADMIN — RIFAXIMIN 550 MG: 550 TABLET ORAL at 20:55

## 2023-01-01 RX ADMIN — RIFAXIMIN 550 MG: 550 TABLET ORAL at 08:27

## 2023-01-01 RX ADMIN — FENTANYL CITRATE 50 MCG: 50 INJECTION, SOLUTION INTRAMUSCULAR; INTRAVENOUS at 13:02

## 2023-01-01 RX ADMIN — POTASSIUM CHLORIDE 60 MEQ: 1500 TABLET, EXTENDED RELEASE ORAL at 21:06

## 2023-01-01 RX ADMIN — LACTULOSE 20 G: 20 SOLUTION ORAL at 14:23

## 2023-01-01 RX ADMIN — GABAPENTIN 300 MG: 300 CAPSULE ORAL at 19:42

## 2023-01-01 RX ADMIN — LACTULOSE 20 G: 20 SOLUTION ORAL at 13:01

## 2023-01-01 RX ADMIN — PANTOPRAZOLE SODIUM 40 MG: 40 INJECTION, POWDER, FOR SOLUTION INTRAVENOUS at 20:55

## 2023-01-01 RX ADMIN — INSULIN GLARGINE 30 UNITS: 100 INJECTION, SOLUTION SUBCUTANEOUS at 12:01

## 2023-01-01 RX ADMIN — GABAPENTIN 900 MG: 300 CAPSULE ORAL at 20:44

## 2023-01-01 RX ADMIN — REGADENOSON 0.4 MG: 0.08 INJECTION, SOLUTION INTRAVENOUS at 09:09

## 2023-01-01 RX ADMIN — OCTREOTIDE ACETATE 50 MCG/HR: 200 INJECTION INTRAVENOUS at 01:59

## 2023-01-01 RX ADMIN — GABAPENTIN 900 MG: 300 CAPSULE ORAL at 08:26

## 2023-01-01 RX ADMIN — Medication 0.5 UNITS: at 10:18

## 2023-01-01 RX ADMIN — LACTULOSE 20 G: 20 SOLUTION ORAL at 15:04

## 2023-01-01 RX ADMIN — POTASSIUM CHLORIDE 40 MEQ: 1.5 POWDER, FOR SOLUTION ORAL at 09:16

## 2023-01-01 RX ADMIN — PHENYLEPHRINE HYDROCHLORIDE 100 MCG: 10 INJECTION INTRAVENOUS at 09:31

## 2023-01-01 RX ADMIN — OXYCODONE HYDROCHLORIDE 5 MG: 5 TABLET ORAL at 08:35

## 2023-01-01 RX ADMIN — ACETAMINOPHEN 500 MG: 500 TABLET ORAL at 10:04

## 2023-01-01 RX ADMIN — ATROPINE SULFATE 0.5 MG: 0.1 INJECTION INTRAVENOUS at 22:09

## 2023-01-01 RX ADMIN — MAGNESIUM SULFATE HEPTAHYDRATE 4 G: 40 INJECTION, SOLUTION INTRAVENOUS at 23:07

## 2023-01-01 RX ADMIN — GABAPENTIN 300 MG: 300 CAPSULE ORAL at 07:35

## 2023-01-01 RX ADMIN — DULOXETINE HYDROCHLORIDE 60 MG: 60 CAPSULE, DELAYED RELEASE ORAL at 21:06

## 2023-01-01 RX ADMIN — ONDANSETRON 4 MG: 2 INJECTION INTRAMUSCULAR; INTRAVENOUS at 16:13

## 2023-01-01 RX ADMIN — HYDROMORPHONE HYDROCHLORIDE 0.5 MG: 1 INJECTION, SOLUTION INTRAMUSCULAR; INTRAVENOUS; SUBCUTANEOUS at 02:03

## 2023-01-01 RX ADMIN — OXYCODONE HYDROCHLORIDE 5 MG: 5 TABLET ORAL at 10:04

## 2023-01-01 RX ADMIN — OXYCODONE HYDROCHLORIDE 5 MG: 5 TABLET ORAL at 20:56

## 2023-01-01 RX ADMIN — AMINOPHYLLINE 100 MG: 25 INJECTION, SOLUTION INTRAVENOUS at 09:14

## 2023-01-01 RX ADMIN — ONDANSETRON 4 MG: 2 INJECTION INTRAMUSCULAR; INTRAVENOUS at 11:24

## 2023-01-01 RX ADMIN — ALBUMIN HUMAN 50 G: 0.25 SOLUTION INTRAVENOUS at 16:13

## 2023-01-01 RX ADMIN — IODIXANOL 75 ML: 320 INJECTION, SOLUTION INTRAVASCULAR at 11:27

## 2023-01-01 RX ADMIN — ACETAMINOPHEN 500 MG: 500 TABLET ORAL at 22:49

## 2023-01-01 RX ADMIN — POTASSIUM CHLORIDE 60 MEQ: 1500 TABLET, EXTENDED RELEASE ORAL at 12:33

## 2023-01-01 RX ADMIN — Medication 1 TABLET: at 08:27

## 2023-01-01 RX ADMIN — SODIUM CHLORIDE, POTASSIUM CHLORIDE, SODIUM LACTATE AND CALCIUM CHLORIDE: 600; 310; 30; 20 INJECTION, SOLUTION INTRAVENOUS at 15:38

## 2023-01-01 RX ADMIN — CIPROFLOXACIN 500 MG: 500 TABLET, FILM COATED ORAL at 07:41

## 2023-01-01 RX ADMIN — SODIUM CHLORIDE 25 MG: 9 INJECTION, SOLUTION INTRAVENOUS at 17:06

## 2023-01-01 RX ADMIN — ACETAMINOPHEN 500 MG: 500 TABLET ORAL at 20:55

## 2023-01-01 RX ADMIN — CEFTRIAXONE SODIUM 1 G: 1 INJECTION, POWDER, FOR SOLUTION INTRAMUSCULAR; INTRAVENOUS at 20:13

## 2023-01-01 RX ADMIN — FERROUS GLUCONATE 324 MG: 324 TABLET ORAL at 08:27

## 2023-01-01 RX ADMIN — INSULIN GLARGINE 20 UNITS: 100 INJECTION, SOLUTION SUBCUTANEOUS at 10:30

## 2023-01-01 RX ADMIN — Medication 20 MG: at 10:50

## 2023-01-01 RX ADMIN — ALBUMIN (HUMAN): 12.5 SOLUTION INTRAVENOUS at 10:41

## 2023-01-01 RX ADMIN — INSULIN GLARGINE 20 UNITS: 100 INJECTION, SOLUTION SUBCUTANEOUS at 10:29

## 2023-01-01 RX ADMIN — RIFAXIMIN 550 MG: 550 TABLET ORAL at 08:21

## 2023-01-01 RX ADMIN — RIFAXIMIN 550 MG: 550 TABLET ORAL at 22:05

## 2023-01-01 RX ADMIN — HYDROMORPHONE HYDROCHLORIDE 0.4 MG: 1 INJECTION, SOLUTION INTRAMUSCULAR; INTRAVENOUS; SUBCUTANEOUS at 13:28

## 2023-01-01 RX ADMIN — TORSEMIDE 60 MG: 20 TABLET ORAL at 08:21

## 2023-01-01 RX ADMIN — ACETAMINOPHEN 500 MG: 500 TABLET ORAL at 08:33

## 2023-01-01 RX ADMIN — MICONAZOLE NITRATE: 20 POWDER TOPICAL at 19:42

## 2023-01-01 RX ADMIN — LACTULOSE 20 G: 20 SOLUTION ORAL at 09:50

## 2023-01-01 RX ADMIN — HEPARIN SODIUM (PORCINE) LOCK FLUSH IV SOLN 100 UNIT/ML 5 ML: 100 SOLUTION at 10:55

## 2023-01-01 RX ADMIN — Medication 6 ML: at 09:26

## 2023-01-01 RX ADMIN — HYDROMORPHONE HYDROCHLORIDE 0.5 MG: 1 INJECTION, SOLUTION INTRAMUSCULAR; INTRAVENOUS; SUBCUTANEOUS at 05:10

## 2023-01-01 RX ADMIN — EPLERENONE 50 MG: 50 TABLET, FILM COATED ORAL at 12:59

## 2023-01-01 RX ADMIN — DULOXETINE HYDROCHLORIDE 60 MG: 60 CAPSULE, DELAYED RELEASE ORAL at 20:44

## 2023-01-01 RX ADMIN — OXYCODONE HYDROCHLORIDE 5 MG: 5 TABLET ORAL at 15:53

## 2023-01-01 RX ADMIN — LACTULOSE 20 G: 20 SOLUTION ORAL at 15:53

## 2023-01-01 RX ADMIN — Medication 2 G: at 09:16

## 2023-01-01 RX ADMIN — ONDANSETRON 4 MG: 2 INJECTION INTRAMUSCULAR; INTRAVENOUS at 12:09

## 2023-01-01 RX ADMIN — PROPOFOL 150 MCG/KG/MIN: 10 INJECTION, EMULSION INTRAVENOUS at 15:50

## 2023-01-01 RX ADMIN — ACETAMINOPHEN 500 MG: 500 TABLET ORAL at 15:34

## 2023-01-01 RX ADMIN — LACTULOSE 20 G: 20 SOLUTION ORAL at 08:21

## 2023-01-01 RX ADMIN — OXYCODONE HYDROCHLORIDE 5 MG: 5 TABLET ORAL at 08:33

## 2023-01-01 RX ADMIN — PANTOPRAZOLE SODIUM 80 MG: 40 INJECTION, POWDER, FOR SOLUTION INTRAVENOUS at 14:20

## 2023-01-01 RX ADMIN — ALBUMIN HUMAN 25 G: 0.25 SOLUTION INTRAVENOUS at 05:02

## 2023-01-01 RX ADMIN — OCTREOTIDE ACETATE 50 MCG/HR: 200 INJECTION INTRAVENOUS at 23:13

## 2023-01-01 RX ADMIN — GABAPENTIN 300 MG: 300 CAPSULE ORAL at 07:41

## 2023-01-01 RX ADMIN — Medication 1 TABLET: at 09:45

## 2023-01-01 RX ADMIN — PANTOPRAZOLE SODIUM 40 MG: 40 TABLET, DELAYED RELEASE ORAL at 10:04

## 2023-01-01 RX ADMIN — ALBUMIN (HUMAN): 12.5 SOLUTION INTRAVENOUS at 10:18

## 2023-01-01 RX ADMIN — Medication 100 MG: at 09:32

## 2023-01-01 RX ADMIN — PANTOPRAZOLE SODIUM 40 MG: 40 TABLET, DELAYED RELEASE ORAL at 07:41

## 2023-01-01 RX ADMIN — OXYCODONE HYDROCHLORIDE 5 MG: 5 TABLET ORAL at 22:05

## 2023-01-01 RX ADMIN — INSULIN GLARGINE 30 UNITS: 100 INJECTION, SOLUTION SUBCUTANEOUS at 09:51

## 2023-01-01 RX ADMIN — Medication 1 UNITS: at 10:09

## 2023-01-01 RX ADMIN — LACTULOSE 20 G: 20 SOLUTION ORAL at 08:23

## 2023-01-01 RX ADMIN — FENTANYL CITRATE 50 MCG: 50 INJECTION, SOLUTION INTRAMUSCULAR; INTRAVENOUS at 12:48

## 2023-01-01 RX ADMIN — RIFAXIMIN 550 MG: 550 TABLET ORAL at 07:34

## 2023-01-01 RX ADMIN — RIFAXIMIN 550 MG: 550 TABLET ORAL at 09:45

## 2023-01-01 RX ADMIN — GADOBUTROL 30 ML: 604.72 INJECTION INTRAVENOUS at 09:41

## 2023-01-01 RX ADMIN — LACTULOSE 20 G: 20 SOLUTION ORAL at 14:30

## 2023-01-01 RX ADMIN — THERA TABS 1 TABLET: TAB at 08:21

## 2023-01-01 RX ADMIN — GABAPENTIN 900 MG: 300 CAPSULE ORAL at 21:06

## 2023-01-01 RX ADMIN — OXYCODONE HYDROCHLORIDE 2.5 MG: 5 TABLET ORAL at 20:47

## 2023-01-01 ASSESSMENT — ACTIVITIES OF DAILY LIVING (ADL)
ADLS_ACUITY_SCORE: 35
ADLS_ACUITY_SCORE: 37
ADLS_ACUITY_SCORE: 35
ADLS_ACUITY_SCORE: 19
ADLS_ACUITY_SCORE: 19
ADLS_ACUITY_SCORE: 35
ADLS_ACUITY_SCORE: 23
PREVIOUS_RESPONSIBILITIES: MEAL PREP;HOUSEKEEPING;LAUNDRY;SHOPPING;YARDWORK;MEDICATION MANAGEMENT;FINANCES;DRIVING
ADLS_ACUITY_SCORE: 35
ADLS_ACUITY_SCORE: 39
ADLS_ACUITY_SCORE: 25
ADLS_ACUITY_SCORE: 19
ADLS_ACUITY_SCORE: 39
ADLS_ACUITY_SCORE: 19
ADLS_ACUITY_SCORE: 35
ADLS_ACUITY_SCORE: 19
ADLS_ACUITY_SCORE: 35
TRANSFERRING: 0-->INDEPENDENT
ADLS_ACUITY_SCORE: 19
ADLS_ACUITY_SCORE: 35
ADLS_ACUITY_SCORE: 35
ADLS_ACUITY_SCORE: 39
NUMBER_OF_TIMES_PATIENT_HAS_FALLEN_WITHIN_LAST_SIX_MONTHS: 1
ADLS_ACUITY_SCORE: 35
ADLS_ACUITY_SCORE: 19
ADLS_ACUITY_SCORE: 19
ADLS_ACUITY_SCORE: 35
ADLS_ACUITY_SCORE: 35
ADLS_ACUITY_SCORE: 37
DIFFICULTY_EATING/SWALLOWING: NO
ADLS_ACUITY_SCORE: 19
ADLS_ACUITY_SCORE: 19
ADLS_ACUITY_SCORE: 35
ADLS_ACUITY_SCORE: 35
ADLS_ACUITY_SCORE: 25
ADLS_ACUITY_SCORE: 19
ADLS_ACUITY_SCORE: 35
FALL_HISTORY_WITHIN_LAST_SIX_MONTHS: YES
ADLS_ACUITY_SCORE: 19
WEAR_GLASSES_OR_BLIND: YES
ADLS_ACUITY_SCORE: 35
FALL_HISTORY_WITHIN_LAST_SIX_MONTHS: YES
TRANSFERRING: 0-->INDEPENDENT
TOILETING_ISSUES: NO
ADLS_ACUITY_SCORE: 23
ADLS_ACUITY_SCORE: 37
VISION_MANAGEMENT: GLASSES
ADLS_ACUITY_SCORE: 35
ADLS_ACUITY_SCORE: 25
ADLS_ACUITY_SCORE: 19
HEARING_DIFFICULTY_OR_DEAF: NO
ADLS_ACUITY_SCORE: 19
ADLS_ACUITY_SCORE: 35
WALKING_OR_CLIMBING_STAIRS_DIFFICULTY: NO
ADLS_ACUITY_SCORE: 25
ADLS_ACUITY_SCORE: 37
ADLS_ACUITY_SCORE: 35
ADLS_ACUITY_SCORE: 19
ADLS_ACUITY_SCORE: 33
DOING_ERRANDS_INDEPENDENTLY_DIFFICULTY: NO
ADLS_ACUITY_SCORE: 35
ADLS_ACUITY_SCORE: 23
ADLS_ACUITY_SCORE: 33
ADLS_ACUITY_SCORE: 39
ADLS_ACUITY_SCORE: 19
ADLS_ACUITY_SCORE: 19
ADLS_ACUITY_SCORE: 35
ADLS_ACUITY_SCORE: 19
ADLS_ACUITY_SCORE: 35
ADLS_ACUITY_SCORE: 19
ADLS_ACUITY_SCORE: 35
ADLS_ACUITY_SCORE: 25
DIFFICULTY_COMMUNICATING: NO
ADLS_ACUITY_SCORE: 19
ADLS_ACUITY_SCORE: 39
ADLS_ACUITY_SCORE: 19
ADLS_ACUITY_SCORE: 35
ADLS_ACUITY_SCORE: 35
ADLS_ACUITY_SCORE: 25
ADLS_ACUITY_SCORE: 19
ADLS_ACUITY_SCORE: 35
ADLS_ACUITY_SCORE: 19
ADLS_ACUITY_SCORE: 35
ADLS_ACUITY_SCORE: 19
CONCENTRATING,_REMEMBERING_OR_MAKING_DECISIONS_DIFFICULTY: YES
ADLS_ACUITY_SCORE: 19
ADLS_ACUITY_SCORE: 23
ADLS_ACUITY_SCORE: 19
ADLS_ACUITY_SCORE: 39
ADLS_ACUITY_SCORE: 25
NUMBER_OF_TIMES_PATIENT_HAS_FALLEN_WITHIN_LAST_SIX_MONTHS: 2
ADLS_ACUITY_SCORE: 19
ADLS_ACUITY_SCORE: 35
ADLS_ACUITY_SCORE: 19
ADLS_ACUITY_SCORE: 37
ADLS_ACUITY_SCORE: 35
ADLS_ACUITY_SCORE: 25
ADLS_ACUITY_SCORE: 35
DRESSING/BATHING_DIFFICULTY: NO
CHANGE_IN_FUNCTIONAL_STATUS_SINCE_ONSET_OF_CURRENT_ILLNESS/INJURY: NO
ADLS_ACUITY_SCORE: 35
ADLS_ACUITY_SCORE: 19
ADLS_ACUITY_SCORE: 35
ADLS_ACUITY_SCORE: 19

## 2023-01-01 ASSESSMENT — LIFESTYLE VARIABLES
TOBACCO_USE: 1
TOBACCO_USE: 1

## 2023-01-01 ASSESSMENT — PAIN SCALES - GENERAL
PAINLEVEL: NO PAIN (0)
PAINLEVEL: EXTREME PAIN (8)
PAINLEVEL: SEVERE PAIN (7)
PAINLEVEL: SEVERE PAIN (6)
PAINLEVEL: SEVERE PAIN (7)

## 2023-01-01 ASSESSMENT — COPD QUESTIONNAIRES: COPD: 0

## 2023-01-01 ASSESSMENT — ENCOUNTER SYMPTOMS
DYSRHYTHMIAS: 1
SEIZURES: 0
ORTHOPNEA: 0

## 2023-01-01 ASSESSMENT — NEW YORK HEART ASSOCIATION (NYHA) CLASSIFICATION: NYHA FUNCTIONAL CLASS: II

## 2023-01-10 NOTE — TELEPHONE ENCOUNTER
Mr. Oliver was admitted for hypokalemia, found to have a salmonella infection that was treatment.     Had a TTE this admission that was limited by PVCs, but EF was down to 40-45%. Given this, putting TIPS on hold and messaged IR about this.     He is going to see cardiology here to assess how to optimize cardiac function in case he could get a TIPS in the future, discussed combined heart/liver transplant although he would be low priority given his MELD and CHF status.

## 2023-01-11 NOTE — TELEPHONE ENCOUNTER
Pre assessment questions completed for upcoming upper endoscopy (EGD) procedure scheduled on 1/26/2023    COVID policy reviewed.     Designated  policy reviewed. Instructed to have someone stay 6 hours post procedure.     Patient verbalized understanding and had no questions or concerns at this time.    Sweetie Bingham RN  Endoscopy Procedure Pre Assessment RN        Patient scheduled for upper endoscopy (EGD) on 1/26/2023.     Discuss Covid policy.     Pre op exam scheduled: N/A    Arrival time: 1430    Facility location: [facility]; [address] [place], [place] [id number]    Sedation type: Conscious sedation     Anticoagulations? Yes Apixaban (Eliquis). Holding interval of 2 days    Electronic implanted devices? No    Diabetic? Yes - Patient to hold oral diabetic medications day of procedure    Indication for procedure: varices    Pre visit planning completed.    Sweetie Bingham RN  Endoscopy Procedure Pre Assessment RN

## 2023-01-17 NOTE — PROGRESS NOTES
Spoke with pt for check in. Advised pt to increase eplerenone to 50mg daily per Dr. Bain based on labs from 1/12, pt verbalized understanding. Pt reports significant leg swelling with +1-2 pitting edema, denies weeping. Pt and spouse state that elevating legs help, but compression socks are difficult to get on. Discussed with pt importance of following low sodium diet, and paracentesis orders changed to twice weekly due to pt stating there's a lot of leftover fluid with his current 6L once weekly max.    Pt reports he's currently taking lactulose 30mL BID with ~6 loose stools daily. Denies any mental fogginess or confusion. Denies any hematemesis, hematochezia, or melena. Denies any fevers, chills, or sweats.    Will check in with pt in 1-2 days.    Sharon Owusu, RN Care Coordinator  [facility]  Hepatology Clinic/Specialty Program

## 2023-01-20 NOTE — PROGRESS NOTES
Spoke with pt on 1/19 for check in, notified that updated paracentesis order (increase to twice weekly) had been faxed and received by [place]. Lymphedema therapy referral has also been placed. Pt verbalized understanding, has already scheduled lymphedema appts. Pt states that lower leg swelling remains significant after paracentesis appt on 1/18. States that pitting edema extends into his thighs, causes discomfort with ambulation. Advised pt to keep legs elevated as much as possible, maintain low sodium diet, schedule soonest available paracentesis. Pt reporting significant abdominal distention as well. Continues with current diuretic regimen. Pt is scheduled to follow-up with Dr. Bain on Monday 1/23.    Will check in with pt next week.    Sharon Owusu, RN Care Coordinator  [facility]  Hepatology Clinic/Specialty Program

## 2023-01-23 PROBLEM — R19.7 DIARRHEA, UNSPECIFIED TYPE: Status: ACTIVE | Noted: 2023-01-01

## 2023-01-23 PROBLEM — K70.31 ALCOHOLIC CIRRHOSIS OF LIVER WITH ASCITES (H): Status: ACTIVE | Noted: 2022-06-13

## 2023-01-23 PROBLEM — K92.2 GASTROINTESTINAL HEMORRHAGE, UNSPECIFIED GASTROINTESTINAL HEMORRHAGE TYPE: Status: ACTIVE | Noted: 2023-01-01

## 2023-01-23 NOTE — ED PROVIDER NOTES
ED Provider Note  [facility]      History     Chief Complaint   Patient presents with     GI Problem     HPI  Blair Oliver is a 52 year old male with a past medical history including chronic systolic CHF, acute mesenteric ischemia, ischemic bowel disease, s/p bowel resection, anomalous coronary artery origin, anemia, possible spontaneous bacterial peritonitis, DM2, ascites due to alcoholic cirrhosis, frequent outpatient paracentesis, GI varices, who presents to the Emergency Department for evaluation of anemia. Hemoglobin today form clinic down to 6.5 from 8.    Reports he has noticed dark tarry stools approximately half the time he has a bowel movement over the last week.  With his bowel resection he does have chronic diarrhea with approximate 6 stools per day.  He also has noticed over the last week more fatigue, and dizziness when standing.    Has a history of refractory ascites. History of CHF related to PVCs, and former alcohol use. With his ascites and liver failure does have a history of hepatic encephalopathy on lactulose/rifaximin, as well as chronic diarrhea related to previous bowel resection. Has been referred for TIPS but recently his EF decreased to 40-45%, which has placed the TIPS on indefinite hold. Was referred to cardiology about optimizing CHF for TIPS.    Seen at hepatology clinic earlier today and clinic was arranging outpatient transfusion, but patient noted dark sticky stools with blood and feels more tired than usual today.        Past Medical History  Past Medical History:   Diagnosis Date     Alcohol dependence in remission (H)      Alcoholic cirrhosis of liver with ascites (H)      Anemia      Chronic systolic heart failure (H)      Diabetes (H)     Type 2 DM, Insulin Use.      Esophageal varices (H)      Gastroesophageal reflux disease without esophagitis      Hepatic encephalopathy      Hypertension      Incisional hernia      Ischemic bowel disease (H)       Mixed hyperlipidemia      Past Surgical History:   Procedure Laterality Date     COLONOSCOPY N/A 2021    Procedure: COLONOSCOPY, WITH BIOPSY, WITH CLIPS;  Surgeon: Sweetie Bain MD;  [place] [facility] [place]     ESOPHAGOSCOPY, GASTROSCOPY, DUODENOSCOPY (EGD), COMBINED N/A 2021    Procedure: ESOPHAGOGASTRODUODENOSCOPY, WITH BIOPSY;  Surgeon: Sweetie Bain MD;  [place]: [facility] [place]     acetaminophen (TYLENOL) 500 MG tablet  apixaban ANTICOAGULANT (ELIQUIS) 5 MG tablet  ciprofloxacin (CIPRO) 500 MG tablet  COMPRESSION STOCKINGS  DULoxetine (CYMBALTA) 60 MG capsule  eplerenone (INSPRA) 25 MG tablet  ferrous gluconate (FERGON) 324 (38 Fe) MG tablet  gabapentin (NEURONTIN) 300 MG capsule  insulin glargine (LANTUS PEN) 100 UNIT/ML pen  insulin pen needle (B-D U/F) 31G X 5 MM miscellaneous  lactulose 20 GM/30ML SOLN  loperamide (IMODIUM A-D) 1 MG/7.5ML  metoprolol succinate ER (TOPROL XL) 50 MG 24 hr tablet  Multiple Vitamins-Minerals (SUPER THERA DAMARIS M) TABS  omeprazole (PRILOSEC) 20 MG DR capsule  ONETOUCH ULTRA test strip  potassium chloride ER (KLOR-CON M) 10 MEQ CR tablet  rifaximin (XIFAXAN) 550 MG TABS tablet  torsemide (DEMADEX) 20 MG tablet  metFORMIN (GLUCOPHAGE) 1000 MG tablet  tamsulosin (FLOMAX) 0.4 MG capsule      Allergies   Allergen Reactions     Minocycline Hives     Family History  Family History   Problem Relation Age of Onset     Deep Vein Thrombosis Mother      Pulmonary Embolism Mother      Substance Abuse Brother      Substance Abuse Maternal Grandmother      Anesthesia Reaction No family hx of      Social History   Social History     Tobacco Use     Smoking status: Former     Packs/day: 1.00     Years: 16.00     Pack years: 16.00     Types: Cigarettes     Quit date:      Years since quittin.0     Smokeless tobacco: Never   Vaping Use     Vaping Use: Never used   Substance Use Topics     Alcohol use: Not Currently     Comment: Quit 2020     Drug use: Never         A medically  "appropriate review of systems was performed with pertinent positives and negatives noted in the HPI, and all other systems negative.    Physical Exam   BP: 103/56  Pulse: 62  Temp: 97.8  F (36.6  C)  Resp: 16  Height: 190.5 cm (6' 3\")  Weight: 127.9 kg (282 lb)  SpO2: 100 %  Physical Exam  Vitals and nursing note reviewed.   HENT:      Head: Normocephalic.      Right Ear: External ear normal.      Left Ear: External ear normal.      Nose: Nose normal.      Mouth/Throat:      Mouth: Mucous membranes are moist.   Eyes:      Conjunctiva/sclera: Conjunctivae normal.   Cardiovascular:      Rate and Rhythm: Normal rate and regular rhythm.      Pulses: Normal pulses.      Heart sounds: Normal heart sounds.   Pulmonary:      Effort: Pulmonary effort is normal.      Breath sounds: Normal breath sounds.   Abdominal:      General: Bowel sounds are normal.      Tenderness: There is abdominal tenderness.   Musculoskeletal:         General: Normal range of motion.      Cervical back: Normal range of motion and neck supple.   Skin:     General: Skin is warm and dry.   Neurological:      Mental Status: He is alert and oriented to person, place, and time.         ED Course, Procedures, & Data      Procedures       ED Course Selections:        EKG Interpretation:      Interpreted by GABRIEL Inman CNP  Time reviewed: Dizziness when standing  Symptoms at time of EKG: None   Rhythm: sinus bradycardia  Rate: Bradycardia  Axis: Normal  Ectopy: none  Conduction: normal  ST Segments/ T Waves: No acute ischemic changes  Q Waves: none  Comparison to prior: Unchanged from 06/13/202  Clinical Impression: no acute changes and non-specific EKG        Results for orders placed or performed during the hospital encounter of 01/23/23   Comprehensive metabolic panel     Status: Abnormal   Result Value Ref Range    Sodium 138 136 - 145 mmol/L    Potassium 4.1 3.4 - 5.3 mmol/L    Chloride 112 (H) 98 - 107 mmol/L    Carbon Dioxide (CO2) 13 (L) 22 - " 29 mmol/L    Anion Gap 13 7 - 15 mmol/L    Urea Nitrogen 24.1 (H) 6.0 - 20.0 mg/dL    Creatinine 1.71 (H) 0.67 - 1.17 mg/dL    Calcium 7.9 (L) 8.6 - 10.0 mg/dL    Glucose 105 (H) 70 - 99 mg/dL    Alkaline Phosphatase 189 (H) 40 - 129 U/L    AST 32 10 - 50 U/L    ALT 16 10 - 50 U/L    Protein Total 5.4 (L) 6.4 - 8.3 g/dL    Albumin 3.1 (L) 3.5 - 5.2 g/dL    Bilirubin Total 0.8 <=1.2 mg/dL    GFR Estimate 47 (L) >60 mL/min/1.73m2   INR     Status: Abnormal   Result Value Ref Range    INR 2.31 (H) 0.85 - 1.15   Ammonia (on ice)     Status: Normal   Result Value Ref Range    Ammonia 42 16 - 60 umol/L   Magnesium     Status: Abnormal   Result Value Ref Range    Magnesium 1.6 (L) 1.7 - 2.3 mg/dL   CBC with platelets and differential     Status: Abnormal   Result Value Ref Range    WBC Count 6.9 4.0 - 11.0 10e3/uL    RBC Count 1.99 (L) 4.40 - 5.90 10e6/uL    Hemoglobin 6.4 (LL) 13.3 - 17.7 g/dL    Hematocrit 22.5 (L) 40.0 - 53.0 %     (H) 78 - 100 fL    MCH 32.2 26.5 - 33.0 pg    MCHC 28.4 (L) 31.5 - 36.5 g/dL    RDW 17.8 (H) 10.0 - 15.0 %    Platelet Count 129 (L) 150 - 450 10e3/uL    % Neutrophils 69 %    % Lymphocytes 18 %    % Monocytes 10 %    % Eosinophils 2 %    % Basophils 1 %    % Immature Granulocytes 0 %    NRBCs per 100 WBC 0 <1 /100    Absolute Neutrophils 4.8 1.6 - 8.3 10e3/uL    Absolute Lymphocytes 1.2 0.8 - 5.3 10e3/uL    Absolute Monocytes 0.7 0.0 - 1.3 10e3/uL    Absolute Eosinophils 0.2 0.0 - 0.7 10e3/uL    Absolute Basophils 0.1 0.0 - 0.2 10e3/uL    Absolute Immature Granulocytes 0.0 <=0.4 10e3/uL    Absolute NRBCs 0.0 10e3/uL   EKG 12-lead, tracing only     Status: None   Result Value Ref Range    Systolic Blood Pressure  mmHg    Diastolic Blood Pressure  mmHg    Ventricular Rate 54 BPM    Atrial Rate 54 BPM    KY Interval 192 ms    QRS Duration 92 ms     ms    QTc 428 ms    P Axis  degrees    R AXIS -15 degrees    T Axis 52 degrees    Interpretation ECG       Sinus bradycardia  Cannot  rule out Anterior infarct , age undetermined  Abnormal ECG     Adult Type and Screen     Status: None   Result Value Ref Range    ABO/RH(D) A POS     Antibody Screen Negative Negative    SPECIMEN EXPIRATION DATE [id number]    Prepare red blood cells (unit)     Status: None (Preliminary result)   Result Value Ref Range    Blood Component Type Red Blood Cells     Product Code C8818Q89     Unit Status Issued     Unit Number [id number]     CROSSMATCH Compatible     CODING SYSTEM [id number]     ISSUE DATE AND TIME [id number]     UNIT ABO/RH A+     UNIT TYPE ISBT 6200    CBC with platelets differential     Status: Abnormal    Narrative    The following orders were created for panel order CBC with platelets differential.  Procedure                               Abnormality         Status                     ---------                               -----------         ------                     CBC with platelets and d...[[id number]]  Abnormal            Final result                 Please view results for these tests on the individual orders.   ABO/Rh type and screen     Status: None    Narrative    The following orders were created for panel order ABO/Rh type and screen.  Procedure                               Abnormality         Status                     ---------                               -----------         ------                     Adult Type and Screen[[id number]]                            Final result                 Please view results for these tests on the individual orders.   Results for orders placed or performed in visit on 01/23/23   Comprehensive metabolic panel     Status: Abnormal   Result Value Ref Range    Sodium 140 133 - 144 mmol/L    Potassium 4.1 3.4 - 5.3 mmol/L    Chloride 116 (H) 94 - 109 mmol/L    Carbon Dioxide (CO2) 17 (L) 20 - 32 mmol/L    Anion Gap 7 3 - 14 mmol/L    Urea Nitrogen 23 7 - 30 mg/dL    Creatinine 1.58 (H) 0.66 - 1.25 mg/dL    Calcium 8.0 (L) 8.5 - 10.1 mg/dL     Glucose 87 70 - 99 mg/dL    Alkaline Phosphatase 187 (H) 40 - 150 U/L    AST 24 0 - 45 U/L    ALT 23 0 - 70 U/L    Protein Total 5.8 (L) 6.8 - 8.8 g/dL    Albumin 2.8 (L) 3.4 - 5.0 g/dL    Bilirubin Total 0.9 0.2 - 1.3 mg/dL    GFR Estimate 52 (L) >60 mL/min/1.73m2   CBC with platelets     Status: Abnormal   Result Value Ref Range    WBC Count 7.0 4.0 - 11.0 10e3/uL    RBC Count 1.98 (L) 4.40 - 5.90 10e6/uL    Hemoglobin 6.5 (LL) 13.3 - 17.7 g/dL    Hematocrit 21.1 (L) 40.0 - 53.0 %     (H) 78 - 100 fL    MCH 32.8 26.5 - 33.0 pg    MCHC 30.8 (L) 31.5 - 36.5 g/dL    RDW 17.2 (H) 10.0 - 15.0 %    Platelet Count 116 (L) 150 - 450 10e3/uL   INR     Status: Abnormal   Result Value Ref Range    INR 2.59 (H) 0.85 - 1.15   AFP tumor marker     Status: Normal   Result Value Ref Range    AFP tumor marker 2.4 <=8.3 ng/mL    Narrative    This result is obtained using the Roche Elecsys AFP method on  the dariana e801 immunoassay analyzer. Results obtained with different assay methods or kits cannot be used interchangeably.  Reference ranges apply to non-pregnant females only.   Bilirubin direct     Status: Abnormal   Result Value Ref Range    Bilirubin Direct 0.4 (H) 0.0 - 0.2 mg/dL     Medications   pantoprazole (PROTONIX) IV push injection 40 mg (has no administration in time range)   cefTRIAXone (ROCEPHIN) 1 g vial to attach to  mL bag for ADULTS or NS 50 mL bag for PEDS (1 g Intravenous New Bag 1/23/23 2013)   octreotide (sandoSTATIN) 1,250 mcg in D5W 250 mL (50 mcg/hr Intravenous New Bag 1/23/23 2015)   oxyCODONE (ROXICODONE) tablet 5 mg (5 mg Oral Given 1/23/23 1944)   magnesium sulfate 4 g in 100 mL sterile water (premade) (has no administration in time range)   0.9% sodium chloride BOLUS (1,000 mLs Intravenous New Bag 1/23/23 2013)   pantoprazole (PROTONIX) IV push injection 80 mg (80 mg Intravenous Given 1/23/23 2010)   octreotide (sandoSTATIN) injection 50 mcg (50 mcg Intravenous Given 1/23/23 2013)     Labs  Ordered and Resulted from Time of ED Arrival to Time of ED Departure   COMPREHENSIVE METABOLIC PANEL - Abnormal       Result Value    Sodium 138      Potassium 4.1      Chloride 112 (*)     Carbon Dioxide (CO2) 13 (*)     Anion Gap 13      Urea Nitrogen 24.1 (*)     Creatinine 1.71 (*)     Calcium 7.9 (*)     Glucose 105 (*)     Alkaline Phosphatase 189 (*)     AST 32      ALT 16      Protein Total 5.4 (*)     Albumin 3.1 (*)     Bilirubin Total 0.8      GFR Estimate 47 (*)    INR - Abnormal    INR 2.31 (*)    MAGNESIUM - Abnormal    Magnesium 1.6 (*)    CBC WITH PLATELETS AND DIFFERENTIAL - Abnormal    WBC Count 6.9      RBC Count 1.99 (*)     Hemoglobin 6.4 (*)     Hematocrit 22.5 (*)      (*)     MCH 32.2      MCHC 28.4 (*)     RDW 17.8 (*)     Platelet Count 129 (*)     % Neutrophils 69      % Lymphocytes 18      % Monocytes 10      % Eosinophils 2      % Basophils 1      % Immature Granulocytes 0      NRBCs per 100 WBC 0      Absolute Neutrophils 4.8      Absolute Lymphocytes 1.2      Absolute Monocytes 0.7      Absolute Eosinophils 0.2      Absolute Basophils 0.1      Absolute Immature Granulocytes 0.0      Absolute NRBCs 0.0     AMMONIA - Normal    Ammonia 42     ROUTINE UA WITH MICROSCOPIC REFLEX TO CULTURE   TYPE AND SCREEN, ADULT    ABO/RH(D) A POS      Antibody Screen Negative      SPECIMEN EXPIRATION DATE [id number]     PREPARE RED BLOOD CELLS (UNIT)    Blood Component Type Red Blood Cells      Product Code [id number]      Unit Status Issued      Unit Number [id number]      CROSSMATCH Compatible      CODING SYSTEM [id number]      ISSUE DATE AND TIME [id number]      UNIT ABO/RH A+      UNIT TYPE ISBT 6200     PREPARE RED BLOOD CELLS (UNIT)   TRANSFUSE RED BLOOD CELLS (UNIT)   ABO/RH TYPE AND SCREEN     No orders to display          Medical Decision Making  The patient's presentation is strongly suggestive of a chronic illness mild to moderate exacerbation, progression, or side effect of  treatment.    The patient's evaluation involved:  an assessment requiring an independent historian (see separate area of note for details)  review of external note(s) from 3+ sources (Previous hepatology clinic notes, previous hospital discharge notes.)  ordering and/or review of 3+ test(s) in this encounter (see separate area of note for details)  review of 3+ test result(s) ordered prior to this encounter (Previous CBC, CMP, INR.)  discussion of management or test interpretation with another health professional (management of care discussed with GI fellow.)    The patient's management involved a decision regarding hospitalization.      Assessment & Plan    52 year old male w/ PMH notable for chronic systolic CHF, acute mesenteric ischemia, ischemic bowel disease, s/p bowel resection, anomalous coronary artery origin, anemia, possible spontaneous bacterial peritonitis, DM2, ascites due to alcoholic cirrhosis, frequent outpatient paracentesis, GI varices     Clinically, patient appears in no acute distress. Vital signs stable without tachycardia or hypotension. Otherwise on examination patient with well perfused extremities, lungs were clear to auscultation.    Ddx includes, but not limited to, upper GI bleed secondary to varices, worsening chronic anemia.      His repeat CBC in the ED was 6.4, without leukocytosis, stable platelet count. CMP showed creatinine of 1.7, with recent creatinine being >1.3. Continued elevated alk phos. INR this evening 2.3, with recent INR >2. Ammonia was 42, magnesium was 1.6.    With concern for new upper GI bleed as the cause of his new anemia, did speak to GI fellow on call. In addition to 80 mg Protonix bolus, was also recommended to start patient on Octreotide infusion with known GI varices, as well as start patient on daily 1 gram Ceftriaxone. Patient will be made NPO at midnight, for GI assessment and possible EGD. I consented him for blood products.    With his low magnesium 4  gram magnesium sulfate was ordered for replacement. With his 6.4 hemoglobin did place order to transfuse one unit RBC for symptomatic anemia.     With his need for admission I did speak to the triage hospital ist. I reviewed patient and presentation, current state of workup any pending studies. They will admit for further evaluation/management. Also discussed request from hepatology clinic note about cardiology consult for recommendations to possibly optimize patent's cardiac status for his liver transplant work up.     I have reviewed the available findings, plan for admission with patient and his wife.    I have reviewed the nursing notes. I have reviewed the findings, diagnosis, plan and need for follow up with the patient.    New Prescriptions    No medications on file       Final diagnoses:   Gastrointestinal hemorrhage, unspecified gastrointestinal hemorrhage type   Alcoholic cirrhosis of liver with ascites (H)   Diarrhea, unspecified type       GABRIEL Inman CNP  [facility] EMERGENCY DEPARTMENT  1/23/2023     Remy Navarro APRN CNP  01/23/23 [id number]

## 2023-01-23 NOTE — NURSING NOTE
"Chief Complaint   Patient presents with     Follow Up     3 month follow up.     He requests these members of his care team be copied on today's visit information:  PCP:   Bubba Ovalle  [address]  [address]  [place] [address]    Vitals:    01/23/23 1327   BP: 105/60   BP Location: Left arm   Patient Position: Sitting   Cuff Size: Adult Regular   Pulse: (!) 48   Temp: 97  F (36.1  C)   TempSrc: Oral   SpO2: 100%   Weight: 128 kg (282 lb 1.6 oz)   Height: 1.905 m (6' 3\")     Body mass index is 35.26 kg/m .    Medications were reconciled.      Ellen Wilson CMA    "

## 2023-01-23 NOTE — PROGRESS NOTES
Saw Mr. Oliver in clinic today, Hgb down to 6.5 from 8, was arranging outpatient transfusion, but he is noted dark sticky stools with blood in them, so recommended he come to our ED for transfusion and plan for admission with cardiology consultation. Feels more tired than usual today.     There have been ongoing discussions about managing his refractory ascites. He has a CHF related to PVCs, former alcohol use. History of HE on lactulose/rifaximin, chronic diarrhea related to bowel resection. Was referred for TIPS after improvement noted in his EF on metoprolol (of note, his EF decreased with stopping his BB given his PVC mediated cardiomyopathy), but his EF decreased to 40-45% again, so TIPS on indefinite hold. Referred to cardiology and talked with Dr. Ortega about trying to optimize his CHF so he could get a TIPS, early discussions about heart/liver transplant but needed to be seen in their clinic with objective measurements of CHF.

## 2023-01-23 NOTE — LETTER
1/23/2023         RE: Blair Oliver  [address] [place] [facility] [address]        Dear Colleague,    Thank you for referring your patient, Blair Oliver, to the [facility]. Please see a copy of my visit note below.    Saw Mr. Oliver in clinic today, Hgb down to 6.5 from 8, was arranging outpatient transfusion, but he is noted dark sticky stools with blood in them, so recommended he come to our ED for transfusion and plan for admission with cardiology consultation. Feels more tired than usual today.     There have been ongoing discussions about managing his refractory ascites. He has a CHF related to PVCs, former alcohol use. History of HE on lactulose/rifaximin, chronic diarrhea related to bowel resection. Was referred for TIPS after improvement noted in his EF on metoprolol (of note, his EF decreased with stopping his BB given his PVC mediated cardiomyopathy), but his EF decreased to 40-45% again, so TIPS on indefinite hold. Referred to cardiology and talked with Dr. Ortega about trying to optimize his CHF so he could get a TIPS, early discussions about heart/liver transplant but needed to be seen in their clinic with objective measurements of CHF.     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      Initial History:     Patient states that he was diagnosed with liver disease and cirrhosis in September 2020.  He was admitted to the hospital at that time he was found to have extensive superior mesenteric venous thrombosis, complicated by  ischemic bowel and had to undergo an exploratory laparotomy with small bowel resection.  He was placed on Eliquis postoperatively.  During surgery, he was found to have some ascites that was evacuated.     Since then he has had trouble with incisional hernias, and rectus diastasis.  He was seen by his general surgeon in March where he was noted to have a MELD of 18.  He was then sent to [facility] to be seen by surgery for consideration of incisional hernia repair.    Patient complains of episodes of leakage of fluid from his incision that happens intermittently.  This fluid is usually blood tinged but sometimes yellow. He is on 20 mg of furosemide daily, that was started because of his heart failure and his pedal edema.  He has also noted that every 2 to 3 days he has watery bowel movements, up to 12 times a day.  In between there was episodes he has about 2-3 soft bowel movements per day.  He denies any fevers or chills, abdominal pain, melena, or hematochezia.    His wife is also noted that he is having some memory issues.  He has not had any major episodes of confusion, and has not been admitted to the hospital with hepatic encephalopathy.    Patient denies any prior history of liver disease. He has had abnormal liver enzymes and low platelets as far back as 2011. Past notes from his PCP had also documented excessive alcohol use, as well as, a hospitalization for overdose of Vicodin.     First paracentesis 6/26/2021 - 600 ml removed - 231 WBC, TP 1.4, Albumin 0.8.     Recent EGD with bx negative for celiac. Colon bx showing chronic inactive colitis. Still had loose stools after colonoscopy prep, not c/w overflow.  We have tried a several week course of rifaximin for encephalopathy, this did not help his diarrhea.  Fecal calprotectin was normal when checked December 2021    Liver MRI March 2, 2022 showing cirrhosis without any suspicious enhancing lesions.  Previously demonstrated hyperintense nonenhancing lesions are  not well visualized in the current exam likely due to motion.  Also showed new small to moderate volume ascites.     Started having issues with ascites again at the time of his last visit. MRI liver without evidence of PVT or HCC.  Sent to the hospital after his last visit 6/2022 for bradycardia Metoprolol was stopped due to bradycardia, EKG with frequent PVCs. TTE 6/2022 with improvement in his LVEF. Saw his cardiologist at [facility], resumed BB given concern for PVC induced cardiomyopathy. Repeat TTE 7/2022 showing LVEF 40-45%    Saw Dr. Lord for diarrhea - stopped cholestyramine because it wasn't helpful. Now back on high dose imodium.     Ongoing issues with refractory ascites. Back on Metoprolol 50 mg BID - LVEF improved showing moderate LV dilation but LVEF improved back to 53%, or 55-60%.     Hospitalized 9/23-27 for abdominal pain and confusion, found to have SBP. Is on cipro ppx. Underwent EGD where GAVE was treated with APC    Taking lactulose and now rifaximin (chronic diarrhea at baseline), confusion is stable.  Will require several bowel movements to avoid confusion    Interval Events:  -Was referred for TIPS for refractory ascites given improvement in his ejection fraction.  Discussed that he is high risk for cardiac standpoint for TIPS.  Encephalopathy has been well controlled on lactulose and rifaximin.  Morning of his TIPS procedure, he was found to have severe hypokalemia.  TIPS was canceled.  Hypokalemia improved with increased supplementation, later found to have severe hypokalemia requiring ED evaluation.  Was admitted and found to have salmonella infection.  Was treated for this, he did not appreciably notice improvement in diarrhea with this.  Had an echocardiogram this admission that showed his ejection fraction decreased to 40 to 45% again, although limited due to his very frequent PVCs.  Given this his TIPS is on indefinite hold.  Given worsening lower extremity swelling and increasing  ascites, gradually increasing diuretics as an outpatient.  Also had discussions with cardiology about if we can improve his ejection fraction so TIPS is an option in the future.  -Hemoglobin down to 6.8 today.  He has had issues with chronic anemia likely secondary to portal hypertensive gastropathy and gave, has required IV iron before.  Today he is seeing blood in the stool, wife has noticed that his bowel movements have been dark and sticky the past several days are hard to flush. Feels more tired than usual today.     ROS: 14 point ROS negative except for positives noted in HPI.    PMHx:  Past Medical History:   Diagnosis Date     Alcohol dependence in remission (H)      Alcoholic cirrhosis of liver with ascites (H)      Anemia      Chronic systolic heart failure (H)      Diabetes (H)     Type 2 DM, Insulin Use.      Esophageal varices (H)      Gastroesophageal reflux disease without esophagitis      Hepatic encephalopathy      Hypertension      Incisional hernia      Ischemic bowel disease (H)      Mixed hyperlipidemia    -- Type II diabetes Mellitus  -- Hypertension  -- Hyperlipidemia  -- CHF  -- GISELLE  -- SMV thrombus    PSHx:  Past Surgical History:   Procedure Laterality Date     COLONOSCOPY N/A 12/9/2021    Procedure: COLONOSCOPY, WITH BIOPSY, WITH CLIPS;  Surgeon: Sweetie Bain MD;  Location: [facility] OR     ESOPHAGOSCOPY, GASTROSCOPY, DUODENOSCOPY (EGD), COMBINED N/A 12/9/2021    Procedure: ESOPHAGOGASTRODUODENOSCOPY, WITH BIOPSY;  Surgeon: Sweetie Bain MD;  Location: [facility] OR      -- Exploratory laparotomy with small bowel resection 9/2020    FamHx:  Family History   Problem Relation Age of Onset     Deep Vein Thrombosis Mother      Pulmonary Embolism Mother      Substance Abuse Brother      Substance Abuse Maternal Grandmother      Anesthesia Reaction No family hx of      No family history of liver disease, liver cancer    SocHx:  Social History     Socioeconomic History     Marital status:       Spouse name: Not on file     Number of children: Not on file     Years of education: Not on file     Highest education level: Not on file   Occupational History     Not on file   Tobacco Use     Smoking status: Former     Packs/day: 1.00     Years: 16.00     Pack years: 16.00     Types: Cigarettes     Quit date:      Years since quittin.0     Smokeless tobacco: Never   Vaping Use     Vaping Use: Never used   Substance and Sexual Activity     Alcohol use: Not Currently     Comment: Quit 2020     Drug use: Never     Sexual activity: Not on file   Other Topics Concern     Parent/sibling w/ CABG, MI or angioplasty before 65F 55M? Not Asked   Social History Narrative     Not on file     Social Determinants of Health     Financial Resource Strain: Not on file   Food Insecurity: Not on file   Transportation Needs: Not on file   Physical Activity: Not on file   Stress: Not on file   Social Connections: Not on file   Intimate Partner Violence: Not on file   Housing Stability: Not on file   Patient previously worked with with [employer].  He is currently on disability due to pain from his hernias and discomfort.  Admits to significant alcohol use.  Used to drink about a liter of whiskey per day for about 20 years.  Denies any hospitalizations for intoxication or withdrawal, denies any DUIs, denies any prior treatments.  Last drink was 2020.  Denies any cravings, and is not in any treatments program at the moment.  Denies any tobacco use, denies any IV drug use.    Medications:  No current facility-administered medications for this visit.     Current Outpatient Medications   Medication     acetaminophen (TYLENOL) 500 MG tablet     apixaban ANTICOAGULANT (ELIQUIS) 5 MG tablet     ciprofloxacin (CIPRO) 500 MG tablet     COMPRESSION STOCKINGS     DULoxetine (CYMBALTA) 60 MG capsule     eplerenone (INSPRA) 25 MG tablet     ferrous gluconate (FERGON) 324 (38 Fe) MG tablet     gabapentin (NEURONTIN) 300 MG  "capsule     insulin glargine (LANTUS PEN) 100 UNIT/ML pen     insulin pen needle (B-D U/F) 31G X 5 MM miscellaneous     lactulose 20 GM/30ML SOLN     loperamide (IMODIUM A-D) 1 MG/7.5ML     metFORMIN (GLUCOPHAGE) 1000 MG tablet     metoprolol succinate ER (TOPROL XL) 50 MG 24 hr tablet     Multiple Vitamins-Minerals (SUPER THERA DAMARIS M) TABS     omeprazole (PRILOSEC) 20 MG DR capsule     ONETOUCH ULTRA test strip     potassium chloride ER (KLOR-CON M) 10 MEQ CR tablet     rifaximin (XIFAXAN) 550 MG TABS tablet     tamsulosin (FLOMAX) 0.4 MG capsule     torsemide (DEMADEX) 20 MG tablet     Facility-Administered Medications Ordered in Other Visits   Medication     cefTRIAXone (ROCEPHIN) 1 g vial to attach to  mL bag for ADULTS or NS 50 mL bag for PEDS     magnesium sulfate 4 g in 100 mL sterile water (premade)     octreotide (sandoSTATIN) 1,250 mcg in D5W 250 mL     oxyCODONE (ROXICODONE) tablet 5 mg     [START ON 1/24/2023] pantoprazole (PROTONIX) IV push injection 40 mg     No OTCs, herbals    Allergies:  Allergies   Allergen Reactions     Minocycline Hives       Objective:  /60 (BP Location: Left arm, Patient Position: Sitting, Cuff Size: Adult Regular)   Pulse (!) 48   Temp 97  F (36.1  C) (Oral)   Ht 1.905 m (6' 3\")   Wt 128 kg (282 lb 1.6 oz)   SpO2 100%   BMI 35.26 kg/m    Constitutional: pleasant man in NAD  Eyes: non icteric  Respiratory: Normal respiratory excursion   MSK: normal range of motion of visualized extremities  Abd: Moderate ascites present  Ext: 2+ edema  Skin: No jaundice  Psychiatric: normal mood and orientation    Labs:  Last Comprehensive Metabolic Panel:  Sodium   Date Value Ref Range Status   01/23/2023 138 136 - 145 mmol/L Final   06/15/2021 136 133 - 144 mmol/L Final     Potassium   Date Value Ref Range Status   01/23/2023 4.1 3.4 - 5.3 mmol/L Final   01/23/2023 4.1 3.4 - 5.3 mmol/L Final   06/15/2021 4.1 3.4 - 5.3 mmol/L Final     Chloride   Date Value Ref Range Status "   01/23/2023 112 (H) 98 - 107 mmol/L Final   01/23/2023 116 (H) 94 - 109 mmol/L Final   06/15/2021 112 (H) 94 - 109 mmol/L Final     Carbon Dioxide   Date Value Ref Range Status   06/15/2021 25 20 - 32 mmol/L Final     Carbon Dioxide (CO2)   Date Value Ref Range Status   01/23/2023 13 (L) 22 - 29 mmol/L Final   01/23/2023 17 (L) 20 - 32 mmol/L Final     Anion Gap   Date Value Ref Range Status   01/23/2023 13 7 - 15 mmol/L Final   01/23/2023 7 3 - 14 mmol/L Final   06/15/2021 <1 (L) 3 - 14 mmol/L Final     Glucose   Date Value Ref Range Status   01/23/2023 105 (H) 70 - 99 mg/dL Final   01/23/2023 87 70 - 99 mg/dL Final   06/15/2021 123 (H) 70 - 99 mg/dL Final     Urea Nitrogen   Date Value Ref Range Status   01/23/2023 24.1 (H) 6.0 - 20.0 mg/dL Final   01/23/2023 23 7 - 30 mg/dL Final   06/15/2021 8 7 - 30 mg/dL Final     Creatinine   Date Value Ref Range Status   01/23/2023 1.71 (H) 0.67 - 1.17 mg/dL Final   06/15/2021 0.63 (L) 0.66 - 1.25 mg/dL Final     GFR Estimate   Date Value Ref Range Status   01/23/2023 47 (L) >60 mL/min/1.73m2 Final     Comment:     eGFR calculated using 2021 CKD-EPI equation.   06/15/2021 >90 >60 mL/min/[1.73_m2] Final     Comment:     Non  GFR Calc  Starting 12/18/2018, serum creatinine based estimated GFR (eGFR) will be   calculated using the Chronic Kidney Disease Epidemiology Collaboration   (CKD-EPI) equation.       Calcium   Date Value Ref Range Status   01/23/2023 7.9 (L) 8.6 - 10.0 mg/dL Final   06/15/2021 8.9 8.5 - 10.1 mg/dL Final     Bilirubin Total   Date Value Ref Range Status   01/23/2023 0.8 <=1.2 mg/dL Final   06/15/2021 0.8 0.2 - 1.3 mg/dL Final     Alkaline Phosphatase   Date Value Ref Range Status   01/23/2023 189 (H) 40 - 129 U/L Final   06/15/2021 126 40 - 150 U/L Final     ALT   Date Value Ref Range Status   01/23/2023 16 10 - 50 U/L Final   06/15/2021 36 0 - 70 U/L Final     AST   Date Value Ref Range Status   01/23/2023 32 10 - 50 U/L Final      Comment:     Unsatisfactory specimen - hemolyzed   Specimen is hemolyzed which can falsely elevate AST. Analysis of a non-hemolyzed specimen may result in a lower value.   06/15/2021 34 0 - 45 U/L Final       Lab Results   Component Value Date    WBC 4.7 06/15/2021     Lab Results   Component Value Date    RBC 3.89 06/15/2021     Lab Results   Component Value Date    HGB 8.6 06/15/2021     Lab Results   Component Value Date    HCT 31.0 06/15/2021     Lab Results   Component Value Date    MCV 80 06/15/2021     Lab Results   Component Value Date    MCH 22.1 06/15/2021     Lab Results   Component Value Date    MCHC 27.7 06/15/2021     Lab Results   Component Value Date    RDW 17.3 06/15/2021     Lab Results   Component Value Date     06/15/2021       INR   Date Value Ref Range Status   01/23/2023 2.31 (H) 0.85 - 1.15 Final   06/15/2021 1.58 (H) 0.86 - 1.14 Final        MELD-Na score: 21 at 1/23/2023  7:21 PM  MELD score: 21 at 1/23/2023  7:21 PM  Calculated from:  Serum Creatinine: 1.71 mg/dL at 1/23/2023  7:21 PM  Serum Sodium: 138 mmol/L (Using max of 137 mmol/L) at 1/23/2023  7:21 PM  Total Bilirubin: 0.8 mg/dL (Using min of 1 mg/dL) at 1/23/2023  7:21 PM  INR(ratio): 2.31 at 1/23/2023  7:21 PM  Age: 52 years    Imaging: Reviewed in EHR    Endoscopy: Reviewed in EHR    Independently reviewed labs and imaging.     Assessment/Plan: Mr. 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     Was originally referred for LT evaluation - given his CHF and low MELD, did not pursue this.     LVEF improved with sobriety, then worsened with stopping BB. Metoprolol resumed, LVEF improved. Much of his CHF was related to PVCs, improved with treatment of this.       Was referred for TIPS for refractory ascites once his ejection fraction improved on beta-blocker.  TIPS was canceled due to hypokalemia, repeat echo showed reduction in his ejection fraction to 40 to 45%, although difficult to measure given his PVC burden.  TIPS is on indefinite hold.    Follows cardiology at [facility].  Decided to refer to cardiology here at [facility] to see if there is anything that can be done to improve his PVC mediated cardiomyopathy with the goal of him becoming a TIPS candidate, or potential liver transplant candidate.  His MELD has historically been on the lower side.  There have been brief discussions about heart and liver transplant, unclear if he would be a good candidate for this and concerned he would be lower on the list for this given his CHF is not that severe.  Unfortunately does not have a lot of great options given his coexisting heart liver disease.  The alternative is continuing the current plan and patients with refractory ascites has a high risk of death in the upcoming year.     His HE is well controlled currently. 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  Chronic diarrhea thought to be related to bowel resection and also portal hypertensive colopathy.  Has seen Dr. Lord for this.    - Recommend presenting to the [facility] ER for blood transfusion given active blood loss and cardiac optimization.  - Continue torsemide 60 mg daily  - Continue eplerenone 50 mg daily  - Continue KCl 20 supplementation  - Paracentesis biweekly PRN 6 L max with ~ 7.5 grams albumin replacement/L removed.   - Continue metoprolol 50 mg XL  BID for cardiology - typically BBs are held in refractory ascites, and after SBP, but his metoprolol has been very helpful in improving his LVEF and should be continued  - Continue lactulose and rifaximin  - Continue ciprofloaxin for SBP ppx  - EGD scheduled for later this week, TBD based on \[facility] [place] [facility]\""  evaluation    RTC after hospitalization    Sweetie Bain MD MS  Hepatology/Liver Transplant  [facility]                  Again, thank you for allowing me to participate in the care of your patient.        Sincerely,        Sweetie Bain MD

## 2023-01-23 NOTE — TELEPHONE ENCOUNTER
Pt called needing his arrival time - Instructed pt to arrive at 1430.    Pt verbalized understanding.    Gretta Ventura RN on 1/23/2023 at 9:31 AM

## 2023-01-23 NOTE — PROGRESS NOTES
CRITICAL LAB VALUE:   DATE:  1/23/2023   TIME OF RECEIPT FROM LAB:  1/23/2023   12:58PM  LAB TEST:  HEMOGLOBIN  LAB VALUE:  6.5  RESULTS GIVEN WITH READ-BACK TO (PROVIDER):  Dr. Bain  TIME LAB VALUE REPORTED TO PROVIDER:  1:11PM (provider in clinic, seeing patients)    Belkys Melara RN

## 2023-01-24 NOTE — CONSULTS
Hepatology Consultation    Blair Oliver   MRN# [id number]     Age: 52 year old YOB: 1970       Referring provider: Ro Larson  Attending Hepatologist: Dr. Harris  Consult requested for: anemia       Assessment and Recommendation:   Assessment:   Mr. 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       Recommendations:   # Anemia  # EV s/p banding  - has had episodes of anemia with drop in hemoglobin that may be multifactorial. LDH has been elevated, retic 6/2022 was not significantly elevated. Has not had peripheral smear. T. Bili is mixed indirect/direct.   - resuscitate for hemoglobin >7 and plan for EGD today.   - on octreotide    #Ascites  - not a current candidate for TIPS due to HFrEF.     # Alcohol cirrhosis  - MELD 23  - HCC screening with US 1/23 negative  - mentation stable, no current signs of HE    Plan of care discussed with Dr. Harris    Thank you for the opportunity to be involved in Blair Oliver care. Please call with any questions or concerns.     GABRIEL Calvin, CNP  Inpatient Hepatology [facility]  Text link      Approximately 85 minutes of time were spent in review of the patient's medical record to date.This included review of previous: clinic visits, lab results, imaging studies. This also includes obtaining history, ordering follow up imaging/labs and documenting clinical information in the EHR.  The findings from this review are summarized in the above note.           History of Present Illness:   Blair Oliver is a 52 year old male with a history of alcohol cirrhosis.  His liver disease has been complicated by ascites with twice weekly paracentesis (6 L), esophageal varices s/p banding (12/1/2022) hepatic encephalopathy,  mesenteric vein thrombosis s/p bowel resection, chronic diarrhea, ventral wall hernia, HFrEF (EF40-45), frequent PVCs who was admitted with hemoglobin 6.5 from clinic along with concern for bleeding.  He received x2 units PRBCs with only minimal response in his hemoglobin.  He has not had complete evaluation of his anemia in the past.  He notes intermittent dark stools along with 1 maroon in color yesterday.    Mr. Oliver notes moving to more frequent paracentesis with twice weekly.  He reports having dizziness at home along with mild weakness.  He denies any falls or syncope.  He is on torsemide and has mild decrease in his urine output along with increasing lower extremity edema.  He does have a decreased appetite along with bloating.  He denies any change in mentation, hematemesis abdominal pain.             Past Medical History:     Past Medical History:   Diagnosis Date     Alcohol dependence in remission (H)      Alcoholic cirrhosis of liver with ascites (H)      Anemia      Chronic systolic heart failure (H)      Diabetes (H)     Type 2 DM, Insulin Use.      Esophageal varices (H)      Gastroesophageal reflux disease without esophagitis      Hepatic encephalopathy      Hypertension      Incisional hernia      Ischemic bowel disease (H)      Mixed hyperlipidemia               Past Surgical History:     Past Surgical History:   Procedure Laterality Date     COLONOSCOPY N/A 2021    Procedure: COLONOSCOPY, WITH BIOPSY, WITH CLIPS;  Surgeon: Sweetie Bain MD;  [place] [facility] [place]     ESOPHAGOSCOPY, GASTROSCOPY, DUODENOSCOPY (EGD), COMBINED N/A 2021    Procedure: ESOPHAGOGASTRODUODENOSCOPY, WITH BIOPSY;  Surgeon: Sweetie Bain MD;  [place] [facility] [place]              Social History:     Social History     Tobacco Use     Smoking status: Former     Packs/day: 1.00     Years: 16.00     Pack years: 16.00     Types: Cigarettes     Quit date:      Years since quittin.0     Smokeless tobacco: Never    Substance Use Topics     Alcohol use: Not Currently     Comment: Quit 9/21/2020             Family History:   The I have reviewed this patient's family history and updated it with pertinent information if needed.  Family History   Problem Relation Age of Onset     Deep Vein Thrombosis Mother      Pulmonary Embolism Mother      Substance Abuse Brother      Substance Abuse Maternal Grandmother      Anesthesia Reaction No family hx of                   Immunizations:     Immunization History   Administered Date(s) Administered     COVID-19 Vaccine 12+ (Pfizer) 05/15/2021, 06/11/2021, 12/13/2021     Influenza Vaccine >6 months (Alfuria,Fluzone) 09/16/2020, 09/27/2021            Allergies:     Allergies   Allergen Reactions     Minocycline Hives             Medications:     Current Facility-Administered Medications   Medication     acetaminophen (TYLENOL) tablet 500 mg     cefTRIAXone (ROCEPHIN) 1 g vial to attach to  mL bag for ADULTS or NS 50 mL bag for PEDS     [Held by provider] ciprofloxacin (CIPRO) tablet 500 mg     Contraindications to both pharmacological and mechanical prophylaxis (must document contraindications for both in this order)     glucose gel 15-30 g    Or     dextrose 50 % injection 25-50 mL    Or     glucagon injection 1 mg     DULoxetine (CYMBALTA) DR capsule 60 mg     gabapentin (NEURONTIN) capsule 300 mg     insulin glargine (LANTUS PEN) injection 20 Units     lactulose (CHRONULAC) solution 20 g     lidocaine (LMX4) cream     lidocaine 1 % 0.1-1 mL     melatonin tablet 1 mg     [Held by provider] metoprolol succinate ER (TOPROL XL) 24 hr tablet 50 mg     miconazole (MICATIN) 2 % powder     multivitamin, therapeutic (THERA-VIT) tablet 1 tablet     octreotide (sandoSTATIN) 1,250 mcg in D5W 250 mL     ondansetron (ZOFRAN ODT) ODT tab 4 mg    Or     ondansetron (ZOFRAN) injection 4 mg     oxyCODONE (ROXICODONE) tablet 5 mg     pantoprazole (PROTONIX) IV push injection 40 mg     polyethylene  "glycol (MIRALAX) Packet 17 g     [Held by provider] potassium chloride ER (KLOR-CON M) CR tablet 30 mEq     rifaximin (XIFAXAN) tablet 550 mg     sodium chloride (PF) 0.9% PF flush 3 mL     sodium chloride (PF) 0.9% PF flush 3 mL     [Held by provider] torsemide (DEMADEX) tablet 60 mg     Current Outpatient Medications   Medication     acetaminophen (TYLENOL) 500 MG tablet     apixaban ANTICOAGULANT (ELIQUIS) 5 MG tablet     ciprofloxacin (CIPRO) 500 MG tablet     COMPRESSION STOCKINGS     DULoxetine (CYMBALTA) 60 MG capsule     eplerenone (INSPRA) 25 MG tablet     ferrous gluconate (FERGON) 324 (38 Fe) MG tablet     gabapentin (NEURONTIN) 300 MG capsule     insulin glargine (LANTUS PEN) 100 UNIT/ML pen     insulin pen needle (B-D U/F) 31G X 5 MM miscellaneous     lactulose 20 GM/30ML SOLN     loperamide (IMODIUM A-D) 1 MG/7.5ML     metoprolol succinate ER (TOPROL XL) 50 MG 24 hr tablet     Multiple Vitamins-Minerals (SUPER THERA DAMARIS M) TABS     omeprazole (PRILOSEC) 20 MG DR capsule     ONETOUCH ULTRA test strip     potassium chloride ER (KLOR-CON M) 10 MEQ CR tablet     rifaximin (XIFAXAN) 550 MG TABS tablet     torsemide (DEMADEX) 20 MG tablet     metFORMIN (GLUCOPHAGE) 1000 MG tablet     tamsulosin (FLOMAX) 0.4 MG capsule             Physical Exam:   Blood pressure (!) 115/95, pulse 60, temperature 97.7  F (36.5  C), temperature source Oral, resp. rate 20, height 1.905 m (6' 3\"), weight 127.9 kg (282 lb), SpO2 99 %. Body mass index is 35.25 kg/m .  Date 01/24/23 0700 - 01/25/23 0659   Shift 8637-2580 5329-6478 1674-9207 24 Hour Total   INTAKE   Blood Components 300   300   Shift Total(mL/kg) 300(2.35)   300(2.35)   OUTPUT   Shift Total(mL/kg)       Weight (kg) 127.91 127.91 127.91 127.91     General: In no acute distress, mild facial muscle wasting  Neuro: AOx3, No asterixis  HEENT: PERRL Noscleral icterus, Nooral lesions  CV: S1/S2 with gr 2/6 murmurs, Skin warm and dry  Lungs: clear to auscultation, " diminished bases Respirations even and nonlabored on room air  Abd: Moderately distended, Large ventral wall hernia, reducible.   Extrem: +2 lower peripehral edema  Skin: Nojaundice  Psych: pleasant         Data:     Lab Results   Component Value Date    WBC 4.3 01/24/2023    WBC 4.7 06/15/2021     Lab Results   Component Value Date    RBC 2.00 01/24/2023    RBC 3.89 06/15/2021     Lab Results   Component Value Date    HGB 6.7 01/24/2023    HGB 8.6 06/15/2021     Lab Results   Component Value Date    HCT 22.3 01/24/2023    HCT 31.0 06/15/2021     No components found for: MCT  Lab Results   Component Value Date     01/24/2023    MCV 80 06/15/2021     Lab Results   Component Value Date    MCH 31.5 01/24/2023    MCH 22.1 06/15/2021     Lab Results   Component Value Date    MCHC 29.3 01/24/2023    MCHC 27.7 06/15/2021     Lab Results   Component Value Date    RDW 18.9 01/24/2023    RDW 17.3 06/15/2021     Lab Results   Component Value Date    PLT 89 01/24/2023     06/15/2021       Last Basic Metabolic Panel:  Lab Results   Component Value Date     01/24/2023     06/15/2021      Lab Results   Component Value Date    POTASSIUM 3.9 01/24/2023    POTASSIUM 4.1 01/23/2023    POTASSIUM 4.1 06/15/2021     Lab Results   Component Value Date    CHLORIDE 113 01/24/2023    CHLORIDE 116 01/23/2023    CHLORIDE 112 06/15/2021     Lab Results   Component Value Date    DANIELLE 7.3 01/24/2023    DANIELLE 8.9 06/15/2021     Lab Results   Component Value Date    CO2 14 01/24/2023    CO2 17 01/23/2023    CO2 25 06/15/2021     Lab Results   Component Value Date    BUN 22.7 01/24/2023    BUN 23 01/23/2023    BUN 8 06/15/2021     Lab Results   Component Value Date    CR 1.70 01/24/2023    CR 0.63 06/15/2021     Lab Results   Component Value Date    GLC 80 01/24/2023    GLC 88 01/24/2023    GLC 87 01/23/2023     06/15/2021       Liver Function Studies -   Recent Labs   Lab Test 01/24/23  0854   PROTTOTAL 4.5*   ALBUMIN  2.5*   BILITOTAL 1.1   ALKPHOS 143*   AST 26   ALT 11       Lab Results   Component Value Date    INR 2.51 01/24/2023       MELD-Na score: 22 at 1/24/2023  8:54 AM  MELD score: 22 at 1/24/2023  8:54 AM  Calculated from:  Serum Creatinine: 1.70 mg/dL at 1/24/2023  8:54 AM  Serum Sodium: 138 mmol/L (Using max of 137 mmol/L) at 1/24/2023  8:54 AM  Total Bilirubin: 1.1 mg/dL at 1/24/2023  8:54 AM  INR(ratio): 2.51 at 1/24/2023  8:54 AM  Age: 52 years           Previous Endoscopy:     Results for orders placed or performed during the hospital encounter of 12/01/22   UPPER GI ENDOSCOPY   Result Value Ref Range    Upper GI Endoscopy       [address] [place] [address] [phone]     Endoscopy Department  _______________________________________________________________________________  Patient Name: Blair Oliver          Procedure Date: 12/1/2022 2:24 PM  MRN: [id number]                       Account Number: [id number]  YOB: 1970              Admit Type: Outpatient  Age: 52                               Room: [facility]                      Gender: Male                          Note Status: Finalized  Attending MD: MARISOL FULLER MD  Total Sedation Time:   _______________________________________________________________________________     Procedure:             Upper GI endoscopy  Indications:           Follow-up of esophageal varices  Providers:             MARISOL FULLER MD, Home Betancourt RN  Referring MD:          ARON THOMPSON MD  Medicines:             Diphenhydramine 50 mg IV, Midazolam 2 mg IV, Fentanyl                          100  micrograms IV  Complications:         No immediate complications.  _______________________________________________________________________________  Procedure:             Pre-Anesthesia Assessment:                         - Prior to the procedure, a History and Physical was                           performed, and patient medications and allergies were                          reviewed. The patient is competent. The risks and                          benefits of the procedure and the sedation options and                          risks were discussed with the patient. All questions                          were answered and informed consent was obtained.                          Patient identification and proposed procedure were                          verified by the physician and the nurse in the                          pre-procedure area in the procedure room. Mental                          Status Examination: alert and oriented. Airway                          Examination: normal  oropharyngeal airway and neck                          mobility. Respiratory Examination: clear to                          auscultation. CV Examination: normal. Prophylactic                          Antibiotics: The patient does not require prophylactic                          antibiotics. Prior Anticoagulants: The patient has                          taken no anticoagulant or antiplatelet agents. ASA                          Grade Assessment: II - A patient with mild systemic                          disease. After reviewing the risks and benefits, the                          patient was deemed in satisfactory condition to                          undergo the procedure. The anesthesia plan was to use                          moderate sedation / analgesia (conscious sedation).                          Immediately prior to administration of medications,                          the patient was re-assessed for adequacy to receive                          sedatives. The heart rate, re spiratory rate, oxygen                          saturations, blood pressure, adequacy of pulmonary                          ventilation, and response to care were monitored                          throughout the procedure. The physical status of  the                          patient was re-assessed after the procedure.                         After obtaining informed consent, the endoscope was                          passed under direct vision. Throughout the procedure,                          the patient's blood pressure, pulse, and oxygen                          saturations were monitored continuously. The Endoscope                          was introduced through the mouth, and advanced to the                          second part of duodenum. The upper GI endoscopy was                          accomplished without difficulty. The patient tolerated                          the procedure well.                                                                                   Findings :       Grade II varices with no bleeding and no stigmata of recent bleeding        were found in the lower third of the esophagus. They were large in size.        No red khanh signs were present. Three bands were successfully placed        with incomplete eradication of varices. There was no bleeding during and        at the end of the procedure.       Mild portal hypertensive gastropathy was found in the entire examined        stomach.       Multiple localized petechiae were found at the pylorus.       The examined duodenum was normal.                                                                                   Moderate Sedation:       Moderate (conscious) sedation was administered by the endoscopy nurse        and supervised by the endoscopist. The following parameters were        monitored: oxygen saturation, heart rate, blood pressure, and response        to care. Total physician intraservice time was 14 minutes.  Impression:            - Grade II esophageal varices with no bl eeding and no                          stigmata of recent bleeding. Incompletely eradicated.                          Banded.                         - Portal hypertensive gastropathy.                          - Gastric petechia(e).                         - Normal examined duodenum.                         - No specimens collected.  Recommendation:        - Discharge patient to home.                         - Full liquid diet today.                         - Repeat upper endoscopy in 6 weeks for retreatment.                                                                                     _________________________  MARISOL FULLER MD  12/1/2022 2:59:01 PM  I was physically present for the entire viewing portion of the exam.  __________________________  Signature of teaching physician  [id number] FULLER MD  Number of Addenda: 0    Note Initiated On: 12/1/2022 2:24 PM  Scope In:  Scope Out:       Results for orders placed or performed during the hospital encounter of 12/09/21   COLONOSCOPY   Result Value Ref Range    COLONOSCOPY       [facility]  [address] [place], [place] [address]., [place] [address] [phone]     Endoscopy Department  _______________________________________________________________________________  Patient Name: Blair Oliver          Procedure Date: 12/9/2021 10:28 AM  MRN: [id number]                       Account Number: [id number]  YOB: 1970              Admit Type: Outpatient  Age: 51                               Room: Pro 5  Gender: Male                          Note Status: Finalized  Attending MD: Sweetie Bain MD       Pause for the Cause: Pause for the cause   completed.  Total Sedation Time:                    _______________________________________________________________________________     Procedure:             Colonoscopy  Indications:           Chronic diarrhea  Providers:             Sweetie Bain MD, Shoan Smiley RN, Aracelis Mello CRNA  Patient Profile:       This is a 51 year old male. Refer to note in patient                           chart for documentation of history and physical.  Referring MD:             Medicines:             Propofol per Anesthesia  Complications:         No immediate complications. Estimated blood loss:                          Minimal.  _______________________________________________________________________________  Procedure:             Pre-Anesthesia Assessment:                         - Prior to the procedure, a History and Physical was                          performed, and patient medications and allergies were                          reviewed. The patient is competent. The risks and                          benefits of the procedure and the sedation options and                          risks were discussed with the patient. All questions                          were answered and informed consent was obtained.                          Patient identification and proposed procedure were                          verified by the physician and the nurse in the                           pre-procedure area. Mental Status Examination: alert                          and oriented. Airway Examination: normal oropharyngeal                          airway and neck mobility. Respiratory Examination:                          clear to auscultation. CV Examination: normal.                          Prophylactic Antibiotics: The patient does not require                          prophylactic antibiotics. Prior Anticoagulants: The                          patient has taken Eliquis (apixaban), last dose was 2                          days prior to procedure. ASA Grade Assessment: III - A                          patient with severe systemic disease. After reviewing                          the risks and benefits, the patient was deemed in                          satisfactory condition to undergo the procedure. The                          anesthesia plan was to use monitored anesthesia care                          (MAC). Immediately prior to administration of                            medications, the patient was re-assessed for adequacy                          to receive sedatives. The heart rate, respiratory                          rate, oxygen saturations, blood pressure, adequacy of                          pulmonary ventilation, and response to care were                          monitored throughout the procedure. The physical                          status of the patient was re-assessed after the                          procedure.                         After obtaining informed consent, the colonoscope was                          passed under direct vision. Throughout the procedure,                          the patient's blood pressure, pulse, and oxygen                          saturations were monitored continuously. The                          Colonoscope was introduced through the anus and                          advanced to the cecum, identified by appendiceal                          orifice and ileocecal valve. The colonoscopy was                           performed without difficulty. The patient tolerated                          the procedure well. The quality of the bowel                          preparation was adequate to identify polyps 6 mm and                          larger in size.                                                                                   Findings:       The digital rectal exam findings include decreased sphincter tone.       A single small angioectasia without bleeding was found in the ascending        colon.       An area of moderately congested mucosa was found in the entire colon.        This was biopsied with a cold forceps for histology.       A 7 mm polyp was found in the sigmoid colon. The polyp was semi-sessile.        The polyp was removed with a cold snare. Resection and retrieval were        complete. To prevent bleeding after the polypectomy, two hemostatic        clips were successfully placed. There was no bleeding during, or  at the        end, of the procedure.        Medium sized, non-bleeding rectal varices were found.       The exam was otherwise without abnormality on direct and retroflexion        views.                                                                                   Impression:            - Decreased sphincter tone found on digital rectal                          exam.                         - A single non-bleeding colonic angioectasia.                         - Congested mucosa in the entire examined colon.                          Biopsied.                         - One 7 mm polyp in the sigmoid colon, removed with a                          cold snare. Resected and retrieved. Clips were placed.                         - Rectal varices.                         - The examination was otherwise normal on direct and                          retroflexion views.  Recommendation:        - Discharge patient to home (with escort).                         - Resume previous diet.                         - Continue present medication s.                         - Await pathology results.                         - Repeat colonoscopy in 1 year for screening purposes.                         - Return to GI clinic as previously scheduled.                         - Resume Eliquis (apixaban) at prior dose in 2 days.                                                                                     Electronically Signed by: Dr. Sweetie Bain  ________________  Sweetie Bain MD  12/9/2021 1:57:19 PM  I was physically present for the entire viewing portion of the exam.  __________________________  Signature of teaching physician  Sweetie Bain MD  Number of Addenda: 0    Note Initiated On: 12/9/2021 10:28 AM  Scope In:  Scope Out:       No results found for this or any previous visit.      IMAGING:      US Abdomen Complete w Doppler Complete 1/24/23  Impression:         1. Patent hepatic vasculature by Doppler evaluation. No  portal vein  thrombus demonstrated.  2. Cirrhotic liver morphology with ascites.  3. Distended gallbladder with nonspecific mild gallbladder wall  thickening; possibly secondary to ascites and liver dysfunction,  cholecystitis is considered less likely; if clinical concern for  cholecystitis, HIDA may be performed.      Echo 10/10/22  Interpretation Summary     Frequent ectopy during study making interpretation suboptimal.  Global and regional left ventricular function is low-normal with an EF of  55%.Moderate left ventricular dilation is present.  Right ventricular function, chamber size, wall motion, and thickness are  normal.  No clinically significant valvular pathology identified.  No pericardial effusion is present.  This study was compared with the study from 6/14/22 .  No significant changes noted.

## 2023-01-24 NOTE — ANESTHESIA PREPROCEDURE EVALUATION
Anesthesia Pre-Procedure Evaluation    Patient: Blair Oliver   MRN: [id number] : 1970        Procedure : Procedure(s):  ESOPHAGOGASTRODUODENOSCOPY (EGD)          Past Medical History:   Diagnosis Date     Alcohol dependence in remission (H)      Alcoholic cirrhosis of liver with ascites (H)      Anemia      Chronic systolic heart failure (H)      Diabetes (H)     Type 2 DM, Insulin Use.      Esophageal varices (H)      Gastroesophageal reflux disease without esophagitis      Hepatic encephalopathy      Hypertension      Incisional hernia      Ischemic bowel disease (H)      Mixed hyperlipidemia       Past Surgical History:   Procedure Laterality Date     COLONOSCOPY N/A 2021    Procedure: COLONOSCOPY, WITH BIOPSY, WITH CLIPS;  Surgeon: Sweetie Bain MD;  Location: [facility] OR     ESOPHAGOSCOPY, GASTROSCOPY, DUODENOSCOPY (EGD), COMBINED N/A 2021    Procedure: ESOPHAGOGASTRODUODENOSCOPY, WITH BIOPSY;  Surgeon: Sweetie Bain MD;  Location: [facility] OR      Allergies   Allergen Reactions     Minocycline Hives      Social History     Tobacco Use     Smoking status: Former     Packs/day: 1.00     Years: 16.00     Pack years: 16.00     Types: Cigarettes     Quit date:      Years since quittin.0     Smokeless tobacco: Never   Substance Use Topics     Alcohol use: Not Currently     Comment: Quit 2020      Wt Readings from Last 1 Encounters:   23 127.9 kg (282 lb)        Anesthesia Evaluation            ROS/MED HX  ENT/Pulmonary:     (+) tobacco use,     Neurologic:       Cardiovascular:     (+) Dyslipidemia hypertension-----CHF     METS/Exercise Tolerance:     Hematologic: Comments: Hgb 7.3    (+) anemia,     Musculoskeletal:       GI/Hepatic: Comment: Cirrhosis, hepatic encephalopathy    (+) GERD, Inflammatory bowel disease, liver disease,     Renal/Genitourinary:       Endo:     (+) type II DM,     Psychiatric/Substance Use:     (+) alcohol abuse     Infectious Disease:       Malignancy:        Other:            Physical Exam    Airway        Mallampati: I   TM distance: > 3 FB      Respiratory Devices and Support         Dental     Comment: partials        Cardiovascular             Pulmonary                   OUTSIDE LABS:  CBC:   Lab Results   Component Value Date    WBC 4.3 01/24/2023    WBC 6.9 01/23/2023    HGB 7.3 (L) 01/24/2023    HGB 6.7 (LL) 01/24/2023    HCT 24.2 (L) 01/24/2023    HCT 22.3 (L) 01/24/2023    PLT 89 (L) 01/24/2023     (L) 01/23/2023     BMP:   Lab Results   Component Value Date     01/24/2023     01/23/2023    POTASSIUM 3.9 01/24/2023    POTASSIUM 4.1 01/23/2023    CHLORIDE 113 (H) 01/24/2023    CHLORIDE 112 (H) 01/23/2023    CO2 14 (L) 01/24/2023    CO2 13 (L) 01/23/2023    BUN 22.7 (H) 01/24/2023    BUN 24.1 (H) 01/23/2023    CR 1.70 (H) 01/24/2023    CR 1.71 (H) 01/23/2023    GLC 80 01/24/2023    GLC 88 01/24/2023     COAGS:   Lab Results   Component Value Date    PTT 41 (H) 01/24/2023    INR 2.51 (H) 01/24/2023    FIBR 272 06/13/2022     POC: No results found for: BGM, HCG, HCGS  HEPATIC:   Lab Results   Component Value Date    ALBUMIN 2.5 (L) 01/24/2023    PROTTOTAL 4.5 (L) 01/24/2023    ALT 11 01/24/2023    AST 26 01/24/2023    ALKPHOS 143 (H) 01/24/2023    BILITOTAL 1.1 01/24/2023    PONCHO 42 01/23/2023     OTHER:   Lab Results   Component Value Date    A1C 6.1 (H) 12/02/2022    DANIELLE 7.3 (L) 01/24/2023    PHOS 3.9 06/15/2021    MAG 1.6 (L) 01/23/2023    TSH 1.34 06/13/2022       Anesthesia Plan    ASA Status:  3   NPO Status:  NPO Appropriate    Anesthesia Type: MAC.              Consents    Anesthesia Plan(s) and associated risks, benefits, and realistic alternatives discussed. Questions answered and patient/representative(s) expressed understanding.     - Discussed: Risks, Benefits and Alternatives for BOTH SEDATION and the PROCEDURE were discussed     - Discussed with:  Patient      - Extended Intubation/Ventilatory Support Discussed: No.      -  Patient is DNR/DNI Status: No    Use of blood products discussed: No .     Postoperative Care    Pain management: Multi-modal analgesia.   PONV prophylaxis: Ondansetron (or other 5HT-3)     Comments:                Celeste Nicholas MD

## 2023-01-24 NOTE — ED NOTES
Bed: ED10  Expected date:   Expected time:   Means of arrival:   Comments:  Surge patient CATARINO

## 2023-01-24 NOTE — ED TRIAGE NOTES
Pt referred to come in for low Hgb and sticky/tarry stools.  Feeling dizzy, fatigued, lower extremity swelling.

## 2023-01-24 NOTE — PROGRESS NOTES
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      Initial History:     Patient states that he was diagnosed with liver disease and cirrhosis in September 2020.  He was admitted to the hospital at that time he was found to have extensive superior mesenteric venous thrombosis, complicated by ischemic bowel and had to undergo an exploratory laparotomy with small bowel resection.  He was placed on Eliquis postoperatively.  During surgery, he was found to have some ascites that was evacuated.     Since then he has had trouble with incisional hernias, and rectus diastasis.  He was seen by his general surgeon in March where he was noted to have a MELD of 18.  He was then sent to [facility] to be seen by surgery for consideration of incisional hernia repair.    Patient complains of episodes of leakage of fluid from his incision that happens intermittently.  This fluid is usually blood tinged but sometimes yellow. He is on 20 mg of furosemide daily, that was started because of his heart failure and his pedal edema.  He has also noted that every 2 to 3 days he has watery bowel movements, up to 12 times a day.  In between there was episodes he has about 2-3 soft bowel movements per day.  He denies any fevers or chills, abdominal pain, melena, or hematochezia.    His wife is also noted that he is having some memory issues.  He has not had any major episodes of confusion, and has not been admitted to the hospital with hepatic encephalopathy.    Patient denies any prior history of liver disease. He has  had abnormal liver enzymes and low platelets as far back as 2011. Past notes from his PCP had also documented excessive alcohol use, as well as, a hospitalization for overdose of Vicodin.     First paracentesis 6/26/2021 - 600 ml removed - 231 WBC, TP 1.4, Albumin 0.8.     Recent EGD with bx negative for celiac. Colon bx showing chronic inactive colitis. Still had loose stools after colonoscopy prep, not c/w overflow.  We have tried a several week course of rifaximin for encephalopathy, this did not help his diarrhea.  Fecal calprotectin was normal when checked December 2021    Liver MRI March 2, 2022 showing cirrhosis without any suspicious enhancing lesions.  Previously demonstrated hyperintense nonenhancing lesions are not well visualized in the current exam likely due to motion.  Also showed new small to moderate volume ascites.     Started having issues with ascites again at the time of his last visit. MRI liver without evidence of PVT or HCC.  Sent to the hospital after his last visit 6/2022 for bradycardia Metoprolol was stopped due to bradycardia, EKG with frequent PVCs. TTE 6/2022 with improvement in his LVEF. 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 Repeat TTE 7/2022 showing LVEF 40-45%    Saw Dr. Lord for diarrhea - stopped cholestyramine because it wasn't helpful. Now back on high dose imodium.     Ongoing issues with refractory ascites. Back on Metoprolol 50 mg BID - LVEF improved showing moderate LV dilation but LVEF improved back to 53%, or 55-60%.     Hospitalized 9/23-27 for abdominal pain and confusion, found to have SBP. Is on cipro ppx. Underwent EGD where GAVE was treated with APC    Taking lactulose and now rifaximin (chronic diarrhea at baseline), confusion is stable.  Will require several bowel movements to avoid confusion    Interval Events:  -Was referred for TIPS for refractory ascites given improvement in his ejection fraction.  Discussed  that he is high risk for cardiac standpoint for TIPS.  Encephalopathy has been well controlled on lactulose and rifaximin.  Morning of his TIPS procedure, he was found to have severe hypokalemia.  TIPS was canceled.  Hypokalemia improved with increased supplementation, later found to have severe hypokalemia requiring ED evaluation.  Was admitted and found to have salmonella infection.  Was treated for this, he did not appreciably notice improvement in diarrhea with this.  Had an echocardiogram this admission that showed his ejection fraction decreased to 40 to 45% again, although limited due to his very frequent PVCs.  Given this his TIPS is on indefinite hold.  Given worsening lower extremity swelling and increasing ascites, gradually increasing diuretics as an outpatient.  Also had discussions with cardiology about if we can improve his ejection fraction so TIPS is an option in the future.  -Hemoglobin down to 6.8 today.  He has had issues with chronic anemia likely secondary to portal hypertensive gastropathy and gave, has required IV iron before.  Today he is seeing blood in the stool, wife has noticed that his bowel movements have been dark and sticky the past several days are hard to flush. Feels more tired than usual today.     ROS: 14 point ROS negative except for positives noted in HPI.    PMHx:  Past Medical History:   Diagnosis Date     Alcohol dependence in remission (H)      Alcoholic cirrhosis of liver with ascites (H)      Anemia      Chronic systolic heart failure (H)      Diabetes (H)     Type 2 DM, Insulin Use.      Esophageal varices (H)      Gastroesophageal reflux disease without esophagitis      Hepatic encephalopathy      Hypertension      Incisional hernia      Ischemic bowel disease (H)      Mixed hyperlipidemia    -- Type II diabetes Mellitus  -- Hypertension  -- Hyperlipidemia  -- CHF  -- GISELLE  -- SMV thrombus    PSHx:  Past Surgical History:   Procedure Laterality Date     COLONOSCOPY N/A  2021    Procedure: COLONOSCOPY, WITH BIOPSY, WITH CLIPS;  Surgeon: Sweetie Bain MD;  Location: [facility] OR     ESOPHAGOSCOPY, GASTROSCOPY, DUODENOSCOPY (EGD), COMBINED N/A 2021    Procedure: ESOPHAGOGASTRODUODENOSCOPY, WITH BIOPSY;  Surgeon: Sweetie Bain MD;  Location: [facility] OR      -- Exploratory laparotomy with small bowel resection 2020    FamHx:  Family History   Problem Relation Age of Onset     Deep Vein Thrombosis Mother      Pulmonary Embolism Mother      Substance Abuse Brother      Substance Abuse Maternal Grandmother      Anesthesia Reaction No family hx of      No family history of liver disease, liver cancer    SocHx:  Social History     Socioeconomic History     Marital status:      Spouse name: Not on file     Number of children: Not on file     Years of education: Not on file     Highest education level: Not on file   Occupational History     Not on file   Tobacco Use     Smoking status: Former     Packs/day: 1.00     Years: 16.00     Pack years: 16.00     Types: Cigarettes     Quit date:      Years since quittin.0     Smokeless tobacco: Never   Vaping Use     Vaping Use: Never used   Substance and Sexual Activity     Alcohol use: Not Currently     Comment: Quit 2020     Drug use: Never     Sexual activity: Not on file   Other Topics Concern     Parent/sibling w/ CABG, MI or angioplasty before 65F 55M? Not Asked   Social History Narrative     Not on file     Social Determinants of Health     Financial Resource Strain: Not on file   Food Insecurity: Not on file   Transportation Needs: Not on file   Physical Activity: Not on file   Stress: Not on file   Social Connections: Not on file   Intimate Partner Violence: Not on file   Housing Stability: Not on file   Patient previously worked with with [employer].  He is currently on disability due to pain from his hernias and discomfort.  Admits to significant alcohol use.  Used to drink about a liter of whiskey per day for  about 20 years.  Denies any hospitalizations for intoxication or withdrawal, denies any DUIs, denies any prior treatments.  Last drink was 9/21/2020.  Denies any cravings, and is not in any treatments program at the moment.  Denies any tobacco use, denies any IV drug use.    Medications:  No current facility-administered medications for this visit.     Current Outpatient Medications   Medication     acetaminophen (TYLENOL) 500 MG tablet     apixaban ANTICOAGULANT (ELIQUIS) 5 MG tablet     ciprofloxacin (CIPRO) 500 MG tablet     COMPRESSION STOCKINGS     DULoxetine (CYMBALTA) 60 MG capsule     eplerenone (INSPRA) 25 MG tablet     ferrous gluconate (FERGON) 324 (38 Fe) MG tablet     gabapentin (NEURONTIN) 300 MG capsule     insulin glargine (LANTUS PEN) 100 UNIT/ML pen     insulin pen needle (B-D U/F) 31G X 5 MM miscellaneous     lactulose 20 GM/30ML SOLN     loperamide (IMODIUM A-D) 1 MG/7.5ML     metFORMIN (GLUCOPHAGE) 1000 MG tablet     metoprolol succinate ER (TOPROL XL) 50 MG 24 hr tablet     Multiple Vitamins-Minerals (SUPER THERA DAMARIS M) TABS     omeprazole (PRILOSEC) 20 MG DR capsule     ONETOUCH ULTRA test strip     potassium chloride ER (KLOR-CON M) 10 MEQ CR tablet     rifaximin (XIFAXAN) 550 MG TABS tablet     tamsulosin (FLOMAX) 0.4 MG capsule     torsemide (DEMADEX) 20 MG tablet     Facility-Administered Medications Ordered in Other Visits   Medication     cefTRIAXone (ROCEPHIN) 1 g vial to attach to  mL bag for ADULTS or NS 50 mL bag for PEDS     magnesium sulfate 4 g in 100 mL sterile water (premade)     octreotide (sandoSTATIN) 1,250 mcg in D5W 250 mL     oxyCODONE (ROXICODONE) tablet 5 mg     [START ON 1/24/2023] pantoprazole (PROTONIX) IV push injection 40 mg     No OTCs, herbals    Allergies:  Allergies   Allergen Reactions     Minocycline Hives       Objective:  /60 (BP Location: Left arm, Patient Position: Sitting, Cuff Size: Adult Regular)   Pulse (!) 48   Temp 97  F (36.1  C)  "(Oral)   Ht 1.905 m (6' 3\")   Wt 128 kg (282 lb 1.6 oz)   SpO2 100%   BMI 35.26 kg/m    Constitutional: pleasant man in NAD  Eyes: non icteric  Respiratory: Normal respiratory excursion   MSK: normal range of motion of visualized extremities  Abd: Moderate ascites present  Ext: 2+ edema  Skin: No jaundice  Psychiatric: normal mood and orientation    Labs:  Last Comprehensive Metabolic Panel:  Sodium   Date Value Ref Range Status   01/23/2023 138 136 - 145 mmol/L Final   06/15/2021 136 133 - 144 mmol/L Final     Potassium   Date Value Ref Range Status   01/23/2023 4.1 3.4 - 5.3 mmol/L Final   01/23/2023 4.1 3.4 - 5.3 mmol/L Final   06/15/2021 4.1 3.4 - 5.3 mmol/L Final     Chloride   Date Value Ref Range Status   01/23/2023 112 (H) 98 - 107 mmol/L Final   01/23/2023 116 (H) 94 - 109 mmol/L Final   06/15/2021 112 (H) 94 - 109 mmol/L Final     Carbon Dioxide   Date Value Ref Range Status   06/15/2021 25 20 - 32 mmol/L Final     Carbon Dioxide (CO2)   Date Value Ref Range Status   01/23/2023 13 (L) 22 - 29 mmol/L Final   01/23/2023 17 (L) 20 - 32 mmol/L Final     Anion Gap   Date Value Ref Range Status   01/23/2023 13 7 - 15 mmol/L Final   01/23/2023 7 3 - 14 mmol/L Final   06/15/2021 <1 (L) 3 - 14 mmol/L Final     Glucose   Date Value Ref Range Status   01/23/2023 105 (H) 70 - 99 mg/dL Final   01/23/2023 87 70 - 99 mg/dL Final   06/15/2021 123 (H) 70 - 99 mg/dL Final     Urea Nitrogen   Date Value Ref Range Status   01/23/2023 24.1 (H) 6.0 - 20.0 mg/dL Final   01/23/2023 23 7 - 30 mg/dL Final   06/15/2021 8 7 - 30 mg/dL Final     Creatinine   Date Value Ref Range Status   01/23/2023 1.71 (H) 0.67 - 1.17 mg/dL Final   06/15/2021 0.63 (L) 0.66 - 1.25 mg/dL Final     GFR Estimate   Date Value Ref Range Status   01/23/2023 47 (L) >60 mL/min/1.73m2 Final     Comment:     eGFR calculated using 2021 CKD-EPI equation.   06/15/2021 >90 >60 mL/min/[1.73_m2] Final     Comment:     Non  GFR Calc  Starting " 12/18/2018, serum creatinine based estimated GFR (eGFR) will be   calculated using the Chronic Kidney Disease Epidemiology Collaboration   (CKD-EPI) equation.       Calcium   Date Value Ref Range Status   01/23/2023 7.9 (L) 8.6 - 10.0 mg/dL Final   06/15/2021 8.9 8.5 - 10.1 mg/dL Final     Bilirubin Total   Date Value Ref Range Status   01/23/2023 0.8 <=1.2 mg/dL Final   06/15/2021 0.8 0.2 - 1.3 mg/dL Final     Alkaline Phosphatase   Date Value Ref Range Status   01/23/2023 189 (H) 40 - 129 U/L Final   06/15/2021 126 40 - 150 U/L Final     ALT   Date Value Ref Range Status   01/23/2023 16 10 - 50 U/L Final   06/15/2021 36 0 - 70 U/L Final     AST   Date Value Ref Range Status   01/23/2023 32 10 - 50 U/L Final     Comment:     Unsatisfactory specimen - hemolyzed   Specimen is hemolyzed which can falsely elevate AST. Analysis of a non-hemolyzed specimen may result in a lower value.   06/15/2021 34 0 - 45 U/L Final       Lab Results   Component Value Date    WBC 4.7 06/15/2021     Lab Results   Component Value Date    RBC 3.89 06/15/2021     Lab Results   Component Value Date    HGB 8.6 06/15/2021     Lab Results   Component Value Date    HCT 31.0 06/15/2021     Lab Results   Component Value Date    MCV 80 06/15/2021     Lab Results   Component Value Date    MCH 22.1 06/15/2021     Lab Results   Component Value Date    MCHC 27.7 06/15/2021     Lab Results   Component Value Date    RDW 17.3 06/15/2021     Lab Results   Component Value Date     06/15/2021       INR   Date Value Ref Range Status   01/23/2023 2.31 (H) 0.85 - 1.15 Final   06/15/2021 1.58 (H) 0.86 - 1.14 Final        MELD-Na score: 21 at 1/23/2023  7:21 PM  MELD score: 21 at 1/23/2023  7:21 PM  Calculated from:  Serum Creatinine: 1.71 mg/dL at 1/23/2023  7:21 PM  Serum Sodium: 138 mmol/L (Using max of 137 mmol/L) at 1/23/2023  7:21 PM  Total Bilirubin: 0.8 mg/dL (Using min of 1 mg/dL) at 1/23/2023  7:21 PM  INR(ratio): 2.31 at 1/23/2023  7:21 PM  Age:  52 years    Imaging: Reviewed in EHR    Endoscopy: Reviewed in EHR    Independently reviewed labs and imaging.     Assessment/Plan: Mr. 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     Was originally referred for LT evaluation - given his CHF and low MELD, did not pursue this.     LVEF improved with sobriety, then worsened with stopping BB. Metoprolol resumed, LVEF improved. Much of his CHF was related to PVCs, improved with treatment of this.      Was referred for TIPS for refractory ascites once his ejection fraction improved on beta-blocker.  TIPS was canceled due to hypokalemia, repeat echo showed reduction in his ejection fraction to 40 to 45%, although difficult to measure given his PVC burden.  TIPS is on indefinite hold.    Follows cardiology at [facility].  Decided to refer to cardiology here at [facility] to see if there is anything that can be done to improve his PVC mediated cardiomyopathy with the goal of him becoming a TIPS candidate, or potential liver transplant candidate.  His MELD has historically been on the lower side.  There have been brief discussions about heart and liver transplant, unclear if he would be a good candidate for this and concerned he would be lower on the list for this given his CHF is not that severe.  Unfortunately does not have a lot of great options given his coexisting heart liver disease.  The alternative is continuing the current plan and patients with refractory ascites has a high risk of death in the upcoming year.     His HE is well controlled currently. 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  Chronic diarrhea thought to be related to bowel resection and also  portal hypertensive colopathy.  Has seen Dr. Lord for this.    - Recommend presenting to the [facility] ER for blood transfusion given active blood loss and cardiac optimization.  - Continue torsemide 60 mg daily  - Continue eplerenone 50 mg daily  - Continue KCl 20 supplementation  - Paracentesis biweekly PRN 6 L max with ~ 7.5 grams albumin replacement/L removed.   - Continue metoprolol 50 mg XL  BID for cardiology - typically BBs are held in refractory ascites, and after SBP, but his metoprolol has been very helpful in improving his LVEF and should be continued  - Continue lactulose and rifaximin  - Continue ciprofloaxin for SBP ppx  - EGD scheduled for later this week, TBD based on hospital evaluation    RTC after hospitalization    Sweetie Bain MD MS  Hepatology/Liver Transplant  [facility]

## 2023-01-24 NOTE — PROCEDURES
[facility]  CAPS PROCEDURE NOTE  Date of Admission:  1/23/2023  Consult Requested by: Medicine  Reason for Consult: diagnostic evaluation of ascites    Indication/HPI: Ascites    Pre-Procedure Diagnosis: Ascites    Post-Procedure Diagnosis: Ascites    Risk Assessment: Average risk, Technically straightforward; patient's anticoagulation has been held according to guidelines based on the agent and platelets and coags are within guidelines    [facility]    Procedure: Abdominal paracentesis    Date/Time: 1/24/2023 3:47 PM  Performed by: Mani Strange MD  Authorized by: Mani Strange MD       UNIVERSAL PROTOCOL   Site Marked: Yes  Prior Images Obtained and Reviewed:  Yes  Required items: Required blood products, implants, devices and special equipment available    Patient identity confirmed:  Verbally with patient  Patient was reevaluated immediately before administering moderate or deep sedation or anesthesia  Confirmation Checklist:  Correct equipment/implants were available, patient's identity using two indicators, relevant allergies and procedure was appropriate and matched the consent or emergent situation  Time out: Immediately prior to the procedure a time out was called    Universal Protocol: the Joint Commission Universal Protocol was followed    Preparation: Patient was prepped and draped in usual sterile fashion    ESBL (mL):  10    SEDATION    Patient Sedated: No      PROCEDURE    Patient Tolerance:  Patient tolerated the procedure well with no immediate complications  Length of time physician/provider present for 1:1 monitoring during sedation: 0      No results found for this or any previous visit from the past 1 day.           Mani Strange MD  [facility]  Securely message with [employer] (more info)  Text page via [employer] Paging/Directory   See signed in provider for  up to date coverage information

## 2023-01-24 NOTE — PROGRESS NOTES
New Heart Failure Referral     Sit: Referred by [facility]     B/A: referring to Dr. Ortega for Adv consult, transplant  See Staff message below for complete backround    Hi Dr. Malagon -     This is Sweetie Bain, one of the hepatologists here.  I wanted to see if I could run a complicated case by you for my one of my outpatients     Mr. 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 Sees a cardiologist at [facility]     He was initially referred for liver transplant more than a year ago, given his meld was low and his EF was 40% at that time, we said he was not a good candidate for liver transplant.     He did okay for a little bit and his EF improved. He then developed progressive portal hypertension with ascites.  Ascites looks like it is coming from portal hypertension and not cardiogenic. When his ascites worsened we stopped his beta blockers. We typically do this in refractory ascites, but that was a mistake as that made his PVC mediated cardiomyopathy worse and his ejection fraction went down.  He went back to his beta-blocker and his EF improved to 55 to 60%.  He has moderate LV dilation.  He sees a cardiologist at [facility], he had a Zio patch that showed     Findings:   1.  The patient's basic rhythm is sinus rhythm.   2.  Frequent single premature ventricular contractions were seen measuring 22.7% of total beats.   3.  Occasional ventricular couplets were seen measuring 3.4% of total beats.   4.  Frequent atrial ectopic runs were seen with the longest episode lasting 7 consecutive beats.     5.  Short atrial runs were also seen with the longest episode lasting 12 seconds.   6.  There was 1 diary entry and 1 triggered event.  The patient reported chest pressure while sitting which correlated with frequent ventricular ectopy.  This second triggered event also correlated with frequent ventricular ectopy.        He also recently had a cardiac MRI that showed     FINAL CONCLUSIONS:   1. Mild left ventricular enlargement with overall LV systolic function at   the lower limit of normal (visually estimated ejection fraction 50%; LVEF   calculated at 48% on short-axis images with more frequent PVCs, 58% on   biplane long-axis images without PVC).   2. Low normal right ventricular systolic function.   3. Mild left atrial enlargement.   4. No significant valve dysfunction noted.   5. Overall, when compared to the report from 11/6/2020, there has been   slight improvement in the LV systolic function.   6. Noncardiac findings reported separately below, per radiology.     I thought he was a reasonable but high risk candidate for TIPS given the improvement in his systolic function (and he did not have a lot of other options). The TIPS was cancelled when his K was low pre operatively. It got better with increasing his supplementation, but was very low again, so I sent him to the ED. He was actually found to have salmonella, diarrhea from that probably driving his hypokalemia. They got a repeat TTE this admission and his LVEF is back to 40-45%     I now am begrudgingly admitting he is too high risk for a TIPS. What do you think about combined heart liver transplant...       Rec: Sending to HF RN to review     Plan:

## 2023-01-24 NOTE — ANESTHESIA CARE TRANSFER NOTE
Patient: Blair Oliver    Procedure: Procedure(s):  ESOPHAGOGASTRODUODENOSCOPY, WITH BANDING OF VARICES       Diagnosis: Anemia [D64.9]  Melena [K92.1]  Diagnosis Additional Information: No value filed.    Anesthesia Type:   No value filed.     Note:    Oropharynx: oropharynx clear of all foreign objects  Level of Consciousness: awake  Oxygen Supplementation: room air    Independent Airway: airway patency satisfactory and stable  Dentition: dentition unchanged  Vital Signs Stable: post-procedure vital signs reviewed and stable  Report to RN Given: handoff report given  Patient transferred to: Emergency Department    Handoff Report: Identifed the Patient, Identified the Reponsible Provider, Reviewed the pertinent medical history, Discussed the surgical course, Reviewed Intra-OP anesthesia mangement and issues during anesthesia, Set expectations for post-procedure period and Allowed opportunity for questions and acknowledgement of understanding      Vitals:  Vitals Value Taken Time   BP 92/55 01/24/23 1628   Temp     Pulse 52 01/24/23 1628   Resp 15 01/24/23 1628   SpO2 98 % 01/24/23 1628       Electronically Signed By: GABRIEL Steen CRNA  January 24, 2023  4:29 PM

## 2023-01-24 NOTE — H&P
[facility]    History and Physical - Hospitalist Service, GOLD TEAM        Date of Admission:  1/23/2023    Assessment & Plan      Blair Oliver is a 52 year old male with pmh of cirrhosis admitted on 1/23/2023 with melena and hematochezia    #GI bleed  #Acute blood loss anemia  #Possible esophageal variceal bleed  -Hgb 6.5 on admission to the ER on 1/23 after reporting melena, hematochezia and associated symptoms of dizziness, orthostasis  -DDx - variceal bleed, gastric variceal bleed vs peptic ulcer. Less likely lower Gi bleed  Plan:  >IV octerotide gtt  >IV ppi BID  >IV ceftriaxone for SBP ppx  >GI consulted for possible EGD    #Cirrhosis secondary to alcohol use disorder and alcohol related liver disease  #Refractory Ascites  #Hx of Hepatic encephalopathy  -Follows with [facility] hepatology, seen in clinic on 1/23  -Currently hoping for liver transplant but not currently candidate due to CHF   -Hopeful for TIPS for refractory ascites but on hold with worsening EF  Plan:  >Diagnostic paracentesis  >Hepatology consult  >Rifaxamin, Lactulose  >Hold cipro ppx with ceftriaxone for possible variceal bleed    #Acute on chronic systolic heart failure  -Follows with [facility], currently hoping for re-evaluation by [facility] cardiology to assess if EF can improve and make him eligibile for transplant  Plan:  >Continue metoprolol  >hold eplerenone and torsemide with active GI bleed  >Cardiology consultation    #WAI on CKD  -Baseline Cr 1.3, 1.7 on admission likely secondary to acute blood loss  Plan:  >packed RBC, hold diuretics overnight    #Hx of mesenteric ischemia s/p bowel resection  #Incisional hernia  #Chronic diarrhea  #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  Plan:  >Hold apixaban    #Thrombocytopenia secondary to liver disesae - monitor CBC    #Type 2 DM  -Currently on lantus 65  Units daily, decrease to 30 units with NPO status    #GISELLE -  "CPAP  #Intertriginous candida - miconazole BID  #Peripheal neuropathy - duloxetine, gabapentin       Diet: NPO per Anesthesia Guidelines for Procedure/Surgery Except for: Meds    DVT Prophylaxis: VTE Prophylaxis contraindicated due to active GI bleed  [place] Catheter: Not present  Lines: None     Cardiac Monitoring: None  Code Status: Full Code  , discussed in person on 1/23 PM    Clinically Significant Risk Factors Present on Admission            # Hypomagnesemia: Lowest Mg = 1.6 mg/dL in last 2 days, will replace as needed   # Hypoalbuminemia: Lowest albumin = 2.8 g/dL at 1/23/2023 12:38 PM, will monitor as appropriate  # Drug Induced Coagulation Defect: home medication list includes an anticoagulant medication  # Thrombocytopenia: Lowest platelets = 116 in last 2 days, will monitor for bleeding   # Hypertension: home medication list includes antihypertensive(s)      # Obesity: Estimated body mass index is 35.25 kg/m  as calculated from the following:    Height as of this encounter: 1.905 m (6' 3\").    Weight as of this encounter: 127.9 kg (282 lb).           Disposition Plan      Expected Discharge Date: 01/25/2023                  Audie Rubin MD  Hospitalist Service, [facility]  Securely message with [employer] (more info)  Text page via [facility] Paging/Directory   See signed in provider for up to date coverage information    ______________________________________________________________________    Chief Complaint   Maroon stools    History is obtained from the patient    History of Present Illness   Blair Oliver is a 52 year old male with pmh of cirrhosis, CHF who presents with worsening black stools over the past week. He reports he has had 6 episodes of melena or maroon stools today. He notes associated symptoms of dizziness and light headedness with standing which is new for him. In addition, he reports that his abdomen has been increasing in " size. He reports he was getting paracentesis twice a week but now they have had to start taking more fluid than previously. He also notes that his weight is up 25 lbs over the past few weeks. He denies fever or chills. No rhinorrhea or sore throat. No dysphagia. He notes shortness of breath with activity and is winded after climbing half a flight of stairs in their split level home. No chest pain with activity. No dysuria or hematuria. He notes chronic abdominal pain which he deals with. He notes a  Rash under his abdomen which he believes is from fluid weeping from his abdomen.       Past Medical History    Past Medical History:   Diagnosis Date     Alcohol dependence in remission (H)      Alcoholic cirrhosis of liver with ascites (H)      Anemia      Chronic systolic heart failure (H)      Diabetes (H)     Type 2 DM, Insulin Use.      Esophageal varices (H)      Gastroesophageal reflux disease without esophagitis      Hepatic encephalopathy      Hypertension      Incisional hernia      Ischemic bowel disease (H)      Mixed hyperlipidemia        Past Surgical History   Past Surgical History:   Procedure Laterality Date     COLONOSCOPY N/A 12/9/2021    Procedure: COLONOSCOPY, WITH BIOPSY, WITH CLIPS;  Surgeon: Sweetie Bain MD;  Location: [facility] OR     ESOPHAGOSCOPY, GASTROSCOPY, DUODENOSCOPY (EGD), COMBINED N/A 12/9/2021    Procedure: ESOPHAGOGASTRODUODENOSCOPY, WITH BIOPSY;  Surgeon: Sweetie Bain MD;  Location: [facility] OR       Prior to Admission Medications   Prior to Admission Medications   Prescriptions Last Dose Informant Patient Reported? Taking?   COMPRESSION STOCKINGS Unknown at unknown Spouse/Significant Other No Yes   Sig: Please measure and distribute 2 pair of 20mmHg - 30mmHg knee high open or closed toe compression stockings with extra refills as indicated or what insurance will allow .   DULoxetine (CYMBALTA) 60 MG capsule 1/22/2023 at unknown Spouse/Significant Other Yes Yes   Sig: Take 60 mg by mouth  every evening   Multiple Vitamins-Minerals (SUPER THERA DAMARIS M) TABS 1/23/2023 at am Spouse/Significant Other Yes Yes   Sig: Take 1 tablet by mouth daily   ONETOUCH ULTRA test strip Unknown at unknown Spouse/Significant Other Yes Yes   Sig: PLEASE SEE ATTACHED FOR DETAILED DIRECTIONS   acetaminophen (TYLENOL) 500 MG tablet 1/22/2023 at unknown Spouse/Significant Other Yes Yes   Sig: Take 500-1,000 mg by mouth every 8 hours as needed for mild pain   apixaban ANTICOAGULANT (ELIQUIS) 5 MG tablet 1/23/2023 at am Spouse/Significant Other Yes Yes   Sig: Take 5 mg by mouth 2 times daily    ciprofloxacin (CIPRO) 500 MG tablet 1/22/2023 at night Spouse/Significant Other No Yes   Sig: Take 1 tablet (500 mg) by mouth daily   eplerenone (INSPRA) 25 MG tablet 1/23/2023 at am Spouse/Significant Other No Yes   Sig: Take 2 tablets (50 mg) by mouth 2 times daily   Patient taking differently: Take 50 mg by mouth daily   ferrous gluconate (FERGON) 324 (38 Fe) MG tablet 1/23/2023 at am Spouse/Significant Other No Yes   Sig: Take 1 tablet (324 mg) by mouth daily (with breakfast)   gabapentin (NEURONTIN) 300 MG capsule 1/23/2023 at am Spouse/Significant Other Yes Yes   Sig: Take 900 mg by mouth 2 times daily   insulin glargine (LANTUS PEN) 100 UNIT/ML pen 1/23/2023 at am Spouse/Significant Other Yes Yes   Sig: Inject 65 Units Subcutaneous every morning   insulin pen needle (B-D U/F) 31G X 5 MM miscellaneous Unknown at unknown Spouse/Significant Other Yes Yes   Sig: AS DIRECTED. BD UF MINI PEN NEEDLE [id number]: USE ONCE DAILY   lactulose 20 GM/30ML SOLN 1/22/2023 at night Spouse/Significant Other No Yes   Sig: Take 30 mLs (20 g) by mouth 3 times daily   loperamide (IMODIUM A-D) 1 MG/7.5ML 1/23/2023 at am Spouse/Significant Other No Yes   Sig: Take 15 mLs (2 mg) by mouth 4 times daily as needed for diarrhea   metFORMIN (GLUCOPHAGE) 1000 MG tablet Not Taking Spouse/Significant Other Yes No   Sig: Take 1,000 mg by mouth 2 times daily     Patient not taking: Reported on 2023   metoprolol succinate ER (TOPROL XL) 50 MG 24 hr tablet 2023 at am Spouse/Significant Other Yes Yes   Sig: Take 50 mg by mouth 2 times daily   omeprazole (PRILOSEC) 20 MG DR capsule 2023 at evening Spouse/Significant Other Yes Yes   Sig: Take 20 mg by mouth every evening   potassium chloride ER (KLOR-CON M) 10 MEQ CR tablet 2023 at am Spouse/Significant Other No Yes   Sig: Take 3 tablets (30 mEq) by mouth 2 times daily   rifaximin (XIFAXAN) 550 MG TABS tablet 2023 at am Spouse/Significant Other No Yes   Sig: Take 1 tablet (550 mg) by mouth 2 times daily   tamsulosin (FLOMAX) 0.4 MG capsule Not Taking Spouse/Significant Other Yes No   Sig: Take 0.4 mg by mouth daily   Patient not taking: Reported on 2023   torsemide (DEMADEX) 20 MG tablet 2023 at am Spouse/Significant Other No Yes   Sig: Take 3 tablets (60 mg) by mouth daily      Facility-Administered Medications: None        Review of Systems    The 10 point Review of Systems is negative other than noted in the HPI or here.   Gen: fatigue, malaise  ENT: dizziness  Resp: dyspnea on exertion  GI: melena, hematochezia, abdominal pain  Skin: rash      Social History   I have reviewed this patient's social history and updated it with pertinent information if needed.  Social History     Tobacco Use     Smoking status: Former     Packs/day: 1.00     Years: 16.00     Pack years: 16.00     Types: Cigarettes     Quit date:      Years since quittin.0     Smokeless tobacco: Never   Vaping Use     Vaping Use: Never used   Substance Use Topics     Alcohol use: Not Currently     Comment: Quit 2020     Drug use: Never        Physical Exam   Vital Signs: Temp: 98.3  F (36.8  C) Temp src: Oral BP: (!) 88/72 Pulse: 63   Resp: 15 SpO2: 99 % O2 Device: None (Room air)    Weight: 282 lbs 0 oz    General Appearance: Alert, lying in bed, no acute distress  Eyes: no icterus  HEENT: No oral ulcers,  mucous membranes moist, + JVD  Respiratory: diminshed breath sounds in bases bilaterally, no wheezing, no respiratory distress  Cardiovascular: RRR  GI: +Ascites, +tender to palpation, no rigidity or guarding, +distended    Skin: +intertriginous candida  Musculoskeletal: +3 pitting edema on lower extremities   Neurologic: holding eye contact, following commands, intact  strength and plantar and dorsiflexion of lower extremities, no asterixis  Psychiatric: No confusion     Medical Decision Making             Data     I have personally reviewed the following data over the past 24 hrs:    6.9  \   6.4 (LL)   / 129 (L)     138 112 (H) 24.1 (H) /  105 (H)   4.1 13 (L) 1.71 (H) \       ALT: 16 AST: 32 AP: 189 (H) TBILI: 0.8   ALB: 3.1 (L) TOT PROTEIN: 5.4 (L) LIPASE: N/A       INR:  2.31 (H) PTT:  N/A   D-dimer:  N/A Fibrinogen:  N/A       Imaging results reviewed over the past 24 hrs:   No results found for this or any previous visit (from the past 24 hour(s)).

## 2023-01-24 NOTE — PLAN OF CARE
ED PT Deferral: Patient denies any acute mobility concerns and states that he has chiefly been limited by LE swelling with upright activity. Discussed with edema therapist and requested lymphedema orders from primary. OT to continue to follow and initiate for edema as appropriate. PT to sign off, please re-consult as needed.

## 2023-01-24 NOTE — ANESTHESIA POSTPROCEDURE EVALUATION
Patient: Blair Oliver    Procedure: Procedure(s):  ESOPHAGOGASTRODUODENOSCOPY, WITH BANDING OF VARICES       Anesthesia Type:  MAC    Note:  Anesthesia Post Evaluation    Last vitals:  Vitals:    01/24/23 1311 01/24/23 1314 01/24/23 1628   BP:  118/74 92/55   Pulse:  50 52   Resp:  11 15   Temp: 36.7  C (98  F)     SpO2:  100% 98%       Electronically Signed By: Celeste Nicholas MD  January 24, 2023  5:13 PM

## 2023-01-24 NOTE — PROGRESS NOTES
"CLINICAL NUTRITION SERVICES - ASSESSMENT NOTE     Nutrition Prescription    RECOMMENDATIONS FOR MDs/PROVIDERS TO ORDER:  Diet advancement v nutrition support in 2-3 days     Malnutrition Status:    Unable to determine due to incomplete NFPE    Future/Additional Recommendations:  -- when diet advanced > FLD recommend ensure enlive twice daily and calorie counts  --      REASON FOR ASSESSMENT  Balir Oliver is a/an 52 year old male assessed by the dietitian for Provider Order - nutrition evaluation, supplementation    Per chart review patient with a PMHx: alcohol cirrhosis, DM, anemia, CHF, GERD    NUTRITION HISTORY  Per chart review patient with a decreased appetite and bloating. He reported his weight is up 25 lbs over the past few weeks. Paracentesis twice a week.   Patient not available when this writer attempted to visit.     CURRENT NUTRITION ORDERS  Diet: NPO    LABS  Labs reviewed  (1/24): BUN 22.7 mg/dL(H), Cr 1.7 mg/dL     MEDICATIONS  Medications reviewed  Lantus PEN  Lactulose  Thera-vit    ANTHROPOMETRICS  Height: 190.5 cm (6' 3\")  Most Recent Weight: 127.9 kg (282 lb)    IBW: 89.1 kg  BMI: Obesity Grade II BMI 35-39.9  Weight History:   Wt Readings from Last 10 Encounters:   01/23/23 127.9 kg (282 lb)   01/23/23 128 kg (282 lb 1.6 oz)   12/05/22 116.7 kg (257 lb 4.4 oz)   10/11/22 117.6 kg (259 lb 4.8 oz)   08/09/22 129.7 kg (286 lb)   06/15/22 123.9 kg (273 lb 1.6 oz)   06/13/22 133.4 kg (294 lb)   03/02/22 135 kg (297 lb 9.6 oz)   12/13/21 131.9 kg (290 lb 11.2 oz)   09/20/21 131.2 kg (289 lb 4.8 oz)   weight loss difficult to assess with fluid status   Dosing Weight: 96 kg (adjBW based on IBW and weight from 12/5/23)    ASSESSED NUTRITION NEEDS  Estimated Energy Needs: 4882-5262+ kcals/day (20 - 25+ kcals/kg)  Justification: Obese  Estimated Protein Needs: 115-144+ grams protein/day (1.2 - 1.5+ grams of pro/kg)  Justification: Increased needs  Estimated Fluid Needs: (1 mL/kcal)   Justification: " Maintenance    PHYSICAL FINDINGS  See malnutrition section below.    MALNUTRITION  % Intake: Unable to assess  % Weight Loss: Difficult to assess with fluid status   Subcutaneous Fat Loss: Unable to assess  Muscle Loss: Unable to assess  Fluid Accumulation/Edema: Unable to assess  Malnutrition Diagnosis: Unable to determine due to incomplete NFPE     NUTRITION DIAGNOSIS  Inadequate oral intake related to decreased appetite, bloating as evidenced by patient report from chart review       INTERVENTIONS  Implementation  Nutrition Education: Unable to complete due to patient not available       Goals  Diet adv v nutrition support within 2-3 days.     Monitoring/Evaluation  Progress toward goals will be monitored and evaluated per protocol.    Agnieszka Rodgers, MS/RD/LD  5A [phone])/5B RD pager: [phone]  Weekend/Holiday RD pager: [phone]

## 2023-01-24 NOTE — OR NURSING
EGD under MAC.  One band placed in distal esophagus.  During case pt felt cold to touch, writer checked gluc, was 60.  Dr Faye wanted 1/2 amp D50 given.  Report called to [facility] at 1620hrs.  Pt tolerated procedure.  Gastric mucosa very friable, oozing occurred after using scope  to gastric mucosa.  Liquid diet for 12 hours, ADAT after that per Dr Faye.

## 2023-01-24 NOTE — PROGRESS NOTES
Brief EGD report    Indication Iron deficiency anemia, Cirrhosis with suspected esophageal varices, Portal hypertension with suspected esophageal varices, Esophageal varices, EGD in 12/2022 with esophageal varices s/p 3 bands. Here with Hgb <7 with intermittently black stools. No hematemesis.     Sedation MAC    Findings  - Esophagogastric landmarks identified.   - Esophageal varices with no stigmata of recent bleeding.  Treatment successful and completely eradicated.  Banded.   - Erythematous mucosa in the stomach. No evidence of fresh or old blood in entire stomach.   - Portal hypertensive gastropathy. This is the likely culprit of anemia. There is contact bleeding/   - Erythematous duodenopathy.   - No specimens collected.     Recommendations  - [facility]. Repeat EGD in 4-6 weeks for varices. Consider management of PHTG if anemia persists. Endoscopic intervention might be challenging due to diffuse nature of PHT gastropathy changes.

## 2023-01-24 NOTE — CONSULTS
Cardiology Inpatient Consultation  January 24, 2023    Reason for Consult:  A cardiology consult was requested by the medicine service to provide clinical guidance regarding possible interventions to optimize heart failure management to improve liver transplant eligibility.    Assessment:  Blair Oliver is a 52 year old male with a past medical history of cirrhosis 2/2 alcohol use disorder, heart failure with improved LVEF (35% 9/2020 --> 55% 10/2022) from nonischemic cardiomyopathy, DM2, HTN, dyslipidemia, and GERD who presented with melena and hematochezia thought to be due to an esophageal variceal bleed. Cardiology was consulted for possible interventions to optimize heart failure management to improve liver transplant eligibility.    Acute on chronic decompensated HF with improved LVEF (35%->55%)  Nonischemic cardiomyopathy 2/2 alcohol use and frequent PVC burden   ECG 1/23 shows sinus bradycardia but previous ECGs demonstrate frequent PVCs. The patient has had several TTEs in the past demonstrating variable EFs. His most recent EF was 40-45% on 1/3 at [facility]. His HF is most likely due to a nonischemic cardiomyopathy secondary to PVC burden and may be related to his previous alcohol use as well as hypertension. Total PVC burden of up to 20% as assessed on Zio patch in 2/2022 - at that time he was referred to EP but he had not followed up with this. Ischemic workup so far includes stress MRI 11/2020 which showed no evidence of ischemia.     In terms of optimizing his cardiomyopathy, alcohol avoidance is key. PVC ablation should be considered. He has previously been referred to EP for this but did not follow up in [facility] system.     In terms of his medical therapy, agree with beta blocker, aldosterone antagonist, and diuresis. Given his fluctuating LVEF (possibly related to PVCs), would also add on empagliflozin given benefit in reduced and preserved LVEF (and may help with volume issues over time).      Recommendation:  - when stable from GI bleeding standpoint, would resume his torsemide and eplerenone  - resume home metoprolol succinate, would decrease the frequency to once daily instead of BID given his HR is in the 50s-60s   - start empagliflozin prior to discharge   - referral to EP to consider PVC ablation as outpatient   - the cause of his cardiomyopathy appears reversible at this point and should urgency for liver transplantation arise prior to PVC ablation then this could be considered after liver transplant as well, which may be safer from a bleeding standpoint     Plan of care discussed with Dr. Dumont, who agrees with above plan.    Thank you for consulting the cardiovascular services at the [facility]. Please do not hesitate to call us with any questions.     Pedro Green, MS3    I saw this patient together with medical student, Pedro Green, and performed an independent exam at the same time which confirmed findings. I have edited this note as appropriate to reflect our joint assessment/plan. Patient was also seen and discussed with attending physician, Dr. Dumont, who is in agreement with above.     Ashok Barrow MD  Cardiology Fellow  [phone]    HPI:   Blair Oliver is a 52 year old male with a past medical history of cirrhosis 2/2 alcohol use disorder, CHF, DM2, HTN, dyslipidemia, and GERD. He presented to the ED with anemia following a week of black stools and two episodes of bright red stools. He also notes dizziness and lightheadedness. He undergoes paracentesis twice per week but has noticed they have been drawing off more fluid as of late and his abdomen is becoming more distended. He has also gained 25 lbs in the last few weeks (up 27 lbs this admission from previous office visit on 1/3). He denies chest pain but has been increasingly short of breath with activity including climbing one level of stairs. At the time of interview, the patient denies  orthopnea, PND, palpitations, or syncope.     Review of Systems:    Complete review of systems was performed and negative except per HPI.    PMH:  Past Medical History:   Diagnosis Date    Alcohol dependence in remission (H)     Alcoholic cirrhosis of liver with ascites (H)     Anemia     Chronic systolic heart failure (H)     Diabetes (H)     Type 2 DM, Insulin Use.     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     Priority: Medium    Anemia 2022     Priority: Medium    Bradycardia 2022     Priority: Medium    Alcoholic cirrhosis of liver with ascites (H) 2022     Priority: Medium    Fatigue, unspecified type 2022     Priority: Medium     Social History:  Social History     Tobacco Use    Smoking status: Former     Packs/day: 1.00     Years: 16.00     Pack years: 16.00     Types: Cigarettes     Quit date:      Years since quittin.0    Smokeless tobacco: Never   Vaping Use    Vaping Use: Never used   Substance Use Topics    Alcohol use: Not Currently     Comment: Quit 2020    Drug use: Never     Family History:  Family History   Problem Relation Age of Onset    Deep Vein Thrombosis Mother     Pulmonary Embolism Mother     Substance Abuse Brother     Substance Abuse Maternal Grandmother     Anesthesia Reaction No family hx of        Medications:   cefTRIAXone  1 g Intravenous Daily    [Held by provider] ciprofloxacin   500 mg Oral Daily    - MEDICATION INSTRUCTIONS -   Does not apply See Admin Instructions    DULoxetine  60 mg Oral QPM    gabapentin  300 mg Oral BID    insulin glargine  20 Units Subcutaneous QAM    lactulose  20 g Oral TID    [Held by provider] metoprolol succinate ER  50 mg Oral BID    miconazole   Topical BID    multivitamin, therapeutic  1 tablet Oral Daily    pantoprazole  40 mg Intravenous BID    [Held by provider] potassium chloride ER  30 mEq Oral BID    rifaximin  550 mg Oral BID    sodium chloride (PF)  3 mL Intracatheter Q8H    [Held by provider] torsemide  60 mg Oral Daily        octreotide (sandoSTATIN) infusion ADULT 50 mcg/hr (01/24/23 0853)       Physical Exam:  Temp:  [97  F (36.1  C)-98.3  F (36.8  C)] 97.7  F (36.5  C)  Pulse:  [48-63] 54  Resp:  [12-18] 12  BP: ()/(51-72) 114/69  SpO2:  [98 %-100 %] 100 %    Intake/Output Summary (Last 24 hours) at 1/24/2023 1039  Last data filed at 1/24/2023 0738  Gross per 24 hour   Intake 1800 ml   Output --   Net 1800 ml     GEN: NAD, some jaundice, pleasant  HEENT: No discharge  EYES: no icterus  CV: JVD 12cm. RRR, normal s1/s2, no murmurs/rubs/s3/s4, no heave.   CHEST: some decreased breath sounds bilaterally at the bases  ABD: ascites, distended, tender to palpation on right side  EXTR: Pitting LE edema bilaterally. Compression stockings in place. Normal pulses.  NEURO: alert oriented, speech fluent/appropriate, motor grossly nonfocal  PSYCH: cooperative, affect appropriate, pleasant      Diagnostics:  All labs and imaging were reviewed, of note:    CMP  Recent Labs   Lab 01/24/23  1029 01/24/23  0854 01/24/23  0755 01/23/23  1921 01/23/23  1238   NA  --  138  --  138 140   POTASSIUM  --  3.9  --  4.1 4.1   CHLORIDE  --  113*  --  112* 116*   CO2  --  14*  --  13* 17*   ANIONGAP  --  11  --  13 7   GLC 80 88 78 105* 87   BUN  --  22.7*  --  24.1* 23   CR  --  1.70*  --  1.71* 1.58*   GFRESTIMATED  --  47*  --  47* 52*   DANIELLE  --  7.3*  --  7.9* 8.0*    MAG  --   --   --  1.6*  --    PROTTOTAL  --  4.5*  --  5.4* 5.8*   ALBUMIN  --  2.5*  --  3.1* 2.8*   BILITOTAL  --  1.1  --  0.8 0.9   ALKPHOS  --  143*  --  189* 187*   AST  --  26  --  32 24   ALT  --  11  --  16 23     CBC  Recent Labs   Lab 23  0854 23  0305 23  1237   WBC  --  4.3 6.9 7.0   RBC  --  2.00* 1.99* 1.98*   HGB 6.7* 6.3* 6.4* 6.5*   HCT 22.3* 21.5* 22.5* 21.1*   MCV  --  108* 113* 107*   MCH  --  31.5 32.2 32.8   MCHC  --  29.3* 28.4* 30.8*   RDW  --  18.9* 17.8* 17.2*   PLT  --  89* 129* 116*     INR  Recent Labs   Lab 23  0854 23  1237   INR 2.51* 2.31* 2.59*     Arterial Blood GasNo lab results found in last 7 days.    No results found for: TROPI, TROPONIN, TROPR, TROPN    EK2023 - Sinus bradycardia  2022 - Sinus rhythm with occasional Premature ventricular complexes   2022 - Sinus rhythm with frequent Premature ventricular complexes in a pattern of bigeminy    Transthoracic echocardiogram:  1/3/2023  Visually Estimated EF: 40-45%. Technically difficult study - contrast was used to enhance endocardial definition due to suboptimal image quality. Mild to moderately abnormal left ventricular ejection fraction estimated at 40-45%. EF difficult to estimate due to very frequent PVCs. Mild left atrial enlargement.    10/10/2022  Interpretation Summary  Frequent ectopy during study making interpretation suboptimal. Global and regional left ventricular function is low-normal with an EF of 55%. Moderate left ventricular dilation is present. Right ventricular function, chamber size, wall motion, and thickness are normal. No clinically significant valvular pathology identified. No pericardial effusion is present. This study was compared with the study from 22. No significant changes noted.    2022  Interpretation Summary  Left ventricular size, wall motion and function are normal. The ejection  fraction is 55-60%. The  right ventricle is normal size. Global right ventricular function is normal. No significant valvular abnormalities were noted. No pericardial effusion is present.    7/26/2021  Visually Estimated EF: 40-45%. Mild left ventricular enlargement with mild left ventricular hypertrophy. There is abnormal septal motion. Mild-moderately reduced systolic function. Visually estimated ejection fraction is 40-45%. The right ventricle is normal in size and function. There is no significant valvular heart disease. The aortic root measures 4.7 cm. The ascending aorta measures 3.8 cm. Compared to prior study on 07/26/2021, there is no significant change.    Additional imaging:   MR Cardiac stress imaging O  11/11/2020  No evidence of myocardial ischemia on stress MRI perfusion imaging. Mild LV enlargement with mild to moderately reduced LV systolic function (calculated LVEF 41%), global hypokinesis; no evidence of LV scar   on gadolinium delayed enhancement. Mild RV enlargement and mild RV dysfunction. Mild left atrial enlargement. No significant valve abnormalities noted. Noncardiac findings (cirrhosis and ascites) reported separately below,   per Radiology.      Attending Attestation: I saw, examined and evaluated the patient with the cardiac consultation team and reviewed all relevant data. I agree with the findings, and our shared assessment and plan of care documented in the edited note and summarized the isaacs findings and plan with the patient.  - recommend to add SGLT2 and optimize metoprolol regimen  - EP referral for frequent PVCs

## 2023-01-25 NOTE — ED NOTES
Updated provider: MD George    ED RM 10    diet order? BG 96, asymptomatic, pt would like to eat    Thanks, Elizabeth *[phone]          Elizabeth Morales RN

## 2023-01-25 NOTE — PROCEDURES
[facility]  CAPS PROCEDURE NOTE  Date of Admission:  1/23/2023  Consult Requested by: Medicine  Reason for Consult: management of symptomatic ascites    Indication/HPI: symptomatic ascites    Pre-Procedure Diagnosis: Ascites    Post-Procedure Diagnosis: Ascites    Risk Assessment: Average risk, Technically straightforward; patient's anticoagulation has been held according to guidelines based on the agent and platelets and coags are within guidelines    [facility]    Procedure: Abdominal paracentesis    Date/Time: 1/25/2023 2:24 PM  Performed by: Mani Strange MD  Authorized by: Mani Strange MD       UNIVERSAL PROTOCOL   Site Marked: Yes  Prior Images Obtained and Reviewed:  Yes  Required items: Required blood products, implants, devices and special equipment available    Patient identity confirmed:  Verbally with patient  Patient was reevaluated immediately before administering moderate or deep sedation or anesthesia  Confirmation Checklist:  Correct equipment/implants were available, patient's identity using two indicators, relevant allergies and procedure was appropriate and matched the consent or emergent situation  Time out: Immediately prior to the procedure a time out was called    Universal Protocol: the Joint Commission Universal Protocol was followed    Preparation: Patient was prepped and draped in usual sterile fashion      SEDATION    Patient Sedated: No      PROCEDURE  Describe Procedure: 5000 ml of clear yellow colored fluids removed. Recommend giving Albumin 50 g.   Patient Tolerance:  Patient tolerated the procedure well with no immediate complications  Length of time physician/provider present for 1:1 monitoring during sedation: 0      No results found for this or any previous visit from the past 1 day.         Mani Strange MD  [facility]  Securely  message with Vocera (more info)  Text page via [facility] Paging/Directory   See signed in provider for up to date coverage information

## 2023-01-25 NOTE — PROGRESS NOTES
"[facility]  HEPATOLOGY PROGRESS NOTE  Blair Oliver [id number]       IMPRESSION:  Blair Oliver is a 52 year old male with a history of alcohol cirrhosis complicated by ascites with twice weekly gabbi, EV s/p banding (12/1/22), mild HE, MVT s/p bowel resection, chronic diarrhea, ventral wall hernia, HFrEF (EF 40-45%), frequent PVC's who was admitted with anemia concerning for GIB. He underwent EGD with portal hypertensive gastropathy and nonbleeding EV.     RECOMMENDATIONS:  # Anemia  # EV s/p banding  - has had prior banding in 12/2022. Repeat EGD 1/24 with EV x1 banding. He did have oozing from gastric and duodenopathy with contact. Hemoglobin stable after PRBC.   - anemia multifactorial. When not receiving blood, will need peripheral smear, haptoglobin. T. Bili mixed direct/indirect.   - EGD without evidence of recent EV bleeding. May stop octreotide  -  Transition abx back to SBP ppx with cipro.   - ok to advance diet. Will repeat EGD in 4-6 weeks.      #Ascites  # HFrEF with PVC's.   - not a current candidate for TIPS due to HFrEF.   - Cardiology saw pt 1/24. This may be reversible with correction of PVC's. Recommended EP follow up.      # Alcohol cirrhosis  - MELD 24  - HCC screening with US 1/23 negative  - mentation stable, no current signs of HE    Patient was discussed with attending physician, Dr. Harris     Next follow up appointment: tbd    Thank you for the opportunity to be involved with  Blair Oliver care. Please call with any questions or concerns.    GABRIEL Calvin, CNP  Inpatient Hepatology [facility]  Text Link        SUBJECTIVE:  Denies dyspnea, abdominal pain, nausea or vomiting. He continues to complain of lower extremity edema.     ROS:  A 10-point review of systems was negative.    OBJECTIVE:  VS: /76 (BP Location: Left arm)   Pulse 64   Temp 98  F (36.7  C) (Oral)   Resp 16   Ht 1.905 m (6' 3\")   Wt 127.9 kg (282 lb)   SpO2 100%   BMI 35.25 kg/m     Date 01/25/23 " 0700 - 01/26/23 0659   Shift 9454-1106 8665-7616 7009-3526 24 Hour Total   INTAKE   P.O. 720   720   Shift Total(mL/kg) 720(5.63)   720(5.63)   OUTPUT   Shift Total(mL/kg)       Weight (kg) 127.91 127.91 127.91 127.91      General: In no acute distress, mild facial muscle wasting  Neuro: AOx3  HEENT:  Noscleral icterus  CV:  Skin warm and dry  Lungs:  Respirations even and nonlabored on room air  Abd:  Mildly distended, nontender   Extrem: +2 lower peripheral edema   Skin: Nojaundice  Psych: pleasant    MEDICATIONS:  Current Facility-Administered Medications   Medication     acetaminophen (TYLENOL) tablet 500 mg     albumin human 25 % injection 50 g     ciprofloxacin (CIPRO) tablet 500 mg     Contraindications to both pharmacological and mechanical prophylaxis (must document contraindications for both in this order)     glucose gel 15-30 g    Or     dextrose 50 % injection 25-50 mL    Or     glucagon injection 1 mg     DULoxetine (CYMBALTA) DR capsule 60 mg     eplerenone (INSPRA) tablet 50 mg     gabapentin (NEURONTIN) capsule 300 mg     insulin glargine (LANTUS PEN) injection 20 Units     lactulose (CHRONULAC) solution 20 g     lidocaine (LMX4) cream     lidocaine 1 % 0.1-1 mL     melatonin tablet 1 mg     [START ON 1/26/2023] metoprolol succinate ER (TOPROL XL) 24 hr tablet 50 mg     miconazole (MICATIN) 2 % powder     multivitamin, therapeutic (THERA-VIT) tablet 1 tablet     naloxone (NARCAN) injection 0.2 mg    Or     naloxone (NARCAN) injection 0.4 mg    Or     naloxone (NARCAN) injection 0.2 mg    Or     naloxone (NARCAN) injection 0.4 mg     octreotide (sandoSTATIN) 1,250 mcg in D5W 250 mL     ondansetron (ZOFRAN ODT) ODT tab 4 mg    Or     ondansetron (ZOFRAN) injection 4 mg     oxyCODONE (ROXICODONE) tablet 5 mg     pantoprazole (PROTONIX) IV push injection 40 mg     polyethylene glycol (MIRALAX) Packet 17 g     [Held by provider] potassium chloride ER (KLOR-CON M) CR tablet 30 mEq     rifaximin (XIFAXAN)  tablet 550 mg     sodium chloride (PF) 0.9% PF flush 3 mL     sodium chloride (PF) 0.9% PF flush 3 mL     torsemide (DEMADEX) tablet 60 mg     Current Outpatient Medications   Medication     acetaminophen (TYLENOL) 500 MG tablet     apixaban ANTICOAGULANT (ELIQUIS) 5 MG tablet     ciprofloxacin (CIPRO) 500 MG tablet     COMPRESSION STOCKINGS     DULoxetine (CYMBALTA) 60 MG capsule     eplerenone (INSPRA) 25 MG tablet     ferrous gluconate (FERGON) 324 (38 Fe) MG tablet     gabapentin (NEURONTIN) 300 MG capsule     insulin glargine (LANTUS PEN) 100 UNIT/ML pen     insulin pen needle (B-D U/F) 31G X 5 MM miscellaneous     lactulose 20 GM/30ML SOLN     loperamide (IMODIUM A-D) 1 MG/7.5ML     metoprolol succinate ER (TOPROL XL) 50 MG 24 hr tablet     Multiple Vitamins-Minerals (SUPER THERA DAMARIS M) TABS     omeprazole (PRILOSEC) 20 MG DR capsule     ONETOUCH ULTRA test strip     potassium chloride ER (KLOR-CON M) 10 MEQ CR tablet     rifaximin (XIFAXAN) 550 MG TABS tablet     torsemide (DEMADEX) 20 MG tablet     metFORMIN (GLUCOPHAGE) 1000 MG tablet     tamsulosin (FLOMAX) 0.4 MG capsule       REVIEW OF LABORATORY, PATHOLOGY AND IMAGING RESULTS:  [facility]  Recent Labs   Lab 01/25/23  1127 01/25/23  0938 01/25/23  0822 01/24/23  2057 01/24/23  1029 01/24/23  0854 01/24/23  0755 01/23/23  1921 01/23/23  1238   0000   NA  --  136  --   --   --  138  --  138 140  --    POTASSIUM  --  4.9  --   --   --  3.9  --  4.1 4.1  --    CHLORIDE  --  109*  --   --   --  113*  --  112* 116*  --    DANIELLE  --  8.2*  --   --   --  7.3*  --  7.9* 8.0*  --    CO2  --  12*  --   --   --  14*  --  13* 17*  --    BUN  --  27.7*  --   --   --  22.7*  --  24.1* 23  --    CR  --  1.96*  --   --   --  1.70*  --  1.71* 1.58*  --    * 223* 200* 96   < > 88   < > 105* 87   < >    < > = values in this interval not displayed.     CBC  Recent Labs   Lab 01/25/23  1419 01/25/23  0938 01/24/23  1315 01/24/23  0854 01/24/23  0305 01/23/23  1921  01/23/23  1237   WBC  --  5.0  --   --  4.3 6.9 7.0   RBC  --  2.91*  --   --  2.00* 1.99* 1.98*   HGB 8.6* 9.0* 7.3* 6.7* 6.3* 6.4* 6.5*   HCT 29.1* 30.2* 24.2* 22.3* 21.5* 22.5* 21.1*   MCV  --  104*  --   --  108* 113* 107*   MCH  --  30.9  --   --  31.5 32.2 32.8   MCHC  --  29.8*  --   --  29.3* 28.4* 30.8*   RDW  --  19.1*  --   --  18.9* 17.8* 17.2*   PLT  --  85*  --   --  89* 129* 116*     INR  Recent Labs   Lab 01/25/23  0938 01/24/23  0854 01/23/23 1921 01/23/23  1237   INR 1.95* 2.51* 2.31* 2.59*     LFTs  Recent Labs   Lab 01/25/23  0938 01/24/23  0854 01/23/23  1921 01/23/23  1238   ALKPHOS 168* 143* 189* 187*   AST 32 26 32 24   ALT 15 11 16 23   BILITOTAL 1.6* 1.1 0.8 0.9   PROTTOTAL 5.1* 4.5* 5.4* 5.8*   ALBUMIN 2.9* 2.5* 3.1* 2.8*        MELD-Na score: 24 at 1/25/2023  9:38 AM  MELD score: 23 at 1/25/2023  9:38 AM  Calculated from:  Serum Creatinine: 1.96 mg/dL at 1/25/2023  9:38 AM  Serum Sodium: 136 mmol/L at 1/25/2023  9:38 AM  Total Bilirubin: 1.6 mg/dL at 1/25/2023  9:38 AM  INR(ratio): 1.95 at 1/25/2023  9:38 AM  Age: 52 years      Imaging Results:    US abd w doppler 1/24/23  Impression:  1. Patent hepatic vasculature by Doppler evaluation. No portal vein  thrombus demonstrated.  2. Cirrhotic liver morphology with ascites.  3. Distended gallbladder with nonspecific mild gallbladder wall  thickening; possibly secondary to ascites and liver dysfunction,  cholecystitis is considered less likely; if clinical concern for  cholecystitis, HIDA may be performed.    EGD 1/24/23  Impression:            - Esophagogastric landmarks identified.                          - Esophageal varices with no stigmata of recent                          bleeding. Treatment successful and completely                          eradicated. Banded.                          - Erythematous mucosa in the stomach. No evidence of                          fresh or old blood in entire stomach.                          - Portal  hypertensive gastropathy. This is the likely                          culprit of anemia. There is contact bleeding/                          - Erythematous duodenopathy.

## 2023-01-25 NOTE — PROGRESS NOTES
"Pt educated on lymphatic system anatomy/physiology, precautions, and treatment options. See evaluation above for description of BLE edema and skin. Pt is appropriate for use of GCBs to promote fluid mobilization and edema management, for improved skin integrity, functional mobility, and ADL independence. Pt's LE's washed and lotioned with Sween 24. Donned Size M TGSoft base layer, followed by 1 x 8cm ger from MTP's to distal shin and 1 x 10 cm ger from ankle to knee crease using \"quick wrap technique\" with 50% overlap and 50% stretch. Stockinette added over tape for increased hold. Pt reporting comfort. Pt educated on wear 24-48 hr wear schedule and indications for removal (pain, numbness, tingling, soiled, or loose/falling off). Pt educated in conservative strategies for managing BLE edema, including elevation and muscle pump activation. Pt verbalized understanding of all education. Patient care order in place.     "

## 2023-01-25 NOTE — PROGRESS NOTES
[facility]    Medicine Progress Note - Hospitalist Service, GOLD TEAM 12    Date of Admission:  1/23/2023    Assessment & Plan    Blair Oliver is a 52 year old male with pmh of cirrhosis admitted on 1/23/2023 with melena and hematochezia    Changes Today:  - EGD done 1/24 -> EV noted though unlikely source of bleed, banded; portal hypertensive gastropathy present, suspect source of slow intermittent bleeding  - Dx para completed 1/24, neg for SBP  - Tx para completed 1/25, 5L removed, 50g 25% albumin given in setting of up-trending Cr  - Transition back to prophylatic Cipro for SBP prophy  - Will discontinue Octreotide, transition back to PO PPI on 1/26  - Resumed PTA Eplerenone, Torsemide today 1/25  - PLan to resume PTA Metoprolol at frequency of daily (decreased from BID) per Cards  - Needs EP referral on discharge  - OT assessed at bedside, lymphedema wraps placed     Suspected Upper GI bleed - resolved  Acute blood loss anemia - stable  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 Patient has known hx EV and portal hypertensive gastropathy. Ultimately received 2 units PRBCs on 1/24 with improvement in Hb, no further transfusions required.   - Trend Hb, transfuse for Hb <7, received 2 units PRBCs thus far  - IV PPI BID (1/23->), will plan to transition back to PO on 1/26  - IV Octreotide gtt (1/23 ->) will plan to transition back to PO on 1/26   - IV Ceftriaxone, dx para neg for SBP on 1/24, transitioned to prophy Cipro on 1/26     Cirrhosis secondary to alcohol use disorder and alcohol related liver disease  Refractory Ascites  Hx of Hepatic encephalopathy  Coagulation Defects secondary to underlying liver disease  Chronic Thrombocytopenia  Follows with [facility] hepatology, seen in clinic on 1/23. Currently  hoping for liver transplant but not currently candidate due to CHF. Hopeful for TIPS for refractory ascites but on hold with worsening EF  - Dx para done 1/24 neg for SBP, will transition back to prophylactic Cipro after EGD   - Continue PTA Rifaxamin, Lactulose  - Cardiology consult done as below  - HCC Screening: Repeat Abd US with doppler 1/23-> patent vasculature, cirrhotic liver without any focal lesions  - Mentation stable, no current evidence of HE  - Tx para completed 1/25, 5L removed, 50g 25% albumin given in setting of up-trending Cr    MELD-Na score: 24 at 1/25/2023  9:38 AM  MELD score: 23 at 1/25/2023  9:38 AM  Calculated from:  Serum Creatinine: 1.96 mg/dL at 1/25/2023  9:38 AM  Serum Sodium: 136 mmol/L at 1/25/2023  9:38 AM  Total Bilirubin: 1.6 mg/dL at 1/25/2023  9:38 AM  INR(ratio): 1.95 at 1/25/2023  9:38 AM  Age: 52 years    Recently diagnosed chronic systolic heart failure  Follows with [facility], currently hoping for re-evaluation by [facility] cardiology to assess if EF can improve and make him eligibile for transplant.  Per cardiology notes, heart failure with improved LVEF (35% 9/2020 --> 55% 10/2022). Most recent EF 40-45% from TTE on 1/3 at [facility]. Per Cardiology,  HF is most likely due to a nonischemic cardiomyopathy secondary to PVC burden and may be related to his previous alcohol use as well as hypertension. Total PVC burden of up to 20% as assessed on Zio patch in 2/2022 - at that time he was referred to EP but he had not followed up with this. Ischemic workup so far includes stress MRI 11/2020 which showed no evidence of ischemia.    - Cardiology consulted, suspect NICM secondary to PVCs, prior EtOH, HTN   = Resume PTA Torsemide, Eplerenone when stable fro GIB - reordered 1/25   = Decrease PTA Metoprolol to qday from BID given HR in 50s, will start for 1/26   = Referral to EP for PVC ablation as outpatient, will order at discharge   = Cause of CM appears  reversible, PVC ablation could be deferred to after transplant    COVID-19 Positive   Patient tested positive for COVID on asymptomatic covid test on admission. Has not had a recent positive, no sick contacts. Denies any viral URI symptoms, no SOB, cough, chest pain, or any other concerns. Has normal O2 sats on RA.   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  Drug Induced Coagulation Defect  - HOLD Apixaban,  will tentatively resume 1/26 if Hemoglobin remains stable   - Liver US with doppler as above     Thrombocytopenia secondary to liver disesae - monitor CBC    Type 2 DM  -Currently on lantus 65  Units daily, decrease to 20 units with NPO status, will leave for now as mostly taking in clears, can increase  to PTA dose on 1/26 if needed     GISELLE - CPAP  Intertriginous candida - miconazole BID  Peripheal neuropathy - duloxetine, gabapentin       Diet: Regular Diet Adult    DVT Prophylaxis: Pneumatic Compression Devices  Mendosa Catheter: Not present  Lines: None     Cardiac Monitoring: None  Code Status: Full Code      Clinically Significant Risk Factors            # Hypomagnesemia: Lowest Mg = 1.6 mg/dL in last 2 days, will replace as needed   # Hypoalbuminemia: Lowest albumin = 2.5 g/dL at 1/24/2023  8:54 AM, will monitor as appropriate  # Hypertension: home medication list includes antihypertensive(s)        # Obesity: Estimated body mass index is 35.25 kg/m  as calculated from the  "following:    Height as of this encounter: 1.905 m (6' 3\").    Weight as of this encounter: 127.9 kg (282 lb)., PRESENT ON ADMISSION         Disposition Plan     Expected Discharge Date: 01/25/2023                  Ro Larson MD  Hospitalist Service, GOLD TEAM 12  [facility]  Securely message with [employer] (more info)  Text page via [facility] Paging/Directory   See signed in provider for up to date coverage information  ______________________________________________________________________    Interval History   No acute events overnight. Tolerated EGD well  Yesterday, feeling more energetic this am. Still having a lot of leg swelling, but overall feeling better and glad he got his legs wrapped. Denies fevers, chills, URI symptoms. No SOB when laying in bed, no cough.     Physical Exam   Vital Signs: Temp: 98  F (36.7  C) Temp src: Oral BP: 103/54 Pulse: 64   Resp: 15 SpO2: 97 % O2 Device: None (Room air)    Weight: 282 lbs 0 oz    General Appearance: Alert, lying in bed, no acute distress  Eyes: no icterus  HEENT: No oral ulcers, mucous membranes moist, + JVD  Respiratory: lungs CTAB, no crackles, wheezes   Cardiovascular: RRR  GI: +Ascites, +tender to palpation, no rigidity or guarding, +distended  Skin: +intertriginous candida  Musculoskeletal: +3 pitting edema on lower extremities   Psychiatric: No confusion     Medical Decision Making       60 MINUTES SPENT BY ME on the date of service doing chart review, history, exam, documentation & further activities per the note.      Data     I have personally reviewed the following data over the past 24 hrs:    5.0  \   8.6 (L)   / 85 (L)     136 109 (H) 27.7 (H) /  186 (H)   4.9 12 (L) 1.96 (H) \       ALT: 15 AST: 32 AP: 168 (H) TBILI: 1.6 (H)   ALB: 2.9 (L) TOT PROTEIN: 5.1 (L) LIPASE: N/A       INR:  1.95 (H) PTT:  N/A   D-dimer:  N/A Fibrinogen:  N/A       "

## 2023-01-25 NOTE — PROGRESS NOTES
[facility]    Medicine Progress Note - Hospitalist Service, GOLD TEAM 12    Date of Admission:  1/23/2023    Assessment & Plan    Blair Oliver is a 52 year old male with pmh of cirrhosis admitted on 1/23/2023 with melena and hematochezia    Changes Today:  - Received 2 units PRBCs today 1/24, will undergo EGD today  - Dx para completed 1/24, neg for SBP  - Asymptomatic COVID test positive, no symptoms, not hypoxic, no indication for prednisone or remdesivir  - Repeat Abd US with doppler 1/23-> patent vasculature, cirrhotic liver without any focal lesions    Suspected Upper GI bleed  Acute blood loss anemia  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 Patient has known hx EV and portal hypertensive gastropathy. Ultimately received 2 units PRBCs on 1/24.  - Trend Hb, transfuse for Hb <7, received 2 units PRBCs thus far  - IV PPI BID (1/23->)  - IV Octreotide gtt (1/23 ->)  - IV Ceftriaxone, dx para neg for SBP on 1/24  - Hepatology following -> EGD planned for 1/24     Cirrhosis secondary to alcohol use disorder and alcohol related liver disease  Refractory Ascites  Hx of Hepatic encephalopathy  Coagulation Defects secondary to underlying liver disease  Chronic Thrombocytopenia  Follows with [facility] hepatology, seen in clinic on 1/23. Currently hoping for liver transplant but not currently candidate due to CHF. Hopeful for TIPS for refractory ascites but on hold with worsening EF  - Dx para done 1/24 neg for SBP, will transition back to prophylactic Cipro after EGD   - Continue PTA Rifaxamin, Lactulose  - Cardiology consult done as below  - HCC Screening: Repeat Abd US with doppler 1/23-> patent vasculature, cirrhotic liver without any focal lesions  - Mentation stable, no current evidence of HE    MELD-Na score: 22 at 1/24/2023  8:54 AM  MELD  score: 22 at 1/24/2023  8:54 AM  Calculated from:  Serum Creatinine: 1.70 mg/dL at 1/24/2023  8:54 AM  Serum Sodium: 138 mmol/L (Using max of 137 mmol/L) at 1/24/2023  8:54 AM  Total Bilirubin: 1.1 mg/dL at 1/24/2023  8:54 AM  INR(ratio): 2.51 at 1/24/2023  8:54 AM  Age: 52 years    Recently diagnosed chronic systolic heart failure  Follows with [facility] at [facility], currently hoping for re-evaluation by [facility] cardiology to assess if EF can improve and make him eligibile for transplant.  Per cardiology notes, heart failure with improved LVEF (35% 9/2020 --> 55% 10/2022). Most recent EF 40-45% from TTE on 1/3 at [facility]. Per Cardiology,  HF is most likely due to a nonischemic cardiomyopathy secondary to PVC burden and may be related to his previous alcohol use as well as hypertension. Total PVC burden of up to 20% as assessed on Zio patch in 2/2022 - at that time he was referred to EP but he had not followed up with this. Ischemic workup so far includes stress MRI 11/2020 which showed no evidence of ischemia.    - Cardiology consulted, suspect NICM secondary to PVCs, prior EtOH, HTN   = Resume PTA Torsemide, Eplerenone when stable fro GIB   = Decrease PTA Metoprolol to qday from BID given HR in 50s   = Referral to EP for PVC ablation as outpatient   = Cause of CM appears reversible, PVC ablation could be deferred to after transplant    COVID-19 Positive   Patient tested positive for COVID on asymptomatic covid test on admission. Has not had a recent positive, no sick contacts. Denies any viral URI symptoms, no SOB, cough, chest pain, or any other concerns. Has normal O2 sats on RA.   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   - HOLD Apixaban  - Liver US with doppler as above     Thrombocytopenia secondary to liver disesae - monitor CBC    Type 2 DM  -Currently on lantus 65  Units daily, decrease to 20 units with NPO status    GISELLE - CPAP  Intertriginous candida - miconazole BID  Peripheal neuropathy - duloxetine, gabapentin       Diet: NPO per Anesthesia Guidelines for Procedure/Surgery Except for: Meds    DVT Prophylaxis: Pneumatic Compression Devices  Mendosa Catheter: Not present  Lines: None     Cardiac Monitoring: None  Code Status: Full Code      Clinically Significant Risk Factors Present on Admission            # Hypomagnesemia: Lowest Mg = 1.6 mg/dL in last 2 days, will replace as needed   # Hypoalbuminemia: Lowest albumin = 2.5 g/dL at 1/24/2023  8:54 AM, will monitor as appropriate  # Hypertension: home medication list includes antihypertensive(s)      # Obesity: Estimated body mass index is 35.25 kg/m  as calculated from the following:    Height as of this encounter: 1.905 m (6' 3\").    Weight as of this encounter: 127.9 kg (282 lb).           Disposition Plan     Expected Discharge Date: 01/25/2023                  Ro Larson MD  Hospitalist Service, [address]  Securely message with [employer] (more info)  Text page via [facility] Paging/Directory   See signed in provider for up to date coverage information  ______________________________________________________________________    Interval History   No acute events overnight. Has not noticed any more large volume melena, no hematemesis. Is surprised his covid is positive, denies any recent cold like symptoms, does have more SOB with activity over past several weeks, but nothing acute. No cough, fevers, chills. No abdominal " pain, No other concerns.     Physical Exam   Vital Signs: Temp: 98  F (36.7  C) Temp src: Oral BP: 92/55 Pulse: 52   Resp: 15 SpO2: 98 % O2 Device: None (Room air)    Weight: 282 lbs 0 oz    General Appearance: Alert, lying in bed, no acute distress  Eyes: no icterus  HEENT: No oral ulcers, mucous membranes moist, + JVD  Respiratory: diminshed breath sounds in bases bilaterally, no wheezing, no respiratory distress  Cardiovascular: RRR  GI: +Ascites, +tender to palpation, no rigidity or guarding, +distended  Skin: +intertriginous candida  Musculoskeletal: +3 pitting edema on lower extremities   Neurologic: holding eye contact, following commands, intact  strength and plantar and dorsiflexion of lower extremities, no asterixis  Psychiatric: No confusion     Medical Decision Making       60 MINUTES SPENT BY ME on the date of service doing chart review, history, exam, documentation & further activities per the note.      Data     I have personally reviewed the following data over the past 24 hrs:    4.3  \   7.3 (L)   / 89 (L)     138 113 (H) 22.7 (H) /  103 (H)   3.9 14 (L) 1.70 (H) \       ALT: 11 AST: 26 AP: 143 (H) TBILI: 1.1   ALB: 2.5 (L) TOT PROTEIN: 4.5 (L) LIPASE: N/A       INR:  2.51 (H) PTT:  41 (H)   D-dimer:  N/A Fibrinogen:  N/A

## 2023-01-25 NOTE — PROGRESS NOTES
"   01/25/23 0934   Appointment Info   Signing Clinician's Name / Credentials (OT) Xiomara Last OTR/L  (Edema)   Living Environment   People in Home spouse;parent(s)  (mother)   Current Living Arrangements house   Home Accessibility stairs to enter home;stairs within home   Number of Stairs, Main Entrance 3   Number of Stairs, Within Home, Primary six  (split level entry - 6 up, 6 down)   Stair Railings, Within Home, Primary railings safe and in good condition   Transportation Anticipated car, drives self;family or friend will provide   Living Environment Comments Pt lives with his wife in a split level entry home. Has bathtub/shower combo on upper level, walk-in shower on lower level. Both w/ grab bars.   Self-Care   Usual Activity Tolerance good   Current Activity Tolerance moderate   Equipment Currently Used at Home none  (Pt has grab bars in both tub and shower, has shower chair that his mother uses)   Activity/Exercise/Self-Care Comment Pt is IND w/ ADLs   Instrumental Activities of Daily Living (IADL)   Previous Responsibilities meal prep;housekeeping;laundry;shopping;yardwork;medication management;finances;driving   IADL Comments Pt is IND w/ IADLs, shares responsibilities w/ his wife. Pt is currently doing majority of driving because his wife is in a boot post surgery   General Information   Onset of Illness/Injury or Date of Surgery 01/23/23   Referring Physician Ro Larson MD   Patient/Family Therapy Goal Statement (OT) Per EMR: \" Blair Oliver is a 52 year old male with pmh of cirrhosis admitted on 1/23/2023 with melena and hematochezia.\"   Existing Precautions/Restrictions fall   Left Upper Extremity (Weight-bearing Status) full weight-bearing (FWB)   Right Upper Extremity (Weight-bearing Status) full weight-bearing (FWB)   Left Lower Extremity (Weight-bearing Status) full weight-bearing (FWB)   Right Lower Extremity (Weight-bearing Status) full weight-bearing (FWB)   General Observations " and Info Activity order: Up w/ assist   Cognitive Status Examination   Orientation Status orientation to person, place and time   Cognitive Status Comments No acute concerns   Visual Perception   Visual Impairment/Limitations WFL;corrective lenses for reading   Sensory   Sensory Quick Adds sensation intact   Pain Assessment   Patient Currently in Pain No  (Pt reporting discomfort w/ aceites)   Edema General Information   Onset of Edema   (acute on chronic)   Affected Body Part(s) Left LE;Right LE   Etiology Comments liver failure, WAI on CKD, fluid overload   Edema Precautions Renal Insufficiency   General Comments/Previous Edema Treatment/Edema Equipment Pt has compression stockings, has not trialed wraps. Scheduled for OP edema therapy set for mid-February.   Edema Examination/Assessment   Skin Condition Non-pitting;Dryness;Intact;Hemosiderin deposits   Skin Condition Comments BLE skin is intact and mildly dry over ankles. Light hemosiderin staining on distal BLE.   Scar No   Ulcerations No   Pitting Assessment 1-2+ pitting edema in BLE, L > R. Pt reports edema is much improved from prior 1-2 days.   Edema Assessment Comments Donned GCBs to promote fluid mobility. Educated pt in benefits of elevation and exercise to promote fluid mobility.   Range of Motion Comprehensive   General Range of Motion no range of motion deficits identified   Strength Comprehensive (MMT)   General Manual Muscle Testing (MMT) Assessment no strength deficits identified   Coordination   Upper Extremity Coordination No deficits were identified   Bed Mobility   Comment (Bed Mobility) Not observed. Pt reporting no difficulty   Transfers   Transfer Comments Not observed. Pt reporting no difficulty   Balance   Balance Comments Per PT, pt w/ no significant balance deficits.   Activities of Daily Living   BADL Assessment/Intervention   (Per pt, no concerns at this time. Will further assess as needed.)   Clinical Impression   Criteria for Skilled  Therapeutic Interventions Met (OT) Yes, treatment indicated   OT Diagnosis BLE edema impacting functional mobility and ADL performance   Edema: Patient Presentation Edema   OT Problem List-Impairments impacting ADL problems related to;activity tolerance impaired   Edema: Planned Interventions Gradient compression bandaging;Fit for compression garment;Edema exercises;Precautions to prevent infection/exacerbation;Education;Manual therapy;ADL training   Intervention Comments GCBs to BLE   Clinical Decision Making Complexity (OT) low complexity   Anticipated Equipment Needs Upon Discharge (OT)   (TBD)   Risk & Benefits of therapy have been explained evaluation/treatment results reviewed;care plan/treatment goals reviewed;risks/benefits reviewed;current/potential barriers reviewed;participants voiced agreement with care plan;participants included;patient   Clinical Impression Comments Pt presents w/ chronic BLE edema. Pt will benefit from IP edema therapy to promote fluid mobility in BLE and encourage activity during hospitalization   OT Total Evaluation Time   OT Eval, Low Complexity Minutes (82391) 5   OT Goals   Therapy Frequency (OT) 4 times/wk   OT Predicted Duration/Target Date for Goal Attainment 02/03/23   OT Goals Edema  (Will monitor for OT needs and add goals as appropriate)   OT: Edema education to increase ability to manage edema after discharge from the hospital Patient;Verbalize;Caregiver;Demonstrate;Iuka;Skin care routine;signs/symptoms of intolerance;wear schedule;limb positioning;garrnet/bandage care;discharge recommendations   OT: Management of edema bandages Patient;Verbalize;Caregiver;Demonstrate;Iuka;quick wrap;garment(s)   OT: Functional edema exercise program to reduce limb volume, increase activity tolerance and improve independence with ADL Patient;Demonstrates;Iuka;HEP;walking program   OT Discharge Planning   OT Plan Edema only, monitor for OT needs. New G2. Care plan  order placed.   OT Discharge Recommendation (DC Rec) home with assist  (OP edema therapy, which pt has already scheduled.)   OT Rationale for DC Rec No OOB activity observed today. Per PT and pt report, pt is at/near ADL and mobility baseline, limited primarily by reduced endurance. Pt will benefit from assist for heavier IADLs and OP edema therapy to promote/maintain fluid mobility in BLE.   OT Brief overview of current status Donned GCBs to BLE   Total Session Time   Total Session Time (sum of timed and untimed services) 5

## 2023-01-26 NOTE — CONSULTS
Care Management Initial Consult    General Information  Assessment completed with: Patient,    Type of CM/SW Visit: Initial Assessment    Primary Care Provider verified and updated as needed: Yes   Readmission within the last 30 days: no previous admission in last 30 days      Reason for Consult: discharge planning  Advance Care Planning: Advance Care Planning Reviewed: no concerns identified          Communication Assessment  Patient's communication style: spoken language (English or Bilingual)    Hearing Difficulty or Deaf: no   Wear Glasses or Blind: yes    Cognitive  Cognitive/Neuro/Behavioral: WDL                      Living Environment:   People in home: spouse     Current living Arrangements: house      Able to return to prior arrangements: yes       Family/Social Support:  Care provided by: self  Provides care for: no one  Marital Status:   Wife  Emmanuelle       Description of Support System: Supportive, Involved         Current Resources:   Patient receiving home care services: No     Community Resources: None  Equipment currently used at home: none  Supplies currently used at home: None    Employment/Financial:  Employment Status: disabled        Financial Concerns: No concerns identified   Referral to Financial Worker: No       Lifestyle & Psychosocial Needs:  Social Determinants of Health     Tobacco Use: Medium Risk     Smoking Tobacco Use: Former     Smokeless Tobacco Use: Never     Passive Exposure: Not on file   Alcohol Use: Not on file   Financial Resource Strain: Not on file   Food Insecurity: Not on file   Transportation Needs: Not on file   Physical Activity: Not on file   Stress: Not on file   Social Connections: Not on file   Intimate Partner Violence: Not on file   Depression: Not at risk     PHQ-2 Score: 0   Housing Stability: Not on file       Functional Status:  Prior to admission patient needed assistance: Independent       Mental Health Status:anxiety    Chemical Dependency Status:  denies    Values/Beliefs:  Spiritual, Cultural Beliefs, Shinto Practices, Values that affect care:   None identified              Additional Information:  52-year-old with a 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 CMA completed d/t elevated risk score. Pt denies any needs at discharge but reports he is very worried about why his Hgb continues to drop. He also stated he hopes to get a heart and liver transplant.     Pt denies any financial concerns and prior to admission was independent in his cares and able to drive.     No discharge needs anticipated at this time. Care management will continue to follow and support safe discharge planning as needed.     Agnieszka Carreon, RN  [facility] [place]

## 2023-01-26 NOTE — PROGRESS NOTES
1/26/23 - Patient currently hospitalized and was seen by Cardiology consult team on 1/24/23.  Will follow notes until discharge to make appt accordingly.  1/31/23 - Referral was initially for Dr. Ortega but patient was seen by inpatient cardiology and an echo was done and EF was normal. Notes indicate they thought PVC's may have been contributting to decrease in EF and recommended following up with EP and appt has upcoming appt.

## 2023-01-26 NOTE — PROGRESS NOTES
"Patient was admitted from the ED. He came in due to bloody stool and increase Diarrhea. Patient had paracentesis done on 1/25/2022, 5L was removed. Patient is alert and oriented x4, Transfer independently. Pt uses CPAP at at night which he brought from home. BS was 230. Patient takes medications whole, reg diet. He reported about 20lb recent wt loss. Paracentesis site CDI. Redness noted under abd folds and bruises to bilateral elbow. Old surgical incision to mid abd. VSS on room air. .  /53 (BP Location: Right arm)   Pulse 74   Temp 98  F (36.7  C) (Oral)   Resp 16   Ht 1.905 m (6' 3\")   Wt 129.5 kg (285 lb 7.9 oz)   SpO2 97%   BMI 35.68 kg/m      "

## 2023-01-26 NOTE — PROGRESS NOTES
Provider notified (name): El Smith MD  Reason for notification:Hgb of 6.7  Recommendation/request given to provider:   Response from provider: Order place for blood transfusion.

## 2023-01-26 NOTE — PROGRESS NOTES
[facility]    Medicine Progress Note - Hospitalist Service, GOLD TEAM 12    Date of Admission:  1/23/2023    Assessment & Plan    Blair Oliver is a 52 year old male with pmh of cirrhosis admitted on 1/23/2023 with melena and hematochezia    Changes Today:  - Hb dropped again this am 8.6 -> 6.7 -> received 1 unit PRBCs  - No clinical evidence of melena, hematochezia  - Suspect anemia is multifactorial; difficult to get clear sense of iron deficiency given frequent PRBCs, but will give iron    = IV Iron Dextran 1000mg x1 ordered with test dose  - Continue to hold Apixaban, if Hb stable will resume 1/27  - Given recurrent drop, continued IV PPI and Octreotide, if Hb stable will discontinue 1/27  - Placed outpatient referral for EP Cardiology, will follow to see if can be scheduled prior to patient discharging  - Updated patient and his wife at bedside, reiterated importance of Cardiology follow-up, as PVCs and reduced EF preclude him from getting a liver transplant, but also are preventing him from receiving a TIPS which may significantly help his symptoms related to portal HTN (gastropathy and EV with GIB, diuretic refractory ascites requiring gabbi 2x/weel)    Suspected Upper GI bleed - resolved  Acute blood loss anemia - stable  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 Patient has known hx EV and portal hypertensive gastropathy. Ultimately received 2 units PRBCs on 1/24 with improvement in Hb, no further transfusions required.   - Trend Hb, transfuse for Hb <7, received 2 units PRBCs thus far; Hb dropped again this am 1/26, 8.6 -> 6.7 -> received 1 unit PRBCs; No clinical evidence of melena, hematochezia  - Suspect anemia is multifactorial; difficult to get clear sense of iron deficiency given frequent  PRBCs, but will give iron    = IV Iron Dextran 1000mg x1 ordered with test dose on 1/26  - Continue to hold Apixaban, if Hb stable will resume 1/27  - Given recurrent drop, continued IV PPI and Octreotide, if Hb stable will discontinue 1/27  - IV Ceftriaxone, dx para neg for SBP on 1/24, transitioned to prophy Cipro on 1/26       Cirrhosis secondary to alcohol use disorder and alcohol related liver disease  Refractory Ascites  Hx of Hepatic encephalopathy  Coagulation Defects secondary to underlying liver disease  Chronic Thrombocytopenia  Follows with [facility] hepatology, seen in clinic on 1/23. Currently hoping for liver transplant but not currently candidate due to CHF. Hopeful for TIPS for refractory ascites but on hold with worsening EF  - Dx para done 1/24 neg for SBP, back on prophylactic Cipro    - Continue PTA Rifaxamin, Lactulose  - Cardiology consult done as below  - HCC Screening: Repeat Abd US with doppler 1/23-> patent vasculature, cirrhotic liver without any focal lesions  - Mentation stable, no current evidence of HE  - Tx para completed 1/25, 5L removed, 50g 25% albumin given in setting of up-trending Cr    MELD-Na score: 22 at 1/26/2023  6:00 AM  MELD score: 22 at 1/26/2023  6:00 AM  Calculated from:  Serum Creatinine: 1.98 mg/dL at 1/26/2023  6:00 AM  Serum Sodium: 137 mmol/L at 1/26/2023  6:00 AM  Total Bilirubin: 0.6 mg/dL (Using min of 1 mg/dL) at 1/26/2023  6:00 AM  INR(ratio): 2.21 at 1/26/2023  6:00 AM  Age: 52 years    Recently diagnosed chronic systolic heart failure  [facility], currently hoping for re-evaluation by [facility] cardiology to assess if EF can improve and make him eligibile for transplant.  Per cardiology notes, heart failure with improved LVEF (35% 9/2020 --> 55% 10/2022). Most recent EF 40-45% from TTE on 1/3 at [facility]. Per Cardiology,  HF is most likely due to a nonischemic cardiomyopathy secondary to PVC burden and may be related to his  previous alcohol use as well as hypertension. Total PVC burden of up to 20% as assessed on Zio patch in 2/2022 - at that time he was referred to EP but he had not followed up with this. Ischemic workup so far includes stress MRI 11/2020 which showed no evidence of ischemia.    - Cardiology consulted, suspect NICM secondary to PVCs, prior EtOH, HTN   = Resume PTA Torsemide, Eplerenone when stable fro GIB - reordered 1/25   = Decrease PTA Metoprolol to qday from BID given HR in 50s, will start for 1/26   = Referral to EP for PVC ablation as outpatient, will order at discharge   = Cause of CM appears reversible, PVC ablation could be deferred to after transplant  - 1/26 Outpatient EP referral placed in discharge navigator    COVID-19 Positive   Patient tested positive for COVID on asymptomatic covid test on admission. Has not had a recent positive, no sick contacts. Denies any viral URI symptoms, no SOB, cough, chest pain, or any other concerns. Has normal O2 sats on RA.   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  Drug Induced Coagulation Defect  - HOLD Apixaban,  will tentatively resume 1/27  if Hemoglobin remains stable   - Liver US with doppler as above     Thrombocytopenia secondary to liver disesae - monitor CBC    Type 2 DM  -Currently on lantus 65  Units daily, ddecreased to 20 while NPO, increased to 30 on 1/26, will continue to monitor     GISELLE - CPAP  Intertriginous candida - miconazole  "BID  Peripheal neuropathy - duloxetine, gabapentin       Diet: Regular Diet Adult    DVT Prophylaxis: Pneumatic Compression Devices  [place] Catheter: Not present  Lines: None     Cardiac Monitoring: None  Code Status: Full Code      Clinically Significant Risk Factors            # Hypomagnesemia: Lowest Mg = 1.6 mg/dL in last 2 days, will replace as needed   # Hypoalbuminemia: Lowest albumin = 2.5 g/dL at 1/24/2023  8:54 AM, will monitor as appropriate  # Hypertension: home medication list includes antihypertensive(s)        # Obesity: Estimated body mass index is 35.25 kg/m  as calculated from the following:    Height as of this encounter: 1.905 m (6' 3\").    Weight as of this encounter: 127.9 kg (282 lb)., PRESENT ON ADMISSION         Disposition Plan           Ro Larson MD  Hospitalist Service, GOLD TEAM 12  [facility]  Securely message with [employer] (more info)  Text page via [employer] Paging/Directory   See signed in provider for up to date coverage information  ______________________________________________________________________    Interval History   No acute events overnight. Has not had any new melena, hematochezia, no nausea, no new abdominal pain. No SOB, chest pain, chills, palpitations. Feels overall well. Thinks lymphedema wraps are improving legs.     Physical Exam   Vital Signs: Temp: 97.9  F (36.6  C) Temp src: Oral BP: 122/65 Pulse: 62   Resp: 18 SpO2: 97 % O2 Device: None (Room air)    Weight: 285 lbs 7.93 oz    General Appearance: Alert, lying in bed, no acute distress  Eyes: no icterus  HEENT: No oral ulcers, mucous membranes moist  Respiratory: lungs CTAB, no crackles, wheezes   Cardiovascular: RRR  GI: +Ascites, no ttp, no rigidity or guarding, +distended  Skin: +intertriginous candida  Musculoskeletal:pitting edema on lower extremities with lymphedema wraps in place  Psychiatric: No confusion     Medical Decision Making       60 MINUTES SPENT " BY ME on the date of service doing chart review, history, exam, documentation & further activities per the note.      Data     I have personally reviewed the following data over the past 24 hrs:    3.8 (L)  \   6.7 (LL)   / 71 (L)     137 111 (H) 31.0 (H) /  240 (H)   4.4 13 (L) 1.98 (H) \       ALT: 11 AST: 21 AP: 119 TBILI: 0.6   ALB: 2.9 (L) TOT PROTEIN: 4.5 (L) LIPASE: N/A       INR:  2.21 (H) PTT:  N/A   D-dimer:  N/A Fibrinogen:  N/A

## 2023-01-26 NOTE — SUMMARY OF CARE
Pt has arrived on the unit from ED with the following:    cellphone no charging cord  Seeing eyeglasses  CPAP machine from home  Jacket   Pants   shoes

## 2023-01-26 NOTE — PROGRESS NOTES
A&Ox4. Indep. VSS. Reg diet. . Infusing octreotide 10mL/hr. Patient reports no bloody stools. Had paracentesis today remove 5L. Will monitor.

## 2023-01-26 NOTE — PLAN OF CARE
[facility] [id number]. A&Ox4. Stable on RA. HR consistently in 50's. Endorsed abdominal pain, oxy and tylenol for pain management. Up independently. Multiple loose BMs, no blood noted per patient. Tolerating regular diet. R and L para sites CDI. Hgb 6.7, 1 unit transfused. Recheck hgb 8.6. Tolerating iron dextran test infusion.  Octreotide gtt paused during iron dextran infusion per orders.    Goal Outcome Evaluation:      Plan of Care Reviewed With: patient    Overall Patient Progress: no change

## 2023-01-27 NOTE — PROGRESS NOTES
[facility]  HEPATOLOGY PROGRESS NOTE  Blair Oliver [id number]       IMPRESSION:  Blair Oliver is a 52 year old male with a history of alcohol cirrhosis complicated by ascites with twice weekly gabbi, EV s/p banding (12/1/22), mild HE, MVT s/p bowel resection, chronic diarrhea, ventral wall hernia, HFrEF (EF 40-45%), frequent PVC's who was admitted with anemia concerning for GIB. He underwent EGD with portal hypertensive gastropathy and nonbleeding EV.     RECOMMENDATIONS:  # Anemia  # EV s/p banding  - has had prior banding in 12/2022. Repeat EGD 1/24 with EV x1 banding. He did have oozing from gastric and duodenopathy with contact. Hemoglobin stable after PRBC.   -  Received IV iron during this hospitalization. Working on outpatient plan for supportive care of anemia. Will need complete anemia work up with peripheral smear in the future prior to blood products. No overt signs of GIB. Retic count in 6/2022 not elevated.   - EGD without evidence of recent EV bleeding. May stop octreotide  -  On SBP ppx with Cipro  - Will repeat EGD in 4-6 weeks.      #Ascites  # ventral wall hernia.   # HFrEF with PVC's.   - not a current candidate for TIPS due to HFrEF.   - Cardiology saw pt 1/24. This may be reversible with correction of PVC's. Recommended EP follow up. Has had decrease in weight with diuresis  - weekly gabbi. Limiting factor for mobility is ventral wall hernia.      # Alcohol cirrhosis  - MELD 20  - HCC screening with US 1/23 negative  - mentation stable, no current signs of HE    Patient was discussed with attending physician, Dr. Jurado    Next follow up appointment: tbd    Thank you for the opportunity to be involved with  Blair Oliver care. Please call with any questions or concerns.    GABRIEL Calvin, CNP  Inpatient Hepatology [facility]  Text Link        SUBJECTIVE:  Feeling improved with up walking around room, has an appetite and improvement in lower extremity edema with diuresis and wraps.  "No melena, hematochezia, fevers,     ROS:  A 10-point review of systems was negative.    OBJECTIVE:  VS: /63   Pulse 55   Temp 97.8  F (36.6  C) (Oral)   Resp 18   Ht 1.905 m (6' 3\")   Wt 124.6 kg (274 lb 12.8 oz)   SpO2 95%   BMI 34.35 kg/m       Gross per 24 hour   Intake --   Output 1400 ml   Net -1400 ml     General: In no acute distress, mild facial muscle wasting  Neuro: AOx3  HEENT:  Noscleral icterus  CV:  Skin warm and dry  Lungs:  Respirations even and nonlabored on room air  Abd:  Mildly distended, nontender   Extrem: +1 lower peripheral edema   Skin: Nojaundice  Psych: pleasant    MEDICATIONS:  Current Facility-Administered Medications   Medication     acetaminophen (TYLENOL) tablet 500 mg     apixaban ANTICOAGULANT (ELIQUIS) tablet 5 mg     ciprofloxacin (CIPRO) tablet 500 mg     Contraindications to both pharmacological and mechanical prophylaxis (must document contraindications for both in this order)     glucose gel 15-30 g    Or     dextrose 50 % injection 25-50 mL    Or     glucagon injection 1 mg     diphenhydrAMINE (BENADRYL) injection 50 mg     DULoxetine (CYMBALTA) DR capsule 60 mg     EPINEPHrine (ADRENALIN) kit 0.3 mg     eplerenone (INSPRA) tablet 50 mg     famotidine (PEPCID) injection 20 mg     gabapentin (NEURONTIN) capsule 300 mg     insulin glargine (LANTUS PEN) injection 30 Units     lactulose (CHRONULAC) solution 20 g     lidocaine (LMX4) cream     lidocaine 1 % 0.1-1 mL     melatonin tablet 1 mg     methylPREDNISolone sodium succinate (solu-MEDROL) injection 125 mg     metoprolol succinate ER (TOPROL XL) 24 hr tablet 50 mg     miconazole (MICATIN) 2 % powder     multivitamin, therapeutic (THERA-VIT) tablet 1 tablet     naloxone (NARCAN) injection 0.2 mg    Or     naloxone (NARCAN) injection 0.4 mg    Or     naloxone (NARCAN) injection 0.2 mg    Or     naloxone (NARCAN) injection 0.4 mg     ondansetron (ZOFRAN ODT) ODT tab 4 mg    Or     ondansetron (ZOFRAN) injection 4 " mg     oxyCODONE (ROXICODONE) tablet 5 mg     pantoprazole (PROTONIX) EC tablet 40 mg     polyethylene glycol (MIRALAX) Packet 17 g     [Held by provider] potassium chloride ER (KLOR-CON M) CR tablet 30 mEq     rifaximin (XIFAXAN) tablet 550 mg     sodium chloride (PF) 0.9% PF flush 3 mL     sodium chloride (PF) 0.9% PF flush 3 mL     torsemide (DEMADEX) tablet 60 mg       REVIEW OF LABORATORY, PATHOLOGY AND IMAGING RESULTS:  BMP  Recent Labs   Lab 01/27/23  0831 01/27/23  0646 01/26/23  2144 01/26/23  1655 01/26/23  1011 01/26/23  0600 01/25/23  1127 01/25/23  0938 01/24/23  1029 01/24/23  0854   NA  --  141  --   --   --  137  --  136  --  138   POTASSIUM  --  3.5  --   --   --  4.4  --  4.9  --  3.9   CHLORIDE  --  112*  --   --   --  111*  --  109*  --  113*   DANIELLE  --  7.9*  --   --   --  7.9*  --  8.2*  --  7.3*   CO2  --  17*  --   --   --  13*  --  12*  --  14*   BUN  --  30.0*  --   --   --  31.0*  --  27.7*  --  22.7*   CR  --  1.88*  --   --   --  1.98*  --  1.96*  --  1.70*   * 166* 191* 163*   < > 240*   < > 223*   < > 88    < > = values in this interval not displayed.     CBC  Recent Labs   Lab 01/27/23  0646 01/26/23  1300 01/26/23  0600 01/25/23  1419 01/25/23  0938 01/24/23  0854 01/24/23  0305   WBC 3.0*  --  3.8*  --  5.0  --  4.3   RBC 2.58*  --  2.17*  --  2.91*  --  2.00*   HGB 8.1*   < > 6.7* 8.6* 9.0*   < > 6.3*   HCT 26.3*  --  22.6* 29.1* 30.2*   < > 21.5*   *  --  104*  --  104*  --  108*   MCH 31.4  --  30.9  --  30.9  --  31.5   MCHC 30.8*  --  29.6*  --  29.8*  --  29.3*   RDW 18.8*  --  18.9*  --  19.1*  --  18.9*   PLT 64*  --  71*  --  85*  --  89*    < > = values in this interval not displayed.     INR  Recent Labs   Lab 01/27/23  0646 01/26/23  0600 01/25/23  0938 01/24/23  0854   INR 1.92* 2.21* 1.95* 2.51*     LFTs  Recent Labs   Lab 01/27/23  0646 01/26/23  0600 01/25/23  0938 01/24/23  0854   ALKPHOS 129 119 168* 143*   AST 25 21 32 26   ALT 13 11 15 11   BILITOTAL  0.6 0.6 1.6* 1.1   PROTTOTAL 4.8* 4.5* 5.1* 4.5*   ALBUMIN 2.9* 2.9* 2.9* 2.5*        MELD-Na score: 20 at 1/27/2023  6:46 AM  MELD score: 20 at 1/27/2023  6:46 AM  Calculated from:  Serum Creatinine: 1.88 mg/dL at 1/27/2023  6:46 AM  Serum Sodium: 141 mmol/L (Using max of 137 mmol/L) at 1/27/2023  6:46 AM  Total Bilirubin: 0.6 mg/dL (Using min of 1 mg/dL) at 1/27/2023  6:46 AM  INR(ratio): 1.92 at 1/27/2023  6:46 AM  Age: 52 years      Imaging Results:    None current

## 2023-01-27 NOTE — PROGRESS NOTES
"Shift Summary: Patient had a   RN assumed cares at [phone]     Vitals: VSS on room air  Pain: Pt c/o pain in _abd_ w/ adequate relief from PRN _Oxy_  Neuro: A&Ox4  Cardiac: WNL  Respiratory: Lung sounds clear, Stable on room air, No respiratory distress noted overnight.  GI/: Voiding adequately, no BM reported  IV/Drains: PIV to right arm with Octreotide gtt infusing. Iron Dextran infusion completed with no reaction.   Skin: CDI.   Activity: up independently.    Events/plan: x  /56 (BP Location: Left arm)   Pulse 50   Temp 97.8  F (36.6  C) (Oral)   Resp 18   Ht 1.905 m (6' 3\")   Wt 124.6 kg (274 lb 12.8 oz)   SpO2 95%   BMI 34.35 kg/m        "

## 2023-01-27 NOTE — PROGRESS NOTES
[facility]    Medicine Progress Note - Hospitalist Service, GOLD TEAM 12    Date of Admission:  1/23/2023    Assessment & Plan    Blair Oliver is a 52 year old male with pmh of cirrhosis admitted on 1/23/2023 with melena and hematochezia    Changes Today:  - Hb 8.6 -> 8.1 without evidence bleeding   = discontinue octreotide gtt   = Transition IV PPI to PO PPI   = Start PTA Eliquis and monitor  - Will tentatively plan for therapeutic para tomorrow   - Possible discharge tomorrow after para pending Hb stability, renal function  - Pt and his wife updated at bedside     Suspected Upper GI bleed - resolved  Acute blood loss anemia - stable  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 Patient has known hx EV and portal hypertensive gastropathy. Ultimately received 2 units PRBCs on 1/24 with improvement in Hb, no further transfusions required.   - Trend Hb, transfuse for Hb <7, received 2 units PRBCs thus far; Hb dropped again 1/26, 8.6 -> 6.7 -> received 1 unit PRBCs; No clinical evidence of melena, hematochezia  - Suspect anemia is multifactorial; difficult to get clear sense of iron deficiency given frequent PRBCs, but will give iron    = IV Iron Dextran 1000mg x1 given with test dose on 1/26  - 1/27 Hb 8.6 -> 8.1 without evidence bleeding   = discontinue octreotide gtt 1/27   = Transition IV PPI to PO PPI 1/27   = Start PTA Eliquis and monitor 1/27  - Back on PO Cipro prophylaxis     Cirrhosis secondary to alcohol use disorder and alcohol related liver disease  Refractory Ascites  Hx of Hepatic encephalopathy  Coagulation Defects secondary to underlying liver disease  Chronic Thrombocytopenia  Follows with [facility] hepatology, seen in clinic on 1/23. Currently hoping for liver transplant but not currently  candidate due to CHF. Hopeful for TIPS for refractory ascites but on hold with worsening EF  - Dx para done 1/24 neg for SBP, back on prophylactic Cipro    - Continue PTA Rifaxamin, Lactulose  - Cardiology consult done as below  - HCC Screening: Repeat Abd US with doppler 1/23-> patent vasculature, cirrhotic liver without any focal lesions  - Mentation stable, no current evidence of HE  - Tx para completed 1/25, 5L removed, 50g 25% albumin given in setting of up-trending Cr  - Will plan for repeat therapeutic para prior to discharge, likely 1/28     MELD-Na score: 20 at 1/27/2023  6:46 AM  MELD score: 20 at 1/27/2023  6:46 AM  Calculated from:  Serum Creatinine: 1.88 mg/dL at 1/27/2023  6:46 AM  Serum Sodium: 141 mmol/L (Using max of 137 mmol/L) at 1/27/2023  6:46 AM  Total Bilirubin: 0.6 mg/dL (Using min of 1 mg/dL) at 1/27/2023  6:46 AM  INR(ratio): 1.92 at 1/27/2023  6:46 AM  Age: 52 years    Recently diagnosed chronic systolic heart failure  Follows with [facility] at [facility], currently hoping for re-evaluation by [facility] cardiology to assess if EF can improve and make him eligibile for transplant.  Per cardiology notes, heart failure with improved LVEF (35% 9/2020 --> 55% 10/2022). Most recent EF 40-45% from TTE on 1/3 at [facility]. Per Cardiology,  HF is most likely due to a nonischemic cardiomyopathy secondary to PVC burden and may be related to his previous alcohol use as well as hypertension. Total PVC burden of up to 20% as assessed on Zio patch in 2/2022 - at that time he was referred to EP but he had not followed up with this. Ischemic workup so far includes stress MRI 11/2020 which showed no evidence of ischemia.    - Cardiology consulted, suspect NICM secondary to PVCs, prior EtOH, HTN   = Resume PTA Torsemide, Eplerenone when stable fro GIB - reordered 1/25   = Decreased PTA Metoprolol to qday from BID given HR in 50s, started 1/26  - 1/26 Outpatient EP referral placed in discharge  "navigator    COVID-19 Positive   Patient tested positive for COVID on asymptomatic covid test on admission. Has not had a recent positive, no sick contacts. Denies any viral URI symptoms, no SOB, cough, chest pain, or any other concerns. Has normal O2 sats on RA.   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  Drug Induced Coagulation Defect  - Apixaban resumed 1/27   - Liver US with doppler as above     Thrombocytopenia secondary to liver disesae - monitor CBC    Type 2 DM  -Currently on lantus 65  Units daily, ddecreased to 20 while NPO, increased to 30 on 1/26; even on this regimen his BGs have been well controlled without need for sliding scale insulin, A1c checked on 1/27 on 5.3    GISELLE - CPAP  Intertriginous candida - miconazole BID  Peripheal neuropathy - duloxetine, gabapentin       Diet: Regular Diet Adult    DVT Prophylaxis: Pneumatic Compression Devices  [place] Catheter: Not present  Lines: None     Cardiac Monitoring: None  Code Status: Full Code      Clinically Significant Risk Factors            # Hypomagnesemia: Lowest Mg = 1.6 mg/dL in last 2 days, will replace as needed   # Hypoalbuminemia: Lowest albumin = 2.5 g/dL at 1/24/2023  8:54 AM, will monitor as appropriate  # Hypertension: home medication list includes antihypertensive(s)        # Obesity: Estimated body mass index is 35.25 kg/m  as calculated from the following:    Height as of this encounter: 1.905 m (6' 3\").    Weight as of this encounter: 127.9 kg (282 lb)., PRESENT ON ADMISSION         Disposition Plan      Expected Discharge Date: 01/30/2023      Destination: home with " family  Discharge Comments: Dispo: No discharge needs anticipated; home  Delays: COVID+; source of bleed?; more stable   Progress: No more rectal bleeding. Not med ready.  no needs at home        Ro Larson MD  Hospitalist Service, GOLD TEAM 12  [facility]  Securely message with [employer] (more info)  Text page via [employer] Paging/Directory   See signed in provider for up to date coverage information  ______________________________________________________________________    Interval History   No acute events overnight. No new melena, hematochezia, or any other evidence of bleeding. Feels like he is ready for another therapeutic paracentesis. Denies fevers, chills, chest pain, SOB. FEels leg edema is better when they are wrapped. No other concerns.     Physical Exam   Vital Signs: Temp: 97.8  F (36.6  C) Temp src: Oral BP: 109/63 Pulse: 55   Resp: 18 SpO2: 95 % O2 Device: None (Room air)    Weight: 274 lbs 12.8 oz    General Appearance: Alert, lying in bed, no acute distress  Eyes: no icterus  HEENT: No oral ulcers, mucous membranes moist  Respiratory: lungs CTAB, no crackles, wheezes   Cardiovascular: RRR  GI: +Ascites, no ttp, no rigidity or guarding, +distended  Skin: +intertriginous candida  Musculoskeletal:pitting edema on lower extremities with lymphedema wraps in place  Psychiatric: No confusion     Medical Decision Making       60 MINUTES SPENT BY ME on the date of service doing chart review, history, exam, documentation & further activities per the note.      Data     I have personally reviewed the following data over the past 24 hrs:    3.0 (L)  \   8.1 (L)   / 64 (L)     141 112 (H) 30.0 (H) /  113 (H)   3.5 17 (L) 1.88 (H) \       ALT: 13 AST: 25 AP: 129 TBILI: 0.6   ALB: 2.9 (L) TOT PROTEIN: 4.8 (L) LIPASE: N/A       TSH: N/A T4: N/A A1C: 5.6       INR:  1.92 (H) PTT:  N/A   D-dimer:  N/A Fibrinogen:  N/A

## 2023-01-28 NOTE — PLAN OF CARE
Goal Outcome Evaluation:      Plan of Care Reviewed With: patient    Overall Patient Progress: no changeOverall Patient Progress: no change    Outcome Evaluation: d/c home    Paracentesis completed w/ 6L removed and lab sample sent  50g 25% Albumin given post paracentesis.   Last hgb check 10.1g/dL.     Discharged to: home  Transportation: wife  Time: 6pm  Prescriptions:    CHANGE how you're taking:   acetaminophen (TYLENOL)    insulin glargine (LANTUS PEN)    metoprolol succinate ER (TOPROL XL)   STOP taking:   metFORMIN 1000 MG tablet (GLUCOPHAGE)    tamsulosin 0.4 MG capsule (FLOMAX)   Belongings: returned to patient  PIV/Access: removed  Care Plan and Education discontinued: yes  Paperwork: given to patient and questions answered

## 2023-01-28 NOTE — PROCEDURES
[facility]  CAPS PROCEDURE NOTE  Date of Admission:  1/23/2023  Consult Requested by: Medicine  Reason for Consult: management of symptomatic ascites    Indication/HPI: distention, discomfort due to ascites    Pre-Procedure Diagnosis: Ascites    Post-Procedure Diagnosis: Ascites    Risk Assessment: Average risk, Technically straightforward; patient's anticoagulation has been held according to guidelines based on the agent and platelets and coags are within guidelines    Procedure Outcome:  Therapeutic paracentesis performed with 6 liters of ascites removed. Sample sent to lab. Albumin will be given  See additional procedure details below.    The primary covering service should follow up and address any lab results as appropriate.    Leonela Guajardo MD  [facility]  Securely message with [employer]oc[employer] (more info)  Text page via [employer] Paging/Directory   See signed in provider for up to date coverage information      [facility]    Paracentesis    Date/Time: 1/28/2023 4:13 PM  Performed by: Leonela Guajardo MD  Authorized by: Leonela Guajardo MD     PRE-PROCEDURE DETAILS  Initial or subsequent exam: subsequent  Procedure purpose: therapeutic  Indications: abdominal discomfort secondary to ascites        UNIVERSAL PROTOCOL   Site Marked: Yes  Prior Images Obtained and Reviewed:  Yes  Required items: Required blood products, implants, devices and special equipment available    Patient identity confirmed:  Verbally with patient  NA - No sedation, light sedation, or local anesthesia  Confirmation Checklist:  Patient's identity using two indicators  Time out: Immediately prior to the procedure a time out was called    Universal Protocol: the Joint Commission Universal Protocol was followed    Preparation: Patient was prepped and draped in usual sterile fashion        ANESTHESIA    Anesthesia: Local infiltration  Local Anesthetic:  Lidocaine 1% without epinephrine  Anesthetic Total (mL):  5      SEDATION    Patient Sedated: No    POST PROCEDURE DETAILS  Needle gauge: 22  Ultrasound guidance: yes  Puncture site: right lower quadrant  Fluid removed: 6000(ml)  Fluid characteristics: pale yellow clear.  Dressing: glue and tegaderm.        PROCEDURE  Describe Procedure: Sample was sent to the lab  Patient Tolerance:  Patient tolerated the procedure well with no immediate complications  Length of time physician/provider present for 1:1 monitoring during sedation: 0        POC US Guide for Paracentesis     Impression  US Indication: decompensated liver disease    Limited abdominal ultrasound was performed and demonstrated an adequate fluid collection on the right side of the abdomen.    Doppler of the skin demonstrated an area at this site without significant vasculature.  A paracentesis at this site was subsequently performed.    Leonela Guajardo MD

## 2023-01-28 NOTE — PROGRESS NOTES
"Shift Summary: Patient had a   RN assumed cares at [id number]     Vitals: VSS on room air  Pain: Pt c/o pain in _abd_ w/ adequate relief from PRN _Oxy_x1  Neuro: A&Ox4  Cardiac: WNL  Respiratory: Lung sounds clear, Stable on room air, No respiratory distress noted overnight.  GI/: Voiding adequately, no BM reported  IV/Drains: PIV to right arm SL  Skin: CDI.   Activity: up independently.    Events/plan: Continue POC  /64 (BP Location: Right arm)   Pulse 58   Temp 98.1  F (36.7  C) (Oral)   Resp 18   Ht 1.905 m (6' 3\")   Wt 124.9 kg (275 lb 5.7 oz)   SpO2 95%   BMI 34.42 kg/m        "

## 2023-01-28 NOTE — PLAN OF CARE
Goal Outcome Evaluation:      Plan of Care Reviewed With: patient    Overall Patient Progress: no changeOverall Patient Progress: no change    Outcome Evaluation: Octreotide gtt d/c'd, pt restarted on eliquis. IV protonix switched to oral. Pt up frequently in room independently. Voiding adequately. 5x BMs on shift. On oral lactulose TID. VSS on RA. Temp: 97.6  F (36.4  C) Temp src: Tympanic BP: 106/65 Pulse: 52   Resp: 18 SpO2: 96 % O2 Device: None (Room air)  Pulse intermittently below 50bpm. MD aware. Parameters changed for metoprolol to be held if persisting below 50bpm. BG WNL. Pt reports left abdominal/side pain w/ adequate relief from PRN tylenol and oxycodone q6hrs. PIV saline locked. A+Ox4.

## 2023-01-28 NOTE — DISCHARGE SUMMARY
[facility]  Hospitalist Discharge Summary      Date of Admission:  1/23/2023  Date of Discharge:  1/28/2023  Discharging Provider: Ro Larson MD  Discharge Service: Hospitalist Service, GOLD TEAM 12    Discharge Diagnoses   Suspected Upper GI bleed   Acute blood loss anemia 2  Iron Deficiency Anemia  Hx Esophageal Varices s/p banding  Portal Hypertensive Gastropathy   Cirrhosis secondary to alcohol use disorder and alcohol related liver disease  Refractory Ascites  Hx of Hepatic encephalopathy  Coagulation Defects secondary to underlying liver disease  Pancytopenia 2/2 liver disease  Chronic Thrombocytopenia  Recently diagnosed chronic systolic heart failure secondary to high burden PVCs  COVID-19 Positive   WAI on CKD  Hx of mesenteric ischemia s/p bowel resection  Incisional hernia  Chronic diarrhea  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     Repeat CBC, CMP ordered for Tuesday 1/31 (same day as para), will have   them routed to hepatology clinic.    Follow up with Cardiology Electrophysiology, referral is in, they will   call you in next few days to schedule.     Appointments on [place] and/or [facility] (with [facility] or [facility]   provider or service). Call [phone] if you haven't heard regarding   these appointments within 7 days of discharge.             Unresulted Labs Ordered in the Past 30 Days of this Admission     Date and Time Order Name Status Description    1/23/2023 11:22 PM Anaerobic bacterial culture Preliminary     1/23/2023 11:22 PM Ascites Fluid Aerobic Bacterial Culture Routine Preliminary       These results will be  followed up by Hospitalist pool    Discharge Disposition   Discharged to home  Condition at discharge: Stable    Hospital Course   Blair Oliver is a 52 year old male with pmh of cirrhosis admitted on 1/23/2023 with melena and hematochezia    Suspected Upper GI bleed - resolved  Acute blood loss anemia - stable  Iron Deficiency Anemia   Hx Esophageal Varices s/p banding (12/1/22, 1/24/23)  Portal Hypertensive Gastropathy   Hgb 6.5 on admission to the ER on 1/23 after reporting melena, hematochezia and associated symptoms of dizziness, orthostasis. Patient has known hx EV and portal hypertensive gastropathy. Ultimately received 2 units PRBCs on 1/24 with improvement in Hb and no recurrent melena or hematochezia. Received another 1 unit on 1/26 for Hb <7 but was without symptoms at that time. Ultimately, patient with stable Hb x48 hours with resumption of Eliquis prior to discharge. Iron studies done previously were difficult to interpret, but given low iron on those even in setting of recent transfuons c/w iron deficiency anemia, has not been able to tolerate PO iron much at home, gave 1x dose IV Iron Dextran 1000mg. Suspect patient may also need regular PRBCs as outpatient, Hepatology working on arranging this for pt as outpatient and will call patient with updates.   - Discharge on PTA Eliquis, PPI  - Follow up labs with para on Tuesday 1/31  - Will follow up with Hepatology about arranging for PRBCs prn as outpatient   - Needs repeat EGD in 4-6 weeks   - Ideally would benefit from TIPS, but needs cardiac issues resolved first (see below)     Cirrhosis secondary to alcohol use disorder and alcohol related liver disease  Refractory Ascites  Hx of Hepatic encephalopathy  Coagulation Defects secondary to underlying liver disease  Pancytopenia 2/2 liver disease  Chronic Thrombocytopenia  Follows with [facility] hepatology, seen in clinic on 1/23. Currently hoping for liver transplant but not currently candidate due to  CHF. Hopeful for TIPS for refractory ascites but on hold with worsening EF. During hospitalization, hd dx para done 1/24 neg for SBP, therapeutic para done x2 (1/25, 1/28). Also had HCC screening done while admitted with repeat Abd US with doppler that demonstrated patent vasculature and no evidence of HCC.   - Discharge on all PTA meds (lactulose rifaximin)  - EGD in 4-6 weeks as above  - Would benefit from TIPS, needs cardiac EF optimized as below     MELD-Na score: 21 at 1/28/2023  6:30 AM  MELD score: 21 at 1/28/2023  6:30 AM  Calculated from:  Serum Creatinine: 1.79 mg/dL at 1/28/2023  6:30 AM  Serum Sodium: 145 mmol/L (Using max of 137 mmol/L) at 1/28/2023  6:30 AM  Total Bilirubin: 0.7 mg/dL (Using min of 1 mg/dL) at 1/28/2023  6:30 AM  INR(ratio): 2.22 at 1/28/2023  6:30 AM  Age: 52 years      Recently diagnosed chronic systolic heart failure  Followed previously with [facility] cardiology at [facility], currently hoping for re-evaluation by [facility] cardiology to assess if EF can improve and make him eligibile for transplant.  Per cardiology notes, heart failure with improved LVEF (35% 9/2020 --> 55% 10/2022). Most recent EF 40-45% from TTE on 1/3 at [facility]. Per Cardiology consultation this hospital stay,  HF is most likely due to a nonischemic cardiomyopathy secondary to PVC burden and may be related to his previous alcohol use as well as hypertension. Total PVC burden of up to 20% as assessed on Zio patch in 2/2022 - at that time he was referred to EP but he had not followed up with this. Ischemic workup so far includes stress MRI 11/2020 which showed no evidence of ischemia.  Recommended continuing diuretics, decreasing Metoprolol dosing to 50mg once daily, and for EP follow-up as outpatient.   - Outpatient EP referral placed in discharge navigator    COVID-19 Positive (1/24)   Patient tested positive for COVID on asymptomatic covid test on admission. Has not had a recent positive, no sick contacts. Denies  any viral URI symptoms, no SOB, cough, chest pain, or any other concerns. Has normal O2 sats on RA. Did not require any therapies, remained asymptomatic from covid standpoint. Isolation protocols explained on discharge.     WAI on CKD   -Baseline Cr 1.3, 1.7 on admission likely secondary to acute blood loss; despite fluid resuscitation and PRBCs remained stable ~1/7 throughout admission. Diuretics and BB added back and remained stable ~1.7 on discharge. Gave albumin with therapeutic gabbi.  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 Had Abd US on 1/24 during admission with patent vasculature and no focla lesions.       Type 2 DM  PTA on Lantus 65 units daily, ddecreased to 20 while NPO, increased to 30 on 1/26; even on this regimen his BGs have been well controlled without need for sliding scale insulin, A1c checked on 1/27 on 5.3. Given his BGs on Lantus 30 in the hospital while eating reasonably well and low A1c, Lantus dose adjusted to 30 units qday at home, follow up with PCP     GISELLE - CPAP  Intertriginous candida - miconazole BID  Peripheal neuropathy - duloxetine, gabapentin      Consultations This Hospital Stay   NURSING TO CONSULT FOR VASCULAR ACCESS CARE IP CONSULT  GI LUMINAL ADULT IP CONSULT  GI HEPATOLOGY ADULT IP CONSULT  CARDIOLOGY GENERAL ADULT IP CONSULT  PHYSICAL THERAPY ADULT IP CONSULT  OCCUPATIONAL THERAPY ADULT IP CONSULT  NUTRITION SERVICES ADULT IP CONSULT  INTERNAL MEDICINE PROCEDURE TEAM ADULT IP CONSULT [facility]  LYMPHEDEMA THERAPY IP CONSULT  INTERNAL MEDICINE PROCEDURE TEAM ADULT IP CONSULT [facility] - [facility]  NURSING TO CONSULT FOR VASCULAR ACCESS CARE IP CONSULT  CARE MANAGEMENT / SOCIAL WORK IP CONSULT  INTERNAL MEDICINE PROCEDURE TEAM ADULT IP CONSULT [facility] - [facility]  NURSING TO  CONSULT FOR VASCULAR ACCESS CARE IP CONSULT    Code Status   Full Code    Time Spent on this Encounter   I, Ro Larson MD, personally saw the patient today and spent greater than 30 minutes discharging this patient.       Ro Larson MD  [facility] [address] [address]  Phone: [phone]  ______________________________________________________________________    Physical Exam   Vital Signs: Temp: 97.8  F (36.6  C) Temp src: Oral BP: 125/60 Pulse: 60   Resp: 18 SpO2: 97 % O2 Device: None (Room air)    Weight: 275 lbs 5.67 oz     General: AAOx3, NAD, sitting up in bed conversing easily   HEENT: NC/AT, MMM,  anicteric sclera,   CV: Bradycardia, normal S1S2, no murmur, clicks, rubs appreciated  Resp: Non-labored respirations, CTAB, good air movement, no wheezes, rhonchi  Abd: Soft, distended, + Fluid wave, large non-tender ventral hernia  Extremities: wwp,  Lymphedema wraps in place   Skin: No obvious rashes or lesions  Neuro: A/Ox3, CN2-12 intact, No lateralizing symptoms or focal neurologic deficits  Psych: Appropriate mood         Primary Care Physician   KAYLAN HAILE    Discharge Orders      Adult Cardiology [facility] Referral      Reason for your hospital stay    Patient admitted with fatigue, dizziness, and dark maroon and black stools concerning for upper GI bleed. Was treated with medications and underwent endoscopy. One esophageal varix was noted and banded, in addition to extensive changes of portal hypertensive gastropathy. After receiving PRBCs and medical management, patient's hemoglobin was stabilized, tolerated resuming Eliquis, and was able to be safely discharged. Also received therapeutic para x2 while admitted and also seen by Cardiology with referral to EP as outpatient placed.     Activity    Your activity upon discharge: activity as tolerated     Adult [facility] Follow-up and recommended labs and tests    Follow up with primary care  provider, KAYLAN HAILE, within 7 days for hospital follow- up.     Repeat CBC, CMP ordered for Tuesday 1/31 (same day as para), will have them routed to hepatology clinic.    Follow up with Cardiology Electrophysiology, referral is in, they will call you in next few days to schedule.     Appointments on [place] and/or [facility] (with [facility] or [facility] provider or service). Call [phone] if you haven't heard regarding these appointments within 7 days of discharge.     Discharge Instructions    Dear Blair Oliver,    Your were hospitalized at [facility] with dizziness, fatigue, and blood in stool concerning for GIB and treated with endoscopy and medications.  Over your hospitalization your condition improved and today you are ready to be discharged to home.  You should continue to improve but if you develop fever, shortness of breath, increased dizziness or fatigue, blood or black tarry stools, or blood in vomit, please seek medical attention.    We are suggesting the following medication changes:  1) Decrease Metoprolol to 50mg once a day (new prescription sent to  discharge pharmacy)  2) Decrease your Lantus dose from 65 units in am to 30 units in am  3) I discontinued the Metformin and Tamsulosin since you reported you were not taking those at home so that your medication record will be accurate    Please get the following tests done:  Repeat CBC, CMP on Tuesday 1/31 (orders placed, will be sent to hepatology clinic)  Get your paracentesis as scheduled on Tuesday 1/31  We are working on getting you set up for outpatient blood transfusions as needed, someone from Hepatology clinic will call you to arrange    Please set up an appointment with:  1) Cardiology (electrophysiology) -> Referral is placed, they will call you in next few days to schedule  2) Hepatology -> Constance from hepatology sent message to schedulers to get your next appt moved sooner, they will call to  schedule    Also, you tested positive for COVID on 1/24/23. You need to remain in isolation for total of 5 days (1/24-1/28); then please wear N95 when you go out for the next 5 days (1/29-2/2)     It was a pleasure meeting with you today. Thank you for allowing me and my team the privilege of caring for you during your hospitalization.     Sincerely,    Ro Larson MD  Internal Medicine Hospitalist  [facility]     Diet    Follow this diet upon discharge: Orders Placed This Encounter      Regular Diet Adult       Significant Results and Procedures   See chart for details    Discharge Medications   Current Discharge Medication List      CONTINUE these medications which have CHANGED    Details   acetaminophen (TYLENOL) 500 MG tablet Take 1 tablet (500 mg) by mouth every 8 hours as needed for mild pain    Comments: No more than 2000mg qday      eplerenone (INSPRA) 25 MG tablet Take 2 tablets (50 mg) by mouth daily    Associated Diagnoses: Alcoholic cirrhosis of liver with ascites (H)      insulin glargine (LANTUS PEN) 100 UNIT/ML pen Inject 30 Units Subcutaneous every morning  Qty: 15 mL    Comments: If Lantus is not covered by insurance, may substitute Basaglar or Semglee or other insulin glargine product per insurance preference at same dose and frequency.        metoprolol succinate ER (TOPROL XL) 50 MG 24 hr tablet Take 1 tablet (50 mg) by mouth daily  Qty: 30 tablet, Refills: 1    Associated Diagnoses: PVC's (premature ventricular contractions)         CONTINUE these medications which have NOT CHANGED    Details   apixaban ANTICOAGULANT (ELIQUIS) 5 MG tablet Take 5 mg by mouth 2 times daily       ciprofloxacin (CIPRO) 500 MG tablet Take 1 tablet (500 mg) by mouth daily  Qty: 90 tablet, Refills: 3    Associated Diagnoses: Alcoholic cirrhosis of liver with ascites (H); Spontaneous bacterial peritonitis (H)      COMPRESSION STOCKINGS Please measure and distribute 2 pair of 20mmHg - 30mmHg knee high  open or closed toe compression stockings with extra refills as indicated or what insurance will allow .  Qty: 2 each, Refills: 4    Associated Diagnoses: Edema      DULoxetine (CYMBALTA) 60 MG capsule Take 60 mg by mouth every evening      ferrous gluconate (FERGON) 324 (38 Fe) MG tablet Take 1 tablet (324 mg) by mouth daily (with breakfast)  Qty: 90 tablet, Refills: 3    Associated Diagnoses: Anemia, unspecified type      gabapentin (NEURONTIN) 300 MG capsule Take 900 mg by mouth 2 times daily      insulin pen needle (B-D U/F) 31G X 5 MM miscellaneous AS DIRECTED. BD UF MINI PEN NEEDLE [id number]: USE ONCE DAILY      lactulose 20 GM/30ML SOLN Take 30 mLs (20 g) by mouth 3 times daily  Qty: 2838 mL, Refills: 11    Associated Diagnoses: Alcoholic cirrhosis of liver with ascites (H)      loperamide (IMODIUM A-D) 1 MG/7.5ML Take 15 mLs (2 mg) by mouth 4 times daily as needed for diarrhea  Qty: 237 mL, Refills: 1    Associated Diagnoses: Diarrhea, unspecified type      Multiple Vitamins-Minerals (SUPER THERA DAMARIS M) TABS Take 1 tablet by mouth daily      omeprazole (PRILOSEC) 20 MG DR capsule Take 20 mg by mouth every evening      ONETOUCH ULTRA test strip PLEASE SEE ATTACHED FOR DETAILED DIRECTIONS      potassium chloride ER (KLOR-CON M) 10 MEQ CR tablet Take 3 tablets (30 mEq) by mouth 2 times daily  Qty: 180 tablet, Refills: 3    Associated Diagnoses: Hypokalemia      rifaximin (XIFAXAN) 550 MG TABS tablet Take 1 tablet (550 mg) by mouth 2 times daily  Qty: 60 tablet, Refills: 11    Associated Diagnoses: Hepatic encephalopathy; Alcoholic cirrhosis of liver with ascites (H)      torsemide (DEMADEX) 20 MG tablet Take 3 tablets (60 mg) by mouth daily  Qty: 270 tablet, Refills: 1    Associated Diagnoses: Alcoholic cirrhosis of liver with ascites (H)         STOP taking these medications       metFORMIN (GLUCOPHAGE) 1000 MG tablet Comments:   Reason for Stopping:         tamsulosin (FLOMAX) 0.4 MG capsule Comments:   Reason  for Stopping:             Allergies   Allergies   Allergen Reactions     Minocycline Hives

## 2023-01-29 NOTE — PROGRESS NOTES
Discharge:  Patient has received discharge instructions and paperwork from bedside ASTRID Jensen. Albumin infusion has completed. PIV was removed after infusion completed at 1810. S/O is at bedside and and will be driving patient home. S/O has already picked up prescriptions. Patient was discharge by wheelchair with family member at 1815 to home with belongings.

## 2023-01-29 NOTE — PLAN OF CARE
Edema Discharge Summary    Reason for therapy discharge:    Discharged to home.    Progress towards therapy goal(s). See goals on Care Plan in [facility] electronic health record for goal details.  Goals partially met.  Barriers to achieving goals:   discharge from facility.    Therapy recommendation(s):    No further therapy is recommended.  Continue wearing comperm for edema management.

## 2023-01-30 NOTE — PROGRESS NOTES
Clinic Care Coordination Contact  [facility]: Post-Discharge Note  SITUATION                                                      Admission:    Admission Date: 01/23/23   Reason for Admission: melena and hematochezia  Discharge:   Discharge Date: 01/28/23  Discharge Diagnosis: melena and hematochezia    BACKGROUND                                                      Blair Oliver is a 52 year old male with pmh of cirrhosis admitted on 1/23/2023 with melena and hematochezia     Suspected Upper GI bleed - resolved  Acute blood loss anemia - stable  Iron Deficiency Anemia   Hx Esophageal Varices s/p banding (12/1/22, 1/24/23)  Portal Hypertensive Gastropathy   Hgb 6.5 on admission to the ER on 1/23 after reporting melena, hematochezia and associated symptoms of dizziness, orthostasis. Patient has known hx EV and portal hypertensive gastropathy. Ultimately received 2 units PRBCs on 1/24 with improvement in Hb and no recurrent melena or hematochezia. Received another 1 unit on 1/26 for Hb <7 but was without symptoms at that time. Ultimately, patient with stable Hb x48 hours with resumption of Eliquis prior to discharge. Iron studies done previously were difficult to interpret, but given low iron on those even in setting of recent transfuons c/w iron deficiency anemia, has not been able to tolerate PO iron much at home, gave 1x dose IV Iron Dextran 1000mg. Suspect patient may also need regular PRBCs as outpatient, Hepatology working on arranging this for pt as outpatient and will call patient with updates.   - Discharge on PTA Eliquis, PPI  - Follow up labs with para on Tuesday 1/31  - Will follow up with Hepatology about arranging for PRBCs prn as outpatient   - Needs repeat EGD in 4-6 weeks   - Ideally would benefit from TIPS, but needs cardiac issues resolved first (see below)      Cirrhosis secondary to alcohol use disorder and alcohol related liver disease  Refractory Ascites  Hx of Hepatic  encephalopathy  Coagulation Defects secondary to underlying liver disease  Chronic Thrombocytopenia  Follows with [facility] hepatology, seen in clinic on 1/23. Currently hoping for liver transplant but not currently candidate due to CHF. Hopeful for TIPS for refractory ascites but on hold with worsening EF. During hospitalization, hd dx para done 1/24 neg for SBP, therapeutic para done x2 (1/25, 1/28). Also had HCC screening done while admitted with repeat Abd US with doppler that demonstrated patent vasculature and no evidence of HCC.   - Discharge on all PTA meds (lactulose rifaximin)  - EGD in 4-6 weeks as above  - Would benefit from TIPS, needs cardiac EF optimized as below      MELD-Na score: 21 at 1/28/2023  6:30 AM  MELD score: 21 at 1/28/2023  6:30 AM  Calculated from:  Serum Creatinine: 1.79 mg/dL at 1/28/2023  6:30 AM  Serum Sodium: 145 mmol/L (Using max of 137 mmol/L) at 1/28/2023  6:30 AM  Total Bilirubin: 0.7 mg/dL (Using min of 1 mg/dL) at 1/28/2023  6:30 AM  INR(ratio): 2.22 at 1/28/2023  6:30 AM  Age: 52 years        Recently diagnosed chronic systolic heart failure  Followed previously with [facility] cardiology at [facility], currently hoping for re-evaluation by [facility] cardiology to assess if EF can improve and make him eligibile for transplant.  Per cardiology notes, heart failure with improved LVEF (35% 9/2020 --> 55% 10/2022). Most recent EF 40-45% from TTE on 1/3 at [facility]. Per Cardiology consultation this hospital stay,  HF is most likely due to a nonischemic cardiomyopathy secondary to PVC burden and may be related to his previous alcohol use as well as hypertension. Total PVC burden of up to 20% as assessed on Zio patch in 2/2022 - at that time he was referred to EP but he had not followed up with this. Ischemic workup so far includes stress MRI 11/2020 which showed no evidence of ischemia.  Recommended continuing diuretics, decreasing Metoprolol dosing to 50mg once daily, and for EP follow-up as  outpatient.   - Outpatient EP referral placed in discharge navigator    COVID-19 Positive (1/24)   Patient tested positive for COVID on asymptomatic covid test on admission. Has not had a recent positive, no sick contacts. Denies any viral URI symptoms, no SOB, cough, chest pain, or any other concerns. Has normal O2 sats on RA. Did not require any therapies, remained asymptomatic from covid standpoint. Isolation protocols explained on discharge.      WAI on CKD   -Baseline Cr 1.3, 1.7 on admission likely secondary to acute blood loss; despite fluid resuscitation and PRBCs remained stable ~1/7 throughout admission. Diuretics and BB added back and remained stable ~1.7 on discharge. Gave albumin with therapeutic gabbi.  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 Had Abd US on 1/24 during admission with patent vasculature and no focla lesions.        Type 2 DM  PTA on Lantus 65 units daily, ddecreased to 20 while NPO, increased to 30 on 1/26; even on this regimen his BGs have been well controlled without need for sliding scale insulin, A1c checked on 1/27 on 5.3. Given his BGs on Lantus 30 in the hospital while eating reasonably well and low A1c, Lantus dose adjusted to 30 units qday at home, follow up with PCP      GISELLE - CPAP  Intertriginous candida - miconazole BID  Peripheal neuropathy - duloxetine, gabapentin       ASSESSMENT           Discharge Assessment  How are you doing now that you are home?: Patient shares that he is doing well. No questions/concerns or needs at this time. Thanks writer for the call  How are your symptoms? (Red Flag symptoms escalate to triage hotline per guidelines): Improved  Do you feel your condition is stable enough to be safe at home until your provider visit?: Yes  Does the patient have their discharge  instructions? : Yes  Does the patient have questions regarding their discharge instructions? : No  Were you started on any new medications or were there changes to any of your previous medications? : Yes  Does the patient have all of their medications?: Yes  Do you have questions regarding any of your medications? : No  Do you have all of your needed medical supplies or equipment (DME)?  (i.e. oxygen tank, CPAP, cane, etc.): Yes  Discharge follow-up appointment scheduled within 14 calendar days? : Yes  Discharge Follow Up Appointment Date: 02/14/23  Discharge Follow Up Appointment Scheduled with?: Specialty Care Provider    Post-op ([facility] CTA Only)  If the patient had a surgery or procedure, do they have any questions for a nurse?: No    Post-op (Clinicians Only)  Did the patient have surgery or a procedure: No  Fever: No  Chills: No      PLAN                                                      Outpatient Plan:  Follow up with primary care provider, KAYLAN HAILE, within 7 days for hospital follow- up.      Repeat CBC, CMP ordered for Tuesday 1/31 (same day as para), will have them routed to hepatology clinic.     Follow up with Cardiology Electrophysiology, referral is in, they will call you in next few days to schedule.      Appointments on [place] and/or [place] campus (with [facility] or [facility] provider or service). Call [phone] if you haven't heard regarding these appointments within 7 days of discharge.    Future Appointments   Date Time Provider Department Center   2/14/2023 11:00 AM Landy Duarte MD [facility] [place]   2/17/2023 10:30 AM Roxie Londono OT [facility]   2/24/2023 10:30 AM Roxie Londono OT [facility]         For any urgent concerns, please contact our 24 hour nurse triage line: [phone] ([phone])         JAME Lala

## 2023-02-01 PROBLEM — D50.0 IRON DEFICIENCY ANEMIA DUE TO CHRONIC BLOOD LOSS: Status: ACTIVE | Noted: 2023-01-01

## 2023-02-01 NOTE — PROGRESS NOTES
Spoke with pt for check in. Pt was discharged from hospital on 1/28, Hgb on discharge was 10.1, Hgb yesterday 1/31 8.1. Pt states his stools have gotten darker since he's gotten home from the hospital, continues to have 6-8 loose stools daily on lactulose 30mL BID. Pt states he's feeling very weak and fatigued. Had labs done yesterday, but INR was not drawn, will fax labs to PCP's office for pt to have done at [facility] hospital f/u appt tomorrow. IV venofer therapy plan faxed to [facility], notified pt that infusion scheduling will be contacting him soon.  Pt has started getting twice weekly paracentesis, states he has 6L max drained every time and is feeling 'less swollen'. Last para was yesterday 1/31, next appt scheduled for Friday 2/3. States he had lymphedema therapy while he was hospitalized, reports it was 'very helpful' for his leg swelling. First OP lymphedema therapy appt scheduled 2/17.  Repeat lab orders placed and faxed to pt's PCP office - pt is scheduled for hospital f/u visit tomorrow morning at 10:05am, to have CBC, CMP, and INR rechecked. Standing blood transfusion therapy plan placed and faxed to [facility], informed consent obtained for blood products over the phone with Dr. Bain, consent urgent faxed to HIM.  Pt is scheduled for EP consult on 2/14, is due for repeat EGD between 2/20/23 and 3/6/23. Needs to schedule follow-up with hepatology.    Will check back in with pt in 1-2 days.    Sharon Owusu, RN Care Coordinator  [facility]  Hepatology Clinic/Specialty Program

## 2023-02-06 NOTE — PROGRESS NOTES
"Received call from [facility] IR nurse on Friday 2/3 to let clinic know that pt is a difficult IV start, and now that he is getting paracentesis twice weekly as well as iron infusions it is getting more difficult to establish IV access. They stated \"his arms are covered in bruises, and his fragile veins are getting harder to maintain IV access on with how often he's getting poked every week\". They wanted to know if they could place a long-term IV access option on pt.     Per Dr. Bain, OK to place single lumen PICC for lab draws and albumin. Called [facility] IR and gave verbal order to place PICC line.    Dr. Bain reviewed pt's labs that were completed at his PCP visit on 2/2, recommends pt have labs rechecked next week.    Attempted to reach patient for check in, no answer, message left requesting call back, number provided.    Sharon Owusu, RN Care Coordinator  [facility]  Hepatology Clinic/Specialty Program    "

## 2023-02-08 NOTE — PROGRESS NOTES
Spoke with pt for check in, notified pt that OK given for him to have PICC placed for lab draws and albumin. Advised pt to have labs done on Monday 2/13, pt stated he has continued to have black stools that are occasionally red-tinged. Pt reports he had a few 'dizzy spells' yesterday but that today he 'feels fine'. Per pt request, lab appt scheduled for tomorrow for lab recheck given continued black stools.    Pt continues with twice weekly paracentesis appts, had first iron infusion today. States his leg swelling has much improved. Continues with lactulose BID, denies any confusion or mental fogginess. Scheduled for f/u with Dr. Bain on 2/24.    Will check back in with pt in 2-3 days.    Sharon Owusu, RN Care Coordinator  [facility]  Hepatology Clinic/Specialty Program

## 2023-02-09 NOTE — PROGRESS NOTES
"Per Dr. Bain, given pt's critical Hgb 6.6 today, symptoms of dizziness, and patient fall reported by lab staff at [facility] lab, pt should present to ED for evaluation and blood transfusion.    Called pt's spouse Alive to notify. She states that pt has been \"very tired and out of it today\", reports that he has been sleeping since they got home from lab appt. She states she will bring pt to [facility] ED immediately, requested writer call staff at ED to let them know they're on they're way.    Called and spoke with [facility] ED charge RN, Cassie, to give brief report and notify that pt is being brought in by spouse.    Sharon Owusu, RN Care Coordinator  [facility]  Hepatology Clinic/Specialty Program          "

## 2023-02-09 NOTE — TELEPHONE ENCOUNTER
ori Bradshaw from [facility] lab called and stated that the patient had been in the clinic for a lab draw. When he got up out of the chair, he had dropped to his knees. Patient stated that he had felt a little dizzy. Leeann stated that he was not pale; stated that the patient declined seeing a provider.   Staff had got patient into a wheelchair and wheeled him out to his car; wife drove him home.   Leeann stated that she will be filling out a [facility] report, but also wanted to reach out to let Dr. Bain's team know.   Writer will forward to hepatology team.     Belkys Melara RN

## 2023-02-09 NOTE — TELEPHONE ENCOUNTER
See patient outreach encounter from 2/9/23.    Sharon Owusu, RN Care Coordinator  [facility]  Hepatology Clinic/Specialty Program

## 2023-02-09 NOTE — CONFIDENTIAL NOTE
DATE:  2/9/2023   TIME OF RECEIPT FROM LAB:  1:28 PM  LAB TEST:  Hgb; RBC  LAB VALUE:  6.6; 1.92  RESULTS GIVEN WITH READ-BACK TO (PROVIDER): Dr. Bain    TIME LAB VALUE REPORTED TO PROVIDER:   1:30 PM

## 2023-02-13 NOTE — TELEPHONE ENCOUNTER
DATE:  2/13/2023   TIME OF RECEIPT FROM LAB:  4:50pm  LAB TEST:  Hgb  LAB VALUE:  7.5  RESULTS GIVEN WITH READ-BACK TO (PROVIDER):  ARON THOMPSON  TIME LAB VALUE REPORTED TO PROVIDER:   4:54pm    Lab was ordered by GABRIEL Washington CNP but lab was unable to reach this provider or anyone in their department, so they contacted Hepatology.    Sharon Owusu, RN Care Coordinator  [facility]  Hepatology Clinic/Specialty Program

## 2023-02-13 NOTE — TELEPHONE ENCOUNTER
RECORDS RECEIVED FROM: [facility]   DATE RECEIVED: 02/13/23   NOTES STATUS DETAILS   OFFICE NOTE from referring provider  Internal Referred by Dr. Larson   OFFICE NOTE from other specialist   Care Everywhere Dr. Bell - Cardiology [facility]   MEDICATION LIST In Epic    DIAGNOSTIC PROCEDURES     Acute HFrEF (heart failure with reduced ejection fraction) (H)   PVC's (premature ventricular contractions)      Internal     IMAGING (DISC & REPORT)      EKG In process 2020 - present [facility]     Called [facility] and requested for all ECG's from 2020 to present to be faxed over by the end of the day today.    NICK Mcintosh   Cardiology Team  [facility]

## 2023-02-14 NOTE — PROGRESS NOTES
"Spoke with pt for check in. Pt recently hospitalized at [facility] for acute on chronic blood loss anemia (Hgb 6.6 on 2/9), hx of GAVE. Pt underwent EGD at [facility] on 2/10, showed post banding ulceration, recommended follow-up EGD in 4-6 weeks to monitor ulcer healing. Pt instructed to hold eplerenone due to WAI, Cr upon discharge 1.97. Hgb on 2/11 was 7.7, pt had recheck yesterday 2/13 Hgb 7.4.    Pt reports that black stools have become his baseline, but starting last night his stools have become bloody, describes his stools as dark red. Pt typically has ~6-8 stools daily. Pt denies any \"dizzy spells\", but experiencing weakness and fatigue per his baseline. Pt and wife on the way to cardiology appt with Dr. Duarte during call.    Spoke with Dr. Bain regarding pt symptoms, she advised that pt go to ED after cardiology appt due to bloody stools. Attempted to call ot and wife to advise they go to ED, unable to reach, left VM with callback number.    Sharon Owusu, RN Care Coordinator  [facility]  Hepatology Clinic/Specialty Program    "

## 2023-02-14 NOTE — PROGRESS NOTES
Blair Oliver's goals for this visit include:     He requests these members of his care team be copied on today's visit information: PCP    PCP: Bubba Ovalle    Referring Provider:  Landy Duarte MD  [address] [place] [id number]    BP 96/56 (BP Location: Left arm, Patient Position: Sitting, Cuff Size: Adult Regular)   Pulse (!) 41   Wt 114.2 kg (251 lb 11.2 oz)   SpO2 100%   BMI 31.46 kg/m      Do you need any medication refills at today's visit? No.    Larry Escobedo, EMT  Clinic Support  [facility]    [phone]    Employed by [facility]

## 2023-02-14 NOTE — PROGRESS NOTES
I am delighted to see Blair Oliver as a new patient in [place] cardiology clinic for evaluation of PVCs. He is here with his wife.    History of Present Illness:  The patient is a 52 year old  Male alcoholic liver cirrhosis, heart failure with improved EF (35% in 2018 to 55% 10/2022). He has been noted to have varying EF over the last few years, with PVC burden up to 20%. He is referred to see if PVCs are cause of cardiomyopathy in order to optimize CV status for possible liver transplant.    He denies any palpitations or irregular heart beats. No chest discomfort or syncope. Has had dizziness in setting of blood loss and anemia. No orthopnea. His daily physical activity has been limited due to chronic fatigue from liver disease. He's had lower extremity edema which is currently improved. His main issue recently has been recurrent GI bleed for which he has received transfusions. He continues to be on apixaban for a h/o mesenteric venous thrombosis.     Past Medical History:  [facility] 2018, ? etiology  Alcoholic liver cirrhosis  Paracentesis twice a week  Diabetes type II  Esophageal varices  GERD  Hypertension  Mesenteric venous thrombosis on apixaban  CKD     Medications:   Apixaban 5 bid  Metoprolol succinate 50 every day  KCL 30 bid  Torsemide 60 every day  Omeprazole  Lactulose  Ferrous gluconate  Insulin  Cymbalta    Eplerenone 50 every day - currently on hold due to WAI      Allergies:    Allergies   Allergen Reactions     Minocycline Hives       Family History: father with stroke, MI, passed at 70s.    Psychosocial history:   Smoke: none  Alcohol: stopped    Physical examination  Vitals: 96/56, HR 41  BMI= 35 (114 kg)    Constitutional: In general, the patient is a pleasant male, appears chronically ill  Cardiovascular: Carotids +2/2 bilaterally without bruits.  No jugular venous distension. Regular rate and rhythm with ectopy. Normal S1, S2. No murmur, rub, click, or gallop.   Extremities: Pulses are  normal bilaterally throughout. Trace peripheral edema.  Respiratory: Clear to asculation.  No ronchi, wheezes, rales.  No dullness to percussion.     I have personally and independently reviewed the following:  Labs:   2023: cr 1.77, Na 143, AST 22, ALT 25  2023: Hgb 7.4, plt 77K, INR 2.25    Stress Cardiac MRI at [facility] 2020:   LVEF 41%, RVEF 42%, LA 4.4 cm  Stress normal  No scars or fibrosis    Echo:  1/3/2023 at [facility]: EF 40-45%  10/10/2022: EF 55%, moderate LV dilation, normal RV, no effusion  2022 at [facility]: EF 40-45%  2022: EF 55-60%  2021 at [facility]: EF 40-45%  10/30/2020 at [facility]: EF 40-45%  2020 at [facility]: EF 35-40%  3/7/2018 at [facility]: EF 35-40%      EK2023: sinus 54 bpm, normal intervals, no PVC  2022: sinus 71 bpm, PVCs - inferior axis, transition V2    24 hour Holter 3/7/2018: PVCs 32%    Patch monitor:   -2022: PVCs 22.7%    6/15/2022-2022: sinus average 90 bpm, PVC 15%  NSVT up to 14 beats, no symptoms   Runs of PACs, longest 2 minutes at 116 bpm, no symptoms.    Assessment :  Frequent PVCs. His burden had been anywhere from 15-30%. It is unclear how PVCs relate to his cardiomyopathy:  [facility] 55-60%. All of his other echos were at [facility] which consistently read 40-45%. Discrepancies were present in summer of 2022 when 2 echos 3 weeks apart had different readings. He has not evidence of heart failure today.   PVC location suggestive of LV coronary cusp area. His platelets are 77K and he has active GI bleeding issues for which he has ongoing investigation. In the overall scheme of things I am uncertain regarding risk/benefit of PVC ablation. However I will discuss with liver team.            I spent a total of 30 minutes face to face with  Blair Oliver during today's office visit. I have spend an additional 30 minutes today on chart review and documentation.      The patient is to return as above . The  patient understood the treatment plan as outlined above.  There were no barriers to learning.      Landy Duarte MD    1. RUE wound-Continue w/ current treatment w/ Xeroform, gauze & Kerlex dressing - Cleanse w/ normal saline, gently pat dry. Apply non-adherent Xeroform dressing to provide protective non-adherent dressing and maintain clean, moist wound bed. Cover w/ gauze dressing and wrap w/ Kerlex dressing. Change dressing daily & prn for strike-through drainage or soiling.   -Continue elevating RUE on pillow to assist w/ edema reduction.    -Assess skin/wound qshift, report changes to primary provider.     Additional recs/PI Prevention:   -Continue turning/positioning patient from side-to-side q2h while in bed, q1h when/if OOB chair, or in accordance w/ pt's plan of care.  Continue utilizing pillows and/or fluidized positioner to assist w/ turning/positioning. When/if OOB chair, utilize pillows or chair cushion to offload pressure.   -Continue to offload heels from bed surface with soft pillow under calfs or by applying offloading boots to BLEs.   -Continue applying Coloplast Marti Protect moisture barrier cream to buttock and perineal area daily and prn after each incontinent episode.    -Continue utilizing one underpad underneath patient to contain incontinence episodes; change pad when saturated/soiled.   -If patient w/ consistent urinary incontinence, consider utilizing female incontinence device/PrimaFit/PureWick (if patient candidate) to contain urinary incontinence.  -Continue nutrition consult & tight glucose control for optimal wound healing & nutritional status.     Plan of Care: Primary RN Sumaya at bedside made aware of above recs. Spoke w/ covering/primary Neuro ICU MD (at 2223) in regards to above. Signing off on patient, no further needs/recs from [facility] service at this time. Staff RN to perform routine skin/wound assessment and manage wound care. Questions or concerns or if wound worsens and reconsult needed, please contact [facility], [facility][phone].

## 2023-02-14 NOTE — TELEPHONE ENCOUNTER
All ECG's from 2020 to present has been received and placed in stat scanning. Copy has been given to provider to review prior to appointment.       NICK Mcintosh   Cardiology Team  [facility]

## 2023-02-15 NOTE — LETTER
2/16/2023    Blair Oliver  [address] [place] [address]      Dear Blair,    Thank you for your interest in the Transplant Center at [facility]. We look forward to being a part of your care team and assisting you through the transplant process.    As we discussed, your transplant coordinator is Kala Sanford (Liver).  You may call your coordinator at any time with questions or concerns.  [phone].    Please complete the following.    1. Fill out and return the enclosed forms    Authorization for Electronic Communication    Authorization to Discuss Protected Health Information    Authorization for Release of Protected Health Information    2. Sign up for:    [employer], access to your electronic medical record (see enclosed pamphlet)    [email or web address], a transplant education website    You can use these tools to learn more about your transplant, communicate with your care team, and track your medical details      Sincerely,      Solid Organ Transplant  [facility]    cc:

## 2023-02-15 NOTE — TELEPHONE ENCOUNTER
Anemia Management Note - Enrollment  SUBJECTIVE/OBJECTIVE:    Referred by Dr. Sweetie Bain on 2023  Primary Diagnosis: Anemia of Other Chronic Illness (D63.8), CKD stage 3a     Secondary Diagnosis:  GAVE, Alcoholic cirrhosis of liver with ascites (K70.3) Anemia in CKD Satge 3a.   *2/15/23 Ok to use CKD dx codes per Dr. Sweetie Bain.   Hgb goal range:  9-10  Epo/Darbo: TBD: Delmer is in the ED at [facility]  Iron regimen:  NA. Elevated TSAT  Labs : 24  Recent FELI use, transfusion, IV iron: NA  RX/TX plans : TBD    No history of stroke, MI and blood clots or cancers.    Contact:  No Boxes Checked on Consent to Communicate.     Anemia Latest Ref Rng & Units 2023   Hemoglobin 13.3 - 17.7 g/dL 6.7(LL) 8.6(L) 8.1(L) 10.1(L) 8.1(L) 6.6(LL) 7.4(LL)   TSAT 15 - 46 % - - - - - 100(H) -   Ferritin 26 - 388 ng/mL - - - - - 185 -       BP Readings from Last 3 Encounters:   23 96/56   23 125/60   23 105/60     Wt Readings from Last 2 Encounters:   23 251 lb 11.2 oz (114.2 kg)   23 275 lb 5.7 oz (124.9 kg)     Current Outpatient Medications   Medication Sig Dispense Refill     acetaminophen (TYLENOL) 500 MG tablet Take 1 tablet (500 mg) by mouth every 8 hours as needed for mild pain       apixaban ANTICOAGULANT (ELIQUIS) 5 MG tablet Take 5 mg by mouth 2 times daily        ciprofloxacin (CIPRO) 500 MG tablet Take 1 tablet (500 mg) by mouth daily 90 tablet 3     COMPRESSION STOCKINGS Please measure and distribute 2 pair of 20mmHg - 30mmHg knee high open or closed toe compression stockings with extra refills as indicated or what insurance will allow . 2 each 4     DULoxetine (CYMBALTA) 60 MG capsule Take 60 mg by mouth every evening       eplerenone (INSPRA) 25 MG tablet Take 2 tablets (50 mg) by mouth daily       ferrous gluconate (FERGON) 324 (38 Fe) MG tablet Take 1 tablet (324 mg) by mouth daily (with breakfast) 90  tablet 3     gabapentin (NEURONTIN) 300 MG capsule Take 900 mg by mouth 2 times daily       insulin glargine (LANTUS PEN) 100 UNIT/ML pen Inject 30 Units Subcutaneous every morning 15 mL      insulin pen needle (B-D U/F) 31G X 5 MM miscellaneous AS DIRECTED. BD UF MINI PEN NEEDLE [id number]: USE ONCE DAILY       lactulose 20 GM/30ML SOLN Take 30 mLs (20 g) by mouth 3 times daily 2838 mL 11     loperamide (IMODIUM A-D) 1 MG/7.5ML Take 15 mLs (2 mg) by mouth 4 times daily as needed for diarrhea 237 mL 1     metoprolol succinate ER (TOPROL XL) 50 MG 24 hr tablet Take 1 tablet (50 mg) by mouth daily 30 tablet 1     Multiple Vitamins-Minerals (SUPER THERA DAMARIS M) TABS Take 1 tablet by mouth daily       omeprazole (PRILOSEC) 20 MG DR capsule Take 20 mg by mouth every evening       ONETOUCH ULTRA test strip PLEASE SEE ATTACHED FOR DETAILED DIRECTIONS       potassium chloride ER (KLOR-CON M) 10 MEQ CR tablet Take 3 tablets (30 mEq) by mouth 2 times daily 180 tablet 3     rifaximin (XIFAXAN) 550 MG TABS tablet Take 1 tablet (550 mg) by mouth 2 times daily 60 tablet 11     torsemide (DEMADEX) 20 MG tablet Take 3 tablets (60 mg) by mouth daily 270 tablet 1     ASSESSMENT:  Hgb Not at goal/Initiation of therapy   Ferritin: At goal (>100ng/mL)  TSat: elevated at >50%  Iron regimen recommended: NA. Elevated TSAT  Recommended FELI regimen: TBD  Blood Pressure: Stable    PLAN:  1. Patient  called  for enrollment in Anemia Management Service. Currently in the ED at [facility]  2. Need to discuss:  anemia overview, monitoring service and goal hemoglobin range and rationale and risks of FELI blood clots, stroke and increase in blood pressure  3. Dose location: in clinic TBD  4. Labs: [facility]  5. Pharmacy: TBD       New Anemia Referral that needs to be treated with FELI. Waiting to hear back from MD karolina Neil codes.  Per recent GFR, Blair does has fall under CKD stage 3a.     2/15/23: Called Blair.  Wife answered, he is currently in the  ED at [facility]. Will follow up in 1-2 days.     Next call date:  2/17/23    Gretta Brar RN   [address] [address]   [email or web address]   Office : [phone]  Fax: [phone]

## 2023-02-15 NOTE — PROGRESS NOTES
Spoke with pt's spouse, Margarita to notify of anemia clinic referral and new blood transfusion parameters. Margarita states that pt is admitted to [facility], has just gotten out of another EGD. Discussed with Margarita that SOT referral will be placed for liver transplant eval per Dr. Bain, they will be hearing from [facility] office soon. Margarita verbalized understanding, no further questions at this time.    Sharon Owusu, RN Care Coordinator  [facility]  Hepatology Clinic/Specialty Program

## 2023-02-17 NOTE — PROGRESS NOTES
Attempted to reach pt for check in after hospitalization. Message left with both pt and spouse, including reminder of upcoming liver evaluation appointments on Tuesday 2/21, and to return to ED for signs of bleeding. Call back number provided.

## 2023-02-17 NOTE — TELEPHONE ENCOUNTER
Screening Questions  BLUE  KIND OF PREP RED  LOCATION [review exclusion criteria] GREEN  SEDATION TYPE        Y Are you active on mychart?       Sweetie Bain MD    Ordering/Referring Provider?        [facility] What type of coverage do you have?      N Have you had a positive covid test in the last 14 days?     31.4 1. BMI  [BMI 40+ - review exclusion criteria]    Y  2. Are you able to give consent for your medical care? [IF NO,RN REVIEW]          N  3. Are you taking any prescription pain medications on a routine schedule   (ex narcotics: oxycodone, roxicodone, oxycontin,  and percocet)? [RN Review]          3a. EXTENDED PREP What kind of prescription?     N 4. Do you have any chemical dependencies such as alcohol, street drugs, or methadone?        **If yes 3- 5 , please schedule with MAC sedation.**          IF YES TO ANY 6 - 10 - HOSPITAL SETTING ONLY.     N 6.   Do you need assistance transferring?     N 7.   Have you had a heart or lung transplant?    N 8.   Are you currently on dialysis?   N 9.   Do you use daily home oxygen?   N 10. Do you take nitroglycerin?   10a.  If yes, how often?     11. [FEMALES]  N/A Are you currently pregnant?    11a.  If yes, how many weeks? [ Greater than 12 weeks, OR NEEDED]    N 12. Do you have Pulmonary Hypertension? *NEED PAC APPT AT [facility] w/ MAC*     N 13. [review exclusion criteria]  Do you have any implantable devices in your body (pacemaker, defib, LVAD)?    N 14. In the past 6 months, have you had any heart related issues including cardiomyopathy or heart attack?     14a.  If yes, did it require cardiac stenting if so when?     N 15. Have you had a stroke or Transient ischemic attack (TIA - aka  mini stroke ) within 6 months?      Y 16. Do you have mod to severe Obstructive Sleep Apnea?  [Hospital only]    N 17. Do you have SEVERE AND UNCONTROLLED asthma? *NEED PAC APPT AT [facility] w/MAC*     Y 18. Are you currently taking any blood thinners?     18a. If yes, inform patient to  "\"follow up w/ ordering provider for bridging instructions.\"    N 19. Do you take the medication Phentermine?    19a. If yes, \"Hold for 7 days before procedure.  Please consult your prescribing provider if you have questions about holding this medication.\"     N  20. Do you have chronic kidney disease?      N  21. Do you have a diagnosis of diabetes?     N  22. On a regular basis do you go 3-5 days between bowel movements?      23. Preferred LOCAL Pharmacy for Pre Prescription    [ LIST ONLY ONE PHARMACY]       [id number] - [place], [place] - [address] [place] [address] AT [facility]      - CLOSING REMINDERS -    Informed patient they will need an adult    Cannot take any type of public or medical transportation alone    Conscious Sedation- Needs  for 6 hours after the procedure       MAC/General-Needs  for 24 hours after procedure    Pre-Procedure Covid test to be completed [[facility] PCR Testing Required]    Confirmed Nurse will call to complete assessment       - SCHEDULING DETAILS -  YES Hospital Setting Required? If yes, what is the exclusion?: GISELLE    FRANK  Surgeon    3/27  Date of Procedure  Upper Endoscopy [EGD]  Type of Procedure Scheduled  [facility]   Which Colonoscopy Prep was Sent?     MODERATE Sedation Type     N PAC / Pre-op Required                 "

## 2023-02-17 NOTE — TELEPHONE ENCOUNTER
Patient discharged from [facility]    Reports feeling good and he says if he needs ED services going forward he will come to [facility] given this is where he needs to be for liver eval/transplant.    Planning on coming for eval on 2/21 at [facility]    Per Dr. Sweetie Bain, after discuss with Dr. Duarte, patient should get cardiac MR with stress done asap as part of his evaluation.  Ordered and sent to scheduling

## 2023-02-20 NOTE — PROGRESS NOTES
[facility]  Outpatient MNT: Liver Transplant Evaluation    Current BMI: 31.4 (HT 75 in,  lbs/114 kg)  BMI is within recommendation of <45 for liver transplant    Fried Frailty-- Frail (3/5 points)- unintentional wt loss, reported exhaustion, reduced    *Scored Not Frail (2/5 points) - unintentional wt loss and reported exhaustion (6/2021 eval)    FraiLT-- Frail- unable to complete chair stand portion of test d/t feeling dizzy, ascites, etc   [email or web address]  *Scored Pre frail during 6/2021 eval     Recommended Interventions to Address Frailty:  - Discussed option of PT, yet pt reports he would like to maintain functional status on his own at this point  - Include smaller, more frequent meals to prevent early satiety, including 1 (or more) protein drinks/bars daily      Time Spent: 15 minutes  Visit Type: follow up (saw pt 6/2021 for txp eval)  Referring Physician: James   Pt accompanied by: his wife, Margarita     History of previous txp: none     Nutrition Assessment  H/o DM II  Discharged from hospital end of Jan  Reports really dry mouth, very thirsty    - Appetite: reduced x 1 month; eating 1 meal/day now (prev 2 meals/day @ baseline)   - Food allergies/intolerances: no   - Meal prep & grocery shopping: pt/wife   - Previous RD education: yes  - Issues chewing or swallowing: no   - N/V/D/C: new nausea, dry heaves, diarrhea (chronic)- unsure why, takes lactulose which doesn't help   - Food access concerns: not asked     Vitamins, Supplements, Pertinent Meds: iron, MVI   Herbal Medicines/Supplements: none   Protein supplement: none     Edema: mild EMIL/thighs, + ascites, drained 12 L every week via para    Weight hx: losing muscle per pt, moderate in nature   Wt Readings from Last 10 Encounters:   02/14/23 114.2 kg (251 lb 11.2 oz)   01/27/23 124.9 kg (275 lb 5.7 oz)   01/23/23 128 kg (282 lb 1.6 oz)   12/05/22 116.7 kg (257 lb 4.4 oz)   10/11/22 117.6 kg (259  lb 4.8 oz)   08/09/22 129.7 kg (286 lb)   06/15/22 123.9 kg (273 lb 1.6 oz)   06/13/22 133.4 kg (294 lb)   03/02/22 135 kg (297 lb 9.6 oz)   12/13/21 131.9 kg (290 lb 11.2 oz)     Diet Recall  Breakfast None    Lunch None    Dinner Eats out often (4x/week)- burger (partial or full portion), pizza (3 slices), steak w/ baked potato; if at home- wife will make chicken w/ potato, veggie/corn; spaghetti w/ meat sauce + salad, bread; chicken/beef tacos    Snacks Rare    Beverages Water, lemonade (1 L/day), juice when in the hospital    Dining out 3-4x/week     Physical Activity  Some walking, 4-5x/week, 20 minutes   ADLs- needs some help from wife based on EMIL Brito x 9 months- unstable, 4 falls this year  Feels he is functional enough on his own and does not feel PT would be helpful at this time     Malnutrition   % Intake: </= 50% for >/= 1 month (severe malnutrition)  % Weight Loss: difficult to assess w/ fluid status   Subcutaneous Fat Loss: Mild/Moderate  Muscle Loss: Moderate  Fluid Accumulation/Edema: Moderate   Malnutrition Diagnosis: Moderate malnutrition in the context of chronic illness     Nutrition Diagnosis  Inadequate protein-energy intake r/t reduced appetite AEB diet recall reveals eating 1 meal/day, muscle loss, not/pre-frail status-->now has declined to frail status.     Nutrition Intervention  Nutrition education provided:  Discussed sodium intake (low sodium foods and drinks, seasoning food without salt and tips for low sodium diet). Although he is only eating 1 meal/day, it is often outside the home and can contain a full day or more of his recommended sodium intake. This may be why he is thirsty.     Reviewed adequate protein intake. Encouraged receiving protein from both animal and plant based sources. Suggested a protein drink and/or bar (or more than one) earlier in the day to help meet protein needs, as this is not possible to achieve with 1 meal/day. Reviewed benefits of grazing or eating  small meals during the day to avoid overloading stomach with larger amounts of food at night, possibly impacting hunger levels earlier in the day, etc.     Reviewed post txp diet guidelines in brief (will review in further detail post txp):  (1) Review of proper food safety measures d/t immunosuppressant therapy post-op and increased risk for food-borne illness    (2) Avoid the following post txp d/t risk for rejection, unknown effects on the organs, and/or potential interactions with immunosuppressants:  - Herbal, Chinese, holistic, chiropractic, natural, alternative medicines and supplements  - Detoxes and cleanses  - Weight loss pills  - Protein powders or other products with extracts or herbs (ie green tea extract)    (3) Med regimen and possible side effects    Patient Understanding: Pt verbalized understanding of education provided.  Expected Engagement: Good  Follow-Up Plans: PRN     Nutrition Goals  Aim for 1 protein shake/drink earlier in the day    Iliana Barros RD, LD, CCTD

## 2023-02-20 NOTE — PROGRESS NOTES
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    Cirrhosis  - dx ~ 9/2020  - Etiology: EtOH +/- NAFLD  - hx HE  - hx ascites. Hx SBP (9/2022).  - no hx variceal bleed  - last EGD: 2/2023 with grade II EV - no banding performed  - HCC screening: Abd US 2/2023 - no hepatic lesions  - Last EtOH use: 9/21/2020    Initial History:   Patient states that he was diagnosed with liver disease and cirrhosis in September 2020.  He was admitted to the hospital at that time he was found to have extensive superior mesenteric venous thrombosis, complicated by ischemic bowel and had to undergo an exploratory laparotomy with small bowel resection.  He was placed on Eliquis postoperatively.  During surgery, he was found to have some ascites that was evacuated.     Since then he has had trouble with incisional hernias, and rectus diastasis.  He was seen by his general surgeon in March where he was noted to have a MELD of 18.  He was then sent to [facility] to be seen by surgery for consideration of incisional hernia repair.    Patient complains of episodes of leakage of fluid from his incision that happens intermittently.  This fluid is usually blood tinged but sometimes yellow. He is on 20 mg of furosemide daily, that was started because of his heart failure and his pedal edema.  He has also noted that every 2 to 3 days he has watery bowel movements, up to 12 times a day.  In between there was episodes he has about 2-3 soft bowel movements per day.  He denies any fevers or chills, abdominal pain, melena, or hematochezia.    His wife is  also noted that he is having some memory issues.  He has not had any major episodes of confusion, and has not been admitted to the hospital with hepatic encephalopathy.    Patient denies any prior history of liver disease. He has had abnormal liver enzymes and low platelets as far back as 2011. Past notes from his PCP had also documented excessive alcohol use, as well as, a hospitalization for overdose of Vicodin.     First paracentesis 6/26/2021 - 600 ml removed - 231 WBC, TP 1.4, Albumin 0.8.     Recent EGD with bx negative for celiac. Colon bx showing chronic inactive colitis. Still had loose stools after colonoscopy prep, not c/w overflow.  We have tried a several week course of rifaximin for encephalopathy, this did not help his diarrhea.  Fecal calprotectin was normal when checked December 2021    Liver MRI March 2, 2022 showing cirrhosis without any suspicious enhancing lesions.  Previously demonstrated hyperintense nonenhancing lesions are not well visualized in the current exam likely due to motion.  Also showed new small to moderate volume ascites.     Started having issues with ascites again at the time of his last visit. MRI liver without evidence of PVT or HCC.  Sent to the hospital after his last visit 6/2022 for bradycardia Metoprolol was stopped due to bradycardia, EKG with frequent PVCs. TTE 6/2022 with improvement in his LVEF. Saw his cardiologist at [facility], resumed BB given concern for PVC induced cardiomyopathy. Repeat TTE 7/2022 showing LVEF 40-45%    Saw Dr. Lord for diarrhea - stopped cholestyramine because it wasn't helpful. Now back on high dose imodium.     Ongoing issues with refractory ascites. Back on Metoprolol 50 mg BID - LVEF improved showing moderate LV dilation but LVEF improved back to 53%, or 55-60%.     Hospitalized 9/23-27 for abdominal pain and confusion, found to have SBP. Is on cipro ppx. Underwent EGD where GAVE was treated with APC    Taking lactulose and now rifaximin  (chronic diarrhea at baseline), confusion is stable.  Will require several bowel movements to avoid confusion    Was referred for TIPS for refractory ascites given improvement in his ejection fraction.  Discussed that he is high risk for cardiac standpoint for TIPS.  Encephalopathy has been well controlled on lactulose and rifaximin.  Morning of his TIPS procedure, he was found to have severe hypokalemia.  TIPS was canceled.  Hypokalemia improved with increased supplementation, later found to have severe hypokalemia requiring ED evaluation.  Was admitted and found to have salmonella infection.  Was treated for this, he did not appreciably notice improvement in diarrhea with this.  Had an echocardiogram this admission that showed his ejection fraction decreased to 40 to 45% again, although limited due to his very frequent PVCs.  Given this his TIPS is on indefinite hold.  Given worsening lower extremity swelling and increasing ascites, gradually increasing diuretics as an outpatient.  Also had discussions with cardiology about if we can improve his ejection fraction so TIPS is an option in the future.    Interval Events  - The patient was recently admitted to [facility] from February 14 to February 16 with GI bleeding.  He underwent upper endoscopy with no clear source of bleeding.  Upper endoscopy demonstrated grade 2 varices and esophageal ulcer.  He underwent a colonoscopy with concern for a possible Dieulafoy lesion in the transverse colon which was clipped.  He received 2 units of PRBCs.    ROS: 14 point ROS negative except for positives noted in HPI.    PMHx:  Past Medical History:   Diagnosis Date     Alcohol dependence in remission (H)      Alcoholic cirrhosis of liver with ascites (H)      Anemia      Chronic systolic heart failure (H)      Diabetes (H)     Type 2 DM, Insulin Use.      Esophageal varices (H)      Gastroesophageal reflux disease without esophagitis      Hepatic encephalopathy       Hyperlipidemia      Hypertension      Incisional hernia      GISELLE (obstructive sleep apnea)      SMV complicated by ischemic small bowel disease      PSHx:  Past Surgical History:   Procedure Laterality Date     COLONOSCOPY N/A 12/09/2021    Procedure: COLONOSCOPY, WITH BIOPSY, WITH CLIPS;  Surgeon: Sweetie Bain MD;  Location: [facility] OR     ESOPHAGOSCOPY, GASTROSCOPY, DUODENOSCOPY (EGD), COMBINED N/A 12/09/2021    Procedure: ESOPHAGOGASTRODUODENOSCOPY, WITH BIOPSY;  Surgeon: Sweetie Bain MD;  Location: [facility] OR     EXPLORATORY LAPAROTOMY W/ BOWEL RESECTION  09/2020    Exploratory laparotomy with small bowel resection     FamHx:  Family History   Problem Relation Age of Onset     Deep Vein Thrombosis Mother      Pulmonary Embolism Mother      Substance Abuse Brother      Substance Abuse Maternal Grandmother      Anesthesia Reaction No family hx of    - No family history of liver disease or liver cancer    SocHx:  -   - Tobacco use: Former, quit 1994  - Patient previously worked with [employer]  He is currently on disability due to pain from his hernias and discomfort.  - Alcohol use: Prior significant alcohol use - used to drink about a liter of whiskey per day for about 20 years.  Denies any hospitalizations for intoxication or withdrawal, denies any DUIs, denies any prior treatments.  Last drink was 9/21/2020.   - Denies any tobacco use, denies any IV drug use.    Medications:  No current facility-administered medications for this visit.     Current Outpatient Medications   Medication     acetaminophen (TYLENOL) 500 MG tablet     apixaban ANTICOAGULANT (ELIQUIS) 5 MG tablet     ciprofloxacin (CIPRO) 500 MG tablet     DULoxetine (CYMBALTA) 60 MG capsule     ferrous gluconate (FERGON) 324 (38 Fe) MG tablet     gabapentin (NEURONTIN) 300 MG capsule     insulin glargine (LANTUS PEN) 100 UNIT/ML pen     insulin pen needle (B-D U/F) 31G X 5 MM miscellaneous     lactulose 20 GM/30ML SOLN     loperamide  (IMODIUM A-D) 1 MG/7.5ML     metoprolol succinate ER (TOPROL XL) 50 MG 24 hr tablet     metoprolol succinate ER (TOPROL XL) 50 MG 24 hr tablet     Multiple Vitamins-Minerals (SUPER THERA DAMARIS M) TABS     omeprazole (PRILOSEC) 20 MG DR capsule     ONETOUCH ULTRA test strip     potassium chloride ER (KLOR-CON M) 10 MEQ CR tablet     potassium chloride ER (KLOR-CON M) 10 MEQ CR tablet     rifaximin (XIFAXAN) 550 MG TABS tablet     torsemide (DEMADEX) 20 MG tablet     COMPRESSION STOCKINGS     Facility-Administered Medications Ordered in Other Visits   Medication     cefTRIAXone (ROCEPHIN) 1 g vial to attach to  mL bag for ADULTS or NS 50 mL bag for PEDS     octreotide (sandoSTATIN) 1,250 mcg in D5W 250 mL     octreotide (sandoSTATIN) injection 50 mcg     Allergies:  Allergies   Allergen Reactions     Minocycline Hives       Objective:  There were no vitals taken for this visit.  Constitutional: pleasant man in NAD  Eyes: non icteric  Respiratory: Normal respiratory excursion   MSK: normal range of motion of visualized extremities  Abd: Moderate ascites present, large hernias present  Ext: 1+ edema  Skin: No jaundice  Psychiatric: normal mood and orientation    Labs:  Reviewed in EHR    Imaging:   Abdominal US 2/2023  1.  Cirrhosis with portal hypertension including splenomegaly and abdominal ascites.   2.  Slightly distended gallbladder.   3.  Normal abdominal liver duplex within the limitation of nonvisualization of the splenic vein at the pancreas.   4.  Irregular rhythm. Correlation with EKG.    TTE 10/2022  Frequent ectopy during study making interpretation suboptimal.  Global and regional left ventricular function is low-normal with an EF of 55%.Moderate left ventricular dilation is present.  Right ventricular function, chamber size, wall motion, and thickness are normal.  No clinically significant valvular pathology identified.  No pericardial effusion is present.  This study was compared with the study  from 6/14/22 .  No significant changes noted.    TTE 1/2023  Visually Estimated EF: 40-45%   Technically difficult study - contrast was used to enhance endocardial definition due to suboptimal image quality.   Mild to moderately abnormal left ventricular ejection fraction estimated at 40-45%.   EF difficult to estimate due to very frequent PVCs.  Mild left atrial enlargement.     Endoscopy:  EGD 1/2023: Esophageal varices with no stigmata of recent bleeding s/p banding. Erythematous mucosa in the stomach. Portal hypertensive gastropathy. Erythematous duodenopathy.     EGD 2/10/2023:  Grade I esophageal varices. Esophageal ulcer with no stigmata of recent bleeding. Portal hypertensive gastropathy. GAVE. Duodenitis.     EGD 2/2023: Esophageal ulcer. Grade II varices. Portal HTN gastropathy. Normal duodenum.    Colonoscopy 12/2021: Single non-bleeding colonic angioectasia. Congested mucosa in the entire examined colon. One 7 mm polyp in the sigmoid colon, removed. Rectal varices.   Pathology: Tubular adenoma    Colonoscopy 2/2022:  The terminal ileum appeared normal.   A possible visible vessel with oozing and no surrounding ulceration was found in the duodenum [??] For hemostasis, one hemostatic clip was successfully placed (MR conditional). There was no bleeding at the end of the procedure.   A segmental area of mildly friable mucosa with contact bleeding was found in the rectum and in the sigmoid colon.               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    Was originally referred for LT evaluation - given his CHF and low MELD, did not pursue this.     LVEF improved with sobriety, then worsened with  stopping BB. Metoprolol resumed, LVEF improved. Much of his CHF was related to PVCs, improved with treatment of this.      Was referred for TIPS for refractory ascites once his ejection fraction improved on beta-blocker.  TIPS was canceled due to hypokalemia, repeat echo showed reduction in his ejection fraction to 40 to 45%, although difficult to measure given his PVC burden.      Follows cardiology at [facility].  Decided to refer to cardiology here at [facility] to see if there is anything that can be done to improve his PVC mediated cardiomyopathy with the goal of him becoming a TIPS candidate, or potential liver transplant candidate.  Dr. Duarte recommended repeat TTE which showed LVEF improved to 50-55%, suspect other echos were technically limited. Referred for TIPS, felt ok for this from a cardiac standpoint.     Will plan to stop his AC he has been having multiple bleeding episodes - SMV and PV patent on last MRI. Will confirm with IR and cardiology that this is ok.     His HE is well controlled currently. He has a history of a SMV thrombosis, recanalized had been on long term AC.     Opened transplant candidate, now that his TTE shows improvement, plan for TIPS referral. If he is proceeding down transplant route, would need ischemic testing.     - Tentative plan to stop AC after discussion with IR and cards.    - Re-referring for TIPS. MELD slightly elevated with INR - suspect will improve with stopping DOAC and would consider vitamin K. Bilirubin is normal.   - Continue torsemide 60 mg daily + KCl 30 mg BID  - Paracentesis biweekly PRN 6 L max with ~ 7.5 grams albumin replacement/L removed.   - Continue metoprolol 50 mg XL  Daily for cardiology - typically BBs are held in refractory ascites, and after SBP, but his metoprolol has been very helpful in improving his LVEF and should be continued  - Continue lactulose and rifaximin  - Continue ciprofloxacin for SBP ppx  - EGD for variceal  surveillance    RTC 3 months    Sweetie Bain MD MS  Hepatology/Liver Transplant  [facility]

## 2023-02-21 NOTE — NURSING NOTE
Anemia Management Note  SUBJECTIVE/OBJECTIVE:  Referred by Dr. Sweetie Bain on 2023  Primary Diagnosis: Anemia of Other Chronic Illness (D63.8), CKD stage 3a     Secondary Diagnosis:  GAVE, Alcoholic cirrhosis of liver with ascites (K70.3) Anemia in CKD Satge 3a.   *2/15/23 Ok to use CKD dx codes per Dr. Sweetie Bain.   Hgb goal range:  9-10  Epo/Darbo: TBD: Delmer is in the ED at [facility]  Iron regimen:  NA. Elevated TSAT  Labs : 24  Recent FELI use, transfusion, IV iron: NA  RX/TX plans : TBD     No history of stroke, MI and blood clots or cancers.     Contact:            No Boxes Checked on Consent to Communicate.     Anemia Latest Ref Rng & Units 2023   Hemoglobin 13.3 - 17.7 g/dL 8.6(L) 8.1(L) 10.1(L) 8.1(L) 6.6(LL) 7.4(LL) 8.4(L)   TSAT 15 - 46 % - - - - 100(H) - -   Ferritin 26 - 388 ng/mL - - - - 185 - -     BP Readings from Last 3 Encounters:   23 104/41   23 96/56   23 125/60     Wt Readings from Last 2 Encounters:   23 251 lb 11.2 oz (114.2 kg)   23 275 lb 5.7 oz (124.9 kg)           ASSESSMENT:  Hgb:Not at goal but improving since surgery to stop GI bleed.   TSat: elevated at >50% Ferritin: At goal (>100ng/mL)    PLAN:  RTC for Hgb  in 2 week(s). Hgb is improving since surgery to stop GI Bleed.     Orders needed to be renewed (for next follow-up date) in EPIC: None    Iron labs due:  2023    Plan discussed with:  No call, chart review      NEXT FOLLOW-UP DATE:  3/7/23    Gretta Brar RN   Anemia Services  [address] [address]   [email or web address]   Office : [phone]  Fax: [phone]

## 2023-02-21 NOTE — COMMITTEE REVIEW
Abdominal Committee Review Note     Evaluation Date: 2/21/2023  Committee Review Date: 2/21/2023    Organ being evaluated for: Liver    Transplant Phase: Evaluation  Transplant Status: Active    Transplant Coordinator: Kala Sanford  Transplant Surgeon:   Mick Ramirez    Referring Physician:     Primary Diagnosis: Alcohol-Associated Cirrhosis Without Acute Alcohol-Associated Hepatitis  Secondary Diagnosis: Cirrhosis: Fatty Liver (CALLEJAS)    Committee Review Members:  Nutrition Iliana Barros, RD   Pharmacist Zander Ch, [facility]    - Clinical Liana Smith, GLENN, Aleksandra Mcclrue, [facility]   Transplant Ivy Michael, ASTRID, Sole Srivastava PA-C, Cesilia Higgins LPN, Cassie Braswell, ASTRID, Jr Manuel Tolbert, ASTRID, Nereyda Lau, KRISHNA, Yan Gomez, RN   Transplant Hepatology  Roxie Norman MD, Hermelinda Bingham MD, Sweetie Bain MD, Alf Jurado MD, Chandler Ha MD, Nile Jones MD   Transplant Surgery Mick Ramirez MD, John Baig MD       Transplant Eligibility: Cirrhosis with MELD, ETOH    Committee Review Decision: Needs Re-presentation    Relative Contraindications:     Absolute Contraindications:     Committee Chair Sweetie Bain MD verbally attested to the committee's decision.    Committee Discussion Details: Patient scheduled for Echo 2/22/23. If EF is above 50, will refer for TIPS if MELD allowable. OK from surgery standpoint, would likely be okay for Living Donor, has potential donor candidate. Will re-present pending cardiology testing completion.

## 2023-02-21 NOTE — LETTER
2/21/2023         RE: Blair Oliver  [address]   [place] [address]        Dear Colleague,    Thank you for referring your patient, Blair Oliver, to the [facility]. Please see a copy of my visit note below.      Assessment and Plan:  1. liver transplant evaluation - patient is a good candidate overall. Benefits and surgical risks of a liver transplantation were discussed.  2.  End stage liver disease due to Laennec's    Surgical evaluation:  1. Portal Vein:Patent on most recent imaging although has a history of PVT in the past and was on anticoagulation for some time  2. Hepatic Artery: Open  3. TIPS: absent  4. Previous Abdominal Surgery: Yes - had a small bowel resection in 9/2020 for ? Ischemic bowel with midline incision ? Related to have history of portal vein/SMV thrombosis, also had umbilical hernia repair with mesh  5. Hepatocellular Carcinoma: None  6. Ascites: Present - large amount  7. Costal Angle: wide  8. Portopulmonary Hypertension: absent  9. Hepatopulmonary Syndrome: absent  10. Cardiac Evaluation: needs echocardiogram has a history of depressed EF and PVCs, thought maybe to be related to EtOH related cardiomyopathy  11. Nutritional Status: Moderate  12. Diabetes: yes  13.Hypertension no  14. Smoker:previous quit 1996  14: Fraility index:scored frail but look Marisol  15. Meets guidelines to receive Living Donor  Yes -  would need to lose weight currently 119kg  16. Potential Living donors No    Recommendations: needs full cardiac evaluation, if OK then plan was for TIPS      Patients overall evaluation will be discussed at the Liver Transplant selection committee meeting with a final recommendation on the patients suitability for transplant to be made at that time.    Consult Full  Details:  Blair Oliver was seen in consultation at the request of Dr. Bingham for evaluation as a potential liver transplant recipient.    Reason for Visit:  Blair PHILIP  Benito is a 52 year old year old male with Laennec's, who presents for liver transplant evaluation.    HPI:  Presenting complaint: Abdominal distention      51 y/o male with EtOH related liver disease, diagnosed 9/2020.  Patient relates that he developed abdominal pain found to have ischemic bowel ? SMV thrombosis, taken for emergency  X-lap and small bowel resection, on anticoagulation afterwards.  More recently has developed refractory ascites requiring frequent gabbi, also has a ho SBP.  Also has developed large incision hernia.  Now here for liver transplant evaluation, possible TIPS    PMH  Cirrhosis- EtOH +/- CALLEJAS complicated by refractory ascites, GI bleeds, +/- HE  CHF/depressed EF- ? EtOH cardiomyopathy  DM  Incisional hernia  H/o SMV/PV thrombosis and bowel ischemia, most recent imaging shows it resolved  ? CKD, CR 1.9    PSH  X-Lap, SB resection for ischemia related to SMV thrombosis  Umbilical hernia repair with mesh    Soc  EtOH- previously 4-5L whiskey daily, no DUI, stopped 9/2020, no rehab  Tob- quit 1996  Denies drugs    Fam  Mother- active, healthy  Father- pancreatic cancer  Grandmother- EtOH  Bother- EtOH  2 children- healthy        Past Medical History:   Diagnosis Date     Alcohol dependence in remission (H)      Alcoholic cirrhosis of liver with ascites (H)      Anemia      Chronic systolic heart failure (H)      Diabetes (H)     Type 2 DM, Insulin Use.      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      Past Surgical History:   Procedure Laterality Date     COLONOSCOPY N/A 12/09/2021    Procedure: COLONOSCOPY, WITH BIOPSY, WITH CLIPS;  Surgeon: Sweetie Bain MD;  [place] [facility] [place]     ESOPHAGOSCOPY, GASTROSCOPY, DUODENOSCOPY (EGD), COMBINED N/A 12/09/2021    Procedure:  ESOPHAGOGASTRODUODENOSCOPY, WITH BIOPSY;  Surgeon: Sweetie Bain MD;  Location: [facility] OR     ESOPHAGOSCOPY, GASTROSCOPY, DUODENOSCOPY (EGD), COMBINED N/A 02/24/2023    Procedure: Esophagoscopy, gastroscopy, duodenoscopy (EGD), combined;  Surgeon: Tom George MD;  Location: [facility] GI     EXPLORATORY LAPAROTOMY W/ BOWEL RESECTION  09/2020    Exploratory laparotomy with small bowel resection     IR PARACENTESIS  3/14/2023     IR TRANSVEN INTRAHEPATIC PORTOSYST SHUNT  3/14/2023     SIGMOIDOSCOPY FLEXIBLE N/A 02/24/2023    Procedure: Sigmoidoscopy flexible;  Surgeon: Tom George MD;  Location: [facility]     Past Surgical History:   Procedure Laterality Date     COLONOSCOPY N/A 12/09/2021    Procedure: COLONOSCOPY, WITH BIOPSY, WITH CLIPS;  Surgeon: Sweetie Bain MD;  Location: [facility] OR     ESOPHAGOSCOPY, GASTROSCOPY, DUODENOSCOPY (EGD), COMBINED N/A 12/09/2021    Procedure: ESOPHAGOGASTRODUODENOSCOPY, WITH BIOPSY;  Surgeon: Sweetie Bain MD;  Location: [facility] OR     ESOPHAGOSCOPY, GASTROSCOPY, DUODENOSCOPY (EGD), COMBINED N/A 02/24/2023    Procedure: Esophagoscopy, gastroscopy, duodenoscopy (EGD), combined;  Surgeon: Tom George MD;  Location: [facility]     EXPLORATORY LAPAROTOMY W/ BOWEL RESECTION  09/2020    Exploratory laparotomy with small bowel resection     IR PARACENTESIS  3/14/2023     IR TRANSVEN INTRAHEPATIC PORTOSYST SHUNT  3/14/2023     SIGMOIDOSCOPY FLEXIBLE N/A 02/24/2023    Procedure: Sigmoidoscopy flexible;  Surgeon: Tom George MD;  Location: [facility]     Family History   Problem Relation Age of Onset     Deep Vein Thrombosis Mother      Pulmonary Embolism Mother      Substance Abuse Brother      Substance Abuse Maternal Grandmother      Anesthesia Reaction No family hx of      Allergies   Allergen Reactions     Minocycline Hives     Prior to Admission medications    Medication Sig Start Date End Date Taking? Authorizing Provider   acetaminophen (TYLENOL)  500 MG tablet Take 1 tablet (500 mg) by mouth every 8 hours as needed for mild pain 1/28/23  Yes Ro Larson MD   ciprofloxacin (CIPRO) 500 MG tablet Take 1 tablet (500 mg) by mouth daily  Patient taking differently: Take 500 mg by mouth At Bedtime 12/23/22  Yes Sweetie Bain MD   DULoxetine (CYMBALTA) 60 MG capsule Take 60 mg by mouth every evening   Yes Unknown, Entered By History   ferrous gluconate (FERGON) 324 (38 Fe) MG tablet Take 1 tablet (324 mg) by mouth daily (with breakfast) 9/13/22 9/13/23 Yes Sweetie Bain MD   gabapentin (NEURONTIN) 300 MG capsule Take 900 mg by mouth 2 times daily   Yes Unknown, Entered By History   insulin glargine (LANTUS PEN) 100 UNIT/ML pen Inject 30 Units Subcutaneous every morning 1/28/23  Yes Ro Larson MD   insulin pen needle (B-D U/F) 31G X 5 MM miscellaneous AS DIRECTED. BD UF MINI PEN NEEDLE [id number]: USE ONCE DAILY 1/12/21  Yes Reported, Patient   lactulose 20 GM/30ML SOLN Take 30 mLs (20 g) by mouth 3 times daily  Patient taking differently: Take 20 g by mouth 2 times daily 8/24/22  Yes Sweetie Bain MD   loperamide (IMODIUM A-D) 1 MG/7.5ML Take 15 mLs (2 mg) by mouth 4 times daily as needed for diarrhea 9/15/22  Yes Sweetie Bain MD   Multiple Vitamins-Minerals (SUPER THERA DAMARIS M) TABS Take 1 tablet by mouth every morning 9/29/20  Yes Reported, Patient   omeprazole (PRILOSEC) 20 MG DR capsule Take 20 mg by mouth every evening 4/15/21  Yes Reported, Patient   ONETOUCH ULTRA test strip PLEASE SEE ATTACHED FOR DETAILED DIRECTIONS 4/10/21  Yes Reported, Patient   rifaximin (XIFAXAN) 550 MG TABS tablet Take 1 tablet (550 mg) by mouth 2 times daily 8/10/22  Yes Sweetie Bain MD   torsemide (DEMADEX) 20 MG tablet Take 3 tablets (60 mg) by mouth daily  Patient taking differently: Take 60 mg by mouth every morning 1/17/23  Yes Sweetie Bain MD   calcium carbonate (OS-DANIELLE) 500 MG tablet Take 1 tablet (500 mg) by mouth 2 times daily  Patient not taking:  Reported on 3/8/2023 2/28/23   Sweetie Bain MD   COMPRESSION STOCKINGS Please measure and distribute 2 pair of 20mmHg - 30mmHg knee high open or closed toe compression stockings with extra refills as indicated or what insurance will allow . 10/31/22   Michael Bolton MD   potassium chloride ER (KLOR-CON M) 10 MEQ CR tablet Take 4 tablets (40 mEq) by mouth 2 times daily for 90 days 2/21/23 5/22/23  Sweetie Bain MD   vitamin D2 (ERGOCALCIFEROL) 14653 units (1250 mcg) capsule Take 1 capsule (50,000 Units) by mouth once a week  Patient not taking: Reported on 3/8/2023 2/28/23   Sweetie Bain MD   vitamin D3 (CHOLECALCIFEROL) 50 mcg (2000 units) tablet Take 1 tablet (50 mcg) by mouth daily Begin taking this after completion of Vitamin D2 therapy  Patient not taking: Reported on 3/8/2023 5/23/23   Sweetie Bain MD       Previous Transplant Hx: No    Cardiovascular Hx:       h/o Cardiac Issues: No- see history above has ho of depressed EF and PVCs       Exercise Tolerance: no chest pain or shortness of breath with exertion.    Potential Donor(s): No    ROS:    REVIEW OF SYSTEMS (check box if normal)  [x]                GENERAL  [x]                  PULMONARY [x]                 GENITOURINARY  [x]                 CNS                 [x]                  CARDIAC  [x]                  ENDOCRINE  [x]                 EARS,NOSE,THROAT [x]                  GASTROINTESTINAL [x]                  NEUROLOGIC    [x]                 MUSCLOSKELTAL  [x]                   HEMATOLOGY    Examination:     Vitals:  /41   Pulse (!) 34     GENERAL APPEARANCE: alert and no distress  GENERAL APPEARANCE: no distress  EYES: PERRL  HENT: mouth without ulcers or lesions  NECK: supple, no adenopathy  RESP: lungs clear to auscultation - no rales, rhonchi or wheezes  CV: regular rhythm, normal rate, no rub   ABDOMEN:  soft, nontender, no HSM or masses and bowel sounds normal  MS: extremities normal- no gross deformities noted, no  evidence of inflammation in joints, no muscle tenderness  SKIN: no rash  NEURO: Normal strength and tone, sensory exam grossly normal, mentation intact and speech normal  PSYCH: mentation appears normal. and affect normal/bright      Results:   Recent Results (from the past 168 hour(s))   Hemoglobin and hematocrit    Collection Time: 03/23/23 12:53 PM   Result Value Ref Range    Hemoglobin 8.7 (L) 13.3 - 17.7 g/dL    Hematocrit 27.3 (L) 40.0 - 53.0 %   INR    Collection Time: 03/23/23 12:53 PM   Result Value Ref Range    INR 1.80 (H) 0.85 - 1.15   Comprehensive metabolic panel    Collection Time: 03/23/23 12:53 PM   Result Value Ref Range    Sodium 142 133 - 144 mmol/L    Potassium 3.8 3.4 - 5.3 mmol/L    Chloride 116 (H) 94 - 109 mmol/L    Carbon Dioxide (CO2) 25 20 - 32 mmol/L    Anion Gap 1 (L) 3 - 14 mmol/L    Urea Nitrogen 17 7 - 30 mg/dL    Creatinine 1.22 0.66 - 1.25 mg/dL    Calcium 7.8 (L) 8.5 - 10.1 mg/dL    Glucose 203 (H) 70 - 99 mg/dL    Alkaline Phosphatase 250 (H) 40 - 150 U/L    AST 28 0 - 45 U/L    ALT 23 0 - 70 U/L    Protein Total 5.1 (L) 6.8 - 8.8 g/dL    Albumin 2.7 (L) 3.4 - 5.0 g/dL    Bilirubin Total 1.0 0.2 - 1.3 mg/dL    GFR Estimate 70 >60 mL/min/1.73m2     I had a long discussion with the patient regarding liver transplantation which included but was not limited to  the following points:    1. Liver transplant selection committee process.  2. The federal rules for cadaveric waiting list, the size and blood type matching of the organ. The availability of living-related donor transplantation.  3. The types of donors: brain death donors, non-heart beating donors, partial liver grafts: splits and living donor grafts  4. Extended criteria  Donors (older age, steasosis) and the increased  risk of primary non-function using the extended criteria donors  5. The CDC high risk donors,  Risk of donor transmitted infections and donor transmitted malignancy  6. The liver transplant operation and the  associated risks and technical complications which can include intraoperative death, post operative death,  Primary non-function, bleeding requiring re-operations, arterial and biliary complications, bowel perforations, and intra abdominal abscess. Some of these complicaitons may require a second operation.  7. The postoperative course, the ICU stay and risk of postoperative complications which can include sepsis, MI, stroke, brain injury, pneumonia, pleural effusions, and renal dysfunction.  8. The current 1 year and 5 year graft and patient survivals.  9. The need for life long immunosuppressive therapy and the side effects of these medications, including the possibility of toxicity, opportunistic infections, risk of cancer including lymphoma, and the possibility of rejection even if the patient is taking the medication exactly as prescribed.  10. The need for compliance with medications and follow-up visits in the clinic and thereafter.  11. The patient and family understand these risks and wish to proceed to transplantation       Again, thank you for allowing me to participate in the care of your patient.        Sincerely,        Mick Ramirez MD

## 2023-02-21 NOTE — LETTER
2/21/2023         RE: Blair Oliver  [address] [facility] [address]        Dear Colleague,    Thank you for referring your patient, Blair Oliver, to the [facility] [place]. Please see a copy of my visit note below.    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    Cirrhosis  - dx ~ 9/2020  - Etiology: EtOH +/- NAFLD  - hx HE  - hx ascites. Hx SBP (9/2022).  - no hx variceal bleed  - last EGD: 2/2023 with grade II EV - no banding performed  - HCC screening: Abd US 2/2023 - no hepatic lesions  - Last EtOH use: 9/21/2020    Initial History:   Patient states that he was diagnosed with liver disease and cirrhosis in September 2020.  He was admitted to the hospital at that time he was found to have extensive superior mesenteric venous thrombosis, complicated by ischemic bowel and had to undergo an exploratory laparotomy with small bowel resection.  He was placed on Eliquis postoperatively.  During surgery, he was found to have some ascites that was evacuated.     Since then he has had trouble with incisional hernias, and rectus diastasis.  He was seen by his general surgeon in March where he was noted to have a MELD of 18.  He was then sent to [facility] to be seen by surgery for consideration of incisional hernia repair.    Patient complains of episodes of leakage of fluid from his incision that happens intermittently.  This fluid is usually blood tinged but sometimes yellow. He is on 20 mg of furosemide daily, that was started because of his heart failure and his pedal  edema.  He has also noted that every 2 to 3 days he has watery bowel movements, up to 12 times a day.  In between there was episodes he has about 2-3 soft bowel movements per day.  He denies any fevers or chills, abdominal pain, melena, or hematochezia.    His wife is also noted that he is having some memory issues.  He has not had any major episodes of confusion, and has not been admitted to the hospital with hepatic encephalopathy.    Patient denies any prior history of liver disease. He has had abnormal liver enzymes and low platelets as far back as 2011. Past notes from his PCP had also documented excessive alcohol use, as well as, a hospitalization for overdose of Vicodin.     First paracentesis 6/26/2021 - 600 ml removed - 231 WBC, TP 1.4, Albumin 0.8.     Recent EGD with bx negative for celiac. Colon bx showing chronic inactive colitis. Still had loose stools after colonoscopy prep, not c/w overflow.  We have tried a several week course of rifaximin for encephalopathy, this did not help his diarrhea.  Fecal calprotectin was normal when checked December 2021    Liver MRI March 2, 2022 showing cirrhosis without any suspicious enhancing lesions.  Previously demonstrated hyperintense nonenhancing lesions are not well visualized in the current exam likely due to motion.  Also showed new small to moderate volume ascites.     Started having issues with ascites again at the time of his last visit. MRI liver without evidence of PVT or HCC.  Sent to the hospital after his last visit 6/2022 for bradycardia Metoprolol was stopped due to bradycardia, EKG with frequent PVCs. TTE 6/2022 with improvement in his LVEF. Saw his cardiologist at [facility], resumed BB given concern for PVC induced cardiomyopathy. Repeat TTE 7/2022 showing LVEF 40-45%    Saw Dr. Lord for diarrhea - stopped cholestyramine because it wasn't helpful. Now back on high dose imodium.     Ongoing issues with refractory ascites. Back on Metoprolol 50 mg BID -  LVEF improved showing moderate LV dilation but LVEF improved back to 53%, or 55-60%.     Hospitalized 9/23-27 for abdominal pain and confusion, found to have SBP. Is on cipro ppx. Underwent EGD where GAVE was treated with APC    Taking lactulose and now rifaximin (chronic diarrhea at baseline), confusion is stable.  Will require several bowel movements to avoid confusion    Was referred for TIPS for refractory ascites given improvement in his ejection fraction.  Discussed that he is high risk for cardiac standpoint for TIPS.  Encephalopathy has been well controlled on lactulose and rifaximin.  Morning of his TIPS procedure, he was found to have severe hypokalemia.  TIPS was canceled.  Hypokalemia improved with increased supplementation, later found to have severe hypokalemia requiring ED evaluation.  Was admitted and found to have salmonella infection.  Was treated for this, he did not appreciably notice improvement in diarrhea with this.  Had an echocardiogram this admission that showed his ejection fraction decreased to 40 to 45% again, although limited due to his very frequent PVCs.  Given this his TIPS is on indefinite hold.  Given worsening lower extremity swelling and increasing ascites, gradually increasing diuretics as an outpatient.  Also had discussions with cardiology about if we can improve his ejection fraction so TIPS is an option in the future.    Interval Events  - The patient was recently admitted to [facility] from February 14 to February 16 with GI bleeding.  He underwent upper endoscopy with no clear source of bleeding.  Upper endoscopy demonstrated grade 2 varices and esophageal ulcer.  He underwent a colonoscopy with concern for a possible Dieulafoy lesion in the transverse colon which was clipped.  He received 2 units of PRBCs.    ROS: 14 point ROS negative except for positives noted in HPI.    PMHx:  Past Medical History:   Diagnosis Date     Alcohol dependence in remission (H)       Alcoholic cirrhosis of liver with ascites (H)      Anemia      Chronic systolic heart failure (H)      Diabetes (H)     Type 2 DM, Insulin Use.      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      PSHx:  Past Surgical History:   Procedure Laterality Date     COLONOSCOPY N/A 12/09/2021    Procedure: COLONOSCOPY, WITH BIOPSY, WITH CLIPS;  Surgeon: Sweetie Bain MD;  Location: [facility] OR     ESOPHAGOSCOPY, GASTROSCOPY, DUODENOSCOPY (EGD), COMBINED N/A 12/09/2021    Procedure: ESOPHAGOGASTRODUODENOSCOPY, WITH BIOPSY;  Surgeon: Sweetie Bain MD;  Location: [facility] OR     EXPLORATORY LAPAROTOMY W/ BOWEL RESECTION  09/2020    Exploratory laparotomy with small bowel resection     FamHx:  Family History   Problem Relation Age of Onset     Deep Vein Thrombosis Mother      Pulmonary Embolism Mother      Substance Abuse Brother      Substance Abuse Maternal Grandmother      Anesthesia Reaction No family hx of    - No family history of liver disease or liver cancer    SocHx:  -   - Tobacco use: Former, quit 1994  - Patient previously worked with [employer]  He is currently on disability due to pain from his hernias and discomfort.  - Alcohol use: Prior significant alcohol use - used to drink about a liter of whiskey per day for about 20 years.  Denies any hospitalizations for intoxication or withdrawal, denies any DUIs, denies any prior treatments.  Last drink was 9/21/2020.   - Denies any tobacco use, denies any IV drug use.    Medications:  No current facility-administered medications for this visit.     Current Outpatient Medications   Medication     acetaminophen (TYLENOL) 500 MG tablet     apixaban ANTICOAGULANT (ELIQUIS) 5 MG tablet     ciprofloxacin (CIPRO) 500 MG tablet     DULoxetine (CYMBALTA) 60 MG  capsule     ferrous gluconate (FERGON) 324 (38 Fe) MG tablet     gabapentin (NEURONTIN) 300 MG capsule     insulin glargine (LANTUS PEN) 100 UNIT/ML pen     insulin pen needle (B-D U/F) 31G X 5 MM miscellaneous     lactulose 20 GM/30ML SOLN     loperamide (IMODIUM A-D) 1 MG/7.5ML     metoprolol succinate ER (TOPROL XL) 50 MG 24 hr tablet     metoprolol succinate ER (TOPROL XL) 50 MG 24 hr tablet     Multiple Vitamins-Minerals (SUPER THERA DAMARIS M) TABS     omeprazole (PRILOSEC) 20 MG DR capsule     ONETOUCH ULTRA test strip     potassium chloride ER (KLOR-CON M) 10 MEQ CR tablet     potassium chloride ER (KLOR-CON M) 10 MEQ CR tablet     rifaximin (XIFAXAN) 550 MG TABS tablet     torsemide (DEMADEX) 20 MG tablet     COMPRESSION STOCKINGS     Facility-Administered Medications Ordered in Other Visits   Medication     cefTRIAXone (ROCEPHIN) 1 g vial to attach to  mL bag for ADULTS or NS 50 mL bag for PEDS     octreotide (sandoSTATIN) 1,250 mcg in D5W 250 mL     octreotide (sandoSTATIN) injection 50 mcg     Allergies:  Allergies   Allergen Reactions     Minocycline Hives       Objective:  There were no vitals taken for this visit.  Constitutional: pleasant man in NAD  Eyes: non icteric  Respiratory: Normal respiratory excursion   MSK: normal range of motion of visualized extremities  Abd: Moderate ascites present, large hernias present  Ext: 1+ edema  Skin: No jaundice  Psychiatric: normal mood and orientation    Labs:  Reviewed in EHR    Imaging:   Abdominal US 2/2023  1.  Cirrhosis with portal hypertension including splenomegaly and abdominal ascites.   2.  Slightly distended gallbladder.   3.  Normal abdominal liver duplex within the limitation of nonvisualization of the splenic vein at the pancreas.   4.  Irregular rhythm. Correlation with EKG.    TTE 10/2022  Frequent ectopy during study making interpretation suboptimal.  Global and regional left ventricular function is low-normal with an EF of 55%.Moderate  left ventricular dilation is present.  Right ventricular function, chamber size, wall motion, and thickness are normal.  No clinically significant valvular pathology identified.  No pericardial effusion is present.  This study was compared with the study from 6/14/22 .  No significant changes noted.    TTE 1/2023  Visually Estimated EF: 40-45%   Technically difficult study - contrast was used to enhance endocardial definition due to suboptimal image quality.   Mild to moderately abnormal left ventricular ejection fraction estimated at 40-45%.   EF difficult to estimate due to very frequent PVCs.  Mild left atrial enlargement.     Endoscopy:  EGD 1/2023: Esophageal varices with no stigmata of recent bleeding s/p banding. Erythematous mucosa in the stomach. Portal hypertensive gastropathy. Erythematous duodenopathy.     EGD 2/10/2023:  Grade I esophageal varices. Esophageal ulcer with no stigmata of recent bleeding. Portal hypertensive gastropathy. GAVE. Duodenitis.     EGD 2/2023: Esophageal ulcer. Grade II varices. Portal HTN gastropathy. Normal duodenum.    Colonoscopy 12/2021: Single non-bleeding colonic angioectasia. Congested mucosa in the entire examined colon. One 7 mm polyp in the sigmoid colon, removed. Rectal varices.   Pathology: Tubular adenoma    Colonoscopy 2/2022:  The terminal ileum appeared normal.   A possible visible vessel with oozing and no surrounding ulceration was found in the duodenum [??] For hemostasis, one hemostatic clip was successfully placed (MR conditional). There was no bleeding at the end of the procedure.   A segmental area of mildly friable mucosa with contact bleeding was found in the rectum and in the sigmoid colon.               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    Was originally referred for LT evaluation - given his CHF and low MELD, did not pursue this.     LVEF improved with sobriety, then worsened with stopping BB. Metoprolol resumed, LVEF improved. Much of his CHF was related to PVCs, improved with treatment of this.      Was referred for TIPS for refractory ascites once his ejection fraction improved on beta-blocker.  TIPS was canceled due to hypokalemia, repeat echo showed reduction in his ejection fraction to 40 to 45%, although difficult to measure given his PVC burden.      Follows cardiology at [facility].  Decided to refer to cardiology here at [facility] to see if there is anything that can be done to improve his PVC mediated cardiomyopathy with the goal of him becoming a TIPS candidate, or potential liver transplant candidate.  Dr. Duarte recommended repeat TTE which showed LVEF improved to 50-55%, suspect other echos were technically limited. Referred for TIPS, felt ok for this from a cardiac standpoint.     Will plan to stop his AC he has been having multiple bleeding episodes - SMV and PV patent on last MRI. Will confirm with IR and cardiology that this is ok.     His HE is well controlled currently. He has a history of a SMV thrombosis, recanalized had been on long term AC.     Opened transplant candidate, now that his TTE shows improvement, plan for TIPS referral. If he is proceeding down transplant route, would need ischemic testing.     - Tentative plan to stop AC after discussion with IR and cards.    - Re-referring for TIPS. MELD slightly elevated with INR - suspect will improve with stopping DOAC and would consider vitamin K. Bilirubin is normal.   - Continue torsemide 60 mg daily + KCl 30 mg BID  - Paracentesis biweekly PRN 6 L max with ~ 7.5 grams albumin replacement/L removed.   - Continue metoprolol 50 mg XL  Daily for cardiology -  typically BBs are held in refractory ascites, and after SBP, but his metoprolol has been very helpful in improving his LVEF and should be continued  - Continue lactulose and rifaximin  - Continue ciprofloxacin for SBP ppx  - EGD for variceal surveillance    RTC 3 months        Again, thank you for allowing me to participate in the care of your patient.      Sincerely,    Sweetie Bain MD

## 2023-02-23 NOTE — PROGRESS NOTES
Patient Name: Blair Oliver  : 1970  Age: 52 year old  MRN: [id number]  Date of Initial Social Work Evaluation: 6/15/21      Presenting Information   Living Situation: Delmer and his wife Margarita live together in a house in [place], [place].  Delmer's mother, their son Jodie, his girlfriend and their eleven year old also live with Delmer and Margarita.    If not local, plans for short term stay: n/a  Functional Status: Delmer reports one fall a couple of weeks ago, potentially due to low hemoglobin.  Margarita assists with Delmer's medication management.  Margarita provides transportation if/when Delmer is unable to drive.  Cultural/Language/Spiritual Considerations: Shinto, English    Support System  Primary Support Person:  wife Margarita   Other support:  Son Jodie and his girlfriend live with them as previously mentioned. Their son Chucho lives in [place], [place].  Plan for support in immediate post-transplant period: Margarita would take time off from work and provide the majority of the care giving.  Their adult sons would assist as needed.    Health Care Directive  Decision Maker: patient  Alternate Decision Maker: wife Margarita  Health Care Directive: Provided education and form    Mental Health/Coping:   History of Mental Health: Delmer denies any mental health history.  He specifically denies any history of suicidal ideation or hospitalization for mental health treatment.  History of Chemical Health: Delmer reports he quit smoking at the age of [age].  He denies any history of pain medication abuse.  He used marijuana from the ages of [age] to his mid-twenties.     Delmer has cirrhosis caused by alcohol.  He reports drinking one liter of whiskey three to four days per week, on the days he was not working, for the past twenty years.  Delmer discontinued all alcohol consumption on 2020 when he was hospitalized.  He has no history of chemical dependency treatment, or legal consequences of his drinking.    Current status: Stable, Alcohol Use Disorder, in remission  Coping: appropriate to situation  Services Needed/Recommended: virtual liver transplant support group    Financial   Income: Delmer receives SSDI benefits, wife has income from her full-time employment.  Impact of transplant on income: Reduced income on disability benefits.  Insurance and medication coverage: [facility], through wife's employer.  She reports an individual annual maximum of $6500 and an annual family maximum of $13,500.  Financial concerns: none voiced today  Resources needed: ongoing assessment    Education provided by SW: Social Work role in transplant program, availability of support group, Caregiver Agreement for Liver Transplant    Assessment and recommendations and plan:    Delmer has been sober from alcohol since September 21, 2020.  He denies any relapses.  PEth is pending from today.  A new substance use assessment will not be required unless PEth is positive.  Delmer reports family support and being near death have helped him maintain sobriety.  He is not currently engaged in sober supports.    Delmer is an acceptable candidate for a liver transplant from a psychosocial perspective. This writer will remain available to assist patient throughout the evaluation process and will follow patient through transplant if he is listed.  It was a pleasure to evaluate this patient for liver transplant.    GLENN Estrada, [facility]  Liver Transplant   Phone [phone]  Pager [phone]

## 2023-02-24 PROBLEM — E11.9 DIABETES MELLITUS, TYPE 2 (H): Chronic | Status: ACTIVE | Noted: 2022-08-09

## 2023-02-24 PROBLEM — K31.89 PORTAL HYPERTENSIVE GASTROPATHY (H): Status: ACTIVE | Noted: 2023-01-01

## 2023-02-24 PROBLEM — E11.9 DIABETES MELLITUS, TYPE 2 (H): Status: ACTIVE | Noted: 2022-08-09

## 2023-02-24 PROBLEM — K31.89 PORTAL HYPERTENSIVE GASTROPATHY (H): Chronic | Status: ACTIVE | Noted: 2023-01-01

## 2023-02-24 PROBLEM — I49.8 BIGEMINAL RHYTHM: Status: ACTIVE | Noted: 2018-02-21

## 2023-02-24 PROBLEM — I49.8 BIGEMINAL RHYTHM: Chronic | Status: ACTIVE | Noted: 2018-02-21

## 2023-02-24 PROBLEM — K70.31 ALCOHOLIC CIRRHOSIS OF LIVER WITH ASCITES (H): Chronic | Status: ACTIVE | Noted: 2022-06-13

## 2023-02-24 PROBLEM — D64.9 ANEMIA: Chronic | Status: ACTIVE | Noted: 2022-06-13

## 2023-02-24 PROBLEM — D64.9 ANEMIA: Status: ACTIVE | Noted: 2022-06-13

## 2023-02-24 PROBLEM — K55.069: Chronic | Status: ACTIVE | Noted: 2022-06-15

## 2023-02-24 PROBLEM — K55.069: Status: ACTIVE | Noted: 2022-06-15

## 2023-02-24 PROBLEM — K76.6 PORTAL HYPERTENSIVE GASTROPATHY (H): Chronic | Status: ACTIVE | Noted: 2023-01-01

## 2023-02-24 PROBLEM — K76.6 PORTAL HYPERTENSIVE GASTROPATHY (H): Status: ACTIVE | Noted: 2023-01-01

## 2023-02-24 NOTE — H&P
[facility]    History and Physical - Hospitalist Service, GOLD TEAM        Date of Admission:  2/24/2023    Assessment & Plan      Blair Oliver is a 52 year old male admitted on 2/24/2023. 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  He presented to the emergency department with hematochezia and is admitted to the hospital medicine team with gastroenterology consulting.    #acute blood loss anemia secondary to GI bleed  Presented with both bright red blood and dark blood per rectum with greater than 12 bowel movements on date of presentation.  Hemoglobin on presentation 6.4, HCT 20.7.    -Consultation to gastroenterology    -EGD and flexible sigmoidoscopy with GI    -pantoprazole 40mg BID    -Interventional radiology consult-consideration of TIPS    -Continue PTA lactulose and rifaximin per GI recommendation    -Hold apixaban given active bleed    -Status post 2 units in the ED and intraprocedure    -Hemoglobin check every 6 hours overnight    -Transfuse for hemoglobin less than 7.0    #decompensated cirrhosis with refractory ascites   #hx grade II esophageal varices and rectal varices  #hx hepatic encephalopathy  ALT 12. AST 25. ALK PHOS 171.  Gets outpatient paracentesis twice weekly, last 2/24 with 6L off.    -gastroenterology following    -Imaging: None ordered on admission    -Outpatient medications: lactulose 20g Three times daily, rifaximin 550mg BID and diuretic: Torsemide (Demadex)60mg Once daily.  Currently holding torsemide while trending BP in patient with active GI bleed.    -SBP prophylaxis: ceftriaxone 1g q24h    -MELD-Na score: 24 at 2/24/2023  1:14 PM  MELD score: 24 at 2/24/2023  1:14 PM  Calculated from:  Serum Creatinine: 1.85 mg/dL at  2/24/2023  1:14 PM  Serum Sodium: 139 mmol/L (Using max of 137 mmol/L) at 2/24/2023  1:14 PM  Total Bilirubin: 0.7 mg/dL (Using min of 1 mg/dL) at 2/24/2023  1:14 PM  INR(ratio): 2.80 at 2/24/2023  1:14 PM  Age: 52 years    #systolic HFrEF secondary to NICM  #frequent PVCs  #bradycardia  #Primary hypertension  #Portal hypertension    -PTA torsemide on hold as above    -Holding PTA metoprolol succinate 50 mg daily while trending BP in setting of active GI bleed.      -Suspect a dose reduction in metoprolol may be warranted - trend HR on telemetry. Required atropine after EGD for bradycardia in the 30s.    #CKD III  Creatinine 1.85 on admission. Baseline creatinine appears ~1.70-1.90.    -renally dose medications    -avoid nephrotoxic medications as able    -daily BMP    #hx SMV thrombus (2020)    -hold apixaban    -consultation with cardiology, discuss risk/benefit of stopping apixaban    #GISELLE on CPAP    -Continue CPAP with naps and at bedtime    #hypokalemia  Potassium 3.2.  Takes 60 mEq oral potassium daily as an outpatient while on torsemide.  In hospital, potassium initially replaced with 40 mEQ IV.    -replacement per RN protocol    #hypomagnesemia  Magnesium 1.2. Replace with 3g IV.    -replacement per RN protocol    #thrombocytopenia  Chronic, in setting of liver disease.    -daily CBC    #prolonged QTc    -electrolyte replacement    -avoid QTc-prolonging medications as able     Diet: NPO per Anesthesia Guidelines for Procedure/Surgery Except for: Meds    DVT Prophylaxis: Pneumatic Compression Devices  [place] Catheter: Not present  Lines: PRESENT           Cardiac Monitoring: ACTIVE order. Indication: QTc prolonging medication (48 hours)  Code Status: Full Code      Clinically Significant Risk Factors Present on Admission        # Hypokalemia: Lowest K = 3.2 mmol/L in last 2 days, will replace as needed   # Hypocalcemia: Lowest Ca = 8.1 mg/dL in last 2 days, will monitor and replace as appropriate      # Drug  Induced Coagulation Defect: home medication list includes an anticoagulant medication  # Thrombocytopenia: Lowest platelets = 62 in last 2 days, will monitor for bleeding              Disposition Plan      Expected Discharge Date: 02/26/2023                The patient's care was discussed with the Attending Physician, Dr. Givens and Patient.    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    Recently admitted to [facility] 2/14/2023 through 2/16/2023, also for GI bleeding.  During that admission, EGD with grade 2 esophageal varices and esophageal ulcer.  Colonoscopy also performed during that admission with possible Dieulafoy lesion in transverse colon.  Returns to hospital on 2/24/2023 again with GI bleeding, 12 or more bloody stools on date of admission. No hematemesis.     He was taken to endoscopy on date of admission. EGD with evidence of portal hypertensive gastropathy - which is the suspected etiology of his GI bleeding. He was also treated with pantoprazole and was started on ceftriaxone for prophylaxis given his history of esophageal varices and presence of ascites. He also received 2 units of PRBCs in ED.     Past Medical History    Past Medical History:   Diagnosis Date     Alcohol dependence in  remission (H)      Alcoholic cirrhosis of liver with ascites (H)      Anemia      Chronic systolic heart failure (H)      Diabetes (H)     Type 2 DM, Insulin Use.      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      Past Surgical History   Past Surgical History:   Procedure Laterality Date     COLONOSCOPY N/A 12/09/2021    Procedure: COLONOSCOPY, WITH BIOPSY, WITH CLIPS;  Surgeon: Sweetie Bain MD;  Location: [facility] OR     ESOPHAGOSCOPY, GASTROSCOPY, DUODENOSCOPY (EGD), COMBINED N/A 12/09/2021    Procedure: ESOPHAGOGASTRODUODENOSCOPY, WITH BIOPSY;  Surgeon: Sweetie Bain MD;  Location: [facility] OR     EXPLORATORY LAPAROTOMY W/ BOWEL RESECTION  09/2020    Exploratory laparotomy with small bowel resection     Prior to Admission Medications   Prior to Admission Medications   Prescriptions Last Dose Informant Patient Reported? Taking?   COMPRESSION STOCKINGS  Spouse/Significant Other No No   Sig: Please measure and distribute 2 pair of 20mmHg - 30mmHg knee high open or closed toe compression stockings with extra refills as indicated or what insurance will allow .   DULoxetine (CYMBALTA) 60 MG capsule  Spouse/Significant Other Yes No   Sig: Take 60 mg by mouth every evening   Multiple Vitamins-Minerals (SUPER THERA DAMARIS M) TABS  Spouse/Significant Other Yes No   Sig: Take 1 tablet by mouth daily   ONETOUCH ULTRA test strip  Spouse/Significant Other Yes No   Sig: PLEASE SEE ATTACHED FOR DETAILED DIRECTIONS   acetaminophen (TYLENOL) 500 MG tablet   Yes No   Sig: Take 1 tablet (500 mg) by mouth every 8 hours as needed for mild pain   apixaban ANTICOAGULANT (ELIQUIS) 5 MG tablet  Spouse/Significant Other Yes No   Sig: Take 5 mg by mouth 2 times daily    ciprofloxacin (CIPRO) 500 MG tablet  Spouse/Significant Other No  No   Sig: Take 1 tablet (500 mg) by mouth daily   ferrous gluconate (FERGON) 324 (38 Fe) MG tablet  Spouse/Significant Other No No   Sig: Take 1 tablet (324 mg) by mouth daily (with breakfast)   gabapentin (NEURONTIN) 300 MG capsule  Spouse/Significant Other Yes No   Sig: Take 900 mg by mouth 2 times daily   insulin glargine (LANTUS PEN) 100 UNIT/ML pen   Yes No   Sig: Inject 30 Units Subcutaneous every morning   insulin pen needle (B-D U/F) 31G X 5 MM miscellaneous  Spouse/Significant Other Yes No   Sig: AS DIRECTED. BD UF MINI PEN NEEDLE [id number]: USE ONCE DAILY   lactulose 20 GM/30ML SOLN  Spouse/Significant Other No No   Sig: Take 30 mLs (20 g) by mouth 3 times daily   loperamide (IMODIUM A-D) 1 MG/7.5ML  Spouse/Significant Other No No   Sig: Take 15 mLs (2 mg) by mouth 4 times daily as needed for diarrhea   metoprolol succinate ER (TOPROL XL) 50 MG 24 hr tablet   No No   Sig: Take 1 tablet (50 mg) by mouth daily   metoprolol succinate ER (TOPROL XL) 50 MG 24 hr tablet   No No   Sig: Take 1 tablet (50 mg) by mouth daily   omeprazole (PRILOSEC) 20 MG DR capsule  Spouse/Significant Other Yes No   Sig: Take 20 mg by mouth every evening   potassium chloride ER (KLOR-CON M) 10 MEQ CR tablet  Spouse/Significant Other No No   Sig: Take 3 tablets (30 mEq) by mouth 2 times daily   potassium chloride ER (KLOR-CON M) 10 MEQ CR tablet   No No   Sig: Take 4 tablets (40 mEq) by mouth 2 times daily for 90 days   rifaximin (XIFAXAN) 550 MG TABS tablet  Spouse/Significant Other No No   Sig: Take 1 tablet (550 mg) by mouth 2 times daily   torsemide (DEMADEX) 20 MG tablet  Spouse/Significant Other No No   Sig: Take 3 tablets (60 mg) by mouth daily      Facility-Administered Medications: None           Physical Exam   Vital Signs: Temp: 98.3  F (36.8  C) Temp src: Oral BP: 115/62 Pulse: 60   Resp: 16 SpO2: 100 % O2 Device: None (Room air)      GENERAL APPEARANCE: alert and no distress   EYES: Eyes grossly normal to inspection.  Pupils are equal and round. No scleral icterus  HENT: dry, pale oral mucous membranes  RESP: lungs clear to auscultation - no rales, rhonchi or wheezes  CV: regular rates and rhythm, normal S1 S2, no S3 or S4, no murmur  ABDOMEN: soft, nontender, +ascites  MS: extremities normal- no gross deformities noted  SKIN: no suspicious lesions or rashes  NEURO: Normal strength and tone, mentation intact and speech normal  PSYCH: mentation appears normal and affect normal    Medical Decision Making       90 MINUTES SPENT BY ME on the date of service doing chart review, history, exam, documentation & further activities per the note.  NOTE(S)/MEDICAL RECORDS REVIEWED over the past 24 hours: GI consultation, ED note, GI progress note (outpatient), discharge summary from [facility]  Tests ORDERED & REVIEWED in the past 24 hours:  - CMP  - CBC  - Coags/INR  - Magnesium  - Troponin  Tests personally interpreted in the past 24 hours:  - EKG tracing showing sinus arrhythmia with PVCs  Medical complexity over the past 24 hours:  - Prescription DRUG MANAGEMENT performed      Data     I have personally reviewed the following data over the past 24 hrs:    4.7  \   6.4 (LL)   / 62 (L)     139 111 (H) 28.0 (H) /  79   3.2 (L) 15 (L) 1.85 (H) \       ALT: 12 AST: 25 AP: 171 (H) TBILI: 0.7   ALB: 3.5 TOT PROTEIN: 5.2 (L) LIPASE: 52       Trop: 99 (H) BNP: N/A       INR:  2.80 (H) PTT:  41 (H)   D-dimer:  N/A Fibrinogen:  N/A

## 2023-02-24 NOTE — CONSULTS
GASTROENTEROLOGY CONSULTATION    Date of Admission:  2/24/2023       ASSESSMENT AND RECOMMENDATIONS:   52 year old male with decompensated alcohol +/- CALLEJAS cirrhosis complicated by HE and ascites, SMV thrombosis c/b ischemic bowel s/p ex lap with resection (9/2020) on apixiban, GAVE, hypertension, hyperlipidemia, type 2 diabetes, obstructive sleep apnea, non-ischemic cardiomyopathy with systolic dysfunction, who is presented with hematochezia.    Hematochezia  History of non-bleeding rectal varices  Hgb of 6.4. At baseline requiring transfusion about once a day. Has intermittent bleeding in the past. Most likely lower GI bleeding with significant hematochezia and vitally stable. DDx include rectal varices bleeding, AVMs (both seen on last colonoscopy), hemorrhoid; and less likely upper GI bleeding. Will obtain EGD given he is also due for post banding repeat EGD, and flex sig. Considering further colonoscopy if not seeing any cause of bleeding.    Cirrhosis, decompensated with ascites, MELD-Na 24  Refractory ascites  Etiology: EtOH +/- NAFLD  - hx HE on lactulose and rifaximin  - hx refractory ascites on torsemide 60 mg daily, requiring paracentesis twice a week. Hx SBP (9/2022).  - no hx variceal bleed, last EGD: 1/2023 with grade II EV s/p banded  He is in the process of working up for TIPS for refractory ascites as outpatient.    SMV thrombosis on anticoagulation  Chronic diarrhea     RECOMMENDATIONS  - NPO  - EGD and flexible sigmoidoscopy now  - IR consult for consideration of TIPS  - Continue lactulose and rifaximin  - Holding apixaban for now, will reassess after the procedure    Gastroenterology follow up recommendations:     Thank you for involving us in this patient's care. Please do not hesitate to contact the GI service with any questions or concerns.     Patient care plan discussed with Dr. Jurado, GI staff physician.    Chandler Ha MD  GI fellow  Page [phone]          Chief Complaint:    We were asked to evaluate this patient with   History is obtained from the patient and the medical record.          History of Present Illness:   52 year old male with decompensated alcohol +/- CALLEJAS cirrhosis complicated by HE and ascites, SMV thrombosis c/b ischemic bowel s/p ex lap with resection (9/2020) on apixiban, GAVE, hypertension, hyperlipidemia, type 2 diabetes, obstructive sleep apnea, non-ischemic cardiomyopathy with systolic dysfunction, who is presented with hematochezia.    Had intermittent rectal bleeding in the past requiring blood transfusion weekly. Since yesterday afternoon, had large bloody bowel movements for >12 times, with most recent one in the ED. Bright and dark blood, unsure if any blood clot. No vomiting blood. Has chronic abdominal discomfort, but since yesterday had cramping of lower abdomen. No dizziness or lightheadedness.      Last dose apixaban was 2/24/23 7 am.    Follows with Dr. Bain for cirrhosis.   dx 2020, Last EtOH use: 9/21/2020  - Etiology: EtOH +/- NAFLD  - hx HE on lactulose and rifaximin  - hx refractory ascites on torsemide 60 mg daily, requiring paracentesis twice a week. Hx SBP (9/2022).  - no hx variceal bleed, last EGD: 1/2023 with grade II EV s/p banded    NSAID use: denies    Prior endoscopy:   - last colonoscopy 12/9/2021:   Impression:            - Decreased sphincter tone found on digital rectal                          exam.                          - A single non-bleeding colonic angioectasia.                          - Congested mucosa in the entire examined colon.                          Biopsied.                          - One 7 mm polyp in the sigmoid colon, removed with a                          cold snare. Resected and retrieved. Clips were placed.                          - Rectal varices.                          - The examination was otherwise normal on direct and                          retroflexion views.         Past Medical History:    Reviewed and edited as appropriate  Past Medical History:   Diagnosis Date     Alcohol dependence in remission (H)      Alcoholic cirrhosis of liver with ascites (H)      Anemia      Chronic systolic heart failure (H)      Diabetes (H)     Type 2 DM, Insulin Use.      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             Past Surgical History:   Reviewed and edited as appropriate   Past Surgical History:   Procedure Laterality Date     COLONOSCOPY N/A 2021    Procedure: COLONOSCOPY, WITH BIOPSY, WITH CLIPS;  Surgeon: Sweetie Bain MD;  Location: [facility] OR     ESOPHAGOSCOPY, GASTROSCOPY, DUODENOSCOPY (EGD), COMBINED N/A 2021    Procedure: ESOPHAGOGASTRODUODENOSCOPY, WITH BIOPSY;  Surgeon: Sweetie Bain MD;  Location: [facility] OR     EXPLORATORY LAPAROTOMY W/ BOWEL RESECTION  2020    Exploratory laparotomy with small bowel resection            Social History:   Reviewed and edited as appropriate  Social History     Socioeconomic History     Marital status:      Spouse name: Not on file     Number of children: Not on file     Years of education: Not on file     Highest education level: Not on file   Occupational History     Not on file   Tobacco Use     Smoking status: Former     Packs/day: 1.00     Years: 16.00     Pack years: 16.00     Types: Cigarettes     Quit date:      Years since quittin.1     Smokeless tobacco: Never   Vaping Use     Vaping Use: Never used   Substance and Sexual Activity     Alcohol use: Not Currently     Comment: Quit 2020     Drug use: Never     Sexual activity: Not on file   Other Topics Concern     Parent/sibling w/ CABG, MI or angioplasty before 65F 55M? Not Asked   Social History Narrative     Not on file     Social Determinants of Health      Financial Resource Strain: Not on file   Food Insecurity: Not on file   Transportation Needs: Not on file   Physical Activity: Not on file   Stress: Not on file   Social Connections: Not on file   Intimate Partner Violence: Not on file   Housing Stability: Not on file            Family History:   Reviewed and edited as appropriate  No known history of gastrointestinal/liver disease or  gastrointestinal malignancies       Allergies:   Reviewed and edited as appropriate     Allergies   Allergen Reactions     Minocycline Hives            Medications:   (Not in a hospital admission)            Review of Systems:     A complete review of systems was performed and is negative except as noted in the HPI           Physical Exam:   BP 98/54   Pulse 63   Temp 97.9  F (36.6  C) (Oral)   Resp 18   SpO2 100%   Wt:   Wt Readings from Last 2 Encounters:   02/14/23 114.2 kg (251 lb 11.2 oz)   01/27/23 124.9 kg (275 lb 5.7 oz)      Constitutional: no acute distress  HEENT: Sclera anicteric  CV:+ edema  Respiratory: Unlabored breathing  Abdomen: distended, midline surgical scar, nontender, no peritoneal signs  Skin: warm, perfused  Neuro: AAO x 3, No asterixis         Data:   Labs and imaging below were independently reviewed and interpreted    Imaging: Reviewed.  US doppler 2/22/23  1. Cirrhosis without focal liver lesion.   a. LI-RADS US Category: US-1 Negative: No US evidence of HCC  b. Recommend continued surveillance US.  2. Moderate ascites.  3. Gallbladder wall thickening likely due to nondistention and cirrhosis.  4. Patent hepatic vasculature as detailed above. Portal hypertension.    Attestation:  This patient has been seen and evaluated by me, Alf Jurado.  Discussed with the house staff team or resident(s) and agree with the findings and plan in this note.

## 2023-02-24 NOTE — ED TRIAGE NOTES
Patient is in bloody stools, dizziness, diarrhea and abdominal cramping. report 12 bloody stools( bright red and maroon) started 2/23. History og      Triage Assessment     Row Name 02/24/23 1310       Triage Assessment (Adult)    Airway WDL WDL       Respiratory WDL    Respiratory WDL WDL       Skin Circulation/Temperature WDL    Skin Circulation/Temperature WDL WDL       Cardiac WDL    Cardiac WDL WDL       Peripheral/Neurovascular WDL    Peripheral Neurovascular WDL WDL       Cognitive/Neuro/Behavioral WDL    Cognitive/Neuro/Behavioral WDL WDL

## 2023-02-24 NOTE — TELEPHONE ENCOUNTER
Order placed for patient to see IR for TIps Placement.     Message sent to patient via [facility] updating him on new order.     YEN Craven, LPN   Pre-Liver/TPIAT Transplant

## 2023-02-24 NOTE — PROGRESS NOTES
Received VM from pt's spouse Margarita at 11:53am that pt started having bright red bloody stools, is on the way to bringing pt to [facility] ED to be seen. Writer notes that pt is being to admitted to ED. Attempted to call pt's spouse to check in, left message with callback number.    Sharon Owusu, RN Care Coordinator  [facility]  Hepatology Clinic/Specialty Program

## 2023-02-24 NOTE — ED PROVIDER NOTES
[place] [facility] ([facility])    2/24/23      History     Chief Complaint   Patient presents with     Rectal Bleeding     Dizziness     HPI  Blair Oliver is a 52 year old male with PMH significant for alcoholic cirrhosis with ascites (getting paracentesis twice a week, undergoing current work-up for TIPS), recurrent GI bleed, SMV thrombosis (s/p partial bowel resection, on Eliquis), gastric antral vascular ectasia, esophageal varices s/p banding, chronic systolic heart failure, CKD, and DM2 who presents to the ED for evaluation of bright red blood per rectum.      Patient states this feels similar to prior GI bleeds, but worse than before.  He states that he began having bloody stools last night at approximately 5 PM and states that he has been having severe bright red blood in his diarrhea since then.  Patient does have recurrent GI bleeds which he indicates is related to his GAVE (per EGD on 2/11).  He states that he has never had a GI bleed as bad as this current one.  Patient also undergoes paracentesis twice weekly.    Per review of the chart, he has been told by his Cardiologist that he needs to stay on anticoagulation because the last SMV thrombosis resulted in significant infarcted bowel requiring 18'' of bowel resection.     Past Medical History  Past Medical History:   Diagnosis Date     Alcohol dependence in remission (H)      Alcoholic cirrhosis of liver with ascites (H)      Anemia      Chronic systolic heart failure (H)      Diabetes (H)     Type 2 DM, Insulin Use.      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      Past Surgical History:   Procedure Laterality Date     COLONOSCOPY N/A 12/09/2021    Procedure: COLONOSCOPY, WITH BIOPSY, WITH CLIPS;  Surgeon:  Sweetie Bain MD;  [place] [facility] [place]OPHAGOSCOPY, GASTROSCOPY, DUODENOSCOPY (EGD), COMBINED N/A 2021    Procedure: ESOPHAGOGASTRODUODENOSCOPY, WITH BIOPSY;  Surgeon: Sweetie Bain MD;  [place] [facility] [place] [facility] [place]     EXPLORATORY LAPAROTOMY W/ BOWEL RESECTION  2020    Exploratory laparotomy with small bowel resection     acetaminophen (TYLENOL) 500 MG tablet  apixaban ANTICOAGULANT (ELIQUIS) 5 MG tablet  ciprofloxacin (CIPRO) 500 MG tablet  COMPRESSION STOCKINGS  DULoxetine (CYMBALTA) 60 MG capsule  ferrous gluconate (FERGON) 324 (38 Fe) MG tablet  gabapentin (NEURONTIN) 300 MG capsule  insulin glargine (LANTUS PEN) 100 UNIT/ML pen  insulin pen needle (B-D U/F) 31G X 5 MM miscellaneous  lactulose 20 GM/30ML SOLN  loperamide (IMODIUM A-D) 1 MG/7.5ML  metoprolol succinate ER (TOPROL XL) 50 MG 24 hr tablet  metoprolol succinate ER (TOPROL XL) 50 MG 24 hr tablet  Multiple Vitamins-Minerals (SUPER THERA DAMARIS M) TABS  omeprazole (PRILOSEC) 20 MG DR capsule  ONETOUCH ULTRA test strip  potassium chloride ER (KLOR-CON M) 10 MEQ CR tablet  potassium chloride ER (KLOR-CON M) 10 MEQ CR tablet  rifaximin (XIFAXAN) 550 MG TABS tablet  torsemide (DEMADEX) 20 MG tablet      Allergies   Allergen Reactions     Minocycline Hives     Family History  Family History   Problem Relation Age of Onset     Deep Vein Thrombosis Mother      Pulmonary Embolism Mother      Substance Abuse Brother      Substance Abuse Maternal Grandmother      Anesthesia Reaction No family hx of      Social History   Social History     Tobacco Use     Smoking status: Former     Packs/day: 1.00     Years: 16.00     Pack years: 16.00     Types: Cigarettes     Quit date:      Years since quittin.1     Smokeless tobacco: Never   Vaping Use     Vaping Use: Never used   Substance Use Topics     Alcohol use: Not Currently     Comment: Quit 2020     Drug use: Never         A medically appropriate review of systems was performed with pertinent positives  and negatives noted in the HPI, and all other systems negative.    Physical Exam   BP: 105/50  Pulse: 68  Temp: 97.9  F (36.6  C)  Resp: 18  SpO2: 99 %  Physical Exam  Vitals reviewed.   Constitutional:       General: He is not in acute distress.     Appearance: He is not diaphoretic.   HENT:      Head: Normocephalic and atraumatic.      Right Ear: External ear normal.      Left Ear: External ear normal.      Nose: Nose normal. No rhinorrhea.      Mouth/Throat:      Mouth: Mucous membranes are moist.   Eyes:      General: No scleral icterus.     Extraocular Movements: Extraocular movements intact.      Conjunctiva/sclera: Conjunctivae normal.   Cardiovascular:      Rate and Rhythm: Normal rate and regular rhythm.      Heart sounds: Normal heart sounds.   Pulmonary:      Effort: No respiratory distress.      Breath sounds: Normal breath sounds.   Abdominal:      General: Bowel sounds are normal.      Palpations: Abdomen is soft.      Tenderness: There is no abdominal tenderness.   Musculoskeletal:         General: No deformity. Normal range of motion.      Cervical back: Normal range of motion and neck supple.   Skin:     General: Skin is warm.      Findings: No rash.   Neurological:      Mental Status: Mental status is at baseline.   Psychiatric:         Mood and Affect: Mood normal.         Behavior: Behavior normal.         ED Course, Procedures, & Data      Procedures   1:23 PM  The patient was seen and examined by Leonel Maguire DO in Room ED25.          ED Course Selections:        EKG Interpretation:      Interpreted by Leonel Maguire DO  Time reviewed: 1331  Symptoms at time of EKG: GI bleed rhythm: Ventricular bigeminy  Rate: normal  Axis: normal  Ectopy: none  Conduction: normal  ST Segments/ T Waves: No ST-T wave changes  Q Waves: none  Comparison to prior: No old EKG available    Clinical Impression: abnormal EKG                     Results for orders placed or performed during the hospital  encounter of 02/24/23   [place] Draw     Status: None    Narrative    The following orders were created for panel order [place] Draw.  Procedure                               Abnormality         Status                     ---------                               -----------         ------                     Extra Blue Top Tube[[id number]]                              Final result               Extra Red Top Tube[[id number]]                               Final result               Extra Green Top (Lithium...[[id number]]                      Final result               Extra Purple Top Tube[[id number]]                            Final result                 Please view results for these tests on the individual orders.   Extra Blue Top Tube     Status: None   Result Value Ref Range    Hold Specimen JIC    Extra Red Top Tube     Status: None   Result Value Ref Range    Hold Specimen JIC    Extra Green Top (Lithium Heparin) Tube     Status: None   Result Value Ref Range    Hold Specimen JIC    Extra Purple Top Tube     Status: None   Result Value Ref Range    Hold Specimen JIC    Comprehensive metabolic panel     Status: Abnormal   Result Value Ref Range    Sodium 139 136 - 145 mmol/L    Potassium 3.2 (L) 3.4 - 5.3 mmol/L    Chloride 111 (H) 98 - 107 mmol/L    Carbon Dioxide (CO2) 15 (L) 22 - 29 mmol/L    Anion Gap 13 7 - 15 mmol/L    Urea Nitrogen 28.0 (H) 6.0 - 20.0 mg/dL    Creatinine 1.85 (H) 0.67 - 1.17 mg/dL    Calcium 8.1 (L) 8.6 - 10.0 mg/dL    Glucose 79 70 - 99 mg/dL    Alkaline Phosphatase 171 (H) 40 - 129 U/L    AST 25 10 - 50 U/L    ALT 12 10 - 50 U/L    Protein Total 5.2 (L) 6.4 - 8.3 g/dL    Albumin 3.5 3.5 - 5.2 g/dL    Bilirubin Total 0.7 <=1.2 mg/dL    GFR Estimate 43 (L) >60 mL/min/1.73m2   Lipase     Status: Normal   Result Value Ref Range    Lipase 52 13 - 60 U/L   INR     Status: Abnormal   Result Value Ref Range    INR 2.80 (H) 0.85 - 1.15   Partial thromboplastin time     Status: Abnormal   Result  Value Ref Range    aPTT 41 (H) 22 - 38 Seconds   Troponin T, High Sensitivity     Status: Abnormal   Result Value Ref Range    Troponin T, High Sensitivity 99 (H) <=22 ng/L   CBC with platelets and differential     Status: Abnormal   Result Value Ref Range    WBC Count 4.7 4.0 - 11.0 10e3/uL    RBC Count 1.95 (L) 4.40 - 5.90 10e6/uL    Hemoglobin 6.4 (LL) 13.3 - 17.7 g/dL    Hematocrit 20.7 (L) 40.0 - 53.0 %     (H) 78 - 100 fL    MCH 32.8 26.5 - 33.0 pg    MCHC 30.9 (L) 31.5 - 36.5 g/dL    RDW 19.8 (H) 10.0 - 15.0 %    Platelet Count 62 (L) 150 - 450 10e3/uL    % Neutrophils 78 %    % Lymphocytes 10 %    % Monocytes 9 %    % Eosinophils 2 %    % Basophils 1 %    % Immature Granulocytes 0 %    NRBCs per 100 WBC 0 <1 /100    Absolute Neutrophils 3.7 1.6 - 8.3 10e3/uL    Absolute Lymphocytes 0.5 (L) 0.8 - 5.3 10e3/uL    Absolute Monocytes 0.4 0.0 - 1.3 10e3/uL    Absolute Eosinophils 0.1 0.0 - 0.7 10e3/uL    Absolute Basophils 0.0 0.0 - 0.2 10e3/uL    Absolute Immature Granulocytes 0.0 <=0.4 10e3/uL    Absolute NRBCs 0.0 10e3/uL   EKG 12-lead     Status: None (Preliminary result)   Result Value Ref Range    Systolic Blood Pressure  mmHg    Diastolic Blood Pressure  mmHg    Ventricular Rate 65 BPM    Atrial Rate 65 BPM    IN Interval 134 ms    QRS Duration 98 ms     ms    QTc 495 ms    P Axis 10 degrees    R AXIS 30 degrees    T Axis 81 degrees    Interpretation ECG       Sinus rhythm with frequent Premature ventricular complexes in a pattern of bigeminy  Low voltage QRS  Prolonged QT  Abnormal ECG     Adult Type and Screen     Status: None   Result Value Ref Range    ABO/RH(D) A POS     Antibody Screen Negative Negative    SPECIMEN EXPIRATION DATE 20230227235900    Prepare red blood cells (unit)     Status: None (Preliminary result)   Result Value Ref Range    Blood Component Type Red Blood Cells     Product Code V7512B74     Unit Status Issued     Unit Number [id number]     CROSSMATCH Compatible      CODING SYSTEM [id number]     ISSUE DATE AND TIME [id number]     UNIT ABO/RH A+     UNIT TYPE ISBT 6200    Prepare red blood cells (unit)     Status: None (Preliminary result)   Result Value Ref Range    Blood Component Type Red Blood Cells     Product Code L0038S60     Unit Status Ready for issue     Unit Number [id number]     CROSSMATCH Compatible     CODING SYSTEM [id number]    CBC with platelets differential     Status: Abnormal    Narrative    The following orders were created for panel order CBC with platelets differential.  Procedure                               Abnormality         Status                     ---------                               -----------         ------                     CBC with platelets and d...[[id number]]  Abnormal            Final result                 Please view results for these tests on the individual orders.   ABO/Rh type and screen     Status: None    Narrative    The following orders were created for panel order ABO/Rh type and screen.  Procedure                               Abnormality         Status                     ---------                               -----------         ------                     Adult Type and Screen[[id number]]                            Final result                 Please view results for these tests on the individual orders.     Medications   octreotide (sandoSTATIN) 1,250 mcg in D5W 250 mL (has no administration in time range)   octreotide (sandoSTATIN) injection 50 mcg (has no administration in time range)   pantoprazole (PROTONIX) IV push injection 80 mg (80 mg Intravenous $Given 2/24/23 1420)   cefTRIAXone (ROCEPHIN) 1 g vial to attach to  mL bag for ADULTS or NS 50 mL bag for PEDS (0 g Intravenous Stopped 2/24/23 1553)     Labs Ordered and Resulted from Time of ED Arrival to Time of ED Departure   COMPREHENSIVE METABOLIC PANEL - Abnormal       Result Value    Sodium 139      Potassium 3.2 (*)     Chloride 111 (*)     Carbon Dioxide  (CO2) 15 (*)     Anion Gap 13      Urea Nitrogen 28.0 (*)     Creatinine 1.85 (*)     Calcium 8.1 (*)     Glucose 79      Alkaline Phosphatase 171 (*)     AST 25      ALT 12      Protein Total 5.2 (*)     Albumin 3.5      Bilirubin Total 0.7      GFR Estimate 43 (*)    INR - Abnormal    INR 2.80 (*)    PARTIAL THROMBOPLASTIN TIME - Abnormal    aPTT 41 (*)    TROPONIN T, HIGH SENSITIVITY - Abnormal    Troponin T, High Sensitivity 99 (*)    CBC WITH PLATELETS AND DIFFERENTIAL - Abnormal    WBC Count 4.7      RBC Count 1.95 (*)     Hemoglobin 6.4 (*)     Hematocrit 20.7 (*)      (*)     MCH 32.8      MCHC 30.9 (*)     RDW 19.8 (*)     Platelet Count 62 (*)     % Neutrophils 78      % Lymphocytes 10      % Monocytes 9      % Eosinophils 2      % Basophils 1      % Immature Granulocytes 0      NRBCs per 100 WBC 0      Absolute Neutrophils 3.7      Absolute Lymphocytes 0.5 (*)     Absolute Monocytes 0.4      Absolute Eosinophils 0.1      Absolute Basophils 0.0      Absolute Immature Granulocytes 0.0      Absolute NRBCs 0.0     LIPASE - Normal    Lipase 52     TROPONIN T, HIGH SENSITIVITY   COVID-19 VIRUS (CORONAVIRUS) BY PCR   TYPE AND SCREEN, ADULT    ABO/RH(D) A POS      Antibody Screen Negative      SPECIMEN EXPIRATION DATE [id number]     PREPARE RED BLOOD CELLS (UNIT)    Blood Component Type Red Blood Cells      Product Code M6909Q41      Unit Status Issued      Unit Number [id number]      CROSSMATCH Compatible      CODING SYSTEM [id number]      ISSUE DATE AND TIME [id number]      UNIT ABO/RH A+      UNIT TYPE ISBT 6200     PREPARE RED BLOOD CELLS (UNIT)    Blood Component Type Red Blood Cells      Product Code Q9552J90      Unit Status Ready for issue      Unit Number [id number]      CROSSMATCH Compatible      CODING SYSTEM [id number]     PREPARE RED BLOOD CELLS (UNIT)   TRANSFUSE RED BLOOD CELLS (UNIT)   ABO/RH TYPE AND SCREEN     No orders to display          ED Course Selections:   Critical Care  Addendum  My initial assessment, based on my review of nursing observations, review of vital signs, focused history and physical exam, established a high suspicion that Blair Oliver has severe hemorrhage, which requires immediate intervention, and therefore he is critically ill.     After the initial assessment, the care team initiated multiple lab tests, initiated IV fluid administration and initiated medication therapy with Blood transfusion, octreotide, Protonix to provide stabilization care. Due to the critical nature of this patient, I reassessed vital signs and physical exam multiple times prior to his disposition.     Time also spent performing documentation, reviewing test results and discussion with consultants.     Critical care time (excluding teaching time and procedures): 40 minutes.       Medical Decision Making  The patient's presentation is strongly suggestive of high complexity (a chronic illness severe exacerbation, progression, or side effect of treatment).    The patient's evaluation involved:  review of external note(s) from 3+ sources (Most recent H&P in addition to clinic and ED note)  review of 2 test result(s) ordered prior to this encounter (Most recent BMP and CBC)  Current labs including CBC and CMP  EKG was interpreted by myself  Management was discussed with GI physician  The patient's management involved high risk (a decision regarding hospitalization).      Assessment & Plan    52-year-old male presents to us with a chief complaint of rectal bleeding for the last day.  Vital signs today have been normal.  EKG was unremarkable.  Hemoglobin was low at 6.4.  Patient is currently anticoagulated.  He took Eliquis this AM.  Given his history of varices Protonix as well as octreotide were started.  Hemoglobin was found to be 6.4 and so blood transfusion was ordered.  Care was discussed with both gastroenterology as well as internal medicine.  He will be admitted    I have reviewed the  nursing notes. I have reviewed the findings, diagnosis, plan and need for follow up with the patient.    New Prescriptions    No medications on file       Final diagnoses:   Gastrointestinal hemorrhage, unspecified gastrointestinal hemorrhage type     I, Nile Simmons, am serving as a trained medical scribe to document services personally performed by Leonel Maguire DO, based on the provider's statements to me.     I, Leonel Maguire DO, was physically present and have reviewed and verified the accuracy of this note documented by Nile Simmons.    Leonel Maguire DO  [facility] EMERGENCY DEPARTMENT  2/24/2023     Leonel Maguire DO  02/24/23 1605

## 2023-02-25 NOTE — CONSULTS
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 Cardiology is consulted for asymptomatic bradycardia, frequent ectopy, and chronic heart failure. The patient is hemodynamically stable, clinically hypervolemic, and in no distress.     Recommendations   - EF has recovered and metoprolol does not need to be restarted  - Continue telemetry monitoring  - Diuresis as able for goal net negative 1L as tolerated  - Restart Eplerenone as able from a renal perspective (hopefully after TIPS)  - Start Empagliflozin when able from a renal perspective (hopefully after TIPS)  - EP referral at discharge for consideration of PVC ablation ([facility] [address] [facility] inpatient service on Monday)  - Agree with cardiac MRI for ischemic evaluation however mindful that a positive study would prompt coronary angiography and possible intervention which would require high dose heparin during the procedure and uninterrupted DAPT afterward     #Sinus Bradycardia  #Frequent PVCs  Sinus bradycardia noted since admission with heart rates ranging 39-80 on telemetry review. The patient remains hemodynamically stable with baseline blood pressure 90s-110s/50s-70s despite his low heart rates. On review of EKGs, it appears that the patient has a baseline sinus rhythm with rates in the 70s without evidence of AV block however his bradycardia appears to be driven his frequent ectopy which produce sinus node pauses and thus produce the bradycardic heart rates seen on telemetry.   On review of prior data, the patient  had an ambulatory monitor performed in 06/2022 which demonstrated a total ventricular ectopy burden of approximately 20%. In the context of his alcohol use and mildly dilated left ventricle (6.3cm on most recent echocardiogram), suspect that his ectopy is driven by a dilated and likely alcoholic cardiomyopathy. The ectopy is likely worsened in the setting of acute anemia of GI bleeding.   In terms of management, no acute intervention is indicated given his hemodynamic stability and seemly adequate end-organ perfusion (based on lactate 1.7). If he becomes unstable due to bradycardia, temporary pacing would be necessary as a bridge to resolution of underlying conditions or permanent pacing. Restarting his home metoprolol could be considered as the beta blocker might suppress his PVCs and concordantly reduce his SA node suppression, effectively increasing his heart rate.   For long-term management, we recommend a referral to cardiology electrophysiology for consideration of PVC ablation as his PVCs appear to have 1-2 dominant morphologies on review of EKGs which could be favorable for ablation. However, this would be a non-urgent procedure and can be performed when the patient is more stable from a bleeding perspective.     #Acute Decompensated Heart Failure  #Non-Ischemic Cardiomyopathy, presumed   #Heart Failure with Recovered Ejection Fraction  The patient presents with GI bleeding, heart failure compensation is difficult to determine in the setting of decompensated cirrhosis however he is clearly globally hypervolemic likely more related to portal hypertension than decompensated heart failure. His heart failure is attributed to alcoholic and subsequent arrhythmogenic cardiomyopathy with LVEF as low as 30-35% in the past but most recently recovered to 50-55%. He has never undergone an ischemic evaluation which will be difficult in his case given frequent GI bleeding, making DAPT very risky.   Baseline: ACC/AHA Stage  C, NYHA Class III  Estimated Dry Weight: 275 pounds  Admission Weight: none performed   Etiology: Non-ischemic cardiomyopathy (presumed) due to alcohol and PVCs  Most Recent Diagnostics  Ischemic workup: None performed   Hemodynamics: None performed   Echo: 02/22/23 LVEF 50-55%, mild LV dilation 6.3cm, RV WNL  EKG: Sinus rhythm with frequent monomorphic PVCs both sporadic and in bigeminy  Outpatient Regimen  - Torsemide 60mg daily  - Metoprolol Succinate 50mg daily  Current Presentation  NT-proBNP: none; baseline normal  Volume status: Weight today none, I&O -400 (500 UOP)  - Volume status and heart failure compensation difficult to determine in setting of decompensated cirrhosis however suspect that he is globally hypervolemic and regardless of hepatic or cardiac etiology of hypervolemia would recommend diuresis for a goal net negative 1L daily if tolerated.    GDMT:   > Beta Blocker: Held in setting of bradycardia    > ACE/ARB/ARNI: None 2/2 renal function    > MRA: Previously on eplerenone, held 2/2 renal function    > SGLT2i: None 2/2 renal function   Additional Afterload: not needed   Therapies:    > Inotrope: not indicated    > ICD: None  > CRT: not indicated    > Mechanical Support: not indicated    Other Management:   - Continued alcohol cessation  - EP referral at discharge as above   - Agree with cardiac MRI for ischemic evaluation however mindful that a positive study would prompt coronary angiography and possible intervention which would require DAPT      This patient's care was discussed with Dr Bin Dumont, attending cardiologist, who agrees with the assessment and plan unless otherwise indicated.    Thank you for this consultation. Please contact the Cardiology 1 fellow at [phone] with any questions or concerns. Cardiology will sign off at this time.    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     The patient reports a long history of bradycardia, frequently with rates in the 50s. He is asymptomatic during these events, even with rates reaching low 40s last night. He gets an occasional palpitation however this does not seem to correlate. He denies any lightheadedness, blurry vision, or other symptoms of hypoperfusion.     No recent chest pains, baseline ALMODOVAR and difficulty moving when his legs are swollen. Abdomen is full of fluid, stable, hoping this will get better s/p TIPS.       Past Medical History    Past Medical History:   Diagnosis Date     Alcohol dependence in remission (H)      Alcoholic cirrhosis of liver with ascites (H)      Anemia      Chronic systolic heart failure (H)      Diabetes (H)     Type 2 DM, Insulin Use.      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        Past Surgical History   Past Surgical History:   Procedure Laterality Date     COLONOSCOPY N/A 12/09/2021    Procedure: COLONOSCOPY, WITH BIOPSY, WITH CLIPS;  Surgeon: Sweetie Bain MD;  [place] [facility] [place]     ESOPHAGOSCOPY, GASTROSCOPY, DUODENOSCOPY (EGD), COMBINED N/A 12/09/2021    Procedure: ESOPHAGOGASTRODUODENOSCOPY, WITH BIOPSY;  Surgeon: Sweetie Bain MD;  [place] [facility] [place]     EXPLORATORY LAPAROTOMY W/ BOWEL RESECTION  09/2020    Exploratory laparotomy with small bowel  resection       Medications   I have reviewed this patient's current medications      Review of Systems      Review Of Systems  Skin: No rash or bruising   Eyes: No blurry vision, eye pain  Ears/Nose/Throat: No rhinorrhea, sore throat  Respiratory: +ALMODOVAR, no wheezing, no cough  Cardiovascular: No chest pain, + palpitations  Gastrointestinal: No abdominal pain, nausea, vomiting  Genitourinary: No dysuria, frequency   Neurologic: No headache, weakness, syncope  Psychiatric: No hallucinations, SI, HI  Endocrine: No abnormal bleeding or bruising     Physical Exam   Vital Signs: Temp: 97.6  F (36.4  C) Temp src: Oral BP: 92/62 Pulse: 51   Resp: 16 SpO2: 99 % O2 Device: None (Room air) Oxygen Delivery: 2 LPM  Weight: 0 lbs 0 oz  Exam:  Constitutional: healthy, alert, and no distress  Head: Normocephalic. No masses, lesions, or abnormalities  Neck: Normal appearance, JVP estimated [id number]  ENT: EOMI, external nose and ears are normal  Cardiovascular: Bradycardic and irregular, no murmur appreciated, no rub, radial pulses 2+ and equal bilaterally  Respiratory: Clear to auscultation bilaterally, normal work of breathing, no wheeze, no rales  Gastrointestinal: Abdomen soft, non-tender, distended. No masses.   : Deferred  Musculoskeletal: extremities normal with no gross deformities noted  Skin: no suspicious lesions or rashes  Neurologic: Alert and responsive, grossly non-focal, moves all extremities, sensation grossly intact.   Psychiatric: mentation appears normal and affect normal/bright    Data   I personally reviewed all laboratory and imaging data from the last 24 hours in addition to all available cardiology data.     Attending Attestation: I saw, examined and evaluated the patient and reviewed all relevant data. I agree with the findings, and our shared assessment and plan of care documented in the edited note and summarized the isaacs findings and plan with the patient.

## 2023-02-25 NOTE — PROGRESS NOTES
Physician Attestation   I, Julio Sandhu MD, was present with the medical/SHARIF student who participated in the service and in the documentation of the note.  I have verified the history and personally performed the physical exam and medical decision making.  I agree with the assessment and plan of care as documented in the note.      Key findings:     52M CM, cirrhosis  pw hematochezia  Stable overnight  Appreciate GI consult  CM etiology unclear, likely EtOH component, also awaiting ischemic evaluation with outpatient cardiac MRI pending  Bradycardia requiring atropine overnight noted  No e/o hypotension   Cardiology consult appreciated  Restarted toprol 50 today               Julio Sandhu MD  Date of Service (when I saw the patient): 02/25/23    [facility]    Progress Note - Hospitalist Service, GOLD TEAM 9       Date of Admission:  2/24/2023    Assessment & Plan   Blair Oliver is a 52 year old male admitted on 2/24/2023. 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 He presented to the emergency department with hematochezia and is admitted to the hospital medicine team with gastroenterology consulting.    Changes today:   - Cardiology consult, appreciate recs  - Repeat troponin-pending  - Lactic acid- pending  - Lantus 10 while on clears, advance as diet advances  - 1 unit pRBC for hgb 7.1    # Acute blood loss anemia secondary to GI bleed  # Chronic anemia  Presented with both bright red blood and dark blood per rectum with greater than 12 bowel movements on date of presentation. Hgb 6.4 on admission, given 2 units pRBCs. S/p flex sig and EDG with GI 2/24 with unclear source of bleeding,  possibly from portal hypertensive gastropathy. Of note, has hospitalization 2/14-2/16 at [facility] for GI bleed with no clear source. Hx chronic anemia, baseline labile, appears to be ~8.  - GI consult, appreciate recs   - PPI PO once daily   - Clear liquid diet  - Holding apixiban in setting of bleed  - PTA ferrous gluconate  - 1 unit pRBC for hgb 7.1  -Transfuse for hemoglobin less than 7.0  - Daily CBC  - Hgb Q6hr for 24 hour  - Lactic acid    # Decompensated cirrhosis with refractory ascites   # Hx grade II esophageal varices and rectal varices  # Hx hepatic encephalopathy  # Portal hypertension   Hx decompensated cirrhosis, MELD 24 (2/24/23). Requires twice weekly paracentesis, last 2/24 with 6L off (typically 12L off per week). PTA regimen is lactulose 20mg TID, rifaximin 550mg BID, torsemide 60mg daily. Follows with Dr. Bingham as outpatient. Undergoing evaluation for TIPS, liver transplant  - GI consulting, appreciate recs  - IR consult for consideration of TIPS, appreciate recs   - Per IR note, already evaluated/planned for TIPS in outpatient setting  - Continue PTA lactulose  - Continue PTA rifaximin  - Continue ceftriaxone for SBP ppx (PTA cipro 500mg daily)    # HF with improved EF  # Frequent PVCs  # Bradycardia  # Primary hypertension  Hx HF w/ EF 35% in 2018, improved to up to 55% in 10/2022 and high PVC burden up to 20-30%. Etiology of HF includes EtOH, high PVCs. ECHO 2/22/23 showing reduced LV function, EF 50-55% with mild-moderate LV dilation, mild diffuse hypokinesis, and significant dyssynchrony due to PVCs. Bradycardia into 30-40s but hemodynamically stable with SBP in low 100s on 2/24, requiring atropine x2.   - Cardiology consult, appreciate recs  - Holding PTA metoprolol given bradycardia   - Could consider dose reduction in metoprolol prior to discharge  - Holding torsemide as above   - Repeat troponin     #CKD III  Creatinine 1.85 on admission. Baseline creatinine appears ~1.70-1.90.  -  Renally dosed medications  - Avoid nephrotoxic medications as able  - Daily BMP    # DMII   HbA1c 5.6 1/27/23. PTA lantus 30.   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   - Hold apixiban as above  - With SMV patent and recurrent bleeding, can discuss risk/benefit of stopping apixaban indefintiely- This has been considered by outpt hepatologist with tentative plan to stop AC after discussing with cards and IR    #GISELLE on CPAP  - Continue CPAP with naps and at bedtime     # Hypokalemia  Potassium 3.2 on admission, replaced with 40mEq IV. Takes 60 mEq oral potassium daily as an outpatient while on torsemide.  - 60mEq PO KCl 2/25 for K 3.6  - Will not do nurse managed given CKD     # Hypomagnesemia  Magnesium 1.2 on admission, replaced with 3g IV.  - Continue to monitor  - Will not do nurse managed given CKD     # Thrombocytopenia  Chronic, in setting of liver disease.  - Daily CBC     # Prolonged QTc  QTc 523 2/25.   - Avoid QTc-prolonging medications as able       Diet: Clear Liquid Diet No Red Food or Beverage - for certain tests    DVT Prophylaxis: Holding due to GI bleed  [place] Catheter: Not present  Fluids: None  Lines: PRESENT      PICC  Single Lumen Right Brachial vein medial-Site Assessment: WDL except;Ecchymotic      Cardiac Monitoring: ACTIVE order. Indication: QTc prolonging medication (48 hours)  Code Status: Full Code      Disposition Plan     Expected Discharge Date: 02/26/2023                The patient's care was discussed with the Attending Physician, Dr. Julio Sandhu.    [facility]  Medical Student  Hospitalist Service, [address]  Securely message with [employer] (more info)  Text page via [employer] Paging/Directory   See signed in provider  for up to date coverage information  ______________________________________________________________________    Interval History   Bradycardia overnight. Got atropine x2.     This morning, Delmer is doing ok. Has not had a bowel movement since procedure last night, so unsure if there is still bleeding. Endorses LLQ abdominal pain this morning, slightly more tender compared to yesterday over site of known hernia. No N/V. No hx of hematemesis. No fevers or chills. He is always cold at baseline. No difficulty breathing. No chest pain or palpitations.     Physical Exam   Vital Signs: Temp: 97.8  F (36.6  C) Temp src: Oral BP: 108/67 Pulse: 58   Resp: 16 SpO2: 97 % O2 Device: None (Room air) Oxygen Delivery: 2 LPM  Weight: 0 lbs 0 oz    Constitutional:Awake, alert, and oriented. No acute distress. Resting comfortably in bed.   Respiratory: Breathing comfortably on room air. No increased work of breathing. Lungs clear to auscultation.   Cardiovascular: Borderline bradycardic with frequent PVCs on tell. Well perfused.   GI: Abdomen with large bilateral hernias, reducible. LLQ over hernias tender. No epigastric tenderness.   Musculoskeletal: No edema.   Neurologic: Alert and oriented. Cranial nerves grossly intact.     Data

## 2023-02-25 NOTE — PLAN OF CARE
Goal Outcome Evaluation:    BP (!) 144/47   Pulse 55   Temp 97.7  F (36.5  C) (Oral)   Resp 16   SpO2 97%     [id number]    Admitted for acute anemia 2/2 GIB. Hx of liver cirrhosis with ascites and hepatic encephalopathy. Low Hgb replaced with 2units of PRBC, recheck 7.7. K+ replaced, Mg2+ replaced, recheck this am. Bradycardia with intermittent low 30s BPM, Atropine 0.5 mg twice by prior RN. Asymptomatic for low heart rate. Pt is weak and pale. PICC single lumen and PIV.

## 2023-02-25 NOTE — OR NURSING
EGD and Flex sigmoidoscopy with no interventions, conscious sedation and tolerated well. Report called to Xioamra RESENDIZ bedside RN in   ER.

## 2023-02-25 NOTE — PROCEDURES
Gastroenterology Endoscopy Suite Brief Operative Note    Procedure:  Upper endoscopy, flexible sigmoidoscopy   Post-operative diagnosis:  unclear cause of bleeding, possible bleeding from portal hypertensive gastropathy   Staff Physician:  Dr. George   Fellow/Assistant(s):  Chandler Ha   Specimens:  Please see final procedure note for further details.   Findings:  EGD  - small esophageal varices, not banded  -    Complications:  Bradycardia to 30 bpm, and up to 40 bpm on transporting back to ED, no hypotension   Condition:  Stable   Recommendations  Diet:  Clear liquid  PPI:  PPI po once daily  Anti-coagulants/platelets:  Hold anticoagulation  Octreotide:  Stop octreotide drip  Discharge Planning:   - remains on lopez  - if rebleeding, plan prep for capsule and colonoscopy

## 2023-02-25 NOTE — CONSULTS
INTERVENTIONAL RADIOLOGY CONSULT      Blair Oliver is a 52 year old male admitted on 2/24/2023. 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  He presented to the emergency department with hematochezia and is admitted to the hospital medicine team with gastroenterology consulting.    IR has been consulted for TIPS placement and input regarding anticoagulation cessation in the setting of hematochezia.    /67   Pulse 58   Temp 97.8  F (36.6  C) (Oral)   Resp 16   SpO2 97%   Recent Labs   Lab Test 02/25/23  0535   WBC 3.1*   HGB 7.1*   PLT 59*     Lab Results   Component Value Date    INR 2.47 02/25/2023    INR 2.80 02/24/2023    INR 1.58 06/15/2021       - Patient has been evaluated for TIPS for ascites in outpatient setting and plan for procedure is underway pending scheduling  - Patient with hx of SMV thrombos s/p tx with apixaban, recent imaging demonstrates SMV is now patent. From IR perspective there is no contraindication to stopping apixaban as reducing bleeding risk in setting of hematochezia likely outweighs risk of recurrent portal thrombus/impediment to TIPS placement    Dimas Moore MD  Interventional Radiology Fellow  IR pass pager: [phone]

## 2023-02-25 NOTE — PROGRESS NOTES
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    Hematochezia  History of non-bleeding rectal varices  Patient is now s/p EGD/Flex sig that were unremarkable. Patient is doing great today, no further signs of bleeding, hemoglobin appropriately responding to pRBC transfusion. Okay by IR to resume Eliquis.    Cirrhosis, decompensated with ascites, MELD-Na 24  Refractory ascites  Etiology: EtOH +/- NAFLD  - hx HE on lactulose and rifaximin  - hx refractory ascites on torsemide 60 mg daily, requiring paracentesis twice a week. Hx SBP (9/2022).  - no hx variceal bleed, last EGD: 1/2023 with grade II EV s/p banded  He is in the process of working up for TIPS for refractory ascites as outpatient.               Recommendations:     - Okay to advance diet as tolerated.  - No further procedures from GI standpoint at this time.  - IR following, plan for TIPS likely outpatient.  - Okay to resume Eliquis.  - Continue once daily PO PPIs.  - Continue antibiotics for 5-day course.   - Continue lactulose, rifaximin.     It has been a pleasure to participate in the care of this patient.  Patient discussed with GI staff, Dr. Jurado.  Please do not hesitate to contact the GI service with any questions or concerns.     Joey Toussaint MD  Gastroenterology Fellow  Pager [phone]         Subjective/Objective:   - No acute events overnight  Patient is doing well this morning, denies abdominal pain, denies GI bleeding, tolerates clear liquids with no issues.         Medications:     Current Facility-Administered Medications   Medication     acetaminophen (TYLENOL) tablet 500 mg      atropine injection 0.5 mg     cefTRIAXone (ROCEPHIN) 1 g vial to attach to  mL bag for ADULTS or NS 50 mL bag for PEDS     glucose gel 15-30 g    Or     dextrose 50 % injection 25-50 mL    Or     glucagon injection 1 mg     DULoxetine (CYMBALTA) DR capsule 60 mg     fentaNYL (PF) (SUBLIMAZE) injection     ferrous gluconate (FERGON) tablet 324 mg     gabapentin (NEURONTIN) capsule 900 mg     insulin glargine (LANTUS PEN) injection 10 Units     lactulose (CHRONULAC) solution 20 g     melatonin tablet 1 mg     [START ON 2/26/2023] metoprolol succinate ER (TOPROL XL) 24 hr tablet 50 mg     midazolam (VERSED) injection     multivitamin w/minerals (THERA-VIT-M) tablet 1 tablet     ondansetron (ZOFRAN ODT) ODT tab 4 mg    Or     ondansetron (ZOFRAN) injection 4 mg     oxyCODONE IR (ROXICODONE) half-tab 2.5 mg     pantoprazole (PROTONIX) EC tablet 40 mg     rifaximin (XIFAXAN) tablet 550 mg     senna-docusate (SENOKOT-S/PERICOLACE) 8.6-50 MG per tablet 1 tablet    Or     senna-docusate (SENOKOT-S/PERICOLACE) 8.6-50 MG per tablet 2 tablet     simethicone (MYLICON) chewable tablet 80 mg     sodium chloride (PF) 0.9% PF flush 3 mL     sodium chloride (PF) 0.9% PF flush 3 mL     [Held by provider] torsemide (DEMADEX) tablet 60 mg            Physical Exam:   VS:  /67   Pulse 54   Temp 97.5  F (36.4  C) (Oral)   Resp 16   SpO2 99%     Wt:   Wt Readings from Last 2 Encounters:   02/14/23 114.2 kg (251 lb 11.2 oz)   01/27/23 124.9 kg (275 lb 5.7 oz)      Constitutional: cooperative, pleasant, no acute distress  Eyes: Conjunctiva anicteric  HEENT: Normal oropharynx without ulcers or exudate, mucus membranes moist  CV: RRR, no m/r/g  Respiratory: CTAB  Abd:  Nondistended, +bs, no hepatosplenomegaly, nontender, no rebound or guarding   Skin: warm, perfused, no jaundice  Neuro: AAO x 3, No asterixis         Laboratory:   BMP  Recent Labs   Lab 02/25/23  1236 02/25/23  0541 02/25/23  0535 02/24/23  2016  02/24/23  1314 02/21/23  0926   NA  --   --  140  --  139 139   POTASSIUM  --   --  3.6  --  3.2* 3.3*   CHLORIDE  --   --  112*  --  111* 109*   DANIELLE  --   --  8.2*  --  8.1* 8.0*   CO2  --   --  17*  --  15* 21*   BUN  --   --  28.3*  --  28.0* 24.3*   CR  --   --  1.90*  --  1.85* 1.90*   * 106* 110* 85 79 134*     CBC  Recent Labs   Lab 02/25/23  1442 02/25/23  0535 02/25/23  0012 02/24/23 1314 02/21/23  0926   WBC  --  3.1*  --  4.7 5.0   RBC  --  2.23*  --  1.95* 2.59*   HGB 8.3* 7.1* 7.7* 6.4* 8.4*   HCT  --  22.5*  --  20.7* 26.2*   MCV  --  101*  --  106* 101*   MCH  --  31.8  --  32.8 32.4   MCHC  --  31.6  --  30.9* 32.1   RDW  --  20.6*  --  19.8* 19.9*   PLT  --  59*  --  62* 61*     INR  Recent Labs   Lab 02/25/23  0535 02/24/23  1314 02/21/23  0926   INR 2.47* 2.80* 2.41*     LFTs  Recent Labs   Lab 02/25/23  0535 02/24/23  1314 02/21/23  0926   ALKPHOS 154* 171* 197*   AST 25 25 30   ALT 12 12 19   BILITOTAL 2.1* 0.7 0.8   PROTTOTAL 4.5* 5.2* 5.5*   ALBUMIN 3.0* 3.5 3.3*      PANC  Recent Labs   Lab 02/24/23  1314   LIPASE 52            Imaging/Procedures/Studies:     Results for orders placed or performed during the hospital encounter of 12/09/21   COLONOSCOPY   Result Value Ref Range    COLONOSCOPY       [facility]  [address] [place] [address] [place] [address] [phone]     Endoscopy Department  _______________________________________________________________________________  Patient Name: Blair Oliver          Procedure Date: 12/9/2021 10:28 AM  MRN: [id number]                       Account Number: [id number]  YOB: 1970              Admit Type: Outpatient  Age: 51                               Room: Pro 5  Gender: Male                          Note Status: Finalized  Attending MD: Sweetie Bain MD       Pause for the Cause: Pause for the cause   completed.  Total Sedation Time:                     _______________________________________________________________________________     Procedure:             Colonoscopy  Indications:           Chronic diarrhea  Providers:             Sweetie Bain MD, Shona Smiley RN, Aracelis Mello CRNA  Patient Profile:       This is a 51 year old male. Refer to note in patient                           chart for documentation of history and physical.  Referring MD:            Medicines:             Propofol per Anesthesia  Complications:         No immediate complications. Estimated blood loss:                          Minimal.  _______________________________________________________________________________  Procedure:             Pre-Anesthesia Assessment:                         - Prior to the procedure, a History and Physical was                          performed, and patient medications and allergies were                          reviewed. The patient is competent. The risks and                          benefits of the procedure and the sedation options and                          risks were discussed with the patient. All questions                          were answered and informed consent was obtained.                          Patient identification and proposed procedure were                          verified by the physician and the nurse in the                           pre-procedure area. Mental Status Examination: alert                          and oriented. Airway Examination: normal oropharyngeal                          airway and neck mobility. Respiratory Examination:                          clear to auscultation. CV Examination: normal.                          Prophylactic Antibiotics: The patient does not require                          prophylactic antibiotics. Prior Anticoagulants: The                          patient has taken Eliquis (apixaban), last dose was 2                          days prior to procedure. ASA  Grade Assessment: III - A                          patient with severe systemic disease. After reviewing                          the risks and benefits, the patient was deemed in                          satisfactory condition to undergo the procedure. The                          anesthesia plan was to use monitored anesthesia care                          (MAC). Immediately prior to administration of                           medications, the patient was re-assessed for adequacy                          to receive sedatives. The heart rate, respiratory                          rate, oxygen saturations, blood pressure, adequacy of                          pulmonary ventilation, and response to care were                          monitored throughout the procedure. The physical                          status of the patient was re-assessed after the                          procedure.                         After obtaining informed consent, the colonoscope was                          passed under direct vision. Throughout the procedure,                          the patient's blood pressure, pulse, and oxygen                          saturations were monitored continuously. The                          Colonoscope was introduced through the anus and                          advanced to the cecum, identified by appendiceal                          orifice and ileocecal valve. The colonoscopy was                           performed without difficulty. The patient tolerated                          the procedure well. The quality of the bowel                          preparation was adequate to identify polyps 6 mm and                          larger in size.                                                                                   Findings:       The digital rectal exam findings include decreased sphincter tone.       A single small angioectasia without bleeding was found in the ascending        colon.        An area of moderately congested mucosa was found in the entire colon.        This was biopsied with a cold forceps for histology.       A 7 mm polyp was found in the sigmoid colon. The polyp was semi-sessile.        The polyp was removed with a cold snare. Resection and retrieval were        complete. To prevent bleeding after the polypectomy, two hemostatic        clips were successfully placed. There was no bleeding during, or at the        end, of the procedure.        Medium sized, non-bleeding rectal varices were found.       The exam was otherwise without abnormality on direct and retroflexion        views.                                                                                   Impression:            - Decreased sphincter tone found on digital rectal                          exam.                         - A single non-bleeding colonic angioectasia.                         - Congested mucosa in the entire examined colon.                          Biopsied.                         - One 7 mm polyp in the sigmoid colon, removed with a                          cold snare. Resected and retrieved. Clips were placed.                         - Rectal varices.                         - The examination was otherwise normal on direct and                          retroflexion views.  Recommendation:        - Discharge patient to home (with escort).                         - Resume previous diet.                         - Continue present medication s.                         - Await pathology results.                         - Repeat colonoscopy in 1 year for screening purposes.                         - Return to GI clinic as previously scheduled.                         - Resume Eliquis (apixaban) at prior dose in 2 days.                                                                                     Electronically Signed by: Dr. Sweetie Bain  ________________  Sweetie Bain MD  12/9/2021 1:57:19 PM  I was  physically present for the entire viewing portion of the exam.  __________________________  Signature of teaching physician  Sweetie Bain MD  Number of Addenda: 0    Note Initiated On: 12/9/2021 10:28 AM  Scope In:  Scope Out:       Attestation:  This patient has been seen and evaluated by me, Alf Jurado.  Discussed with the house staff team or resident(s) and agree with the findings and plan in this note.

## 2023-02-25 NOTE — CONSULTS
Care Management Initial Consult    General Information  Assessment completed with: Blair Britton  Type of CM/SW Visit: Initial Assessment    Primary Care Provider verified and updated as needed: Yes   Readmission within the last 30 days: no previous admission in last 30 days, other (see comments) (misses by 1day)   Return Category: Medically unsuccessful treatment plan  Reason for Consult: discharge planning  Advance Care Planning:            Communication Assessment  Patient's communication style: spoken language (English or Bilingual)    Hearing Difficulty or Deaf: no   Wear Glasses or Blind: yes    Cognitive  Cognitive/Neuro/Behavioral: WDL                      Living Environment:   People in home: spouse (mother, son, sons girlfriend, and grand daughter)   (bella- mom son- georges, Georges gf-evelyn, granddaughter- nikki)  Current living Arrangements: house      Able to return to prior arrangements: yes  Living Arrangement Comments: house    Family/Social Support:  Care provided by: self  Provides care for: no one  Marital Status:   Wife, Parent(s), Children  marisol       Description of Support System: Supportive, Involved    Support Assessment: Adequate family and caregiver support    Current Resources:   Patient receiving home care services: No     Community Resources: None  Equipment currently used at home: cane, quad  Supplies currently used at home: None    Employment/Financial:  Employment Status: disabled        Financial Concerns: No concerns identified   Referral to Financial Worker: No     Lifestyle & Psychosocial Needs: pulled from flowsheet  Social Determinants of Health     Tobacco Use: Medium Risk     Smoking Tobacco Use: Former     Smokeless Tobacco Use: Never     Passive Exposure: Not on file   Alcohol Use: Not on file   Financial Resource Strain: Not on file   Food Insecurity: Not on file   Transportation Needs: Not on file   Physical Activity: Not on file   Stress: Not on file    Social Connections: Not on file   Intimate Partner Violence: Not on file   Depression: Not at risk     PHQ-2 Score: 0   Housing Stability: Not on file       Functional Status:  Prior to admission patient needed assistance:   Dependent ADLs:: Independent, Bathing, Dressing, Eating, Grooming  Dependent IADLs:: Independent, Cooking, Meal Preparation, Shopping  Assesssment of Functional Status: Not at baseline with ADL Functioning    Mental Health Status:  Mental Health Status: No Current Concerns  Mental Health Management:  (no)    Chemical Dependency Status:  Chemical Dependency Status: Other (No)  Chemical Dependency Management:  (no)          Values/Beliefs:  Spiritual, Cultural Beliefs, Anglican Practices, Values that affect care: no               Additional Information:  Blair Oliver is a 52 year old male admitted on 2/24/2023. 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  He presented to the emergency department with hematochezia and is admitted to the hospital medicine team with gastroenterology consulting.    He expresses great concern with being discharged to have his TIPS procedure as an OP d/t the struggle with his current coagulation status which is the issue that has been present for the last 9months l/t multiple ED visits. RN empathized with pt and advised that his team would be made aware of his concerns. If he has questions about his discharge plans dont hesitate to ask for SW or RNCC team    1632- message sent to Dr Sandhu via [employer] with pts concerns about discharging to OP for TIPS procedure. RNCC and charge RN thought this may be the best place to start.    JM Bedolla RN 2/25/2023 4:16 PM    [place] RN Care Coordinator  Unit RNCC pager: [phone]     For Weekend & Holiday on call RN Care  Coordinator:  (Tasks: Home care, home infusion, medical equipment/oxygen, transportation, IMM & MOON forms, etc.)     Text Paging in [employer] Smart Web is the preferred method of contact for these teams     [place] [facility] [place] (0800-1630) Saturday & Sunday; (0800-1630) [facility] Recognized Holidays  Pager #1: [phone] Units: 4A, 4C, 4E, 5A & 5B   Pager #2: [phone] Units: 6A, 6B, 6C, 6D  Pager #3: [phone] Units: 7A, 7B, 7C, 7D & 5C   Pager #4: [phone] Units: 5 Ortho, 5 Med/Surg, 6 Med/Surg, 8A, 10 ICU, & RUST      For Weekend & Holiday on call Social Work:  (Tasks: TCU, transportation, Hospice, adjustment to illness counseling, Health Care Directives, Child Protection and Domestic Violence concerns, Vulnerable Adult, IMM forms, etc.)     Text Paging in [employer] Smart Web is the preferred method of contact for these teams    [place] (0800 - 1630) Saturday and Sunday  Pager: [phone] Units: 4A, 4C, 4E, 5A and 5B   Pager: [phone] Units: 6A, 6B, 6C, 6D   Pager: [phone] Units: 7A, 7B, 7C, 7D, and 5C      [facility] (0800-1630) Saturday and Sunday  Units: 5 Ortho, 5 Med/Surg, 6 Med/Surg, 8A, and 10 ICU   Pager: [phone]   ______________________________________________     After hours for all units everyday- (only the  is available after hours until midnight)  Pager [phone]

## 2023-02-25 NOTE — PROGRESS NOTES
Called by RN to notify of intermittent bradycardia to the 30s and 40s  On chart review, his metop is being held and the bradycardia was thought to be 2/2 low K and Mg as well.  Patient currently getting repleted.   His Hgb after repletion w 1U was 7.7 at midnight. Rpt labs ordered in the AM    On my interview, patient is awake, alert, in no distress and was on his phone, states that he does not feel any symptoms with decline in HR unless someone lets him know. While I was speaking with him, HR was in the 60s with frequent PVCs    Plan  - Replete electrolytes   - [place] [facility]  - Closed loop w RN in person, they agree w the plan and have no further concerns. RN to call me w any changes    Update at 5am  Followed up with RN- Per report, patient has had improvement in his HR in the last 2-3 hours and has stayed in the 40s, HD stable, asymptomatic.   - Recheck of K and Mg being drawn at this time

## 2023-02-26 NOTE — PROGRESS NOTES
Pt is discharging home with spouse. Pt to f/u outpatient regarding his furture TIPS procedure. All discharge instructions reviewed including changes to medications and follow up appointments. All belongings accounted for and returned to patient.    Neuro: A&Ox4.   Cardiac: SR freq PVC. VSS.   Respiratory: on RA.  GI/: Adequate urine output.  Diet/appetite: Tolerating reg diet. Eating well.  Activity:  Assist of ad kristin, up to chair and in halls.  Pain: At acceptable level on current regimen.   Skin: No new deficits noted.  LDA's: discharging with single lumen picc.    Plan: Continue with POC. Notify primary team with changes.

## 2023-02-26 NOTE — DISCHARGE SUMMARY
[facility]  Discharge Summary - Medicine & Pediatrics       Date of Admission:  2/24/2023  Date of Discharge:  2/26/2023  Discharging Provider: Julio Sandhu  Discharge Service: Hospitalist Service, GOLD TEAM 9    Discharge Diagnoses      Acute blood loss anemia secondary to GI bleed  Chronic anemia  Decompensated EtOH cirrhosis with refractory ascites   Hx of grade II esophageal varices and rectal varices  Hx of hepatic encephalopathy  Portal hypertension  HF with improved EF  Frequent PVCs  Bradycardia  Hypertension  CKD Stage III  Type 2 Diabetes mellitus  Peripheral neuropathy  Hx of SMV thrombus   GISELLE  Hypomagnesemia  Hypokalemia  Thrombocytopenia  Prolonged QTc    Follow-ups Needed After Discharge   Follow-up Appointments     Adult [facility] Follow-up and recommended labs and tests      Follow up with primary care provider, KAYLAN HAILE, within 7 days for   hospital follow- up.      Follow up with Dr. Bingham of GI at next available.     Follow up with interventional radiology at next available to arrange TIPS   (ordered prior to admission).      Appointments on [place] and/or [facility] (with [facility] or [facility]   provider or service). Call [phone] if you haven't heard regarding   these appointments within 7 days of discharge.            PCP and Dr. Bingham should follow up on discontinuation of apixiban and decide if this should be discontinued permanently.     Discharge Disposition   Discharged to home  Condition at discharge: Good    Hospital Course   Blair Oliver was admitted on 2/24/2023 for acute blood loss anemia secondary to a GI bleed.  The following problems were addressed during his hospitalization:    Acute blood loss anemia secondary to GI bleed  Chronic anemia  Patient presented with both bright red blood and dark blood per rectum with greater than 12 bowel movements on date of presentation, concerning for a GI bleed. Of note, this  was his 4th hospitalization for acute GI bleed in the past 5 weeks. His hemoglobin was 6.4 on admission, and he was given 2 units pRBCs with appropriate response. His apixiban was held on admission and he was given octreotide once. He underwent a flexible sigmoidoscopy and EDG with GI with unclear source of bleeding, but possible bleeding from portal hypertensive gastropathy. He was given an additional 1 unit of pRBCs on hospital day 1 for a hemoglobin of 7.1 His hemoglobin remained stable after this, and he had no further evidence of GI bleeding. He completed a 3 day course of ceftriaxone for GI bleed prophylaxis with plan to resume his PTA SBP prophylaxis with ciprofloxacin on discharge. His apixiban was held throughout this hospitalization and was discontinued on discharge as the benefits of discontinuing it to reduce his risk of bleeding were felt to outweigh his risk for recurrent thrombosis. Hepatic vasculature was noted to be patent on ultrasound on admission and the SMV was noted to be patent on most recent abdominal MRI in 6/2022. He should follow up his outpatient hepatologist, Dr. Bingham, about the discontinuation of apixiban, although per her last note it appears she was tentatively planning to discontinue it.     Decompensated EtOH cirrhosis with refractory ascites   Hx of grade II esophageal varices and rectal varices  Hx of hepatic encephalopathy  Portal hypertension  Patient has a history of decompensated EtOH cirrhosis, MELD 24 (2/24/23), complicated by ascites requiring twice weekly paracentesis, varices, and hepatic encephalopathy. His PTA lactulose and rifaximin were continued during this hospitalization. His PTA torsemide was held on admission in the setting of an acute bleed with concern for hypotension, but was resumed on hospital day 2. IR was consulted regarding TIPS procedure and advised continuation of his current evaluation and scheduling for TIPS in the outpatient setting. He should  follow up with outpatient IR to schedule TIPS. He should continue to follow up with his outpatient hepatologist, Dr. Bingham, for ongoing management of his EtOH cirrhosis.     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 His troponin was mildly elevated to 99 on admission, unchanged on recheck, without clinical evidence of acute coronary syndrome. This likely represented demand ischemia in the setting of an acute bleed and chronic kidney disease. His hospital course was complicated by bradycardia into the 30-40s during and after EGD, requiring 2 doses of atropine. During these episodes of bradycardia, his blood pressure remained stable and he was asymptomatic. On EKG, he was noted to have sinus bradycardia with frequent PVCs. Cardiology was consulted, and ultimately recommended discontinuing his PTA metoprolol in the setting of  bradycardia with EF recovered. Per cardiology, a electrophysiology referral was placed on discharge for consideration of PVC ablation. Cardiology also recommended restarting eplerenone as able from renal perspective hopefully after TIPS, starting empagliflozin when able from renal perspective after hopefully TIPS, and pursuing cardiac MRI, noting however, that a positive study would prompt coronary angiography and possible intervention which would require high dose heparin during the procedure and uninterrupted DAPT afterward. He should follow up with the EP clinic outpatient and continue following with his outpatient cardiologist, Dr. Duarte.       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 He has been on chronic anticoagulation with apixiban since this event. The SMV was noted to be patent on abdominal MRI  6/14/22. Apixiban was discontinued on discharge as above.     CKD Stage III  Patient has a history of stage III CKD with baseline Cr 1.7-1.9. His creatinine remained at or near his baseline throughout this hospitalization.     Type 2 Diabetes mellitus  Patient has a history of type II diabetes mellitus on home basal insulin. Basal insulin was continued throughout this hospitalization, titrated to diet order. He resumed his PTA regimen once he resumed a regular diet. Blood sugars were appropriate throughout this hospitalization.     Peripheral neuropathy  PTA gabapentin and duloxetine were continued during this hospitalization.     GISELLE  Patient has a history of GISELLE and wears CPAP at home. CPAP was continued at night during this hospitalization.     Hypomagnesemia  Patient was hypomagnesemic with Mg 1.2 on admission. Mg was replaced with 3g IV with appropriate effect. Mg remained appropriate for remainder of hospitalization.     Hypokalemia  Patient has a history of chronic hypokalemia on torsemide with PTA oral potassium replacement. K was 3.2 on admission, and was replaced with 40mEq IV with appropriate effect. K was replaced PRN and remained within normal limits for remainder of hospitalization. Patient resumed PTA oral replacement when PTA torsemide was resumed prior to discharge.     Thrombocytopenia  Patient has a history of chronic thrombocytopenia in the setting of cirrhosis. Platelets were monitored with daily CBC during this hospitalization.     Prolonged QTc  QTc prolonged to maximum of 546 during this hospitalization. QTc prolonging medications were avoided.     Consultations This Hospital Stay   GI HEPATOLOGY ADULT IP CONSULT  INTERVENTIONAL RADIOLOGY ADULT/PEDS IP CONSULT  NURSING TO CONSULT FOR VASCULAR ACCESS CARE IP CONSULT  CARDIOLOGY GENERAL ADULT IP CONSULT  CARE MANAGEMENT / SOCIAL WORK IP CONSULT    Code Status   Full Code       The patient was discussed with Dr. Julio Bridges  [place]  [facility] [address]  [address]  [place] [address]  Phone: [phone]  ______________________________________________________________________    Physical Exam   Vital Signs: Temp: 97.7  F (36.5  C) Temp src: Oral BP: 108/60 Pulse: 63   Resp: 18 SpO2: 99 % O2 Device: None (Room air)    Weight: 247 lbs 9.23 oz     Constitutional: Awake, alert, cooperative, no apparent distress.  Respiratory: Breathing comfortably on room air. No increased work of breathing. Lungs clear to auscultation.   Cardiovascular: Regular rate, rate in 60s. Well perfused.   GI: Abdomen with bilateral incisional hernias, consistent with previous, reducible. Abdomen non-distended and soft. Abdomen mildly tender to palpation, consistent with patient's baseline.   Musculoskeletal: Spontaneously moves all extremities. 1+ edema in feet bilaterally.   Neurologic: Alert and oriented. No evidence of encephalopathy.       Primary Care Physician   KAYLAN HAILE    Discharge Orders      Reason for your hospital stay    GI bleed, cirrhosis     Activity    Your activity upon discharge: activity as tolerated and no driving for today     Adult [facility] Follow-up and recommended labs and tests    Follow up with primary care provider, KAYLAN HAILE, within 7 days for hospital follow- up.      Follow up with Dr. Bingham of GI at next available.     Follow up with interventional radiology at next available to arrange TIPS (ordered prior to admission).      Appointments on [place] and/or [facility] (with [facility] or [facility] provider or service). Call [phone] if you haven't heard regarding these appointments within 7 days of discharge.     Diet    Follow this diet upon discharge: Orders Placed This Encounter      Advance Diet as Tolerated: Regular Diet Adult       Significant Results and Procedures        Discharge Medications   Current Discharge Medication List      CONTINUE these medications which have  NOT CHANGED    Details   acetaminophen (TYLENOL) 500 MG tablet Take 1 tablet (500 mg) by mouth every 8 hours as needed for mild pain    Comments: No more than 2000mg qday      ciprofloxacin (CIPRO) 500 MG tablet Take 1 tablet (500 mg) by mouth daily  Qty: 90 tablet, Refills: 3    Associated Diagnoses: Alcoholic cirrhosis of liver with ascites (H); Spontaneous bacterial peritonitis (H)      DULoxetine (CYMBALTA) 60 MG capsule Take 60 mg by mouth every evening      ferrous gluconate (FERGON) 324 (38 Fe) MG tablet Take 1 tablet (324 mg) by mouth daily (with breakfast)  Qty: 90 tablet, Refills: 3    Associated Diagnoses: Anemia, unspecified type      gabapentin (NEURONTIN) 300 MG capsule Take 900 mg by mouth 2 times daily      insulin glargine (LANTUS PEN) 100 UNIT/ML pen Inject 30 Units Subcutaneous every morning  Qty: 15 mL    Comments: If Lantus is not covered by insurance, may substitute Basaglar or Semglee or other insulin glargine product per insurance preference at same dose and frequency.        insulin pen needle (B-D U/F) 31G X 5 MM miscellaneous AS DIRECTED. BD UF MINI PEN NEEDLE [id number]: USE ONCE DAILY      lactulose 20 GM/30ML SOLN Take 30 mLs (20 g) by mouth 3 times daily  Qty: 2838 mL, Refills: 11    Associated Diagnoses: Alcoholic cirrhosis of liver with ascites (H)      loperamide (IMODIUM A-D) 1 MG/7.5ML Take 15 mLs (2 mg) by mouth 4 times daily as needed for diarrhea  Qty: 237 mL, Refills: 1    Associated Diagnoses: Diarrhea, unspecified type      Multiple Vitamins-Minerals (SUPER THERA DAMARIS M) TABS Take 1 tablet by mouth daily      omeprazole (PRILOSEC) 20 MG DR capsule Take 20 mg by mouth every evening      ONETOUCH ULTRA test strip PLEASE SEE ATTACHED FOR DETAILED DIRECTIONS      potassium chloride ER (KLOR-CON M) 10 MEQ CR tablet Take 4 tablets (40 mEq) by mouth 2 times daily for 90 days  Qty: 720 tablet, Refills: 3    Associated Diagnoses: Alcoholic cirrhosis of liver with ascites (H)       rifaximin (XIFAXAN) 550 MG TABS tablet Take 1 tablet (550 mg) by mouth 2 times daily  Qty: 60 tablet, Refills: 11    Associated Diagnoses: Hepatic encephalopathy; Alcoholic cirrhosis of liver with ascites (H)      torsemide (DEMADEX) 20 MG tablet Take 3 tablets (60 mg) by mouth daily  Qty: 270 tablet, Refills: 1    Associated Diagnoses: Alcoholic cirrhosis of liver with ascites (H)      COMPRESSION STOCKINGS Please measure and distribute 2 pair of 20mmHg - 30mmHg knee high open or closed toe compression stockings with extra refills as indicated or what insurance will allow .  Qty: 2 each, Refills: 4    Associated Diagnoses: Edema         STOP taking these medications       apixaban ANTICOAGULANT (ELIQUIS) 5 MG tablet Comments:   Reason for Stopping:         metoprolol succinate ER (TOPROL XL) 50 MG 24 hr tablet Comments:   Reason for Stopping:             Allergies   Allergies   Allergen Reactions     Minocycline Hives

## 2023-02-26 NOTE — CONSULTS
[facility]    Cardiology Consult Note-Cards 1      Date of Admission:  2/24/2023  Consult Requested by: Medicine Gold 9  Reason for Consult: Bradycardia and Frequent PVCs    Assessment & Plan: SL   Mr Blair Oliver is a 52 year old gentleman with a history of combined alcoholic and CALLEJAS cirrhosis, suspected combined alcoholic and ectopy-mediated non-ischemic cardiomyopathy c/b HFrEF 30-35% now recovered to 50-55% who is admitted for recurrent GI bleeding. Cardiology is consulted for asymptomatic bradycardia, frequent ectopy, and chronic heart failure. The patient is hemodynamically stable, clinically hypervolemic, and in no distress.     Recommendations   - On further evaluation, okay to hold beta blocker as this will have limited utility with his recovered ejection fraction   - Restart Eplerenone as able from a renal perspective (hopefully after TIPS)  - Start Empagliflozin when able from a renal perspective (hopefully after TIPS)  - EP referral at discharge for consideration of PVC ablation ([facility] inpatient service on Monday)  - Agree with cardiac MRI for ischemic evaluation however mindful that a positive study would prompt coronary angiography and possible intervention which would require high dose heparin during the procedure and uninterrupted DAPT afterward   - Okay to stop Apixaban from a cardiology perspective, no cardiac indications for anticoagulation     #Sinus Bradycardia  #Frequent PVCs  Sinus bradycardia noted since admission with heart rates ranging 39-80 on telemetry review. The patient remains hemodynamically stable with baseline blood pressure 90s-110s/50s-70s despite his low heart rates. On review of EKGs, it appears that the patient has a baseline sinus rhythm with rates in the 70s without evidence of AV block however his bradycardia appears to be driven his frequent ectopy which produce sinus node pauses and thus produce the bradycardic  heart rates seen on telemetry.   On review of prior data, the patient had an ambulatory monitor performed in 06/2022 which demonstrated a total ventricular ectopy burden of approximately 20%. In the context of his alcohol use and mildly dilated left ventricle (6.3cm on most recent echocardiogram), suspect that his ectopy is driven by a dilated and likely alcoholic cardiomyopathy. The ectopy is likely worsened in the setting of acute anemia of GI bleeding.   In terms of management, no acute intervention is indicated given his hemodynamic stability and seemly adequate end-organ perfusion (based on lactate 1.7). If he becomes unstable due to bradycardia, temporary pacing would be necessary as a bridge to resolution of underlying conditions or permanent pacing.   For long-term management, we recommend a referral to cardiology electrophysiology for consideration of PVC ablation as his PVCs appear to have 1-2 dominant morphologies on review of EKGs which could be favorable for ablation. However, this would be a non-urgent procedure and can be performed when the patient is more stable from a bleeding perspective.     #Acute Decompensated Heart Failure  #Non-Ischemic Cardiomyopathy, presumed   #Heart Failure with Recovered Ejection Fraction  The patient presents with GI bleeding, heart failure compensation is difficult to determine in the setting of decompensated cirrhosis however he is clearly globally hypervolemic likely more related to portal hypertension than decompensated heart failure. His heart failure is attributed to alcoholic and subsequent arrhythmogenic cardiomyopathy with LVEF as low as 30-35% in the past but most recently recovered to 50-55%. He has never undergone an ischemic evaluation which will be difficult in his case given frequent GI bleeding, making DAPT very risky.   Baseline: ACC/AHA Stage C, NYHA Class III  Estimated Dry Weight: 275 pounds (prior discharge summary)  Admission Weight: none performed    Etiology: Non-ischemic cardiomyopathy (presumed) due to alcohol and PVCs  Most Recent Diagnostics  Ischemic workup: None performed   Hemodynamics: None performed   Echo: 02/22/23 LVEF 50-55%, mild LV dilation 6.3cm, RV WNL  EKG: Sinus rhythm with frequent monomorphic PVCs both sporadic and in bigeminy  Outpatient Regimen  - Torsemide 60mg daily  - Metoprolol Succinate 50mg daily  Current Presentation  NT-proBNP: none; baseline normal  Volume status: Weight today 247, I&O +880 (500 UOP)  - Volume status and heart failure compensation difficult to determine in setting of decompensated cirrhosis however suspect that he is globally hypervolemic and regardless of hepatic or cardiac etiology of hypervolemia would recommend diuresis for a goal net negative 1L daily if tolerated.    GDMT:   > Beta Blocker: Held in setting of bradycardia    > ACE/ARB/ARNI: None 2/2 renal function    > MRA: Previously on eplerenone, held 2/2 renal function    > SGLT2i: None 2/2 renal function   Additional Afterload: not needed   Therapies:    > Inotrope: not indicated    > ICD: None  > CRT: not indicated    > Mechanical Support: not indicated    Other Management:   - Continued alcohol cessation  - EP referral at discharge as above   - Agree with cardiac MRI for ischemic evaluation however mindful that a positive study would prompt coronary angiography and possible intervention which would require DAPT      This patient's care was discussed with Dr Bin Dumont, attending cardiologist, who agrees with the assessment and plan unless otherwise indicated.    Thank you for this consultation. Please contact the Cardiology 1 fellow at [phone] with any questions or concerns. Cardiology will sign off at this time.    Sam Hardin MD [facility], PGY-4  Fellow, Cardiovascular Disease           ______________________________________________________________________    Chief Complaint   Bradycardia    History is obtained from the patient    History of  Present Illness   Mr Blair Oliver is a 52 year old gentleman with a history of combined alcoholic and CALLEJAS cirrhosis, suspected combined alcoholic and ectopy-mediated non-ischemic cardiomyopathy c/b HFrEF 30-35% now recovered to 50-55% who is admitted for recurrent GI bleeding.     Doing well today, no acute complaints. No palpitations or chest pains. No hypotensive symptoms but has not moved around much. No significant dyspnea but thinks he would have some with activity.       Past Medical History    Past Medical History:   Diagnosis Date     Alcohol dependence in remission (H)      Alcoholic cirrhosis of liver with ascites (H)      Anemia      Chronic systolic heart failure (H)      Diabetes (H)     Type 2 DM, Insulin Use.      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        Past Surgical History   Past Surgical History:   Procedure Laterality Date     COLONOSCOPY N/A 12/09/2021    Procedure: COLONOSCOPY, WITH BIOPSY, WITH CLIPS;  Surgeon: Sweetie Bain MD;  Location: [facility] OR     ESOPHAGOSCOPY, GASTROSCOPY, DUODENOSCOPY (EGD), COMBINED N/A 12/09/2021    Procedure: ESOPHAGOGASTRODUODENOSCOPY, WITH BIOPSY;  Surgeon: Sweetie Bain MD;  Location: [facility] OR     EXPLORATORY LAPAROTOMY W/ BOWEL RESECTION  09/2020    Exploratory laparotomy with small bowel resection       Medications   I have reviewed this patient's current medications      Review of Systems      Review Of Systems  Skin: No rash or bruising   Eyes: No blurry vision, eye pain  Ears/Nose/Throat: No rhinorrhea, sore throat  Respiratory: +ALMODOVAR, no wheezing, no cough  Cardiovascular: No chest pain, + palpitations  Gastrointestinal: No abdominal pain, nausea, vomiting  Genitourinary: No dysuria, frequency   Neurologic: No headache, weakness,  syncope  Psychiatric: No hallucinations, SI, HI  Endocrine: No abnormal bleeding or bruising     Physical Exam   Vital Signs: Temp: 97.7  F (36.5  C) Temp src: Oral BP: 108/60 Pulse: 63   Resp: 18 SpO2: 99 % O2 Device: None (Room air)    Weight: 247 lbs 9.23 oz  Exam:  Constitutional: healthy, alert, and no distress  Head: Normocephalic. No masses, lesions, or abnormalities  Neck: Normal appearance, JVP estimated [id number]  ENT: EOMI, external nose and ears are normal  Cardiovascular: Bradycardic and regular, no murmur appreciated, no rub, radial pulses 2+ and equal bilaterally  Respiratory: Clear to auscultation bilaterally, normal work of breathing, no wheeze, no rales  Gastrointestinal: Abdomen soft, non-tender, distended. No masses.   : Deferred  Musculoskeletal: extremities normal with no gross deformities noted  Skin: no suspicious lesions or rashes  Neurologic: Alert and responsive, grossly non-focal, moves all extremities, sensation grossly intact.   Psychiatric: mentation appears normal and affect normal/bright    Data   I personally reviewed all laboratory and imaging data from the last 24 hours in addition to all available cardiology data.     Attending Attestation: I have seen and evaluated the patient and spent a total of 25 minutes with this patient and clinical rounding time. 15 minutes of this time was spent in counseling and coordination of care. I agree with the fellows assessment and plan.

## 2023-02-26 NOTE — PLAN OF CARE
Neuro: A&Ox4.   Cardiac: Sinus david with PVCs. VSS.   Respiratory: Sating 97% on RA.  GI/: Adequate urine output. BM X1 - Dark maroon color  Diet/appetite: Tolerating regular diet. Eating well, clear liquids most of the day.  Activity:  Assist of SBA, up to chair and in halls.  Pain: At acceptable level on current regimen. Oxy given x1  Skin: No new deficits noted.  LDA's: PICC line intact and infusing.     Plan: 1 unit PRBC given for active bleed and Hgb of 7.1. Patient expressed serious concerns about being discharged and attempting again to do the TIPS procedure outpatient. He would like to optimize his labs (particularly Hgb and K) and decrease his chance for readmission. Continue with POC. Notify primary team with changes.         Problem: Plan of Care - These are the overarching goals to be used throughout the patient stay.    Goal: Readiness for Transition of Care  Outcome: Progressing  Intervention: Mutually Develop Transition Plan  Recent Flowsheet Documentation  Taken 2/25/2023 1300 by Jaime Vincent, RN  Equipment Currently Used at Home: cane, quad     Problem: Gastrointestinal Bleeding  Goal: Optimal Coping with Acute Illness  Outcome: Progressing

## 2023-02-26 NOTE — PLAN OF CARE
/69   Pulse 55   Temp 97.8  F (36.6  C) (Oral)   Resp 16   Wt 112.3 kg (247 lb 9.2 oz)   SpO2 100%   BMI 30.94 kg/m      Neuro: A&Ox4.   Cardiac: Bradycardic, 50s-60s. Frequent PVCs. VSS.    Respiratory: Sating >98% on RA.  GI/: Unmeasured UOP & BM. Pt reports stool was brown.   Diet/appetite: Regular diet. Good appetite, swallows good. BG check BID, Lantus Qmorning.   Activity: SBA. Steady gait. Ambulated to toilet.   Pain: Managed with PRN oxy & tylenol.   Skin: No new deficits noted.  LDA's: R single lumen PICC and R PIV, saline locked.     Plan: Hgb recheck q6 for 24h. Continue with POC.

## 2023-02-28 NOTE — NURSING NOTE
Is the patient currently in the state of [place]? YES    Visit mode:VIDEO    If the visit is dropped, the patient can be reconnected by: VIDEO VISIT: Text to cell phone: [phone]    Will anyone else be joining the visit? NO      How would you like to obtain your AVS? MyChart    Are changes needed to the allergy or medication list? NO    Reason for visit: consult    Crystal VENTURA

## 2023-02-28 NOTE — PROGRESS NOTES
Video-Visit Details    Type of service:  Video Visit    Video Start Time (time video started): 2:31    Video End Time (time video stopped): 2:46    Originating Location (pt. Location): Home        Distant Location (provider location):  Off-site    Mode of Communication:  Video Conference via LoLo      HPI:    Mr. Oliver is a 52-year-old gentleman with a history of Cirrhosis and GI Bleed, referred by Dr. Jurado for evaluation for a TIPS placement.  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  He presented to the emergency department with hematochezia and was admitted until recently.    The patient has also a refractory ascites with two paracentesis per week. He has been scheduled for a TIPS placement in December but was cancelled for low Potassium.    The patient was also on anticoagulation but that stopped after his most recent GI bleed.     Current Outpatient Medications   Medication     acetaminophen (TYLENOL) 500 MG tablet     calcium carbonate (OS-DANIELLE) 500 MG tablet     ciprofloxacin (CIPRO) 500 MG tablet     DULoxetine (CYMBALTA) 60 MG capsule     ferrous gluconate (FERGON) 324 (38 Fe) MG tablet     gabapentin (NEURONTIN) 300 MG capsule     insulin glargine (LANTUS PEN) 100 UNIT/ML pen     insulin pen needle (B-D U/F) 31G X 5 MM miscellaneous     lactulose 20 GM/30ML SOLN     loperamide (IMODIUM A-D) 1 MG/7.5ML     Multiple Vitamins-Minerals (SUPER THERA DAMARIS M) TABS     omeprazole (PRILOSEC) 20 MG DR capsule     ONETOUCH ULTRA test strip     potassium chloride ER (KLOR-CON M) 10 MEQ CR tablet     rifaximin (XIFAXAN) 550 MG TABS tablet     torsemide (DEMADEX) 20 MG tablet     vitamin D2 (ERGOCALCIFEROL) 39853 units (1250 mcg) capsule     [START ON 5/23/2023] vitamin D3 (CHOLECALCIFEROL) 50 mcg (2000  units) tablet     COMPRESSION STOCKINGS     No current facility-administered medications for this visit.     Past Medical History:   Diagnosis Date     Alcohol dependence in remission (H)      Alcoholic cirrhosis of liver with ascites (H)      Anemia      Chronic systolic heart failure (H)      Diabetes (H)     Type 2 DM, Insulin Use.      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        Past Surgical History:   Procedure Laterality Date     COLONOSCOPY N/A 2021    Procedure: COLONOSCOPY, WITH BIOPSY, WITH CLIPS;  Surgeon: Sweetie Bain MD;  [place] [facility] [place]     ESOPHAGOSCOPY, GASTROSCOPY, DUODENOSCOPY (EGD), COMBINED N/A 2021    Procedure: ESOPHAGOGASTRODUODENOSCOPY, WITH BIOPSY;  Surgeon: Sweetie Bain MD;  [place] [facility] [place]     ESOPHAGOSCOPY, GASTROSCOPY, DUODENOSCOPY (EGD), COMBINED N/A 2023    Procedure: Esophagoscopy, gastroscopy, duodenoscopy (EGD), combined;  Surgeon: Tom George MD;  Location:  GI     EXPLORATORY LAPAROTOMY W/ BOWEL RESECTION  2020    Exploratory laparotomy with small bowel resection     SIGMOIDOSCOPY FLEXIBLE N/A 2023    Procedure: Sigmoidoscopy flexible;  Surgeon: Tom George MD;  Location:  GI       Family History   Problem Relation Age of Onset     Deep Vein Thrombosis Mother      Pulmonary Embolism Mother      Substance Abuse Brother      Substance Abuse Maternal Grandmother      Anesthesia Reaction No family hx of        Social History     Tobacco Use     Smoking status: Former     Packs/day: 1.00     Years: 16.00     Pack years: 16.00     Types: Cigarettes     Quit date:      Years since quittin.1     Smokeless tobacco: Never   Substance Use Topics     Alcohol use: Not Currently     Comment: Quit  9/21/2020       Impression and plan:    I think this patient needs a TIPS however, based on the 2/25 labs, his MELD is 23.   He has a complex situation and I would prefer to perform the procedure sooner than later before he has a rebleeding.  I would proceed with scheduling a TIPS as soon as possible. In the meantime, I would follow his labs to see if his MELD improves.

## 2023-02-28 NOTE — LETTER
2/28/2023         RE: Blair Oliver  [address] [place] [facility] [address]        Dear Colleague,    Thank you for referring your patient, Blair Oliver, to the [facility]. Please see a copy of my visit note below.    Video-Visit Details    Type of service:  Video Visit    Video Start Time (time video started): 2:31    Video End Time (time video stopped): 2:46    Originating Location (pt. Location): Home        Distant Location (provider location):  Off-site    Mode of Communication:  Video Conference via Wisair      HPI:    Mr. Oliver is a 52-year-old gentleman with a history of Cirrhosis and GI Bleed, referred by Dr. Jurado for evaluation for a TIPS placement.  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  He presented to the emergency department with hematochezia and was admitted until recently.    The patient has also a refractory ascites with two paracentesis per week. He has been scheduled for a TIPS placement in December but was cancelled for low Potassium.    The patient was also on anticoagulation but that stopped after his most recent GI bleed.     Current Outpatient Medications   Medication     acetaminophen (TYLENOL) 500 MG tablet     calcium carbonate (OS-DANIELLE) 500 MG tablet     ciprofloxacin (CIPRO) 500 MG tablet     DULoxetine (CYMBALTA) 60 MG capsule     ferrous gluconate (FERGON) 324 (38 Fe) MG tablet     gabapentin (NEURONTIN) 300 MG capsule     insulin glargine (LANTUS PEN) 100 UNIT/ML pen     insulin pen needle (B-D U/F) 31G X 5 MM miscellaneous     lactulose 20 GM/30ML SOLN     loperamide (IMODIUM A-D) 1 MG/7.5ML     Multiple Vitamins-Minerals (SUPER THERA DAMARIS M) TABS     omeprazole (PRILOSEC) 20 MG DR capsule     ONETOUCH ULTRA test strip      potassium chloride ER (KLOR-CON M) 10 MEQ CR tablet     rifaximin (XIFAXAN) 550 MG TABS tablet     torsemide (DEMADEX) 20 MG tablet     vitamin D2 (ERGOCALCIFEROL) 76967 units (1250 mcg) capsule     [START ON 5/23/2023] vitamin D3 (CHOLECALCIFEROL) 50 mcg (2000 units) tablet     COMPRESSION STOCKINGS     No current facility-administered medications for this visit.     Past Medical History:   Diagnosis Date     Alcohol dependence in remission (H)      Alcoholic cirrhosis of liver with ascites (H)      Anemia      Chronic systolic heart failure (H)      Diabetes (H)     Type 2 DM, Insulin Use.      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        Past Surgical History:   Procedure Laterality Date     COLONOSCOPY N/A 12/09/2021    Procedure: COLONOSCOPY, WITH BIOPSY, WITH CLIPS;  Surgeon: Sweetie Bain MD;  Location: [facility] OR     ESOPHAGOSCOPY, GASTROSCOPY, DUODENOSCOPY (EGD), COMBINED N/A 12/09/2021    Procedure: ESOPHAGOGASTRODUODENOSCOPY, WITH BIOPSY;  Surgeon: Sweetie Bain MD;  Location: [facility] OR     ESOPHAGOSCOPY, GASTROSCOPY, DUODENOSCOPY (EGD), COMBINED N/A 2/24/2023    Procedure: Esophagoscopy, gastroscopy, duodenoscopy (EGD), combined;  Surgeon: Tom George MD;  Location: [facility] GI     EXPLORATORY LAPAROTOMY W/ BOWEL RESECTION  09/2020    Exploratory laparotomy with small bowel resection     SIGMOIDOSCOPY FLEXIBLE N/A 2/24/2023    Procedure: Sigmoidoscopy flexible;  Surgeon: Tom George MD;  Location: [facility] GI       Family History   Problem Relation Age of Onset     Deep Vein Thrombosis Mother      Pulmonary Embolism Mother      Substance Abuse Brother      Substance Abuse Maternal Grandmother      Anesthesia Reaction No family hx of        Social History     Tobacco Use      Smoking status: Former     Packs/day: 1.00     Years: 16.00     Pack years: 16.00     Types: Cigarettes     Quit date:      Years since quittin.1     Smokeless tobacco: Never   Substance Use Topics     Alcohol use: Not Currently     Comment: Quit 2020       Impression and plan:    I think this patient needs a TIPS however, based on the  labs, his MELD is 23.   He has a complex situation and I would prefer to perform the procedure sooner than later before he has a rebleeding.  I would proceed with scheduling a TIPS as soon as possible. In the meantime, I would follow his labs to see if his MELD improves.         Farrukh Thomason MD

## 2023-03-02 NOTE — PROGRESS NOTES
Spoke with pt for check in. States he's having worsening ascites - is currently doing paracentesis twice weekly with 6L max drained each time. TIPS is scheduled on 3/14. Current diuretic regimen is torsemide 60mg daily. Message sent to provider.    Pt continues on lactulose 30mL BID, denies any confusion or mental fogginess. Discussed with pt and spouse that now that he is in liver transplant evaluation, his care coordination will be transferring to a new nurse during this process, but can still reach out to writer with any concerns. Contact information given. Pt and spouse verbalized understanding.    Sharon Owusu, RN Care Coordinator  [facility]  Hepatology Clinic/Specialty Program

## 2023-03-02 NOTE — TELEPHONE ENCOUNTER
FUTURE VISIT INFORMATION      SURGERY INFORMATION:    Date: 3/14/23    [facility]    Surgeon:  Teddy Hilario MD    Anesthesia Type:  general    Procedure: ANESTHESIA OUT OF OR FOR TRANSJUGULAR INTRAHEPATIC PORTOSYSTEMIC SHUNT WITH POSSIBLE PARACENTESIS PRIOR@0800    RECORDS REQUESTED FROM:       Primary Care Provider: Georges Ovalle MD - [facility]    Most recent EKG+ Tracin23    Most recent ECHO: 23    Most recent Cardiac Stress Test: 23

## 2023-03-02 NOTE — PROGRESS NOTES
Attempted to reach patient for check in, no answer, message left requesting call back, number provided.    Sharon Owusu, RN Care Coordinator  [facility]  Hepatology Clinic/Specialty Program

## 2023-03-07 NOTE — NURSING NOTE
Anemia Management Note  SUBJECTIVE/OBJECTIVE:  Referred by Dr. Sweetie Bain on 2023  Primary Diagnosis: Anemia of Other Chronic Illness (D63.8), CKD stage 3a     Secondary Diagnosis:  GAVE, Alcoholic cirrhosis of liver with ascites (K70.3) Anemia in CKD Satge 3a.   *2/15/23 Ok to use CKD dx codes per Dr. Sweetie Bain.   Hgb goal range:  9-10  Epo/Darbo: TBD: Hgb >9.0  Iron regimen:  NA. Elevated TSAT  Labs : 24  Recent FELI use, transfusion, IV iron: NA  RX/TX plans : TBD     No history of stroke, MI and blood clots or cancers.     Contact:            No Boxes Checked on Consent to Communicate.     Anemia Latest Ref Rng & Units 2023 2023 2023 2023 2023 2023 3/2/2023   Hemoglobin 13.3 - 17.7 g/dL 6.4(LL) 7.7(L) 7.1(L) 8.3(L) 8.6(L) 9.1(L) 10.0(L)   TSAT 15 - 46 % - - - - - - -   Ferritin 26 - 388 ng/mL - - - - - - -     BP Readings from Last 3 Encounters:   23 108/60   23 104/41   23 96/56     Wt Readings from Last 2 Encounters:   23 247 lb 9.2 oz (112.3 kg)   23 251 lb 11.2 oz (114.2 kg)           ASSESSMENT:  Hgb:at goal - continue to monitor  TSat: elevated at >50% Ferritin: At goal (>100ng/mL)    PLAN:  RTC for Hgb in 2 week(s)    Orders needed to be renewed (for next follow-up date) in EPIC: None    Iron labs due:  2023    Plan discussed with:  No call, chart review      NEXT FOLLOW-UP DATE:  3/21/23    Gretta Brar RN   Anemia Services  [address] [address]   [email or web address]   Office : [phone]  Fax: [phone]

## 2023-03-08 NOTE — PATIENT INSTRUCTIONS
Preparing for Your Surgery      Name:  Blair Oliver   MRN:  [id number]   :  1970   Today's Date:  3/8/2023       Arriving for surgery:  Surgery date:  3/14/23  Arrival time:  06:00 AM     Surgeries and procedures: Adult patients can have 2 visitors all through the surgery process.     Visiting hours: 8 a.m. to 8:30 p.m.     Hospital: Adult patients and children under age 18 can have 4 visitor at a time     No visitors under the age of 5 are allowed for hospital patients.  Double occupancy rooms: Patients can have only two visitors at a time.     Patients with disabilities: Can have a support person with them (family member, service provider     Or someone well informed about their needs) plus the allowed number of visitors     Patients confirmed or suspected to have symptoms of COVID 19 or flu:     No visitors allowed for adult patients.   Children (under age 18) can have 1 named visitor.     People who are sick or showing symptoms of COVID 19 or flu:    Are not allowed to visit patients--we can only make exceptions in special situations.       Please follow these guidelines for your visit:   Arrive wearing a mask over your mouth and nose; we will give you a medical mask to wear    If you arrive wearing a cloth mask.   Keep it on during your entire visit, even when in patient's room.   If you don't wear a mask we'll ask you to leave.     Clean your hands with alcohol hand . Do this when you arrive at and leave the building and patient room,    And again after you touch your mask or anything in the room.     You can t visit if you have a fever, cough, shortness of breath, muscle aches, headaches, sore throat    Or diarrhea      Stay 6 feet away from others during your visit and between visits     Go directly to and from the room you are visiting.     Stay in the patient s room during your visit. Limit going to other places in the hospital as much as possible     Leave bags and jackets at home or  in the car.     For everyone s health, please don t come and go during your visit. That includes for smoking   during your visit.     Please come to:     [facility] [address]  [address] [place]  [address]    - ? parking is available in front of the hospital   -    Please proceed to [address] on the 3rd floor. [phone]?     - ?If you are in need of directions, wheelchair or escort please stop at the Information Desk in the lobby.  Inform the information person that you are here for surgery; a wheelchair and escort to [address] will be provided.?     What can I eat or drink?  -  You may eat and drink normally up to 8 hours prior to arrival time.   -  You may have clear liquids until 2 hours prior to arrival time.     Examples of clear liquids:  Water  Clear broth  Juices (apple, white grape, white cranberry  and cider) without pulp  Noncarbonated, powder based beverages  (lemonade and Jimenez-Aid)  Sodas (Sprite, 7-Up, ginger ale and seltzer)  Coffee or tea (without milk or cream)  Gatorade    -  No Alcohol for at least 24 hours before surgery.     Which medicines can I take?    Hold Aspirin for 7 days before surgery.   Hold Multivitamins for 7 days before surgery.  Hold Supplements for 7 days before surgery.  Hold Ibuprofen (Advil, Motrin) for 1 day(s) before surgery--unless otherwise directed by surgeon.  Hold Naproxen (Aleve) for 4 days before surgery.  TAKE 24 UNITS OF LANTUS INSULIN THE MORNING OF SURGERY  -  PLEASE TAKE these medications the day of surgery:  Tylenol if needed; take morning medications.    How do I prepare myself?  - Please take 2 showers (one the night prior to surgery and one the morning of surgery) using Scrubcare or Hibiclens soap.    Use this soap only from the neck to your toes.     Leave the soap on your skin for one minute--then rinse thoroughly.      You may use your own shampoo and conditioner. No other hair  products.   - Please remove all jewelry and body piercings.  - No lotions, deodorants or fragrance.  - No makeup or fingernail polish.   - Bring your ID and insurance card.    -If you have a Deep Brain Stimulator, Spinal Cord Stimulator, or any Neuro Stimulator device---you must bring the remote control to the hospital.      ALL PATIENTS GOING HOME THE SAME DAY OF SURGERY ARE REQUIRED TO HAVE A RESPONSIBLE ADULT TO DRIVE AND BE IN ATTENDANCE WITH THEM FOR 24 HOURS FOLLOWING SURGERY.    Covid testing policy as of 12/06/2022  Your surgeon will notify and schedule you for a COVID test if one is needed before surgery--please direct any questions or COVID symptoms to your surgeon      Questions or Concerns:    - For any questions regarding the day of surgery or your hospital stay, please contact the Pre Admission Nursing Office at [phone].       - If you have health changes between today and your surgery, please call your surgeon.       - For questions after surgery, please call your surgeons office.

## 2023-03-08 NOTE — H&P
Pre-Operative H & P     CC:  Preoperative exam to assess for increased cardiopulmonary risk while undergoing surgery and anesthesia.    Date of Encounter: 3/8/2023  Primary Care Physician:  Bubba Ovalle     Reason for Visit: Alcoholic cirrhosis of liver with ascites (H)    JAMES Oliver is a 53 y/o male who presents for pre-operative H&P in preparation for ANESTHESIA OUT OF OR FOR TRANSJUGULAR INTRAHEPATIC PORTOSYSTEMIC SHUNT WITH POSSIBLE PARACENTESIS  with Teddy Hilario MD on 3/14/23 at [facility] for treatment of Alcoholic cirrhosis of liver with ascites (H).     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     Mr. Oliver a history of decompensated ETOH cirrhosis, MELD 24 (2/24/23), complicated by ascites requiring twice weekly paracentesis, varices, and hepatic encephalopathy. H is s/p esophageal varices banding.  He is currently having paracentesis twice per week for his ascites. The above procedure was initially scheduled in December, but was cancelled on DOS due to potassium level of 2.3. He now presents for the above procedure.     History was obtained from patient & chart review. He is accompanied by his wife.       Hx of abnormal bleeding or anti-platelet use: denies      Past Medical History  Past Medical History:   Diagnosis Date     Alcohol dependence in remission (H)      Alcoholic cirrhosis of liver with ascites (H)      Anemia      Chronic systolic heart failure (H)      Diabetes (H)     Type 2 DM, Insulin Use.      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        Past Surgical History  Past Surgical History:   Procedure Laterality Date     COLONOSCOPY N/A 12/09/2021    Procedure: COLONOSCOPY, WITH BIOPSY, WITH CLIPS;  Surgeon: Sweetie Bain MD;  Location: [facility] OR     ESOPHAGOSCOPY, GASTROSCOPY, DUODENOSCOPY (EGD), COMBINED N/A 12/09/2021    Procedure: ESOPHAGOGASTRODUODENOSCOPY, WITH BIOPSY;  Surgeon: Sweetie Bain MD;  Location: [facility] OR     ESOPHAGOSCOPY, GASTROSCOPY, DUODENOSCOPY (EGD), COMBINED N/A 02/24/2023    Procedure: Esophagoscopy, gastroscopy, duodenoscopy (EGD), combined;  Surgeon: Tom George MD;  Location: [facility] GI     EXPLORATORY LAPAROTOMY W/ BOWEL RESECTION  09/2020    Exploratory laparotomy with small bowel resection     SIGMOIDOSCOPY FLEXIBLE N/A 02/24/2023    Procedure: Sigmoidoscopy flexible;  Surgeon: Tom George MD;  Location: [facility] GI       Prior to Admission Medications  Current Outpatient Medications   Medication Sig Dispense Refill     acetaminophen (TYLENOL) 500 MG tablet Take 1 tablet (500 mg) by mouth every 8 hours as needed for mild pain       ciprofloxacin (CIPRO) 500 MG tablet Take 1 tablet (500 mg) by mouth daily (Patient taking differently: Take 500 mg by mouth At Bedtime) 90 tablet 3     DULoxetine (CYMBALTA) 60 MG capsule Take 60 mg by mouth every evening       gabapentin (NEURONTIN) 300 MG capsule Take 900 mg by mouth 2 times daily       insulin glargine (LANTUS PEN) 100 UNIT/ML pen Inject 30 Units Subcutaneous every morning 15 mL      lactulose 20 GM/30ML SOLN Take 30 mLs (20 g) by mouth 3 times daily (Patient taking differently: Take 20 g by mouth 2 times daily) 2838 mL 11     loperamide (IMODIUM A-D) 1 MG/7.5ML Take 15 mLs (2 mg) by mouth 4 times daily as needed for diarrhea 237 mL 1     Multiple Vitamins-Minerals (SUPER THERA DAMARIS M) TABS Take 1 tablet by mouth every morning       omeprazole  (PRILOSEC) 20 MG DR capsule Take 20 mg by mouth every evening       potassium chloride ER (KLOR-CON M) 10 MEQ CR tablet Take 4 tablets (40 mEq) by mouth 2 times daily for 90 days 720 tablet 3     rifaximin (XIFAXAN) 550 MG TABS tablet Take 1 tablet (550 mg) by mouth 2 times daily 60 tablet 11     torsemide (DEMADEX) 20 MG tablet Take 3 tablets (60 mg) by mouth daily (Patient taking differently: Take 60 mg by mouth every morning) 270 tablet 1     calcium carbonate (OS-DANIELLE) 500 MG tablet Take 1 tablet (500 mg) by mouth 2 times daily (Patient not taking: Reported on 3/8/2023) 180 tablet 3     COMPRESSION STOCKINGS Please measure and distribute 2 pair of 20mmHg - 30mmHg knee high open or closed toe compression stockings with extra refills as indicated or what insurance will allow . 2 each 4     ferrous gluconate (FERGON) 324 (38 Fe) MG tablet Take 1 tablet (324 mg) by mouth daily (with breakfast) (Patient not taking: Reported on 3/8/2023) 90 tablet 3     insulin pen needle (B-D U/F) 31G X 5 MM miscellaneous AS DIRECTED. BD UF MINI PEN NEEDLE [id number]: USE ONCE DAILY       ONETOUCH ULTRA test strip PLEASE SEE ATTACHED FOR DETAILED DIRECTIONS       vitamin D2 (ERGOCALCIFEROL) 95252 units (1250 mcg) capsule Take 1 capsule (50,000 Units) by mouth once a week (Patient not taking: Reported on 3/8/2023) 12 capsule 0     [START ON 5/23/2023] vitamin D3 (CHOLECALCIFEROL) 50 mcg (2000 units) tablet Take 1 tablet (50 mcg) by mouth daily Begin taking this after completion of Vitamin D2 therapy (Patient not taking: Reported on 3/8/2023) 90 tablet 3       Allergies  Allergies   Allergen Reactions     Minocycline Hives       Social History  Social History     Socioeconomic History     Marital status:      Spouse name: Not on file     Number of children: Not on file     Years of education: Not on file     Highest education level: Not on file   Occupational History     Not on file   Tobacco Use     Smoking status: Former      Packs/day: 1.00     Years: 16.00     Pack years: 16.00     Types: Cigarettes     Quit date:      Years since quittin.2     Smokeless tobacco: Never   Vaping Use     Vaping Use: Never used   Substance and Sexual Activity     Alcohol use: Not Currently     Comment: Quit 2020     Drug use: Never     Sexual activity: Not on file   Other Topics Concern     Parent/sibling w/ CABG, MI or angioplasty before 65F 55M? Not Asked   Social History Narrative     Not on file     Social Determinants of Health     Financial Resource Strain: Not on file   Food Insecurity: Not on file   Transportation Needs: Not on file   Physical Activity: Not on file   Stress: Not on file   Social Connections: Not on file   Intimate Partner Violence: Not on file   Housing Stability: Not on file       Family History  Family History   Problem Relation Age of Onset     Deep Vein Thrombosis Mother      Pulmonary Embolism Mother      Substance Abuse Brother      Substance Abuse Maternal Grandmother      Anesthesia Reaction No family hx of        Review of Systems  The complete review of systems is negative other than noted in the HPI or here.     Anesthesia Evaluation   Pt has had prior anesthetic.     No history of anesthetic complications       ROS/MED HX  ENT/Pulmonary:     (+) sleep apnea, uses CPAP, tobacco use, Past use,  (-) asthma, COPD and recent URI   Neurologic: Comment: Hepatic encephalopathy, taking rifaximin    (+) peripheral neuropathy, - pain, numbness, tingling feet and hands.  (-) no seizures and no CVA   Cardiovascular: Comment: Recent hospital course c/b bradycardia into the 30-40s during and after EGD, requiring 2 doses of atropine. During these episodes, BP remained stable and he was asymptomatic. On EKG, he was noted to have sinus bradycardia with frequent PVCs.     Chronic Lower extremity Edema    (+) Dyslipidemia hypertension-range: 120/90's/ ----CHF etiology: PVC's Last EF: 55% date: 10/2022 NYHA classification:  II. dysrhythmias, PVCs and Long QT, Previous cardiac testing   Echo: Date: 2/22/23 Results:  Frequent PVCs throughout examination.  Left ventricular function is decreased. The ejection fraction is 50-55%  (borderline). Mild-moderate LV dilation. Mild diffuse hypokinesis is present.  Significant dyssynchrony due to PVCs.  Global right ventricular function is normal. The right ventricle is normal size.  No significant valvular abnormalities.  The estimated PA systolic pressure is 19 mmHg above the RA pressure.  The RA pressure could not be estimated.  This study was compared with the study from 10/10/2022. No significant change in LVEF or aortic calibers (4.2 on both studies) upon direct visual  comparison.  Stress Test: Date: Results:    ECG Reviewed: Date: 2/25/23 Results:  Sinus rhythm with frequent , and consecutive Premature ventricular complexes   Cannot rule out Septal infarct , age undetermined   Prolonged QT   Abnormal ECG   When compared with ECG of 24-FEB-2023 20:05,   No significant change was found  Ventricular rate 63 bpm    Cath: Date: Results:   (-) ALMODOVAR and orthopnea/PND   METS/Exercise Tolerance: 1 - Eating, dressing Comment: Activity limited due to significant incisional hernia. Able to walk w/ shopping cart in store. Otherwise unable to walk one block due to pain.   Hematologic: Comments: Hx of thrombus causing acute mesenteric ischemia, ischemic bowel disease s/p bowel resection c/b incisional hernia (2020)    Hx Iron infusions    Admitted 2/24/2023 for acute blood loss anemia due to GI bleed; given 3 units pRBCs. Hgb 10.0 on 3/2. Eliquis stopped.    Elevated INRs    Thrombocytopenia, baseline platelets 59-87    (+) History of blood clots, anemia, history of blood transfusion, no previous transfusion reaction,     Musculoskeletal:  - neg musculoskeletal ROS     GI/Hepatic: Comment: Decompensated cirrhosis of the Liver with Ascites, MELD 24    S/p eophageal varices banding    Gastric Antral Vascular  "Ectasia    Incisional hernia \"has exploded into 3 hernias\"      (+) GERD, Asymptomatic on medication, appendicitis (  ), liver disease,     Renal/Genitourinary: Comment: Baseline creatinine 1.5-2    (+) renal disease, type: CRI, Nephrolithiasis , BPH,     Endo:     (+) type II DM, Last HgA1c: 6.1, date: 12/2/22, Using insulin, - not using insulin pump. Normal glucose range: , not previously admitted for DM/DKA. Diabetic complications: neuropathy. Obesity,     Psychiatric/Substance Use:     (+) alcohol abuse (Sober since 9/21/20)     Infectious Disease: Comment: spontaneous bacterial peritonitis 9/2022   (-) Recent Fever   Malignancy:  - neg malignancy ROS     Other:  - neg other ROS          Virtual visit -  No vitals were obtained    Physical Exam  Constitutional: Awake, alert, cooperative, no apparent distress, and appears stated age.  HENT: Normocephalic  Respiratory: non labored breathing   Neurologic: Awake, alert, oriented to name, place and time.   Neuropsychiatric: Calm, cooperative. Normal affect.      Prior Labs/Diagnostic Studies   All labs and imaging personally reviewed     EKG/ echocardiogram -  please see in ROS above     The patient's records and results personally reviewed by this provider.     Labs to be collected on 3/9/23:  BMP, CBC, hep panel, INR, T&S    Assessment      Blair Oliver is a 52 year old male seen as a PAC referral for risk assessment and optimization for anesthesia.    Plan/Recommendations  Pt will be optimized for the proposed procedure.  See below for details on the assessment, risk, and preoperative recommendations    NEUROLOGY  - No history of TIA, CVA or seizure  - Hepatic encephalopathy, taking rifaximin    -Post Op delirium risk factors:  High co-morbid index    ENT  - No current airway concerns.  Will need to be reassessed day of surgery.  Mallampati: Unable to assess  TM: Unable to assess    CARDIAC  - High PVC burden  - Prolonged QT  - Hx bradycardia. Recent " hospital course c/b bradycardia into the 30-40s during and after EGD, requiring 2 doses of atropine. During these episodes, BP remained stable and he was asymptomatic. On EKG, he was noted to have sinus bradycardia with frequent PVCs. Troponin was mildly elevated to 99 on 2/24/23, unchanged on recheck, without clinical evidence of acute coronary syndrome. This likely represented demand ischemia in the setting of an acute bleed and chronic kidney disease.   - Cardiology consulted during hospital stay. Recommended discontinuing metoprolol in the setting of bradycardia with EF recovered. Electrophysiology referral placed for consideration of PVC ablation. Also recommended restarting eplerenone and empagliflozin as able from renal perspective hopefully after TIPS, and pursuing cardiac MRI, noting however, that a positive study would then prompt coronary angiography and possible intervention, which would require high dose heparin during the procedure and uninterrupted DAPT afterward. Instructed to follow up with the EP clinic outpatient and continue following with his outpatient cardiologist, Dr. Duarte.    - METS (Metabolic Equivalents)  Activity limited due to significant incisional hernia. Able to walk w/ shopping cart in store. Otherwise unable to walk one block due to pain.    Patient CANNOT perform 4 METS exercise without symptoms            Total Score: 1    Functional Capacity: Unable to complete 4 METS      RCRI-Low risk: Class 2 0.9% complication rate            Total Score: 1    RCRI: Diabetes        PULMONARY  - GISELLE w/ CPAP    GISELLE Medium Risk            Total Score: 4    GISELLE: Hypertension    GISELLE: BMI over 35 kg/m2    GISELLE: Over 50 ys old    GISELLE: Male      - Denies asthma or inhaler use  - Tobacco History      History   Smoking Status     Former     Packs/day: 1.00     Years: 16.00     Types: Cigarettes     Quit date: 1994   Smokeless Tobacco     Never       GI  - GERD, asymptomatic on prilosec   - Decompensated  "cirrhosis of the Liver with Ascites, MELD 24  - S/p eophageal varices banding  - Gastric Antral Vascular Ectasia  - Incisional hernia \"has exploded into 3 hernias\"    PONV Low Risk  Total Score: 1           1 AN PONV: Patient is not a current smoker        /RENAL  - Baseline Creatinine  1.5-2   - Hypokalemia, taking KCl, will continue supplementation. Level was 3.4 on 3/2/23. (Pt was scheduled for above procedure in December, but it was cancelled on DOS due to K level 2.3. Pt had held KCl for one week prior at that time.)    ENDOCRINE    - BMI: Estimated body mass index is 30.94 kg/m  as calculated from the following:    Height as of 1/23/23: 1.905 m (6' 3\").    Weight as of 2/26/23: 112.3 kg (247 lb 9.2 oz).  Obesity (BMI >30)  - IDDM, on Lantus. Hold morning oral hypoglycemic medications and short acting insulin DOS. Take 80% of last scheduled dose of long-acting insulin prior to procedure.  Recommend close monitoring of the patient's blood glucose levels throughout the perioperative period.      HEME  VTE Medium Risk 1.8%            Total Score: 6    VTE: Male    VTE: Family Hx of VTE      - Admitted on 2/24/2023 for acute blood loss anemia secondary to a GI bleed; given 3 units pRBCs. Eliquis stopped at that time.  - Chronic anemia, Hgb 10.0 on 3/2/23  - Chronic thrombocytopenia, baseline platelets 59-87  - Elevated INR, was 1.6 on 3/2 (has been as high >2)  - Has RUE PICC due to difficult IV access.      PSYCH  - Alcoholism, sober since 9/21/20    The patient is aware that the final anesthesia plan will be decided by the assigned anesthesia provider on the date of service.    The patient is optimized for their procedure. AVS with information on surgery time/arrival time, meds and NPO status given by nursing staff. No further diagnostic testing indicated.    Please refer to the physical examination documented by the anesthesiologist in the anesthesia record on the day of surgery.    Video-Visit Details    Type " of service:  Video Visit    Provider received verbal consent for a Video Visit from the patient? Yes     Originating Location (pt. Location): Home    Distant Location (provider location):  Off-site  Mode of Communication:  Video Conference via [employer]  On the day of service:     Prep time: 18 minutes  Visit time: 14 minutes  Documentation time: 16 minutes  ------------------------------------------  Total time: 48 minutes      Erika Horn PA-C  Preoperative Assessment Center  [facility]  Clinic and Surgery Center  Phone: [phone]  Fax: [phone]

## 2023-03-08 NOTE — PROGRESS NOTES
Blair is a 52 year old who is being evaluated via a billable video visit.      How would you like to obtain your AVS? MyChart  If the video visit is dropped, the invitation should be resent by: Text to cell phone: [phone]      HPI         Review of Systems       Objective    Vitals - Patient Reported  Pain Score: Severe Pain (7)  Pain Loc: Abdomen        Physical Exam

## 2023-03-10 NOTE — TELEPHONE ENCOUNTER
Pre assessment questions completed for upcoming Upper endoscopy (EGD) procedure scheduled on 3/27/23    COVID policy reviewed.     Pre-op exam? N/A    Reviewed procedural arrival time 1030, procedure time 1130 and facility location [facility]; [address], [place] [address]    Designated  policy reviewed. Instructed to have someone stay 6 hours post procedure.     Anticoagulation/blood thinners? No    Electronic implanted devices? No    Diabetic? Yes - Patient to hold oral diabetic medications day of procedure    Procedure indication: Cirrhosis, varices    Reviewed procedure prep instructions.     Prep instructions sent via [employer].      Patient verbalized understanding and had no questions or concerns at this time.    Gretta Ventura RN  Endoscopy Procedure Pre Assessment RN

## 2023-03-10 NOTE — TELEPHONE ENCOUNTER
Patient called, wondering about neph consult as a referral was noted on last discontinue summary.    Order placed after discussion with ELYSIA Bain    PLAN:  TIPS 3/14, follow up mid April and see if we can close transplant eval depending on how pt doing post TIPS

## 2023-03-14 NOTE — DISCHARGE INSTRUCTIONS
[facility]  Same-Day Surgery   Adult Discharge Orders & Instructions     For 24 hours after surgery    Get plenty of rest.  A responsible adult must stay with you for at least 24 hours after you leave the hospital.   Do not drive or use heavy equipment.  If you have weakness or tingling, don't drive or use heavy equipment until this feeling goes away.  Do not drink alcohol.  Avoid strenuous or risky activities.  Ask for help when climbing stairs.   You may feel lightheaded.  IF so, sit for a few minutes before standing.  Have someone help you get up.   If you have nausea (feel sick to your stomach): Drink only clear liquids such as apple juice, ginger ale, broth or 7-Up.  Rest may also help.  Be sure to drink enough fluids.  Move to a regular diet as you feel able.  You may have a slight fever. Call the doctor if your fever is over 100 F (37.7 C) (taken under the tongue) or lasts longer than 24 hours.  You may have a dry mouth, a sore throat, muscle aches or trouble sleeping.  These should go away after 24 hours.  Do not make important or legal decisions.   Call your doctor for any of the followin.  Signs of infection (fever, growing tenderness at the surgery site, a large amount of drainage or bleeding, severe pain, foul-smelling drainage, redness, swelling).    2. It has been over 8 to 10 hours since surgery and you are still not able to urinate (pass water).    3.  Headache for over 24 hours.    To contact a doctor, call [phone]  or:    '   After hours, call [phone] and ask for the resident on call for General Surgery (answered 24 hours a day)  '   Emergency Department:    [facility]: [phone]       (TTY for hearing impaired: [phone])

## 2023-03-14 NOTE — OR NURSING
Per Dr. Stiles, he will come up to see patient to determine if he will stay or go home. Family updated.

## 2023-03-14 NOTE — ANESTHESIA PROCEDURE NOTES
Airway       Patient location during procedure: OR       Procedure Start/Stop Times: 3/14/2023 9:34 AM  Staff -        CRNA: Shwetha Valle APRN CRNA       Performed By: CRNA  Consent for Airway        Urgency: elective  Indications and Patient Condition       Indications for airway management: yuniel-procedural       Induction type:RSI       Mask difficulty assessment: 0 - not attempted    Final Airway Details       Final airway type: endotracheal airway       Successful airway: ETT - single  Endotracheal Airway Details        ETT size (mm): 8.0       Cuffed: yes       Successful intubation technique: direct laryngoscopy       DL Blade Type: Canela 2       Grade View of Cords: 1       Adjucts: stylet       Position: Left       Measured from: lips       Secured at (cm): 22       Bite block used: None    Post intubation assessment        Placement verified by: capnometry and chest rise        Number of attempts at approach: 1       Secured with: pink tape       Ease of procedure: easy       Dentition: Intact and Unchanged    Medication(s) Administered   Medication Administration Time: 3/14/2023 9:34 AM

## 2023-03-14 NOTE — ANESTHESIA POSTPROCEDURE EVALUATION
Patient: Blair Oliver    Procedure: Procedure(s):  ANESTHESIA OUT OF OR FOR TRANSJUGULAR INTRAHEPATIC PORTOSYSTEMIC SHUNT WITH POSSIBLE PARACENTESIS PRIOR@0800       Anesthesia Type:  General    Note:  Disposition: Inpatient   Postop Pain Control: Uneventful            Sign Out: Well controlled pain   PONV: No   Neuro/Psych: Uneventful            Sign Out: Acceptable/Baseline neuro status   Airway/Respiratory: Uneventful            Sign Out: Acceptable/Baseline resp. status   CV/Hemodynamics: Uneventful            Sign Out: Acceptable CV status; No obvious hypovolemia; No obvious fluid overload   Other NRE: NONE   DID A NON-ROUTINE EVENT OCCUR? No           Last vitals:  Vitals Value Taken Time   /64 03/14/23 1240   Temp 36.8  C (98.2  F) 03/14/23 1200   Pulse 79 03/14/23 1245   Resp 14 03/14/23 1230   SpO2 95 % 03/14/23 1245   Vitals shown include unvalidated device data.    Electronically Signed By: Boy Ruggiero MD  March 14, 2023  12:46 PM

## 2023-03-14 NOTE — PROCEDURES
[facility]    Procedure: TIPS with paracentesis    Date/Time: 3/14/2023 11:44 AM  Performed by: Brannon Stiles DO  Authorized by: Brannon Stiles DO       UNIVERSAL PROTOCOL   Site Marked: NA  Prior Images Obtained and Reviewed:  Yes  Required items: Required blood products, implants, devices and special equipment available    Patient identity confirmed:  Verbally with patient, arm band, provided demographic data and hospital-assigned identification number  Patient was reevaluated immediately before administering moderate or deep sedation or anesthesia  Confirmation Checklist:  Patient's identity using two indicators, relevant allergies, procedure was appropriate and matched the consent or emergent situation and correct equipment/implants were available  Time out: Immediately prior to the procedure a time out was called    Universal Protocol: the Joint Commission Universal Protocol was followed    Preparation: Patient was prepped and draped in usual sterile fashion       ANESTHESIA    Anesthesia: Local infiltration  Local Anesthetic:  Lidocaine 1% without epinephrine      SEDATION  Patient Sedated: Yes (General Anesthesia)    Sedation Type:  Deep  Vital signs: Vital signs monitored during sedation    See dictated procedure note for full details.  Findings: Patent right hepatic vein and portal veins, Patent TIPS with final gradient of 8    Specimens: none    Complications: None    Condition: Stable    Plan: Routine aftercare. Plan for same day discharge. Clinic follow up with duplex ultrasound in one month to be arranged.      PROCEDURE    Patient Tolerance:  Patient tolerated the procedure well with no immediate complications  Length of time physician/provider present for 1:1 monitoring during sedation: 0

## 2023-03-14 NOTE — ANESTHESIA PROCEDURE NOTES
Arterial Line Procedure Note    Pre-Procedure   Staff -        Anesthesiologist:  Boy Ruggiero MD       Performed By: anesthesiologist       Pre-Anesthestic Checklist: patient identified, IV checked, risks and benefits discussed, informed consent, monitors and equipment checked, pre-op evaluation and at physician/surgeon's request  Timeout:       Correct Patient: Yes        Correct Procedure: Yes        Correct Site: Yes        Correct Position: Yes   Line Placement:   This line was placed Post Induction  Procedure   Procedure: arterial line       Laterality: right       Insertion Site: radial.  Sterile Prep        Standard elements of sterile barrier followed       Skin prep: Chloraprep  Insertion/Injection        Catheter Type/Size: 20 G, 1.75 in/4.5 cm quick cath (integral wire)  Narrative        Tegaderm dressing used.       Complications: None apparent,        Arterial waveform: Yes

## 2023-03-14 NOTE — IR NOTE
POST STENT GRAFT  main portal vein below TIPS:  28  RA: 17    POST 10X60 HUNTERANG  RA: 19  Main portal vein below TIPS: 27

## 2023-03-14 NOTE — ANESTHESIA PREPROCEDURE EVALUATION
Anesthesia Pre-Procedure Evaluation    Patient: Blair Oliver   MRN: [id number] : 1970        Procedure : Procedure(s):  ANESTHESIA OUT OF OR FOR TRANSJUGULAR INTRAHEPATIC PORTOSYSTEMIC SHUNT WITH POSSIBLE PARACENTESIS PRIOR@0800          Past Medical History:   Diagnosis Date     Alcohol dependence in remission (H)      Alcoholic cirrhosis of liver with ascites (H)      Anemia      Chronic systolic heart failure (H)      Diabetes (H)     Type 2 DM, Insulin Use.      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       Past Surgical History:   Procedure Laterality Date     COLONOSCOPY N/A 2021    Procedure: COLONOSCOPY, WITH BIOPSY, WITH CLIPS;  Surgeon: Sweetie Bain MD;  Location: [facility] OR     ESOPHAGOSCOPY, GASTROSCOPY, DUODENOSCOPY (EGD), COMBINED N/A 2021    Procedure: ESOPHAGOGASTRODUODENOSCOPY, WITH BIOPSY;  Surgeon: Sweetie Bain MD;  Location: [facility] OR     ESOPHAGOSCOPY, GASTROSCOPY, DUODENOSCOPY (EGD), COMBINED N/A 2023    Procedure: Esophagoscopy, gastroscopy, duodenoscopy (EGD), combined;  Surgeon: Tom George MD;  Location: [facility]     EXPLORATORY LAPAROTOMY W/ BOWEL RESECTION  2020    Exploratory laparotomy with small bowel resection     SIGMOIDOSCOPY FLEXIBLE N/A 2023    Procedure: Sigmoidoscopy flexible;  Surgeon: Tom George MD;  Location: [facility]      Allergies   Allergen Reactions     Minocycline Hives      Social History     Tobacco Use     Smoking status: Former     Packs/day: 1.00     Years: 16.00     Pack years: 16.00     Types: Cigarettes     Quit date:      Years since quittin.2     Smokeless tobacco: Never   Substance Use Topics     Alcohol use: Not Currently     Comment: Quit 2020      Wt Readings from  Last 1 Encounters:   03/14/23 118.7 kg (261 lb 11 oz)        Anesthesia Evaluation            ROS/MED HX  ENT/Pulmonary:     (+) sleep apnea,     Neurologic:  - neg neurologic ROS     Cardiovascular:     (+) -----CHF Previous cardiac testing   Echo: Date: Results:  Borderline normal / mild reduced LV fx. Normal RV function.   Stress Test: Date: Results:    ECG Reviewed: Date: Results:    Cath: Date: Results:      METS/Exercise Tolerance:     Hematologic:     (+) history of blood transfusion,     Musculoskeletal:       GI/Hepatic:     (+) GERD, liver disease,     Renal/Genitourinary:     (+) renal disease, type: CRI,     Endo:     (+) type II DM, Obesity,     Psychiatric/Substance Use:     (+) alcohol abuse (remote)     Infectious Disease:  - neg infectious disease ROS     Malignancy:  - neg malignancy ROS     Other:            Physical Exam    Airway        Mallampati: II   TM distance: > 3 FB   Neck ROM: full     Respiratory Devices and Support         Dental       (+) Multiple visibly decayed, broken teeth      Cardiovascular          Rhythm and rate: irregular and normal     Pulmonary                   OUTSIDE LABS:  CBC:   Lab Results   Component Value Date    WBC 6.6 03/09/2023    WBC 8.1 03/02/2023    HGB 9.6 (L) 03/09/2023    HGB 10.0 (L) 03/02/2023    HCT 29.6 (L) 03/09/2023    HCT 31.1 (L) 03/02/2023    PLT 88 (L) 03/09/2023    PLT 77 (L) 03/02/2023     BMP:   Lab Results   Component Value Date     03/09/2023     03/02/2023    POTASSIUM 3.6 03/09/2023    POTASSIUM 3.4 03/02/2023    CHLORIDE 114 (H) 03/09/2023    CHLORIDE 119 (H) 03/02/2023    CO2 18 (L) 03/09/2023    CO2 17 (L) 03/02/2023    BUN 22 03/09/2023    BUN 23 03/02/2023    CR 1.57 (H) 03/09/2023    CR 1.58 (H) 03/02/2023     (H) 03/14/2023     (H) 03/09/2023     COAGS:   Lab Results   Component Value Date    PTT 40 (H) 02/25/2023    INR 1.55 (H) 03/09/2023    FIBR 183 02/24/2023     POC: No results found for: BGM, HCG,  HCGS  HEPATIC:   Lab Results   Component Value Date    ALBUMIN 3.4 03/09/2023    PROTTOTAL 6.4 (L) 03/09/2023    ALT 29 03/09/2023    AST 27 03/09/2023    ALKPHOS 267 (H) 03/09/2023    BILITOTAL 1.3 03/09/2023    PONCHO 42 01/23/2023     OTHER:   Lab Results   Component Value Date    LACT 0.9 02/25/2023    A1C 5.6 01/27/2023    DANIELLE 8.3 (L) 03/09/2023    PHOS 4.9 (H) 02/26/2023    MAG 1.9 02/26/2023    LIPASE 52 02/24/2023    TSH 1.34 06/13/2022       Anesthesia Plan    ASA Status:  3   NPO Status:  NPO Appropriate    Anesthesia Type: General.     - Airway: ETT         Techniques and Equipment:     - Lines/Monitors: Arterial Line     Consents    Anesthesia Plan(s) and associated risks, benefits, and realistic alternatives discussed. Questions answered and patient/representative(s) expressed understanding.     - Discussed: Risks, Benefits and Alternatives for BOTH SEDATION and the PROCEDURE were discussed     - Discussed with:  Patient, Spouse    Use of blood products discussed: Yes.     - Discussed with: Patient.     Postoperative Care    Pain management: IV analgesics, Multi-modal analgesia.   PONV prophylaxis: Ondansetron (or other 5HT-3)     Comments:                Boy Ruggiero MD

## 2023-03-14 NOTE — ANESTHESIA CARE TRANSFER NOTE
Patient: Blair Oliver    Procedure: Procedure(s):  ANESTHESIA OUT OF OR FOR TRANSJUGULAR INTRAHEPATIC PORTOSYSTEMIC SHUNT WITH POSSIBLE PARACENTESIS PRIOR@0800       Diagnosis: Alcoholic cirrhosis of liver with ascites (H) [K70.31]  Diagnosis Additional Information: No value filed.    Anesthesia Type:   General     Note:    Oropharynx: spontaneously breathing  Level of Consciousness: drowsy and awake  Oxygen Supplementation: face mask  Level of Supplemental Oxygen (L/min / FiO2): 6  Independent Airway: airway patency satisfactory and stable  Dentition: dentition unchanged  Vital Signs Stable: post-procedure vital signs reviewed and stable  Report to RN Given: handoff report given  Patient transferred to: PACU    Handoff Report: Identifed the Patient, Identified the Reponsible Provider, Reviewed the pertinent medical history, Discussed the surgical course, Reviewed Intra-OP anesthesia mangement and issues during anesthesia, Set expectations for post-procedure period and Allowed opportunity for questions and acknowledgement of understanding      Vitals:  Vitals Value Taken Time   /61 03/14/23 1159   Temp     Pulse 84 03/14/23 1201   Resp     SpO2 98 % 03/14/23 1201   Vitals shown include unvalidated device data.    Electronically Signed By: GABRIEL Dunne CRNA  March 14, 2023  12:02 PM

## 2023-03-15 NOTE — TELEPHONE ENCOUNTER
[facility] Call Center    Phone Message    May a detailed message be left on voicemail: yes     Reason for Call: Order(s): Other:   Reason for requested: new neph appt  Date needed: 3/20/23  Provider name: Dr. Pedro    Patient wants to get labs done week prior at [facility] once orders are in so please reach out to patient. Thank you.    Action Taken: Message routed to:  [facility]): Nephrology    Travel Screening: Not Applicable

## 2023-03-16 NOTE — TELEPHONE ENCOUNTER
DIAGNOSIS: Alcoholic cirrhosis of liver with ascites, CKD, liver transplant eval pt   DATE RECEIVED: 03.27.2023   NOTES STATUS DETAILS   OFFICE NOTE from referring provider Internal 03.10.2023 Sweetie Bain MD   OFFICE NOTE from other specialist      *Only VASCULITIS or LUPUS gather office notes for the following     *PULMONARY       *ENT     *DERMATOLOGY     *RHEUMATOLOGY     DISCHARGE SUMMARY from hospital     DISCHARGE REPORT from the ER     MEDICATION LIST Internal    IMAGING  (NEED IMAGES AND REPORTS)     KIDNEY CT SCAN     KIDNEY ULTRASOUND     MR ABDOMEN     NUCLEAR MEDICINE RENAL     LABS     CBC Internal 03.09.2023   CMP Internal 03.09.2023   BMP Internal 03.09.2023   UA Internal 02.21.2023   URINE PROTEIN Internal 02.21.2023   RENAL PANEL     BIOPSY     KIDNEY BIOPSY

## 2023-03-17 NOTE — PROGRESS NOTES
March 17, 2023 1:39 PM - Yan Gomez, RN:     This RN called [facility], spoke with RN who reported patient came in today for first paracentesis s/p TIPS procedure done on 3/14. 5.7L removed, pt reported feeling lightheaded and dizzy, BP was checked and not hypotensive. RN inquiring about follow-up, asked about possibly changing max drain amount.    This RN called patient, no answer, left detailed voice message requesting call back with direct office number provided X2.    Message sent to Dr. Sweetie Bain with udpate.    March 17, 2023 1:53 PM - Yan Gomez, RN:   This RN received instruction per Dr. Bain to call pt's wife Margarita and to have patient get labs drawn today to monitor anemia. Called Margarita, no answer, left detailed voice message introducing myself, updated in message that Dr. Bain wants Delmer to get labs drawn today, and provided direct office number with request for call back.    Adyoulike message also sent detailing instructions and contact information provided.     Will call patient & wife again this afternoon.    March 17, 2023 3:51 PM - Yan Gomez, RN:     This RN called Blair, who reported feeling better after the paracentesis and had been running a few errands with his wife. This RN instructed him to get labs drawn before the end of the day, patient stated that he will head to the [facility] clinic before they close. This RN gave direct call back number to Delmer, instructed him to call for any reason.    Informed patient that RN will call on Monday to check to see how he is doing.

## 2023-03-17 NOTE — TELEPHONE ENCOUNTER
Attempted to call patient, no answer.  Left VM with instructions to call back to discuss his TIPS procedure and discuss plan for follow up. Provided patient with my direct office line.     Elvira GLYNN RN, BSN   Interventional Radiology [facility]   [phone]

## 2023-03-20 NOTE — TELEPHONE ENCOUNTER
Pt called in to confirm arrival time for upcoming EGD procedure scheduled on 3/27/23.    Reviewed procedural arrival time 1030 and facility location [facility]; [address], [place] [address]    Pt verbalized understanding and had no further questions.      Mariya Harvey, ASTRID  Endoscopy Procedure Pre-Assessment RN

## 2023-03-22 NOTE — TELEPHONE ENCOUNTER
Anemia Management Note  SUBJECTIVE/OBJECTIVE:  Referred by Dr. Sweetie Bain on 2023  Primary Diagnosis: Anemia of Other Chronic Illness (D63.8), CKD stage 3a     Secondary Diagnosis:  GAVE, Alcoholic cirrhosis of liver with ascites (K70.3) Anemia in CKD Satge 3a.   *2/15/23 Ok to use CKD dx codes per Dr. Sweetie Bain.   Hgb goal range:  9-10  Epo/Darbo: TBD: Hgb >9.0  Iron regimen:  NA. Elevated TSAT  Labs : 24  Recent FELI use, transfusion, IV iron: NA  RX/TX plans : TBD     No history of stroke, MI and blood clots or cancers.     Contact:            No Boxes Checked on Consent to Communicate.     Anemia Latest Ref Rng & Units 2023 2023 2023 2023 3/2/2023 3/9/2023 3/17/2023   Hemoglobin 13.3 - 17.7 g/dL 7.1(L) 8.3(L) 8.6(L) 9.1(L) 10.0(L) 9.6(L) 8.5(L)   TSAT 15 - 46 % - - - - - 41 49(H)   Ferritin 26 - 388 ng/mL - - - - - 146 136     BP Readings from Last 3 Encounters:   23 123/66   23 108/60   23 104/41     Wt Readings from Last 2 Encounters:   23 261 lb 11 oz (118.7 kg)   23 247 lb 9.2 oz (112.3 kg)           ASSESSMENT:  Hgb:Not at goal/Known  TSat: at goal >30% Ferritin: At goal (>100ng/mL)    PLAN:  Spoke with Deshawn, he has an appt with Nephrology on 3/27/23. He would like to hold off on FELI until that appt.  Will follow up with Delmer on 3/28/23.     Orders needed to be renewed (for next follow-up date) in EPIC: None        NEXT FOLLOW-UP DATE:  3/28/23    Gretta Brar RN   Anemia Services  [address] [address]   [email or web address]   Office : [phone]  Fax: [phone]

## 2023-03-23 NOTE — TELEPHONE ENCOUNTER
lvm for patient    Follow up w/ mchart message    - see if he can get labs drawn in next day or two, orders in place    - EGD canceled- not beneficial for any bleeding he has had    - See prior message about hernia getting large.  Hoping that TIPS will improve ascites, therefore improving hernia.

## 2023-03-23 NOTE — TELEPHONE ENCOUNTER
Caller: No call needed per MD  Reason for Reschedule/Cancellation (please be detailed, any staff messages or encounters to note?):   From: Sweetie Bain MD   Sent: 3/22/2023   8:05 PM CDT   To: El Pal MD, Nakia Santoro, *     I will send him a message in the portal   ----- Message -----   From: aNkia Santoro   Sent: 3/22/2023   5:15 PM CDT   To: El Pal MD, *     Does he need to be notified of the cancellation, or does he already know the plan?     Thanks,   Adi NICK   Endoscopy Scheduling Team   ----- Message -----   From: Sweetie Bain MD   Sent: 3/22/2023   5:03 PM CDT   To: El Pal MD, *     Hey -     This patient is scheduled for an Egd with Dr. Pal, but he just had a TIPS, so we can cancel this EGD on 3/27. He also only had small varices on his EGD 2/2023, so we should be good to cancel either way     Sweetie     Prior to reschedule please review:    Ordering Provider:Taya    Sedation per order: Moderate    Does patient have any ASC Exclusions, please identify?: Y - GISELLE      Notes on Cancelled Procedure:    Procedure:Upper Endoscopy [EGD]     Date: 3/27/2023    Location:[facility]; [address] [place] [address], [address] Floor, [place] [address]    Surgeon: Demarco        Rescheduled: No

## 2023-03-25 NOTE — PROGRESS NOTES
Assessment and Plan:  1. liver transplant evaluation - patient is a good candidate overall. Benefits and surgical risks of a liver transplantation were discussed.  2.  End stage liver disease due to Laennec's    Surgical evaluation:  1. Portal Vein:Patent on most recent imaging although has a history of PVT in the past and was on anticoagulation for some time  2. Hepatic Artery: Open  3. TIPS: absent  4. Previous Abdominal Surgery: Yes - had a small bowel resection in 9/2020 for ? Ischemic bowel with midline incision ? Related to have history of portal vein/SMV thrombosis, also had umbilical hernia repair with mesh  5. Hepatocellular Carcinoma: None  6. Ascites: Present - large amount  7. Costal Angle: wide  8. Portopulmonary Hypertension: absent  9. Hepatopulmonary Syndrome: absent  10. Cardiac Evaluation: needs echocardiogram has a history of depressed EF and PVCs, thought maybe to be related to EtOH related cardiomyopathy  11. Nutritional Status: Moderate  12. Diabetes: yes  13.Hypertension no  14. Smoker:previous quit 1996  14: Fraility index:scored frail but look Marisol  15. Meets guidelines to receive Living Donor  Yes -  would need to lose weight currently 119kg  16. Potential Living donors No    Recommendations: needs full cardiac evaluation, if OK then plan was for TIPS      Patients overall evaluation will be discussed at the Liver Transplant selection committee meeting with a final recommendation on the patients suitability for transplant to be made at that time.    Consult Full  Details:  Blair Oliver was seen in consultation at the request of Dr. Bingham for evaluation as a potential liver transplant recipient.    Reason for Visit:  Blair Oliver is a 52 year old year old male with Laennec's, who presents for liver transplant evaluation.    HPI:  Presenting complaint: Abdominal distention      53 y/o male with EtOH related liver disease, diagnosed 9/2020.  Patient relates that he developed  abdominal pain found to have ischemic bowel ? SMV thrombosis, taken for emergency  X-lap and small bowel resection, on anticoagulation afterwards.  More recently has developed refractory ascites requiring frequent gabbi, also has a ho SBP.  Also has developed large incision hernia.  Now here for liver transplant evaluation, possible TIPS    PMH  Cirrhosis- EtOH +/- CALLEJAS complicated by refractory ascites, GI bleeds, +/- HE  CHF/depressed EF- ? EtOH cardiomyopathy  DM  Incisional hernia  H/o SMV/PV thrombosis and bowel ischemia, most recent imaging shows it resolved  ? CKD, CR 1.9    PSH  X-Lap, SB resection for ischemia related to SMV thrombosis  Umbilical hernia repair with mesh    Soc  EtOH- previously 4-5L whiskey daily, no DUI, stopped 9/2020, no rehab  Tob- quit 1996  Denies drugs    Fam  Mother- active, healthy  Father- pancreatic cancer  Grandmother- EtOH  Bother- EtOH  2 children- healthy        Past Medical History:   Diagnosis Date     Alcohol dependence in remission (H)      Alcoholic cirrhosis of liver with ascites (H)      Anemia      Chronic systolic heart failure (H)      Diabetes (H)     Type 2 DM, Insulin Use.      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      Past Surgical History:   Procedure Laterality Date     COLONOSCOPY N/A 12/09/2021    Procedure: COLONOSCOPY, WITH BIOPSY, WITH CLIPS;  Surgeon: Sweetie Bain MD;  [place] [facility] [place]     ESOPHAGOSCOPY, GASTROSCOPY, DUODENOSCOPY (EGD), COMBINED N/A 12/09/2021    Procedure: ESOPHAGOGASTRODUODENOSCOPY, WITH BIOPSY;  Surgeon: Sweetie Bain MD;  [place] [facility] [place]     ESOPHAGOSCOPY, GASTROSCOPY, DUODENOSCOPY (EGD), COMBINED N/A 02/24/2023    Procedure: Esophagoscopy, gastroscopy, duodenoscopy (EGD), combined;  Surgeon: Doris  Tom Rubio MD;  Location: [facility]     EXPLORATORY LAPAROTOMY W/ BOWEL RESECTION  09/2020    Exploratory laparotomy with small bowel resection     IR PARACENTESIS  3/14/2023     IR TRANSVEN INTRAHEPATIC PORTOSYST SHUNT  3/14/2023     SIGMOIDOSCOPY FLEXIBLE N/A 02/24/2023    Procedure: Sigmoidoscopy flexible;  Surgeon: Tom George MD;  Location: [facility]     Past Surgical History:   Procedure Laterality Date     COLONOSCOPY N/A 12/09/2021    Procedure: COLONOSCOPY, WITH BIOPSY, WITH CLIPS;  Surgeon: Sweetie Bain MD;  Location: [facility] OR     ESOPHAGOSCOPY, GASTROSCOPY, DUODENOSCOPY (EGD), COMBINED N/A 12/09/2021    Procedure: ESOPHAGOGASTRODUODENOSCOPY, WITH BIOPSY;  Surgeon: Sweetie Bain MD;  Location: [facility] OR     ESOPHAGOSCOPY, GASTROSCOPY, DUODENOSCOPY (EGD), COMBINED N/A 02/24/2023    Procedure: Esophagoscopy, gastroscopy, duodenoscopy (EGD), combined;  Surgeon: Tom George MD;  Location: [facility]     EXPLORATORY LAPAROTOMY W/ BOWEL RESECTION  09/2020    Exploratory laparotomy with small bowel resection     IR PARACENTESIS  3/14/2023     IR TRANSVEN INTRAHEPATIC PORTOSYST SHUNT  3/14/2023     SIGMOIDOSCOPY FLEXIBLE N/A 02/24/2023    Procedure: Sigmoidoscopy flexible;  Surgeon: Tom George MD;  Location: [facility]     Family History   Problem Relation Age of Onset     Deep Vein Thrombosis Mother      Pulmonary Embolism Mother      Substance Abuse Brother      Substance Abuse Maternal Grandmother      Anesthesia Reaction No family hx of      Allergies   Allergen Reactions     Minocycline Hives     Prior to Admission medications    Medication Sig Start Date End Date Taking? Authorizing Provider   acetaminophen (TYLENOL) 500 MG tablet Take 1 tablet (500 mg) by mouth every 8 hours as needed for mild pain 1/28/23  Yes Ro Larson MD   ciprofloxacin (CIPRO) 500 MG tablet Take 1 tablet (500 mg) by mouth daily  Patient taking differently: Take 500 mg by mouth At Bedtime  12/23/22  Yes Sweetie Bain MD   DULoxetine (CYMBALTA) 60 MG capsule Take 60 mg by mouth every evening   Yes Unknown, Entered By History   ferrous gluconate (FERGON) 324 (38 Fe) MG tablet Take 1 tablet (324 mg) by mouth daily (with breakfast) 9/13/22 9/13/23 Yes Sweetie Bain MD   gabapentin (NEURONTIN) 300 MG capsule Take 900 mg by mouth 2 times daily   Yes Unknown, Entered By History   insulin glargine (LANTUS PEN) 100 UNIT/ML pen Inject 30 Units Subcutaneous every morning 1/28/23  Yes Ro Larson MD   insulin pen needle (B-D U/F) 31G X 5 MM miscellaneous AS DIRECTED. BD UF MINI PEN NEEDLE [id number]: USE ONCE DAILY 1/12/21  Yes Reported, Patient   lactulose 20 GM/30ML SOLN Take 30 mLs (20 g) by mouth 3 times daily  Patient taking differently: Take 20 g by mouth 2 times daily 8/24/22  Yes Sweetie Bain MD   loperamide (IMODIUM A-D) 1 MG/7.5ML Take 15 mLs (2 mg) by mouth 4 times daily as needed for diarrhea 9/15/22  Yes Sweetie Bain MD   Multiple Vitamins-Minerals (SUPER THERA DAMARIS M) TABS Take 1 tablet by mouth every morning 9/29/20  Yes Reported, Patient   omeprazole (PRILOSEC) 20 MG DR capsule Take 20 mg by mouth every evening 4/15/21  Yes Reported, Patient   ONETOUCH ULTRA test strip PLEASE SEE ATTACHED FOR DETAILED DIRECTIONS 4/10/21  Yes Reported, Patient   rifaximin (XIFAXAN) 550 MG TABS tablet Take 1 tablet (550 mg) by mouth 2 times daily 8/10/22  Yes Sweetie Bain MD   torsemide (DEMADEX) 20 MG tablet Take 3 tablets (60 mg) by mouth daily  Patient taking differently: Take 60 mg by mouth every morning 1/17/23  Yes Sweetie Bain MD   calcium carbonate (OS-DANIELLE) 500 MG tablet Take 1 tablet (500 mg) by mouth 2 times daily  Patient not taking: Reported on 3/8/2023 2/28/23   Sweetie Bain MD   COMPRESSION STOCKINGS Please measure and distribute 2 pair of 20mmHg - 30mmHg knee high open or closed toe compression stockings with extra refills as indicated or what insurance will allow . 10/31/22    Michael Bolton MD   potassium chloride ER (KLOR-CON M) 10 MEQ CR tablet Take 4 tablets (40 mEq) by mouth 2 times daily for 90 days 2/21/23 5/22/23  Sweetie Bain MD   vitamin D2 (ERGOCALCIFEROL) 93569 units (1250 mcg) capsule Take 1 capsule (50,000 Units) by mouth once a week  Patient not taking: Reported on 3/8/2023 2/28/23   Sweetie Bain MD   vitamin D3 (CHOLECALCIFEROL) 50 mcg (2000 units) tablet Take 1 tablet (50 mcg) by mouth daily Begin taking this after completion of Vitamin D2 therapy  Patient not taking: Reported on 3/8/2023 5/23/23   Sweetie Bain MD       Previous Transplant Hx: No    Cardiovascular Hx:       h/o Cardiac Issues: No- see history above has ho of depressed EF and PVCs       Exercise Tolerance: no chest pain or shortness of breath with exertion.    Potential Donor(s): No    ROS:    REVIEW OF SYSTEMS (check box if normal)  [x]                GENERAL  [x]                  PULMONARY [x]                 GENITOURINARY  [x]                 CNS                 [x]                  CARDIAC  [x]                  ENDOCRINE  [x]                 EARS,NOSE,THROAT [x]                  GASTROINTESTINAL [x]                  NEUROLOGIC    [x]                 MUSCLOSKELTAL  [x]                   HEMATOLOGY    Examination:     Vitals:  /41   Pulse (!) 34     GENERAL APPEARANCE: alert and no distress  GENERAL APPEARANCE: no distress  EYES: PERRL  HENT: mouth without ulcers or lesions  NECK: supple, no adenopathy  RESP: lungs clear to auscultation - no rales, rhonchi or wheezes  CV: regular rhythm, normal rate, no rub   ABDOMEN:  soft, nontender, no HSM or masses and bowel sounds normal  MS: extremities normal- no gross deformities noted, no evidence of inflammation in joints, no muscle tenderness  SKIN: no rash  NEURO: Normal strength and tone, sensory exam grossly normal, mentation intact and speech normal  PSYCH: mentation appears normal. and affect normal/bright      Results:   Recent  Results (from the past 168 hour(s))   Hemoglobin and hematocrit    Collection Time: 03/23/23 12:53 PM   Result Value Ref Range    Hemoglobin 8.7 (L) 13.3 - 17.7 g/dL    Hematocrit 27.3 (L) 40.0 - 53.0 %   INR    Collection Time: 03/23/23 12:53 PM   Result Value Ref Range    INR 1.80 (H) 0.85 - 1.15   Comprehensive metabolic panel    Collection Time: 03/23/23 12:53 PM   Result Value Ref Range    Sodium 142 133 - 144 mmol/L    Potassium 3.8 3.4 - 5.3 mmol/L    Chloride 116 (H) 94 - 109 mmol/L    Carbon Dioxide (CO2) 25 20 - 32 mmol/L    Anion Gap 1 (L) 3 - 14 mmol/L    Urea Nitrogen 17 7 - 30 mg/dL    Creatinine 1.22 0.66 - 1.25 mg/dL    Calcium 7.8 (L) 8.5 - 10.1 mg/dL    Glucose 203 (H) 70 - 99 mg/dL    Alkaline Phosphatase 250 (H) 40 - 150 U/L    AST 28 0 - 45 U/L    ALT 23 0 - 70 U/L    Protein Total 5.1 (L) 6.8 - 8.8 g/dL    Albumin 2.7 (L) 3.4 - 5.0 g/dL    Bilirubin Total 1.0 0.2 - 1.3 mg/dL    GFR Estimate 70 >60 mL/min/1.73m2     I had a long discussion with the patient regarding liver transplantation which included but was not limited to  the following points:    1. Liver transplant selection committee process.  2. The federal rules for cadaveric waiting list, the size and blood type matching of the organ. The availability of living-related donor transplantation.  3. The types of donors: brain death donors, non-heart beating donors, partial liver grafts: splits and living donor grafts  4. Extended criteria  Donors (older age, steasosis) and the increased  risk of primary non-function using the extended criteria donors  5. The CDC high risk donors,  Risk of donor transmitted infections and donor transmitted malignancy  6. The liver transplant operation and the associated risks and technical complications which can include intraoperative death, post operative death,  Primary non-function, bleeding requiring re-operations, arterial and biliary complications, bowel perforations, and intra abdominal abscess. Some  of these complicaitons may require a second operation.  7. The postoperative course, the ICU stay and risk of postoperative complications which can include sepsis, MI, stroke, brain injury, pneumonia, pleural effusions, and renal dysfunction.  8. The current 1 year and 5 year graft and patient survivals.  9. The need for life long immunosuppressive therapy and the side effects of these medications, including the possibility of toxicity, opportunistic infections, risk of cancer including lymphoma, and the possibility of rejection even if the patient is taking the medication exactly as prescribed.  10. The need for compliance with medications and follow-up visits in the clinic and thereafter.  11. The patient and family understand these risks and wish to proceed to transplantation

## 2023-03-27 NOTE — PATIENT INSTRUCTIONS
It was a pleasure taking care of you today.  I've included a brief summary of our discussion and care plan from today's visit below.  Please review this information with your primary care provider.  _______________________________________________________________________    My recommendations are summarized as follows:  -Keep the amount of sodium in your diet at 2 g/day (also see instructions attached in that regard)  -Keep a Blood Pressure diary by taking your blood pressure twice a day as instructed (also see instructions attached in that regard). Please make sure that you are using a validated blood pressure device by checking that it is the case at: https://www.validatebp.org/  -Avoid all NSAID's. Examples include Ibuprofen (Advil, Motrin), naprosyn (Aleve), celebrex among others. Acetaminophen (Tylenol) is ok with maximum dose in 24 hours of 3200 mg.  -Healthy lifestyle measures will keep your kidney's functioning at their current best. This includes regular exercise, weight loss and smoking cessation if you smoke.   -Measure your sugars twice a day before breakfast and before dinner  -Weigh yourself every day  -Start midodrine 10 mg three times a day  -Start spironolactone 25 mg once a day  -Do labs on a weekly basis  -If for any reason, you are at risk of volume depletion/dehydration (high grade fevers, severe vomiting or diarrhea) stop torsemide and spironolactone till you get better      To schedule imaging please call [phone]     To schedule your lab appointment at the [facility], please call       Return to Nephrology Clinic in 2 weeks to review your progress.    _______________________________________________________________________    Who do I call with any questions after my visit?    Please be in touch if there are any further questions that arise following today's visit.  There are multiple ways to contact your nephrology care team.      During business hours, you  may reach your Nephrology LPN Care Coordinator, Ursula, at .      To schedule or reschedule an appointment, please call [phone].    You can always send a secure message through [employer].  [employer] messages are answered by your nurse or doctor typically within 24 hours.  Please allow extra time on weekends and holidays.      For urgent/emergent questions after business hours, you may reach the on-call Nephrology Fellow by contacting the [facility]  at [phone].     How will I get the results of any tests ordered?    You will receive all of your results.  If you have signed up for [employer], any tests ordered at your visit will be available to you after your physician reviews them.  Typically this takes 1-2 weeks.  If there are urgent results that require a change in your care plan, your physician or nurse will call you to discuss the next steps.      What is [employer]?  [employer] is a secure way for you to access all of your healthcare records from the [facility].  It is a web based computer program, so you can sign on to it from any location.  It also allows you to send secure messages to your care team.  I recommend signing up for [employer] access if you have not already done so and are comfortable with using a computer.      How do I schedule a follow-up visit?  If you did not schedule a follow-up visit today, please call [phone] to schedule a follow-up office visit.        Sincerely,      Dr. Mojgan Pedro  [facility]  Division of Nephrology and Hypertension

## 2023-03-27 NOTE — NURSING NOTE
Chief Complaint   Patient presents with     Consult       /78   Pulse 73   Temp 98.2  F (36.8  C) (Oral)   Wt 120.3 kg (265 lb 3.2 oz)   SpO2 100%   BMI 33.15 kg/m      Chetan Srivastava on 3/27/2023 at 7:48 AM

## 2023-03-27 NOTE — LETTER
3/27/2023       RE: Blair Oliver  [address] [place] [facility] [address]     Dear Colleague,    Thank you for referring your patient, Blair Oliver, to the [facility] [place] [facility]. Please see a copy of my visit note below.    Nephrology Clinic    Blair Oliver MRN:[id number] YOB: 1970  Date of Service: 03/27/2023  Primary care provider: Bubba Ovalle  Requesting physician: Sweetie Bingham Md      REASON FOR CONSULT: CKD    HISTORY OF PRESENT ILLNESS:   Blair Oliver is a 52 year old male who presents for evaluation of CKD.  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  From a renal standpoint, his creatinine level was around 0.8 mg/dL up to November 2022 when he was admitted for bradycardia and sepsis possibly secondary to SBP. He was found to have a 3 mm ureteral calculus with possible partial obstruction and developed WAI with a creatinine level that peaked at 1.8 mg/dL.   He was discharged on 11/17 on tamsulosin and torsemide but off eplerenone and with a creatinine level at 1.4 mg/dL. He was readmitted on 1/23 for severe anemia with a hemoglobin level at 6.5, a creatinine level at 1.7 mg/dL and dark tarry stools and was found to be positive for Covid-19. He received 3Us of PRBCs and IV iron was discharged on 1/28 with a creatinine level at 1.7 mg/dL. The patient had another episode of WAI when he was readmitted on 2/24 for another episode of GI bleed and his creatinine level peaked at 2 mg/dL. He was discharged on 2/26 off apixaban that he had been on for his SMV thrombosis and off  metoprolol due to episodes of bradycardia as well as off eplerenone. Since then, his creatinine level has been slowly improving and was 1.2 mg/dL on 3/23 and is 1.1 mg/dL today.  On an abdominal ultrasound done on 2/22 the kidneys appear of normal size and measure 12 cm each.  He has been needing biweekly therapeutic paracentesis however since the TIPS was placed the amount of fluid removed has been decreasing. He last had 3.8L removed on 3/24. He reports however having gained 20 lbs since then and reports worsening lower extremity edema and dyspnea on exertion.  Current meds: torsemide 60 mg daily, rifaximin 1 tab twice daily, potassium chloride 40 mEq twice daily, lactulose 20 g twice daily, ferrous gluconate 1 tab daily, ciprofloxacin 500 mg once daily  The patient     The following portions of the patient's history were reviewed and updated as appropriate: allergies, current medications, past family history, past medical history, past social history, past surgical history and problem list.    PAST MEDICAL HISTORY:  Past Medical History:   Diagnosis Date     Alcohol dependence in remission (H)      Alcoholic cirrhosis of liver with ascites (H)      Anemia      Chronic systolic heart failure (H)      Diabetes (H)     Type 2 DM, Insulin Use.      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      PAST SURGICAL HISTORY:  Past Surgical History:   Procedure Laterality Date     COLONOSCOPY N/A 12/09/2021    Procedure: COLONOSCOPY, WITH BIOPSY, WITH CLIPS;  Surgeon: Sweetie Bain MD;  [place] [facility] [place]     ESOPHAGOSCOPY, GASTROSCOPY, DUODENOSCOPY (EGD), COMBINED N/A 12/09/2021    Procedure: ESOPHAGOGASTRODUODENOSCOPY, WITH BIOPSY;  Surgeon: Sweetie Bain MD;  [place] [facility] [place]     ESOPHAGOSCOPY, GASTROSCOPY,  DUODENOSCOPY (EGD), COMBINED N/A 02/24/2023    Procedure: Esophagoscopy, gastroscopy, duodenoscopy (EGD), combined;  Surgeon: Tom George MD;  Location:  GI     EXPLORATORY LAPAROTOMY W/ BOWEL RESECTION  09/2020    Exploratory laparotomy with small bowel resection     IR PARACENTESIS  3/14/2023     IR TRANSVEN INTRAHEPATIC PORTOSYST SHUNT  3/14/2023     SIGMOIDOSCOPY FLEXIBLE N/A 02/24/2023    Procedure: Sigmoidoscopy flexible;  Surgeon: Tom George MD;  Location: [facility] GI     MEDICATIONS:  Prescription Medications as of 3/27/2023       Rx Number Disp Refills Start End Last Dispensed Date Next Fill Date Owning Pharmacy    acetaminophen (TYLENOL) 500 MG tablet    1/28/2023        Sig: Take 1 tablet (500 mg) by mouth every 8 hours as needed for mild pain    Class: Historical    Notes to Pharmacy: No more than 2000mg qday    Route: Oral    calcium carbonate (OS-DANIELLE) 500 MG tablet  180 tablet 3 2/28/2023    [facility]/pharmacy #[id number] - [place], [place] - [address]    Sig: Take 1 tablet (500 mg) by mouth 2 times daily    Class: E-Prescribe    Route: Oral    ciprofloxacin (CIPRO) 500 MG tablet  90 tablet 3 12/23/2022    [facility]/pharmacy #[id number] - [place], [place] - [address]    Sig: Take 1 tablet (500 mg) by mouth daily    Class: E-Prescribe    Route: Oral    COMPRESSION STOCKINGS  2 each 4 10/31/2022        Sig: Please measure and distribute 2 pair of 20mmHg - 30mmHg knee high open or closed toe compression stockings with extra refills as indicated or what insurance will allow .    Class: Local Print    DULoxetine (CYMBALTA) 60 MG capsule            Sig: Take 60 mg by mouth every evening    Class: Historical    Route: Oral    ferrous gluconate (FERGON) 324 (38 Fe) MG tablet  90 tablet 3 9/13/2022 9/13/2023   [facility]pharmacy #[id number] - [place], [place] - [address]    Sig: Take 1 tablet (324 mg) by mouth daily (with  breakfast)    Class: E-Prescribe    Route: Oral    gabapentin (NEURONTIN) 300 MG capsule            Sig: Take 900 mg by mouth 2 times daily    Class: Historical    Route: Oral    insulin glargine (LANTUS PEN) 100 UNIT/ML pen  15 mL  1/28/2023        Sig: Inject 30 Units Subcutaneous every morning    Class: Historical    Notes to Pharmacy: If Lantus is not covered by insurance, may substitute Basaglar or Semglee or other insulin glargine product per insurance preference at same dose and frequency.      Route: Subcutaneous    insulin pen needle (B-D U/F) 31G X 5 MM miscellaneous    1/12/2021        Sig: AS DIRECTED. BD UF MINI PEN NEEDLE [id number]: USE ONCE DAILY    Class: Historical    lactulose 20 GM/30ML SOLN  2838 mL 11 8/24/2022    [facility]/pharmacy #[id number] - [place], [place] - [address] [place]    Sig: Take 30 mLs (20 g) by mouth 3 times daily    Class: E-Prescribe    Route: Oral    loperamide (IMODIUM A-D) 1 MG/7.5ML  237 mL 1 9/15/2022    [id number] - [place], [place] - [address] [place]    Sig: Take 15 mLs (2 mg) by mouth 4 times daily as needed for diarrhea    Class: E-Prescribe    Route: Oral    Multiple Vitamins-Minerals (SUPER THERA DAMARIS M) TABS    9/29/2020        Sig: Take 1 tablet by mouth every morning    Class: Historical    Route: Oral    omeprazole (PRILOSEC) 20 MG DR capsule    4/15/2021        Sig: Take 20 mg by mouth every evening    Class: Historical    Route: Oral    ONETOUCH ULTRA test strip    4/10/2021        Sig: PLEASE SEE ATTACHED FOR DETAILED DIRECTIONS    Class: Historical    potassium chloride ER (KLOR-CON M) 10 MEQ CR tablet  720 tablet 3 2/21/2023 5/22/2023   [id number] - [place], [place] - [address] [place]    Sig: Take 4 tablets (40 mEq) by mouth 2 times daily for 90 days    Class: E-Prescribe    Route: Oral    rifaximin (XIFAXAN) 550 MG TABS tablet  60 tablet 11 8/10/2022        Sig: Take 1 tablet (550  mg) by mouth 2 times daily    Class: Local Print    Route: Oral    Prior authorization: Approved    torsemide (DEMADEX) 20 MG tablet  270 tablet 1 1/17/2023    [facility]/pharmacy #[id number] - [place], [place] - [address] AT [facility]    Sig: Take 3 tablets (60 mg) by mouth daily    Class: E-Prescribe    Route: Oral    vitamin D2 (ERGOCALCIFEROL) 05029 units (1250 mcg) capsule  12 capsule 0 2/28/2023    [facility]/pharmacy #[id number] - [place], [place] - [address] AT [facility]    Sig: Take 1 capsule (50,000 Units) by mouth once a week    Class: E-Prescribe    Route: Oral    vitamin D3 (CHOLECALCIFEROL) 50 mcg (2000 units) tablet  90 tablet 3 5/23/2023    [facility]/pharmacy #[id number] - [place], [place] - [address] AT [facility]    Sig: Take 1 tablet (50 mcg) by mouth daily Begin taking this after completion of Vitamin D2 therapy    Class: E-Prescribe    Route: Oral         ALLERGIES:    Allergies   Allergen Reactions     Minocycline Hives     REVIEW OF SYSTEMS:  Review Of Systems  Skin: negative for pigmentation, acne, rash, scaling, itching, bruising, pigmentation, acne, rash, scaling, itching, bruising  Eyes: negative for visual blurring, double vision, glaucoma, cataracts, eye pain, color blindness, glasses, visual blurring, double vision, glaucoma, cataracts, eye pain, color blindness  Ears/Nose/Throat: negative for nasal congestion, purulent rhinorrhea, sneezing, postnasal drainage, hearing loss, deafness, tinnitus  Respiratory: positive for dyspnea on exertion, negative for cough, or hemoptysis  Cardiovascular: negative for, palpitations, tachycardia, irregular heart beat, chest pain and exertional chest pain or pressure  Gastrointestinal: positive for chronic diarrhea  Genitourinary: negative for, nocturia, dysuria, frequency, urgency, hesitancy and hematuria  Musculoskeletal: negative for, fracture, back pain, neck pain, arthritis and joint pain  Neurologic: negative for, headaches,  syncope, stroke, seizures, paralysis and local weakness    A comprehensive review of systems was performed and found to be negative except as described here or above.  SOCIAL HISTORY:   Social History     Socioeconomic History     Marital status:      Spouse name: Not on file     Number of children: Not on file     Years of education: Not on file     Highest education level: Not on file   Occupational History     Not on file   Tobacco Use     Smoking status: Former     Packs/day: 1.00     Years: 16.00     Pack years: 16.00     Types: Cigarettes     Quit date:      Years since quittin.2     Smokeless tobacco: Never   Vaping Use     Vaping Use: Never used   Substance and Sexual Activity     Alcohol use: Not Currently     Comment: Quit 2020     Drug use: Never     Sexual activity: Not on file   Other Topics Concern     Parent/sibling w/ CABG, MI or angioplasty before 65F 55M? Not Asked   Social History Narrative     Not on file     Social Determinants of Health     Financial Resource Strain: Not on file   Food Insecurity: Not on file   Transportation Needs: Not on file   Physical Activity: Not on file   Stress: Not on file   Social Connections: Not on file   Intimate Partner Violence: Not on file   Housing Stability: Not on file     FAMILY MEDICAL HISTORY:   Family History   Problem Relation Age of Onset     Deep Vein Thrombosis Mother      Pulmonary Embolism Mother      Substance Abuse Brother      Substance Abuse Maternal Grandmother      Anesthesia Reaction No family hx of      PHYSICAL EXAM:   There were no vitals taken for this visit.  GENERAL APPEARANCE: alert and no distress  EYES: nonicteric  HENT: mouth without ulcers or lesions  NECK: supple, no adenopathy  RESP: lungs clear to auscultation   CV: regular rhythm, normal rate, no rub  ABDOMEN: soft, nontender, normal bowel sounds, no HSM   Extremities: no clubbing, cyanosis, or edema  MS: no evidence of inflammation in joints, no muscle  tenderness  SKIN: no rash  NEURO: mentation intact and speech normal  PSYCH: affect normal/bright   LABS:   Recent Results (from the past 672 hour(s))   INR    Collection Time: 03/02/23 12:06 PM   Result Value Ref Range    INR 1.60 (H) 0.85 - 1.15   AFP tumor marker    Collection Time: 03/02/23 12:06 PM   Result Value Ref Range    AFP tumor marker 3.7 <=8.3 ng/mL   CBC with platelets    Collection Time: 03/02/23 12:06 PM   Result Value Ref Range    WBC Count 8.1 4.0 - 11.0 10e3/uL    RBC Count 3.03 (L) 4.40 - 5.90 10e6/uL    Hemoglobin 10.0 (L) 13.3 - 17.7 g/dL    Hematocrit 31.1 (L) 40.0 - 53.0 %     (H) 78 - 100 fL    MCH 33.0 26.5 - 33.0 pg    MCHC 32.2 31.5 - 36.5 g/dL    RDW 20.0 (H) 10.0 - 15.0 %    Platelet Count 77 (L) 150 - 450 10e3/uL   Basic metabolic panel    Collection Time: 03/02/23 12:06 PM   Result Value Ref Range    Sodium 142 133 - 144 mmol/L    Potassium 3.4 3.4 - 5.3 mmol/L    Chloride 119 (H) 94 - 109 mmol/L    Carbon Dioxide (CO2) 17 (L) 20 - 32 mmol/L    Anion Gap 6 3 - 14 mmol/L    Urea Nitrogen 23 7 - 30 mg/dL    Creatinine 1.58 (H) 0.66 - 1.25 mg/dL    Calcium 8.1 (L) 8.5 - 10.1 mg/dL    Glucose 92 70 - 99 mg/dL    GFR Estimate 52 (L) >60 mL/min/1.73m2   Hepatic panel    Collection Time: 03/02/23 12:06 PM   Result Value Ref Range    Bilirubin Total 1.6 (H) 0.2 - 1.3 mg/dL    Bilirubin Direct 0.6 (H) 0.0 - 0.2 mg/dL    Protein Total 6.4 (L) 6.8 - 8.8 g/dL    Albumin 3.6 3.4 - 5.0 g/dL    Alkaline Phosphatase 232 (H) 40 - 150 U/L    AST 36 0 - 45 U/L    ALT 29 0 - 70 U/L   Ferritin    Collection Time: 03/09/23 11:38 AM   Result Value Ref Range    Ferritin 146 26 - 388 ng/mL   Iron & Iron Binding Capacity    Collection Time: 03/09/23 11:38 AM   Result Value Ref Range    Iron 53 35 - 180 ug/dL    Iron Binding Capacity 128 (L) 240 - 430 ug/dL    Iron Sat Index 41 15 - 46 %   Hepatic panel    Collection Time: 03/09/23 11:38 AM   Result Value Ref Range    Bilirubin Total 1.3 0.2 - 1.3 mg/dL     Bilirubin Direct 0.5 (H) 0.0 - 0.2 mg/dL    Protein Total 6.4 (L) 6.8 - 8.8 g/dL    Albumin 3.4 3.4 - 5.0 g/dL    Alkaline Phosphatase 267 (H) 40 - 150 U/L    AST 27 0 - 45 U/L    ALT 29 0 - 70 U/L   INR    Collection Time: 03/09/23 11:38 AM   Result Value Ref Range    INR 1.55 (H) 0.85 - 1.15   Basic metabolic panel    Collection Time: 03/09/23 11:38 AM   Result Value Ref Range    Sodium 139 133 - 144 mmol/L    Potassium 3.6 3.4 - 5.3 mmol/L    Chloride 114 (H) 94 - 109 mmol/L    Carbon Dioxide (CO2) 18 (L) 20 - 32 mmol/L    Anion Gap 7 3 - 14 mmol/L    Urea Nitrogen 22 7 - 30 mg/dL    Creatinine 1.57 (H) 0.66 - 1.25 mg/dL    Calcium 8.3 (L) 8.5 - 10.1 mg/dL    Glucose 118 (H) 70 - 99 mg/dL    GFR Estimate 52 (L) >60 mL/min/1.73m2   CBC with platelets    Collection Time: 03/09/23 11:38 AM   Result Value Ref Range    WBC Count 6.6 4.0 - 11.0 10e3/uL    RBC Count 2.93 (L) 4.40 - 5.90 10e6/uL    Hemoglobin 9.6 (L) 13.3 - 17.7 g/dL    Hematocrit 29.6 (L) 40.0 - 53.0 %     (H) 78 - 100 fL    MCH 32.8 26.5 - 33.0 pg    MCHC 32.4 31.5 - 36.5 g/dL    RDW 17.9 (H) 10.0 - 15.0 %    Platelet Count 88 (L) 150 - 450 10e3/uL   Adult Type and Screen    Collection Time: 03/09/23 11:38 AM   Result Value Ref Range    ABO/RH(D) A POS     Antibody Screen Negative Negative    SPECIMEN EXPIRATION DATE [id number]    Glucose by meter    Collection Time: 03/14/23  6:00 AM   Result Value Ref Range    GLUCOSE BY METER POCT 145 (H) 70 - 99 mg/dL   Glucose by meter    Collection Time: 03/14/23 12:16 PM   Result Value Ref Range    GLUCOSE BY METER POCT 91 70 - 99 mg/dL   Ferritin    Collection Time: 03/17/23  4:43 PM   Result Value Ref Range    Ferritin 136 26 - 388 ng/mL   Iron & Iron Binding Capacity    Collection Time: 03/17/23  4:43 PM   Result Value Ref Range    Iron 53 35 - 180 ug/dL    Iron Binding Capacity 108 (L) 240 - 430 ug/dL    Iron Sat Index 49 (H) 15 - 46 %   Renal panel    Collection Time: 03/17/23  4:43 PM    Result Value Ref Range    Sodium 142 133 - 144 mmol/L    Potassium 3.6 3.4 - 5.3 mmol/L    Chloride 114 (H) 94 - 109 mmol/L    Carbon Dioxide (CO2) 25 20 - 32 mmol/L    Anion Gap 3 3 - 14 mmol/L    Urea Nitrogen 17 7 - 30 mg/dL    Creatinine 1.39 (H) 0.66 - 1.25 mg/dL    Calcium 8.0 (L) 8.5 - 10.1 mg/dL    Glucose 207 (H) 70 - 99 mg/dL    Albumin 3.2 (L) 3.4 - 5.0 g/dL    Phosphorus 3.1 2.5 - 4.5 mg/dL    GFR Estimate 60 (L) >60 mL/min/1.73m2   CBC with platelets    Collection Time: 03/17/23  4:43 PM   Result Value Ref Range    WBC Count 4.4 4.0 - 11.0 10e3/uL    RBC Count 2.62 (L) 4.40 - 5.90 10e6/uL    Hemoglobin 8.5 (L) 13.3 - 17.7 g/dL    Hematocrit 26.3 (L) 40.0 - 53.0 %     78 - 100 fL    MCH 32.4 26.5 - 33.0 pg    MCHC 32.3 31.5 - 36.5 g/dL    RDW 17.4 (H) 10.0 - 15.0 %    Platelet Count 64 (L) 150 - 450 10e3/uL   INR    Collection Time: 03/17/23  4:43 PM   Result Value Ref Range    INR 1.93 (H) 0.85 - 1.15   Adult Type and Screen    Collection Time: 03/17/23  4:43 PM   Result Value Ref Range    ABO/RH(D) A POS     Antibody Screen Negative Negative    SPECIMEN EXPIRATION DATE [id number]    Alkaline phosphatase    Collection Time: 03/17/23  4:43 PM   Result Value Ref Range    Alkaline Phosphatase 254 (H) 40 - 150 U/L   ALT    Collection Time: 03/17/23  4:43 PM   Result Value Ref Range    ALT 26 0 - 70 U/L   AST    Collection Time: 03/17/23  4:43 PM   Result Value Ref Range    AST 36 0 - 45 U/L   Bilirubin  total    Collection Time: 03/17/23  4:43 PM   Result Value Ref Range    Bilirubin Total 1.4 (H) 0.2 - 1.3 mg/dL   Bilirubin direct    Collection Time: 03/17/23  4:43 PM   Result Value Ref Range    Bilirubin Direct 0.6 (H) 0.0 - 0.2 mg/dL   Protein total    Collection Time: 03/17/23  4:43 PM   Result Value Ref Range    Protein Total 5.6 (L) 6.8 - 8.8 g/dL   Hemoglobin and hematocrit    Collection Time: 03/23/23 12:53 PM   Result Value Ref Range    Hemoglobin 8.7 (L) 13.3 - 17.7 g/dL    Hematocrit  27.3 (L) 40.0 - 53.0 %   INR    Collection Time: 03/23/23 12:53 PM   Result Value Ref Range    INR 1.80 (H) 0.85 - 1.15   Comprehensive metabolic panel    Collection Time: 03/23/23 12:53 PM   Result Value Ref Range    Sodium 142 133 - 144 mmol/L    Potassium 3.8 3.4 - 5.3 mmol/L    Chloride 116 (H) 94 - 109 mmol/L    Carbon Dioxide (CO2) 25 20 - 32 mmol/L    Anion Gap 1 (L) 3 - 14 mmol/L    Urea Nitrogen 17 7 - 30 mg/dL    Creatinine 1.22 0.66 - 1.25 mg/dL    Calcium 7.8 (L) 8.5 - 10.1 mg/dL    Glucose 203 (H) 70 - 99 mg/dL    Alkaline Phosphatase 250 (H) 40 - 150 U/L    AST 28 0 - 45 U/L    ALT 23 0 - 70 U/L    Protein Total 5.1 (L) 6.8 - 8.8 g/dL    Albumin 2.7 (L) 3.4 - 5.0 g/dL    Bilirubin Total 1.0 0.2 - 1.3 mg/dL    GFR Estimate 70 >60 mL/min/1.73m2     CMP  Recent Labs   Lab Test 03/23/23  1253 03/17/23  1643 03/14/23  1216 03/14/23  0600 03/09/23  1138 03/02/23  1206 02/26/23  0514 02/25/23  0541 02/25/23  0535 02/24/23 2016 02/24/23  1523 02/24/23  1314 02/21/23  0926 01/24/23  0755 01/23/23  1921 09/20/21  1227 06/15/21  1235    142  --   --  139 142 140  --  140  --   --    < > 139   < > 138   < > 136   POTASSIUM 3.8 3.6  --   --  3.6 3.4 3.9  --  3.6  --   --    < > 3.3*   < > 4.1   < > 4.1   CHLORIDE 116* 114*  --   --  114* 119* 113*  --  112*  --   --    < > 109*   < > 112*   < > 112*   CO2 25 25  --   --  18* 17* 13*  --  17*  --   --    < > 21*   < > 13*   < > 25   ANIONGAP 1* 3  --   --  7 6 14  --  11  --   --    < > 9   < > 13   < > <1*   * 207* 91 145* 118* 92 164*   < > 110*   < >  --    < > 134*   < > 105*   < > 123*   BUN 17 17  --   --  22 23 28.0*  --  28.3*  --   --    < > 24.3*   < > 24.1*   < > 8   CR 1.22 1.39*  --   --  1.57* 1.58* 2.04*  --  1.90*  --   --    < > 1.90*   < > 1.71*   < > 0.63*   GFRESTIMATED 70 60*  --   --  52* 52* 38*  --  41*  --   --    < > 41*   < > 47*   < > >90   GFRESTBLACK  --   --   --   --   --   --   --   --   --   --   --   --   --   --   --    --  >90   DANIELLE 7.8* 8.0*  --   --  8.3* 8.1* 8.3*  --  8.2*  --   --    < > 8.0*   < > 7.9*   < > 8.9   MAG  --   --   --   --   --   --  1.9  --  1.8  --  1.2*  --   --   --  1.6*   < >  --    PHOS  --  3.1  --   --   --   --  4.9*  --  5.0*  --   --   --  3.0  --   --   --  3.9   PROTTOTAL 5.1* 5.6*  --   --  6.4* 6.4* 5.3*  --  4.5*  --   --    < > 5.5*   < > 5.4*   < > 7.5   ALBUMIN 2.7* 3.2*  --   --  3.4 3.6 3.2*  --  3.0*  --   --    < > 3.3*   < > 3.1*   < > 3.1*   BILITOTAL 1.0 1.4*  --   --  1.3 1.6* 0.8  --  2.1*  --   --    < > 0.8   < > 0.8   < > 0.8   ALKPHOS 250* 254*  --   --  267* 232* 189*  --  154*  --   --    < > 197*   < > 189*   < > 126   AST 28 36  --   --  27 36 30  --  25  --   --    < > 30   < > 32   < > 34   ALT 23 26  --   --  29 29 14  --  12  --   --    < > 19   < > 16   < > 36    < > = values in this interval not displayed.     CBC  Recent Labs   Lab Test 03/23/23  1253 03/17/23  1643 03/09/23  1138 03/02/23  1206 02/26/23  0514   HGB 8.7* 8.5* 9.6* 10.0* 9.1*   WBC  --  4.4 6.6 8.1 5.5   RBC  --  2.62* 2.93* 3.03* 2.86*   HCT 27.3* 26.3* 29.6* 31.1* 29.3*   MCV  --  100 101* 103* 102*   MCH  --  32.4 32.8 33.0 31.8   MCHC  --  32.3 32.4 32.2 31.1*   RDW  --  17.4* 17.9* 20.0* 20.8*   PLT  --  64* 88* 77* 87*     INR  Recent Labs   Lab Test 03/23/23  1253 03/17/23  1643 03/09/23  1138 03/02/23  1206 02/25/23  0535 02/24/23  1314 01/25/23  0938 01/24/23  0854   INR 1.80* 1.93* 1.55* 1.60* 2.47* 2.80*   < > 2.51*   PTT  --   --   --   --  40* 41*  --  41*    < > = values in this interval not displayed.     ABGNo lab results found.   URINE STUDIES  Recent Labs   Lab Test 02/21/23  1215 01/24/23  1043 06/13/22  1313 06/15/21  1241   COLOR Yellow Light Yellow Yellow Yellow   APPEARANCE Slightly Cloudy* Clear Clear Clear   URINEGLC Negative Negative Negative Negative   URINEBILI Negative Negative Negative Negative   URINEKETONE Negative Negative Negative 5*   SG 1.011 1.009 1.027 1.014    UBLD Negative Trace* Negative Negative   URINEPH 5.0 5.0 5.5 5.0   PROTEIN Negative Negative 10* Negative   NITRITE Negative Negative Negative Negative   LEUKEST Trace* Small* Negative Negative   RBCU 1 7* 1  --    WBCU 5 1 <1  --      No lab results found.    ASSESSMENT AND PLAN:   #CKD stage 3 s/p multiple episodes of WAI driven by hypovolemic and septic shock, cardiorenal syndrome, obstructive lithiasis and hepatorenal physiology. The creatinine level has been improving and is 1.1 today and as a result his MELD score is now improved to 15. He remains however with refractory ascites, therefore will start him on spironolactone 25 mg daily and midodrine 10 mg tid and he will weigh himself on a daily basis and also keep a glycemia and BP diary.  He has no evidence of proteinuria and renal imaging was unremarkable in February.The patient was also instructed to lose weight,  keep the sodium intake around 2400 mg /day, follow a plant-based diet and to avoid NSAIDs     #HTN  Primary and secondary to CKD. The patient was instructed to keep a BP diary and to communicate the results    #Type 2 DM  Duration 12 years   Hba1c 5.6 in January 2023  On Lantus 35 units once daily      #CVD/dyslipidemia  Metoprolol was stopped in February 2022. Management as per above     #Blood count  Hemoglobin 8.7 off apixaban on oral iron supplementation  Platelet count 64    #Acid-base status  CO2 level 25 no need for any supplementation    #Electrolytes  Na 143 no acute issues  K 3.8  Mg 1.3 will prescribe magnesium supplementation     #BMD  Ca   7.8    P  3.1  Alb 2.7  iPTH   Vitamin D level   On ergocalciferol 71376 U once a week and on calcium carbonate 500 mg twice daily    #CKD journey/transplant not a candidate at this point    The total time of this encounter amounted to 60 minutes on the day of the encounter. This time included time spent with the patient, reviewing records, ordering tests, and performing post visit documentation.        The patient will return to follow up in 2 weeks    Mojgan Pedro MD  Division of Renal Disease and Hypertension  March 27, 2023  7:36 AM

## 2023-03-27 NOTE — PROGRESS NOTES
Nephrology Clinic    Blair Oliver MRN:[id number] YOB: 1970  Date of Service: 03/27/2023  Primary care provider: Bubba Ovalle  Requesting physician: Sweetie Bingham Md      REASON FOR CONSULT: CKD    HISTORY OF PRESENT ILLNESS:   Blair Oliver is a 52 year old male who presents for evaluation of CKD.  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  From a renal standpoint, his creatinine level was around 0.8 mg/dL up to November 2022 when he was admitted for bradycardia and sepsis possibly secondary to SBP. He was found to have a 3 mm ureteral calculus with possible partial obstruction and developed WAI with a creatinine level that peaked at 1.8 mg/dL.   He was discharged on 11/17 on tamsulosin and torsemide but off eplerenone and with a creatinine level at 1.4 mg/dL. He was readmitted on 1/23 for severe anemia with a hemoglobin level at 6.5, a creatinine level at 1.7 mg/dL and dark tarry stools and was found to be positive for Covid-19. He received 3Us of PRBCs and IV iron was discharged on 1/28 with a creatinine level at 1.7 mg/dL. The patient had another episode of WAI when he was readmitted on 2/24 for another episode of GI bleed and his creatinine level peaked at 2 mg/dL. He was discharged on 2/26 off apixaban that he had been on for his SMV thrombosis and off metoprolol due to episodes of bradycardia as well as off eplerenone. Since then, his creatinine level has been slowly improving and was 1.2 mg/dL on 3/23 and is 1.1 mg/dL today.  On an abdominal ultrasound done on 2/22 the kidneys appear of normal size and measure 12 cm each.  He has been needing biweekly therapeutic  paracentesis however since the TIPS was placed the amount of fluid removed has been decreasing. He last had 3.8L removed on 3/24. He reports however having gained 20 lbs since then and reports worsening lower extremity edema and dyspnea on exertion.  Current meds: torsemide 60 mg daily, rifaximin 1 tab twice daily, potassium chloride 40 mEq twice daily, lactulose 20 g twice daily, ferrous gluconate 1 tab daily, ciprofloxacin 500 mg once daily  The patient     The following portions of the patient's history were reviewed and updated as appropriate: allergies, current medications, past family history, past medical history, past social history, past surgical history and problem list.    PAST MEDICAL HISTORY:  Past Medical History:   Diagnosis Date     Alcohol dependence in remission (H)      Alcoholic cirrhosis of liver with ascites (H)      Anemia      Chronic systolic heart failure (H)      Diabetes (H)     Type 2 DM, Insulin Use.      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      PAST SURGICAL HISTORY:  Past Surgical History:   Procedure Laterality Date     COLONOSCOPY N/A 12/09/2021    Procedure: COLONOSCOPY, WITH BIOPSY, WITH CLIPS;  Surgeon: Sweetie Bain MD;  [place] [facility] [place]     ESOPHAGOSCOPY, GASTROSCOPY, DUODENOSCOPY (EGD), COMBINED N/A 12/09/2021    Procedure: ESOPHAGOGASTRODUODENOSCOPY, WITH BIOPSY;  Surgeon: Sweetie Bain MD;  [place] [facility] [place]     ESOPHAGOSCOPY, GASTROSCOPY, DUODENOSCOPY (EGD), COMBINED N/A 02/24/2023    Procedure: Esophagoscopy, gastroscopy, duodenoscopy (EGD), combined;  Surgeon: Tom George MD;  Location:  GI     EXPLORATORY LAPAROTOMY W/ BOWEL RESECTION  09/2020    Exploratory laparotomy with small bowel resection     IR PARACENTESIS  3/14/2023     IR  TRANSVEN INTRAHEPATIC PORTOSYST SHUNT  3/14/2023     SIGMOIDOSCOPY FLEXIBLE N/A 02/24/2023    Procedure: Sigmoidoscopy flexible;  Surgeon: Tom George MD;  Location:  GI     MEDICATIONS:  Prescription Medications as of 3/27/2023       Rx Number Disp Refills Start End Last Dispensed Date Next Fill Date Owning Pharmacy    acetaminophen (TYLENOL) 500 MG tablet    1/28/2023        Sig: Take 1 tablet (500 mg) by mouth every 8 hours as needed for mild pain    Class: Historical    Notes to Pharmacy: No more than 2000mg qday    Route: Oral    calcium carbonate (OS-DANIELLE) 500 MG tablet  180 tablet 3 2/28/2023    [facility]/pharmacy #[id number] - [facility], [place] - [address]    Sig: Take 1 tablet (500 mg) by mouth 2 times daily    Class: E-Prescribe    Route: Oral    ciprofloxacin (CIPRO) 500 MG tablet  90 tablet 3 12/23/2022    [facility]/pharmacy #[id number] - [facility], [place] - [address]    Sig: Take 1 tablet (500 mg) by mouth daily    Class: E-Prescribe    Route: Oral    COMPRESSION STOCKINGS  2 each 4 10/31/2022        Sig: Please measure and distribute 2 pair of 20mmHg - 30mmHg knee high open or closed toe compression stockings with extra refills as indicated or what insurance will allow .    Class: Local Print    DULoxetine (CYMBALTA) 60 MG capsule            Sig: Take 60 mg by mouth every evening    Class: Historical    Route: Oral    ferrous gluconate (FERGON) 324 (38 Fe) MG tablet  90 tablet 3 9/13/2022 9/13/2023   [facility]/pharmacy #[id number] - [facility], [place] - [address]    Sig: Take 1 tablet (324 mg) by mouth daily (with breakfast)    Class: E-Prescribe    Route: Oral    gabapentin (NEURONTIN) 300 MG capsule            Sig: Take 900 mg by mouth 2 times daily    Class: Historical    Route: Oral    insulin glargine (LANTUS PEN) 100 UNIT/ML pen  15 mL  1/28/2023        Sig: Inject 30 Units Subcutaneous every morning    Class: Historical     Notes to Pharmacy: If Lantus is not covered by insurance, may substitute Basaglar or Semglee or other insulin glargine product per insurance preference at same dose and frequency.      Route: Subcutaneous    insulin pen needle (B-D U/F) 31G X 5 MM miscellaneous    1/12/2021        Sig: AS DIRECTED. BD UF MINI PEN NEEDLE [id number]: USE ONCE DAILY    Class: Historical    lactulose 20 GM/30ML SOLN  2838 mL 11 8/24/2022    [facility]/pharmacy #[id number] - [place], [place] - [address] AT [address] [place]    Sig: Take 30 mLs (20 g) by mouth 3 times daily    Class: E-Prescribe    Route: Oral    loperamide (IMODIUM A-D) 1 MG/7.5ML  237 mL 1 9/15/2022    [facility]/pharmacy #[id number] - [place], [place] - [address] [place]    Sig: Take 15 mLs (2 mg) by mouth 4 times daily as needed for diarrhea    Class: E-Prescribe    Route: Oral    Multiple Vitamins-Minerals (SUPER THERA DAMARIS M) TABS    9/29/2020        Sig: Take 1 tablet by mouth every morning    Class: Historical    Route: Oral    omeprazole (PRILOSEC) 20 MG DR capsule    4/15/2021        Sig: Take 20 mg by mouth every evening    Class: Historical    Route: Oral    ONETOUCH ULTRA test strip    4/10/2021        Sig: PLEASE SEE ATTACHED FOR DETAILED DIRECTIONS    Class: Historical    potassium chloride ER (KLOR-CON M) 10 MEQ CR tablet  720 tablet 3 2/21/2023 5/22/2023   [facility]/pharmacy #[id number] - [place], [place] - [address] [place]    Sig: Take 4 tablets (40 mEq) by mouth 2 times daily for 90 days    Class: E-Prescribe    Route: Oral    rifaximin (XIFAXAN) 550 MG TABS tablet  60 tablet 11 8/10/2022        Sig: Take 1 tablet (550 mg) by mouth 2 times daily    Class: Local Print    Route: Oral    Prior authorization: Approved    torsemide (DEMADEX) 20 MG tablet  270 tablet 1 1/17/2023    [facility]/pharmacy #[id number] - [place] [facility], [place] - [address] [place]    Sig: Take 3 tablets (60 mg) by mouth daily    Class: E-Prescribe    Route:  Oral    vitamin D2 (ERGOCALCIFEROL) 02549 units (1250 mcg) capsule  12 capsule 0 2/28/2023    [facility]/pharmacy #[id number] - [place], [place] - [address] AT [facility]    Sig: Take 1 capsule (50,000 Units) by mouth once a week    Class: E-Prescribe    Route: Oral    vitamin D3 (CHOLECALCIFEROL) 50 mcg (2000 units) tablet  90 tablet 3 5/23/2023    [facility]/pharmacy #[id number] - [place], [place] - [address] AT [facility]    Sig: Take 1 tablet (50 mcg) by mouth daily Begin taking this after completion of Vitamin D2 therapy    Class: E-Prescribe    Route: Oral         ALLERGIES:    Allergies   Allergen Reactions     Minocycline Hives     REVIEW OF SYSTEMS:  Review Of Systems  Skin: negative for pigmentation, acne, rash, scaling, itching, bruising, pigmentation, acne, rash, scaling, itching, bruising  Eyes: negative for visual blurring, double vision, glaucoma, cataracts, eye pain, color blindness, glasses, visual blurring, double vision, glaucoma, cataracts, eye pain, color blindness  Ears/Nose/Throat: negative for nasal congestion, purulent rhinorrhea, sneezing, postnasal drainage, hearing loss, deafness, tinnitus  Respiratory: positive for dyspnea on exertion, negative for cough, or hemoptysis  Cardiovascular: negative for, palpitations, tachycardia, irregular heart beat, chest pain and exertional chest pain or pressure  Gastrointestinal: positive for chronic diarrhea  Genitourinary: negative for, nocturia, dysuria, frequency, urgency, hesitancy and hematuria  Musculoskeletal: negative for, fracture, back pain, neck pain, arthritis and joint pain  Neurologic: negative for, headaches, syncope, stroke, seizures, paralysis and local weakness    A comprehensive review of systems was performed and found to be negative except as described here or above.  SOCIAL HISTORY:   Social History     Socioeconomic History     Marital status:      Spouse name: Not on file     Number of children: Not on file      Years of education: Not on file     Highest education level: Not on file   Occupational History     Not on file   Tobacco Use     Smoking status: Former     Packs/day: 1.00     Years: 16.00     Pack years: 16.00     Types: Cigarettes     Quit date:      Years since quittin.2     Smokeless tobacco: Never   Vaping Use     Vaping Use: Never used   Substance and Sexual Activity     Alcohol use: Not Currently     Comment: Quit 2020     Drug use: Never     Sexual activity: Not on file   Other Topics Concern     Parent/sibling w/ CABG, MI or angioplasty before 65F 55M? Not Asked   Social History Narrative     Not on file     Social Determinants of Health     Financial Resource Strain: Not on file   Food Insecurity: Not on file   Transportation Needs: Not on file   Physical Activity: Not on file   Stress: Not on file   Social Connections: Not on file   Intimate Partner Violence: Not on file   Housing Stability: Not on file     FAMILY MEDICAL HISTORY:   Family History   Problem Relation Age of Onset     Deep Vein Thrombosis Mother      Pulmonary Embolism Mother      Substance Abuse Brother      Substance Abuse Maternal Grandmother      Anesthesia Reaction No family hx of      PHYSICAL EXAM:   There were no vitals taken for this visit.  GENERAL APPEARANCE: alert and no distress  EYES: nonicteric  HENT: mouth without ulcers or lesions  NECK: supple, no adenopathy  RESP: lungs clear to auscultation   CV: regular rhythm, normal rate, no rub  ABDOMEN: soft, nontender, normal bowel sounds, no HSM   Extremities: no clubbing, cyanosis, or edema  MS: no evidence of inflammation in joints, no muscle tenderness  SKIN: no rash  NEURO: mentation intact and speech normal  PSYCH: affect normal/bright   LABS:   Recent Results (from the past 672 hour(s))   INR    Collection Time: 23 12:06 PM   Result Value Ref Range    INR 1.60 (H) 0.85 - 1.15   AFP tumor marker    Collection Time: 23 12:06 PM   Result Value Ref  Range    AFP tumor marker 3.7 <=8.3 ng/mL   CBC with platelets    Collection Time: 03/02/23 12:06 PM   Result Value Ref Range    WBC Count 8.1 4.0 - 11.0 10e3/uL    RBC Count 3.03 (L) 4.40 - 5.90 10e6/uL    Hemoglobin 10.0 (L) 13.3 - 17.7 g/dL    Hematocrit 31.1 (L) 40.0 - 53.0 %     (H) 78 - 100 fL    MCH 33.0 26.5 - 33.0 pg    MCHC 32.2 31.5 - 36.5 g/dL    RDW 20.0 (H) 10.0 - 15.0 %    Platelet Count 77 (L) 150 - 450 10e3/uL   Basic metabolic panel    Collection Time: 03/02/23 12:06 PM   Result Value Ref Range    Sodium 142 133 - 144 mmol/L    Potassium 3.4 3.4 - 5.3 mmol/L    Chloride 119 (H) 94 - 109 mmol/L    Carbon Dioxide (CO2) 17 (L) 20 - 32 mmol/L    Anion Gap 6 3 - 14 mmol/L    Urea Nitrogen 23 7 - 30 mg/dL    Creatinine 1.58 (H) 0.66 - 1.25 mg/dL    Calcium 8.1 (L) 8.5 - 10.1 mg/dL    Glucose 92 70 - 99 mg/dL    GFR Estimate 52 (L) >60 mL/min/1.73m2   Hepatic panel    Collection Time: 03/02/23 12:06 PM   Result Value Ref Range    Bilirubin Total 1.6 (H) 0.2 - 1.3 mg/dL    Bilirubin Direct 0.6 (H) 0.0 - 0.2 mg/dL    Protein Total 6.4 (L) 6.8 - 8.8 g/dL    Albumin 3.6 3.4 - 5.0 g/dL    Alkaline Phosphatase 232 (H) 40 - 150 U/L    AST 36 0 - 45 U/L    ALT 29 0 - 70 U/L   Ferritin    Collection Time: 03/09/23 11:38 AM   Result Value Ref Range    Ferritin 146 26 - 388 ng/mL   Iron & Iron Binding Capacity    Collection Time: 03/09/23 11:38 AM   Result Value Ref Range    Iron 53 35 - 180 ug/dL    Iron Binding Capacity 128 (L) 240 - 430 ug/dL    Iron Sat Index 41 15 - 46 %   Hepatic panel    Collection Time: 03/09/23 11:38 AM   Result Value Ref Range    Bilirubin Total 1.3 0.2 - 1.3 mg/dL    Bilirubin Direct 0.5 (H) 0.0 - 0.2 mg/dL    Protein Total 6.4 (L) 6.8 - 8.8 g/dL    Albumin 3.4 3.4 - 5.0 g/dL    Alkaline Phosphatase 267 (H) 40 - 150 U/L    AST 27 0 - 45 U/L    ALT 29 0 - 70 U/L   INR    Collection Time: 03/09/23 11:38 AM   Result Value Ref Range    INR 1.55 (H) 0.85 - 1.15   Basic metabolic panel     Collection Time: 03/09/23 11:38 AM   Result Value Ref Range    Sodium 139 133 - 144 mmol/L    Potassium 3.6 3.4 - 5.3 mmol/L    Chloride 114 (H) 94 - 109 mmol/L    Carbon Dioxide (CO2) 18 (L) 20 - 32 mmol/L    Anion Gap 7 3 - 14 mmol/L    Urea Nitrogen 22 7 - 30 mg/dL    Creatinine 1.57 (H) 0.66 - 1.25 mg/dL    Calcium 8.3 (L) 8.5 - 10.1 mg/dL    Glucose 118 (H) 70 - 99 mg/dL    GFR Estimate 52 (L) >60 mL/min/1.73m2   CBC with platelets    Collection Time: 03/09/23 11:38 AM   Result Value Ref Range    WBC Count 6.6 4.0 - 11.0 10e3/uL    RBC Count 2.93 (L) 4.40 - 5.90 10e6/uL    Hemoglobin 9.6 (L) 13.3 - 17.7 g/dL    Hematocrit 29.6 (L) 40.0 - 53.0 %     (H) 78 - 100 fL    MCH 32.8 26.5 - 33.0 pg    MCHC 32.4 31.5 - 36.5 g/dL    RDW 17.9 (H) 10.0 - 15.0 %    Platelet Count 88 (L) 150 - 450 10e3/uL   Adult Type and Screen    Collection Time: 03/09/23 11:38 AM   Result Value Ref Range    ABO/RH(D) A POS     Antibody Screen Negative Negative    SPECIMEN EXPIRATION DATE [id number]    Glucose by meter    Collection Time: 03/14/23  6:00 AM   Result Value Ref Range    GLUCOSE BY METER POCT 145 (H) 70 - 99 mg/dL   Glucose by meter    Collection Time: 03/14/23 12:16 PM   Result Value Ref Range    GLUCOSE BY METER POCT 91 70 - 99 mg/dL   Ferritin    Collection Time: 03/17/23  4:43 PM   Result Value Ref Range    Ferritin 136 26 - 388 ng/mL   Iron & Iron Binding Capacity    Collection Time: 03/17/23  4:43 PM   Result Value Ref Range    Iron 53 35 - 180 ug/dL    Iron Binding Capacity 108 (L) 240 - 430 ug/dL    Iron Sat Index 49 (H) 15 - 46 %   Renal panel    Collection Time: 03/17/23  4:43 PM   Result Value Ref Range    Sodium 142 133 - 144 mmol/L    Potassium 3.6 3.4 - 5.3 mmol/L    Chloride 114 (H) 94 - 109 mmol/L    Carbon Dioxide (CO2) 25 20 - 32 mmol/L    Anion Gap 3 3 - 14 mmol/L    Urea Nitrogen 17 7 - 30 mg/dL    Creatinine 1.39 (H) 0.66 - 1.25 mg/dL    Calcium 8.0 (L) 8.5 - 10.1 mg/dL    Glucose 207 (H) 70  - 99 mg/dL    Albumin 3.2 (L) 3.4 - 5.0 g/dL    Phosphorus 3.1 2.5 - 4.5 mg/dL    GFR Estimate 60 (L) >60 mL/min/1.73m2   CBC with platelets    Collection Time: 03/17/23  4:43 PM   Result Value Ref Range    WBC Count 4.4 4.0 - 11.0 10e3/uL    RBC Count 2.62 (L) 4.40 - 5.90 10e6/uL    Hemoglobin 8.5 (L) 13.3 - 17.7 g/dL    Hematocrit 26.3 (L) 40.0 - 53.0 %     78 - 100 fL    MCH 32.4 26.5 - 33.0 pg    MCHC 32.3 31.5 - 36.5 g/dL    RDW 17.4 (H) 10.0 - 15.0 %    Platelet Count 64 (L) 150 - 450 10e3/uL   INR    Collection Time: 03/17/23  4:43 PM   Result Value Ref Range    INR 1.93 (H) 0.85 - 1.15   Adult Type and Screen    Collection Time: 03/17/23  4:43 PM   Result Value Ref Range    ABO/RH(D) A POS     Antibody Screen Negative Negative    SPECIMEN EXPIRATION DATE [id number]    Alkaline phosphatase    Collection Time: 03/17/23  4:43 PM   Result Value Ref Range    Alkaline Phosphatase 254 (H) 40 - 150 U/L   ALT    Collection Time: 03/17/23  4:43 PM   Result Value Ref Range    ALT 26 0 - 70 U/L   AST    Collection Time: 03/17/23  4:43 PM   Result Value Ref Range    AST 36 0 - 45 U/L   Bilirubin  total    Collection Time: 03/17/23  4:43 PM   Result Value Ref Range    Bilirubin Total 1.4 (H) 0.2 - 1.3 mg/dL   Bilirubin direct    Collection Time: 03/17/23  4:43 PM   Result Value Ref Range    Bilirubin Direct 0.6 (H) 0.0 - 0.2 mg/dL   Protein total    Collection Time: 03/17/23  4:43 PM   Result Value Ref Range    Protein Total 5.6 (L) 6.8 - 8.8 g/dL   Hemoglobin and hematocrit    Collection Time: 03/23/23 12:53 PM   Result Value Ref Range    Hemoglobin 8.7 (L) 13.3 - 17.7 g/dL    Hematocrit 27.3 (L) 40.0 - 53.0 %   INR    Collection Time: 03/23/23 12:53 PM   Result Value Ref Range    INR 1.80 (H) 0.85 - 1.15   Comprehensive metabolic panel    Collection Time: 03/23/23 12:53 PM   Result Value Ref Range    Sodium 142 133 - 144 mmol/L    Potassium 3.8 3.4 - 5.3 mmol/L    Chloride 116 (H) 94 - 109 mmol/L    Carbon  Dioxide (CO2) 25 20 - 32 mmol/L    Anion Gap 1 (L) 3 - 14 mmol/L    Urea Nitrogen 17 7 - 30 mg/dL    Creatinine 1.22 0.66 - 1.25 mg/dL    Calcium 7.8 (L) 8.5 - 10.1 mg/dL    Glucose 203 (H) 70 - 99 mg/dL    Alkaline Phosphatase 250 (H) 40 - 150 U/L    AST 28 0 - 45 U/L    ALT 23 0 - 70 U/L    Protein Total 5.1 (L) 6.8 - 8.8 g/dL    Albumin 2.7 (L) 3.4 - 5.0 g/dL    Bilirubin Total 1.0 0.2 - 1.3 mg/dL    GFR Estimate 70 >60 mL/min/1.73m2     CMP  Recent Labs   Lab Test 03/23/23  1253 03/17/23  1643 03/14/23  1216 03/14/23  0600 03/09/23  1138 03/02/23  1206 02/26/23  0514 02/25/23  0541 02/25/23  0535 02/24/23 2016 02/24/23  1523 02/24/23  1314 02/21/23  0926 01/24/23  0755 01/23/23  1921 09/20/21  1227 06/15/21  1235    142  --   --  139 142 140  --  140  --   --    < > 139   < > 138   < > 136   POTASSIUM 3.8 3.6  --   --  3.6 3.4 3.9  --  3.6  --   --    < > 3.3*   < > 4.1   < > 4.1   CHLORIDE 116* 114*  --   --  114* 119* 113*  --  112*  --   --    < > 109*   < > 112*   < > 112*   CO2 25 25  --   --  18* 17* 13*  --  17*  --   --    < > 21*   < > 13*   < > 25   ANIONGAP 1* 3  --   --  7 6 14  --  11  --   --    < > 9   < > 13   < > <1*   * 207* 91 145* 118* 92 164*   < > 110*   < >  --    < > 134*   < > 105*   < > 123*   BUN 17 17  --   --  22 23 28.0*  --  28.3*  --   --    < > 24.3*   < > 24.1*   < > 8   CR 1.22 1.39*  --   --  1.57* 1.58* 2.04*  --  1.90*  --   --    < > 1.90*   < > 1.71*   < > 0.63*   GFRESTIMATED 70 60*  --   --  52* 52* 38*  --  41*  --   --    < > 41*   < > 47*   < > >90   GFRESTBLACK  --   --   --   --   --   --   --   --   --   --   --   --   --   --   --   --  >90   DANIELLE 7.8* 8.0*  --   --  8.3* 8.1* 8.3*  --  8.2*  --   --    < > 8.0*   < > 7.9*   < > 8.9   MAG  --   --   --   --   --   --  1.9  --  1.8  --  1.2*  --   --   --  1.6*   < >  --    PHOS  --  3.1  --   --   --   --  4.9*  --  5.0*  --   --   --  3.0  --   --   --  3.9   PROTTOTAL 5.1* 5.6*  --   --  6.4* 6.4*  5.3*  --  4.5*  --   --    < > 5.5*   < > 5.4*   < > 7.5   ALBUMIN 2.7* 3.2*  --   --  3.4 3.6 3.2*  --  3.0*  --   --    < > 3.3*   < > 3.1*   < > 3.1*   BILITOTAL 1.0 1.4*  --   --  1.3 1.6* 0.8  --  2.1*  --   --    < > 0.8   < > 0.8   < > 0.8   ALKPHOS 250* 254*  --   --  267* 232* 189*  --  154*  --   --    < > 197*   < > 189*   < > 126   AST 28 36  --   --  27 36 30  --  25  --   --    < > 30   < > 32   < > 34   ALT 23 26  --   --  29 29 14  --  12  --   --    < > 19   < > 16   < > 36    < > = values in this interval not displayed.     CBC  Recent Labs   Lab Test 03/23/23 1253 03/17/23 1643 03/09/23 1138 03/02/23  1206 02/26/23  0514   HGB 8.7* 8.5* 9.6* 10.0* 9.1*   WBC  --  4.4 6.6 8.1 5.5   RBC  --  2.62* 2.93* 3.03* 2.86*   HCT 27.3* 26.3* 29.6* 31.1* 29.3*   MCV  --  100 101* 103* 102*   MCH  --  32.4 32.8 33.0 31.8   MCHC  --  32.3 32.4 32.2 31.1*   RDW  --  17.4* 17.9* 20.0* 20.8*   PLT  --  64* 88* 77* 87*     INR  Recent Labs   Lab Test 03/23/23 1253 03/17/23 1643 03/09/23 1138 03/02/23  1206 02/25/23  0535 02/24/23  1314 01/25/23  0938 01/24/23  0854   INR 1.80* 1.93* 1.55* 1.60* 2.47* 2.80*   < > 2.51*   PTT  --   --   --   --  40* 41*  --  41*    < > = values in this interval not displayed.     ABGNo lab results found.   URINE STUDIES  Recent Labs   Lab Test 02/21/23  1215 01/24/23  1043 06/13/22  1313 06/15/21  1241   COLOR Yellow Light Yellow Yellow Yellow   APPEARANCE Slightly Cloudy* Clear Clear Clear   URINEGLC Negative Negative Negative Negative   URINEBILI Negative Negative Negative Negative   URINEKETONE Negative Negative Negative 5*   SG 1.011 1.009 1.027 1.014   UBLD Negative Trace* Negative Negative   URINEPH 5.0 5.0 5.5 5.0   PROTEIN Negative Negative 10* Negative   NITRITE Negative Negative Negative Negative   LEUKEST Trace* Small* Negative Negative   RBCU 1 7* 1  --    WBCU 5 1 <1  --      No lab results found.    ASSESSMENT AND PLAN:   #CKD stage 3 s/p multiple episodes of WAI  driven by hypovolemic and septic shock, cardiorenal syndrome, obstructive lithiasis and hepatorenal physiology. The creatinine level has been improving and is 1.1 today and as a result his MELD score is now improved to 15. He remains however with refractory ascites, therefore will start him on spironolactone 25 mg daily and midodrine 10 mg tid and he will weigh himself on a daily basis and also keep a glycemia and BP diary.  He has no evidence of proteinuria and renal imaging was unremarkable in February.The patient was also instructed to lose weight,  keep the sodium intake around 2400 mg /day, follow a plant-based diet and to avoid NSAIDs     #HTN  Primary and secondary to CKD. The patient was instructed to keep a BP diary and to communicate the results    #Type 2 DM  Duration 12 years   Hba1c 5.6 in January 2023  On Lantus 35 units once daily      #CVD/dyslipidemia  Metoprolol was stopped in February 2022. Management as per above     #Blood count  Hemoglobin 8.7 off apixaban on oral iron supplementation  Platelet count 64    #Acid-base status  CO2 level 25 no need for any supplementation    #Electrolytes  Na 143 no acute issues  K 3.8  Mg 1.3 will prescribe magnesium supplementation     #BMD  Ca   7.8    P  3.1  Alb 2.7  iPTH   Vitamin D level   On ergocalciferol 77984 U once a week and on calcium carbonate 500 mg twice daily    #CKD journey/transplant not a candidate at this point    The total time of this encounter amounted to 60 minutes on the day of the encounter. This time included time spent with the patient, reviewing records, ordering tests, and performing post visit documentation.       The patient will return to follow up in 2 weeks    Mojgan Pedro MD  Division of Renal Disease and Hypertension  March 27, 2023  7:36 AM

## 2023-03-30 NOTE — TELEPHONE ENCOUNTER
Anemia Management Note  SUBJECTIVE/OBJECTIVE:  Referred by Dr. Sweetie Bain on 2023  Primary Diagnosis: Anemia of Other Chronic Illness (D63.8), CKD stage 3a     Secondary Diagnosis:  GAVE, Alcoholic cirrhosis of liver with ascites (K70.3) Anemia in CKD Satge 3a.   *2/15/23 Ok to use CKD dx codes per Dr. Sweetie Bain.   Hgb goal range:  9-10  Epo/Darbo: TBD: Hgb >9.0  Iron regimen:  NA. Elevated TSAT  Labs : 24  Recent FELI use, transfusion, IV iron: NA  RX/TX plans : TBD     No history of stroke, MI and blood clots or cancers.     Contact:            No Boxes Checked on Consent to Communicate.     Anemia Latest Ref Rng & Units 2023 2023 3/2/2023 3/9/2023 3/17/2023 3/23/2023 3/27/2023   Hemoglobin 13.3 - 17.7 g/dL 8.6(L) 9.1(L) 10.0(L) 9.6(L) 8.5(L) 8.7(L) 9.3(L)   TSAT 15 - 46 % - - - 41 49(H) - -   Ferritin 26 - 388 ng/mL - - - 146 136 - -     BP Readings from Last 3 Encounters:   23 123/78   23 123/66   23 108/60     Wt Readings from Last 2 Encounters:   23 265 lb 3.2 oz (120.3 kg)   23 261 lb 11 oz (118.7 kg)           ASSESSMENT:  Hgb:at goal - continue to monitor  TSat: at goal >30% Ferritin: At goal (>100ng/mL)    PLAN:  Will continue to monitor Hgb. Hgb improved to 9.3.  Has follow up appt with Dr. Pedro on 4/10/23.     Orders needed to be renewed (for next follow-up date) in [facility]: None    Iron labs due:  Mid 2023    Plan discussed with:  Delmer      NEXT FOLLOW-UP DATE:  23    Gretta Brar RN   Anemia Services  [address] [address]   [email or web address]   Office : [phone]  Fax: [phone]

## 2023-04-10 NOTE — PATIENT INSTRUCTIONS
-Increase lantus to 38 units daily  -Decrease potassium to 2 tabs twice daily  -Start midodrine 5 mg three times daily  -Do labs in 2 weeks   -Return to clinic in 3 weeks

## 2023-04-10 NOTE — NURSING NOTE
Chief Complaint   Patient presents with     RECHECK     Return visit.     Blood pressure 129/81, pulse 51, temperature 98  F (36.7  C), weight 116.3 kg (256 lb 6.4 oz), SpO2 100 %.    YULI REAL

## 2023-04-10 NOTE — LETTER
4/10/2023       RE: Blair Oliver  [address] [place] [facility] [address]     Dear Colleague,    Thank you for referring your patient, Blair Olievr, to the [facility]PHROLOG[facility] [place] at [facility]. Please see a copy of my visit note below.    Nephrology Clinic    Blair Oliver MRN:[id number] YOB: 1970  Date of Service: 04/10/2023  Primary care provider: Bubba Ovalle  Requesting physician: Sweetie Bingham Md      REASON FOR CONSULT: CKD    HISTORY OF PRESENT ILLNESS:   Blair Oliver is a 52 year old male who first presented for evaluation of CKD on March 27th 2023.  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  From a renal standpoint, his creatinine level was around 0.8 mg/dL up to November 2022 when he was admitted for bradycardia and sepsis possibly secondary to SBP. He was found to have a 3 mm ureteral calculus with possible partial obstruction and developed WAI with a creatinine level that peaked at 1.8 mg/dL.   He was discharged on 11/17 on tamsulosin and torsemide but off eplerenone and with a creatinine level at 1.4 mg/dL. He was readmitted on 1/23 for severe anemia with a hemoglobin level at 6.5, a creatinine level at 1.7 mg/dL and dark tarry stools and was found to be positive for Covid-19. He received 3Us of PRBCs and IV iron was discharged on 1/28 with a creatinine level at 1.7 mg/dL. The patient had another episode of WAI when he was readmitted on 2/24 for another episode of GI bleed and his creatinine level peaked at 2 mg/dL. He was discharged on 2/26 off apixaban that he had been on for  his SMV thrombosis and off metoprolol due to episodes of bradycardia as well as off eplerenone. Since then, his creatinine level has been slowly improving and was 1.2 mg/dL on 3/23 and is 1.1 mg/dL on 3/27 and 4/03.  On an abdominal ultrasound done on 2/22 the kidneys appear of normal size and measure 12 cm each.  He has been needing biweekly therapeutic paracentesis however since the TIPS was placed the amount of fluid removed has been decreasing and has decreased further since I added spironolactone at his last visit. He last had 1.7L removed on 3/24. He was previously getting between 3 and 5L removed each visit. He was also prescribed midodrine 10 mg tid with meals but has been reluctant to take it.    Current meds: torsemide 60 mg daily, rifaximin 1 tab twice daily, potassium chloride 40 mEq twice daily, lactulose 20 g twice daily, ferrous gluconate 1 tab daily, ciprofloxacin 500 mg once daily and spironolactone 25 mg daily      The following portions of the patient's history were reviewed and updated as appropriate: allergies, current medications, past family history, past medical history, past social history, past surgical history and problem list.    PAST MEDICAL HISTORY:  Past Medical History:   Diagnosis Date     Alcohol dependence in remission (H)      Alcoholic cirrhosis of liver with ascites (H)      Anemia      Chronic systolic heart failure (H)      Diabetes (H)     Type 2 DM, Insulin Use.      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      PAST SURGICAL HISTORY:  Past Surgical History:   Procedure Laterality Date     COLONOSCOPY N/A 12/09/2021    Procedure: COLONOSCOPY, WITH BIOPSY, WITH CLIPS;  Surgeon: Sweetie Bain MD;  Location: [facility] OR     ESOPHAGOSCOPY, GASTROSCOPY, DUODENOSCOPY  (EGD), COMBINED N/A 12/09/2021    Procedure: ESOPHAGOGASTRODUODENOSCOPY, WITH BIOPSY;  Surgeon: Sweetie Bain MD;  Location: [facility] OR     ESOPHAGOSCOPY, GASTROSCOPY, DUODENOSCOPY (EGD), COMBINED N/A 02/24/2023    Procedure: Esophagoscopy, gastroscopy, duodenoscopy (EGD), combined;  Surgeon: Tom George MD;  Location: [facility]     EXPLORATORY LAPAROTOMY W/ BOWEL RESECTION  09/2020    Exploratory laparotomy with small bowel resection     IR PARACENTESIS  3/14/2023     IR TRANSVEN INTRAHEPATIC PORTOSYST SHUNT  3/14/2023     SIGMOIDOSCOPY FLEXIBLE N/A 02/24/2023    Procedure: Sigmoidoscopy flexible;  Surgeon: Tom George MD;  Location: [facility]     MEDICATIONS:  Prescription Medications as of 4/10/2023       Rx Number Disp Refills Start End Last Dispensed Date Next Fill Date Owning Pharmacy    acetaminophen (TYLENOL) 500 MG tablet    1/28/2023        Sig: Take 1 tablet (500 mg) by mouth every 8 hours as needed for mild pain    Class: Historical    Notes to Pharmacy: No more than 2000mg qday    Route: Oral    calcium carbonate (OS-DANIELLE) 500 MG tablet  180 tablet 3 2/28/2023    [id number] - [place], [place] - [address] AT [address] [facility]    Sig: Take 1 tablet (500 mg) by mouth 2 times daily    Class: E-Prescribe    Route: Oral    ciprofloxacin (CIPRO) 500 MG tablet  90 tablet 3 12/23/2022    [id number] - [place], [place] - [address] AT [address] [facility]    Sig: Take 1 tablet (500 mg) by mouth daily    Class: E-Prescribe    Route: Oral    DULoxetine (CYMBALTA) 60 MG capsule            Sig: Take 60 mg by mouth every evening    Class: Historical    Route: Oral    ferrous gluconate (FERGON) 324 (38 Fe) MG tablet  90 tablet 3 9/13/2022 9/13/2023   [id number] - [place], [place] - [address] AT [address] OF [place]    Sig: Take 1 tablet (324 mg) by mouth daily (with breakfast)    Class: E-Prescribe    Route: Oral    gabapentin (NEURONTIN) 300 MG  capsule            Sig: Take 900 mg by mouth 2 times daily    Class: Historical    Route: Oral    insulin glargine (LANTUS PEN) 100 UNIT/ML pen  15 mL  1/28/2023        Sig: Inject 30 Units Subcutaneous every morning    Class: Historical    Notes to Pharmacy: If Lantus is not covered by insurance, may substitute Basaglar or Semglee or other insulin glargine product per insurance preference at same dose and frequency.      Route: Subcutaneous    insulin pen needle (B-D U/F) 31G X 5 MM miscellaneous    1/12/2021        Sig: AS DIRECTED. BD UF MINI PEN NEEDLE [id number]: USE ONCE DAILY    Class: Historical    lactulose 20 GM/30ML SOLN  2838 mL 11 8/24/2022    [id number] - [place], [place] - [address] AT [address] OF [place]    Sig: Take 30 mLs (20 g) by mouth 3 times daily    Class: E-Prescribe    Route: Oral    loperamide (IMODIUM A-D) 1 MG/7.5ML  237 mL 1 9/15/2022    [id number] - [place], [place] - [address] [place]    Sig: Take 15 mLs (2 mg) by mouth 4 times daily as needed for diarrhea    Class: E-Prescribe    Route: Oral    magnesium oxide (MAG-OX) 400 MG tablet  60 tablet 3 3/27/2023 7/25/2023   [id number] - [place], [place] - [address] AT [address] [place]    Sig: Take 1 tablet (400 mg) by mouth 2 times daily for 120 days    Class: E-Prescribe    Route: Oral    Multiple Vitamins-Minerals (SUPER THERA DAMARIS M) TABS    9/29/2020        Sig: Take 1 tablet by mouth every morning    Class: Historical    Route: Oral    omeprazole (PRILOSEC) 20 MG DR capsule    4/15/2021        Sig: Take 20 mg by mouth every evening    Class: Historical    Route: Oral    ONETOUCH ULTRA test strip    4/10/2021        Sig: PLEASE SEE ATTACHED FOR DETAILED DIRECTIONS    Class: Historical    potassium chloride ER (KLOR-CON M) 10 MEQ CR tablet  720 tablet 3 2/21/2023 5/22/2023   [id number] - [place], [place] - [address] AT [address] OF [place]    Sig: Take 4  tablets (40 mEq) by mouth 2 times daily for 90 days    Class: E-Prescribe    Route: Oral    rifaximin (XIFAXAN) 550 MG TABS tablet  60 tablet 11 8/10/2022        Sig: Take 1 tablet (550 mg) by mouth 2 times daily    Class: Local Print    Route: Oral    Prior authorization: Approved    spironolactone (ALDACTONE) 25 MG tablet  30 tablet 3 3/27/2023 7/25/2023   [id number] - [place], [place] - [address] AT [facility]    Sig: Take 1 tablet (25 mg) by mouth daily for 120 days    Class: E-Prescribe    Route: Oral    torsemide (DEMADEX) 20 MG tablet  270 tablet 1 1/17/2023    [id number] - [place], [place] - [address] AT [address] [facility]    Sig: Take 3 tablets (60 mg) by mouth daily    Class: E-Prescribe    Route: Oral    vitamin D2 (ERGOCALCIFEROL) 90113 units (1250 mcg) capsule  12 capsule 0 2/28/2023    [id number]  [place], [place] - [address] AT [address] [facility]    Sig: Take 1 capsule (50,000 Units) by mouth once a week    Class: E-Prescribe    Route: Oral    vitamin D3 (CHOLECALCIFEROL) 50 mcg (2000 units) tablet  90 tablet 3 5/23/2023    [id number]  [place], [place] - [address] AT [address] [facility]    Sig: Take 1 tablet (50 mcg) by mouth daily Begin taking this after completion of Vitamin D2 therapy    Class: E-Prescribe    Route: Oral    midodrine (PROAMATINE) 10 MG tablet  90 tablet 3 3/27/2023 4/26/2023   [id number] - [place] - [address] AT [address] [facility]    Sig: Take 1 tablet (10 mg) by mouth 3 times daily for 30 days    Class: E-Prescribe    Route: Oral         ALLERGIES:    Allergies   Allergen Reactions     Minocycline Hives     REVIEW OF SYSTEMS:  Review Of Systems  Skin: negative for pigmentation, acne, rash, scaling, itching, bruising, pigmentation, acne, rash, scaling, itching, bruising  Eyes: negative for visual blurring, double vision, glaucoma, cataracts, eye pain, color blindness,  glasses, visual blurring, double vision, glaucoma, cataracts, eye pain, color blindness  Ears/Nose/Throat: negative for nasal congestion, purulent rhinorrhea, sneezing, postnasal drainage, hearing loss, deafness, tinnitus  Respiratory: positive for dyspnea on exertion, negative for cough, or hemoptysis  Cardiovascular: negative for, palpitations, tachycardia, irregular heart beat, chest pain and exertional chest pain or pressure  Gastrointestinal: positive for chronic diarrhea  Genitourinary: negative for, nocturia, dysuria, frequency, urgency, hesitancy and hematuria  Musculoskeletal: negative for, fracture, back pain, neck pain, arthritis and joint pain  Neurologic: negative for, headaches, syncope, stroke, seizures, paralysis and local weakness    A comprehensive review of systems was performed and found to be negative except as described here or above.  SOCIAL HISTORY:   Social History     Socioeconomic History     Marital status:      Spouse name: Not on file     Number of children: Not on file     Years of education: Not on file     Highest education level: Not on file   Occupational History     Not on file   Tobacco Use     Smoking status: Former     Packs/day: 1.00     Years: 16.00     Pack years: 16.00     Types: Cigarettes     Quit date:      Years since quittin.2     Smokeless tobacco: Never   Vaping Use     Vaping status: Never Used   Substance and Sexual Activity     Alcohol use: Not Currently     Comment: Quit 2020     Drug use: Never     Sexual activity: Not on file   Other Topics Concern     Parent/sibling w/ CABG, MI or angioplasty before 65F 55M? Not Asked   Social History Narrative     Not on file     Social Determinants of Health     Financial Resource Strain: Not on file   Food Insecurity: Not on file   Transportation Needs: Not on file   Physical Activity: Not on file   Stress: Not on file   Social Connections: Not on file   Intimate Partner Violence: Not on file   Housing  Stability: Not on file     FAMILY MEDICAL HISTORY:   Family History   Problem Relation Age of Onset     Deep Vein Thrombosis Mother      Pulmonary Embolism Mother      Substance Abuse Brother      Substance Abuse Maternal Grandmother      Anesthesia Reaction No family hx of      PHYSICAL EXAM:   /81   Pulse 51   Temp 98  F (36.7  C)   Wt 116.3 kg (256 lb 6.4 oz)   SpO2 100%   BMI 32.05 kg/m    GENERAL APPEARANCE: alert and no distress  EYES: nonicteric  HENT: mouth without ulcers or lesions  NECK: supple, no adenopathy  RESP: lungs clear to auscultation   CV: regular rhythm, normal rate, no rub  ABDOMEN: soft, nontender, normal bowel sounds, no HSM   Extremities: no clubbing, cyanosis, or edema  MS: no evidence of inflammation in joints, no muscle tenderness  SKIN: no rash  NEURO: mentation intact and speech normal  PSYCH: affect normal/bright   LABS:   Recent Results (from the past 672 hour(s))   Glucose by meter    Collection Time: 03/14/23  6:00 AM   Result Value Ref Range    GLUCOSE BY METER POCT 145 (H) 70 - 99 mg/dL   Glucose by meter    Collection Time: 03/14/23 12:16 PM   Result Value Ref Range    GLUCOSE BY METER POCT 91 70 - 99 mg/dL   Ferritin    Collection Time: 03/17/23  4:43 PM   Result Value Ref Range    Ferritin 136 26 - 388 ng/mL   Iron & Iron Binding Capacity    Collection Time: 03/17/23  4:43 PM   Result Value Ref Range    Iron 53 35 - 180 ug/dL    Iron Binding Capacity 108 (L) 240 - 430 ug/dL    Iron Sat Index 49 (H) 15 - 46 %   Renal panel    Collection Time: 03/17/23  4:43 PM   Result Value Ref Range    Sodium 142 133 - 144 mmol/L    Potassium 3.6 3.4 - 5.3 mmol/L    Chloride 114 (H) 94 - 109 mmol/L    Carbon Dioxide (CO2) 25 20 - 32 mmol/L    Anion Gap 3 3 - 14 mmol/L    Urea Nitrogen 17 7 - 30 mg/dL    Creatinine 1.39 (H) 0.66 - 1.25 mg/dL    Calcium 8.0 (L) 8.5 - 10.1 mg/dL    Glucose 207 (H) 70 - 99 mg/dL    Albumin 3.2 (L) 3.4 - 5.0 g/dL    Phosphorus 3.1 2.5 - 4.5 mg/dL    GFR  Estimate 60 (L) >60 mL/min/1.73m2   CBC with platelets    Collection Time: 03/17/23  4:43 PM   Result Value Ref Range    WBC Count 4.4 4.0 - 11.0 10e3/uL    RBC Count 2.62 (L) 4.40 - 5.90 10e6/uL    Hemoglobin 8.5 (L) 13.3 - 17.7 g/dL    Hematocrit 26.3 (L) 40.0 - 53.0 %     78 - 100 fL    MCH 32.4 26.5 - 33.0 pg    MCHC 32.3 31.5 - 36.5 g/dL    RDW 17.4 (H) 10.0 - 15.0 %    Platelet Count 64 (L) 150 - 450 10e3/uL   INR    Collection Time: 03/17/23  4:43 PM   Result Value Ref Range    INR 1.93 (H) 0.85 - 1.15   Adult Type and Screen    Collection Time: 03/17/23  4:43 PM   Result Value Ref Range    ABO/RH(D) A POS     Antibody Screen Negative Negative    SPECIMEN EXPIRATION DATE [id number]    Alkaline phosphatase    Collection Time: 03/17/23  4:43 PM   Result Value Ref Range    Alkaline Phosphatase 254 (H) 40 - 150 U/L   ALT    Collection Time: 03/17/23  4:43 PM   Result Value Ref Range    ALT 26 0 - 70 U/L   AST    Collection Time: 03/17/23  4:43 PM   Result Value Ref Range    AST 36 0 - 45 U/L   Bilirubin  total    Collection Time: 03/17/23  4:43 PM   Result Value Ref Range    Bilirubin Total 1.4 (H) 0.2 - 1.3 mg/dL   Bilirubin direct    Collection Time: 03/17/23  4:43 PM   Result Value Ref Range    Bilirubin Direct 0.6 (H) 0.0 - 0.2 mg/dL   Protein total    Collection Time: 03/17/23  4:43 PM   Result Value Ref Range    Protein Total 5.6 (L) 6.8 - 8.8 g/dL   Hemoglobin and hematocrit    Collection Time: 03/23/23 12:53 PM   Result Value Ref Range    Hemoglobin 8.7 (L) 13.3 - 17.7 g/dL    Hematocrit 27.3 (L) 40.0 - 53.0 %   INR    Collection Time: 03/23/23 12:53 PM   Result Value Ref Range    INR 1.80 (H) 0.85 - 1.15   Comprehensive metabolic panel    Collection Time: 03/23/23 12:53 PM   Result Value Ref Range    Sodium 142 133 - 144 mmol/L    Potassium 3.8 3.4 - 5.3 mmol/L    Chloride 116 (H) 94 - 109 mmol/L    Carbon Dioxide (CO2) 25 20 - 32 mmol/L    Anion Gap 1 (L) 3 - 14 mmol/L    Urea Nitrogen 17 7 -  30 mg/dL    Creatinine 1.22 0.66 - 1.25 mg/dL    Calcium 7.8 (L) 8.5 - 10.1 mg/dL    Glucose 203 (H) 70 - 99 mg/dL    Alkaline Phosphatase 250 (H) 40 - 150 U/L    AST 28 0 - 45 U/L    ALT 23 0 - 70 U/L    Protein Total 5.1 (L) 6.8 - 8.8 g/dL    Albumin 2.7 (L) 3.4 - 5.0 g/dL    Bilirubin Total 1.0 0.2 - 1.3 mg/dL    GFR Estimate 70 >60 mL/min/1.73m2   Magnesium    Collection Time: 03/23/23 12:53 PM   Result Value Ref Range    Magnesium 1.3 (L) 1.6 - 2.3 mg/dL   Hemoglobin and hematocrit    Collection Time: 03/27/23  9:13 AM   Result Value Ref Range    Hemoglobin 9.3 (L) 13.3 - 17.7 g/dL    Hematocrit 28.9 (L) 40.0 - 53.0 %   INR    Collection Time: 03/27/23  9:13 AM   Result Value Ref Range    INR 1.63 (H) 0.85 - 1.15   Comprehensive metabolic panel    Collection Time: 03/27/23  9:13 AM   Result Value Ref Range    Sodium 143 136 - 145 mmol/L    Potassium 3.6 3.4 - 5.3 mmol/L    Chloride 109 (H) 98 - 107 mmol/L    Carbon Dioxide (CO2) 24 22 - 29 mmol/L    Anion Gap 10 7 - 15 mmol/L    Urea Nitrogen 14.7 6.0 - 20.0 mg/dL    Creatinine 1.13 0.67 - 1.17 mg/dL    Calcium 8.3 (L) 8.6 - 10.0 mg/dL    Glucose 152 (H) 70 - 99 mg/dL    Alkaline Phosphatase 255 (H) 40 - 129 U/L    AST 31 10 - 50 U/L    ALT 12 10 - 50 U/L    Protein Total 5.7 (L) 6.4 - 8.3 g/dL    Albumin 3.3 (L) 3.5 - 5.2 g/dL    Bilirubin Total 1.2 <=1.2 mg/dL    GFR Estimate 77 >60 mL/min/1.73m2   Protein  random urine    Collection Time: 03/27/23  9:17 AM   Result Value Ref Range    Total Protein Urine mg/dL <6.0 1.0 - 14.0 mg/dL    Total Protein UR MG/MG CR      Creatinine Urine mg/dL 21.2 mg/dL   UA with Microscopic    Collection Time: 03/27/23  9:17 AM   Result Value Ref Range    Color Urine Straw Colorless, Straw, Light Yellow, Yellow    Appearance Urine Clear Clear    Glucose Urine Negative Negative mg/dL    Bilirubin Urine Negative Negative    Ketones Urine Negative Negative mg/dL    Specific Gravity Urine 1.007 1.003 - 1.035    Blood Urine Negative  Negative    pH Urine 5.0 5.0 - 7.0    Protein Albumin Urine Negative Negative mg/dL    Urobilinogen Urine Normal Normal, 2.0 mg/dL    Nitrite Urine Negative Negative    Leukocyte Esterase Urine Negative Negative    RBC Urine <1 <=2 /HPF    WBC Urine 1 <=5 /HPF    Squamous Epithelials Urine <1 <=1 /HPF   Comprehensive metabolic panel    Collection Time: 04/03/23  1:57 PM   Result Value Ref Range    Sodium 142 133 - 144 mmol/L    Potassium 4.2 3.4 - 5.3 mmol/L    Chloride 116 (H) 94 - 109 mmol/L    Carbon Dioxide (CO2) 25 20 - 32 mmol/L    Anion Gap 1 (L) 3 - 14 mmol/L    Urea Nitrogen 16 7 - 30 mg/dL    Creatinine 1.11 0.66 - 1.25 mg/dL    Calcium 8.4 (L) 8.5 - 10.1 mg/dL    Glucose 258 (H) 70 - 99 mg/dL    Alkaline Phosphatase 217 (H) 40 - 150 U/L    AST 31 0 - 45 U/L    ALT 22 0 - 70 U/L    Protein Total 5.5 (L) 6.8 - 8.8 g/dL    Albumin 2.9 (L) 3.4 - 5.0 g/dL    Bilirubin Total 1.1 0.2 - 1.3 mg/dL    GFR Estimate 79 >60 mL/min/1.73m2   INR    Collection Time: 04/03/23  1:57 PM   Result Value Ref Range    INR 1.66 (H) 0.85 - 1.15   Magnesium    Collection Time: 04/03/23  1:57 PM   Result Value Ref Range    Magnesium 1.6 1.6 - 2.3 mg/dL   Hemoglobin and hematocrit    Collection Time: 04/03/23  1:57 PM   Result Value Ref Range    Hemoglobin 8.8 (L) 13.3 - 17.7 g/dL    Hematocrit 27.9 (L) 40.0 - 53.0 %   Adult Type and Screen    Collection Time: 04/03/23  1:57 PM   Result Value Ref Range    ABO/RH(D) A POS     Antibody Screen Negative Negative    SPECIMEN EXPIRATION DATE [id number]    CBC with platelets    Collection Time: 04/10/23  1:22 PM   Result Value Ref Range    WBC Count 4.3 4.0 - 11.0 10e3/uL    RBC Count 2.60 (L) 4.40 - 5.90 10e6/uL    Hemoglobin 8.4 (L) 13.3 - 17.7 g/dL    Hematocrit 25.6 (L) 40.0 - 53.0 %    MCV 99 78 - 100 fL    MCH 32.3 26.5 - 33.0 pg    MCHC 32.8 31.5 - 36.5 g/dL    RDW 17.2 (H) 10.0 - 15.0 %    Platelet Count 83 (L) 150 - 450 10e3/uL     CMP  Recent Labs   Lab Test 04/03/23  2981  03/27/23  0913 03/23/23  1253 03/17/23  1643 03/02/23  1206 02/26/23  0514 02/25/23  0541 02/25/23  0535 02/24/23  1314 02/21/23  0926 09/20/21  1227 06/15/21  1235    143 142 142   < > 140  --  140   < > 139   < > 136   POTASSIUM 4.2 3.6 3.8 3.6   < > 3.9  --  3.6   < > 3.3*   < > 4.1   CHLORIDE 116* 109* 116* 114*   < > 113*  --  112*   < > 109*   < > 112*   CO2 25 24 25 25   < > 13*  --  17*   < > 21*   < > 25   ANIONGAP 1* 10 1* 3   < > 14  --  11   < > 9   < > <1*   * 152* 203* 207*   < > 164*   < > 110*   < > 134*   < > 123*   BUN 16 14.7 17 17   < > 28.0*  --  28.3*   < > 24.3*   < > 8   CR 1.11 1.13 1.22 1.39*   < > 2.04*  --  1.90*   < > 1.90*   < > 0.63*   GFRESTIMATED 79 77 70 60*   < > 38*  --  41*   < > 41*   < > >90   GFRESTBLACK  --   --   --   --   --   --   --   --   --   --   --  >90   DANIELLE 8.4* 8.3* 7.8* 8.0*   < > 8.3*  --  8.2*   < > 8.0*   < > 8.9   MAG 1.6  --  1.3*  --   --  1.9  --  1.8   < >  --    < >  --    PHOS  --   --   --  3.1  --  4.9*  --  5.0*  --  3.0  --  3.9   PROTTOTAL 5.5* 5.7* 5.1* 5.6*   < > 5.3*  --  4.5*   < > 5.5*   < > 7.5   ALBUMIN 2.9* 3.3* 2.7* 3.2*   < > 3.2*  --  3.0*   < > 3.3*   < > 3.1*   BILITOTAL 1.1 1.2 1.0 1.4*   < > 0.8  --  2.1*   < > 0.8   < > 0.8   ALKPHOS 217* 255* 250* 254*   < > 189*  --  154*   < > 197*   < > 126   AST 31 31 28 36   < > 30  --  25   < > 30   < > 34   ALT 22 12 23 26   < > 14  --  12   < > 19   < > 36    < > = values in this interval not displayed.     CBC  Recent Labs   Lab Test 04/10/23  1322 04/03/23  1357 03/27/23  0913 03/23/23  1253 03/17/23  1643 03/09/23  1138 03/02/23  1206   HGB 8.4* 8.8* 9.3* 8.7* 8.5* 9.6* 10.0*   WBC 4.3  --   --   --  4.4 6.6 8.1   RBC 2.60*  --   --   --  2.62* 2.93* 3.03*   HCT 25.6* 27.9* 28.9* 27.3* 26.3* 29.6* 31.1*   MCV 99  --   --   --  100 101* 103*   MCH 32.3  --   --   --  32.4 32.8 33.0   MCHC 32.8  --   --   --  32.3 32.4 32.2   RDW 17.2*  --   --   --  17.4* 17.9* 20.0*   PLT 83*   --   --   --  64* 88* 77*     INR  Recent Labs   Lab Test 04/03/23  1357 03/27/23  0913 03/23/23  1253 03/17/23  1643 03/02/23  1206 02/25/23  0535 02/24/23  1314 01/25/23  0938 01/24/23  0854   INR 1.66* 1.63* 1.80* 1.93*   < > 2.47* 2.80*   < > 2.51*   PTT  --   --   --   --   --  40* 41*  --  41*    < > = values in this interval not displayed.     ABGNo lab results found.   URINE STUDIES  Recent Labs   Lab Test 03/27/23  0917 02/21/23  1215 01/24/23  1043 06/13/22  1313   COLOR Straw Yellow Light Yellow Yellow   APPEARANCE Clear Slightly Cloudy* Clear Clear   URINEGLC Negative Negative Negative Negative   URINEBILI Negative Negative Negative Negative   URINEKETONE Negative Negative Negative Negative   SG 1.007 1.011 1.009 1.027   UBLD Negative Negative Trace* Negative   URINEPH 5.0 5.0 5.0 5.5   PROTEIN Negative Negative Negative 10*   NITRITE Negative Negative Negative Negative   LEUKEST Negative Trace* Small* Negative   RBCU <1 1 7* 1   WBCU 1 5 1 <1     No lab results found.    ASSESSMENT AND PLAN:   #CKD stage 3 s/p multiple episodes of WAI driven by hypovolemic and septic shock, cardiorenal syndrome, obstructive lithiasis and hepatorenal physiology. The creatinine level has been improving and is 1.1 today and as a result his MELD score is now improved to 15. His ascites has improved since spironolactone 25 mg daily has been added however he still requires therapeutic paracentesis. He is willing to try midodrine 5 mg tid to see if it makes a difference. As he has been complaining of breast tenderness since he started spironolactone, will refrain at the present time of increasing the dose further.  He has no evidence of proteinuria and renal imaging was unremarkable in February.The patient was also instructed to lose weight,  keep the sodium intake around 2400 mg /day, follow a plant-based diet and to avoid NSAIDs     #Blood pressure  Previously hypertensive. Currently borderline low. Management as per  above.The patient was also instructed to keep a BP diary and to communicate the results    #Type 2 DM  Duration 12 years   Hba1c 5.6 in January 2023  On Lantus 35 units once daily. Currently uncontrolled. Will increase lantus to 38 units daily.    #CVD/dyslipidemia  Metoprolol was stopped in February 2022. Management as per above     #Blood count  Hemoglobin 8.7 -> 8.4 off apixaban on oral iron supplementation  Ferritin 136, will order IV iron  Platelet count 64    #Acid-base status  CO2 level 25 no need for any supplementation    #Electrolytes  Na 143 no acute issues  K 3.8 -> 4.4 decrease potassium supplementation to 2 tabs twice daily  Mg 1.3 -> 1.6 improved on magnesium supplementation     #BMD  Ca   7.8  -> 9   P  3.1  Alb 2.7 -> 3.2   Vitamin D level 11  On ergocalciferol 31963 U once a week and on calcium carbonate 500 mg twice daily    #CKD journey/transplant not a candidate at this point    The total time of this encounter amounted to 40 minutes on the day of the encounter. This time included time spent with the patient, reviewing records, ordering tests, and performing post visit documentation.       The patient will return to follow up in 3 weeks    Mojgan Pedro MD  Division of Renal Disease and Hypertension      Again, thank you for allowing me to participate in the care of your patient.      Sincerely,    Mojgan Pedro MD

## 2023-04-10 NOTE — PROGRESS NOTES
Nephrology Clinic    Blair Oliver MRN:[id number] YOB: 1970  Date of Service: 04/10/2023  Primary care provider: Bubba Ovalle  Requesting physician: Sweetie Bingham Md      REASON FOR CONSULT: CKD    HISTORY OF PRESENT ILLNESS:   Blair Oliver is a 52 year old male who first presented for evaluation of CKD on March 27th 2023.  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  From a renal standpoint, his creatinine level was around 0.8 mg/dL up to November 2022 when he was admitted for bradycardia and sepsis possibly secondary to SBP. He was found to have a 3 mm ureteral calculus with possible partial obstruction and developed WAI with a creatinine level that peaked at 1.8 mg/dL.   He was discharged on 11/17 on tamsulosin and torsemide but off eplerenone and with a creatinine level at 1.4 mg/dL. He was readmitted on 1/23 for severe anemia with a hemoglobin level at 6.5, a creatinine level at 1.7 mg/dL and dark tarry stools and was found to be positive for Covid-19. He received 3Us of PRBCs and IV iron was discharged on 1/28 with a creatinine level at 1.7 mg/dL. The patient had another episode of WAI when he was readmitted on 2/24 for another episode of GI bleed and his creatinine level peaked at 2 mg/dL. He was discharged on 2/26 off apixaban that he had been on for his SMV thrombosis and off metoprolol due to episodes of bradycardia as well as off eplerenone. Since then, his creatinine level has been slowly improving and was 1.2 mg/dL on 3/23 and is 1.1 mg/dL on 3/27 and 4/03.  On an abdominal ultrasound done on 2/22 the kidneys appear of normal size and measure 12 cm each.  He  has been needing biweekly therapeutic paracentesis however since the TIPS was placed the amount of fluid removed has been decreasing and has decreased further since I added spironolactone at his last visit. He last had 1.7L removed on 3/24. He was previously getting between 3 and 5L removed each visit. He was also prescribed midodrine 10 mg tid with meals but has been reluctant to take it.    Current meds: torsemide 60 mg daily, rifaximin 1 tab twice daily, potassium chloride 40 mEq twice daily, lactulose 20 g twice daily, ferrous gluconate 1 tab daily, ciprofloxacin 500 mg once daily and spironolactone 25 mg daily      The following portions of the patient's history were reviewed and updated as appropriate: allergies, current medications, past family history, past medical history, past social history, past surgical history and problem list.    PAST MEDICAL HISTORY:  Past Medical History:   Diagnosis Date     Alcohol dependence in remission (H)      Alcoholic cirrhosis of liver with ascites (H)      Anemia      Chronic systolic heart failure (H)      Diabetes (H)     Type 2 DM, Insulin Use.      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      PAST SURGICAL HISTORY:  Past Surgical History:   Procedure Laterality Date     COLONOSCOPY N/A 12/09/2021    Procedure: COLONOSCOPY, WITH BIOPSY, WITH CLIPS;  Surgeon: Sweetie Bain MD;  [place] [facility] [place]     ESOPHAGOSCOPY, GASTROSCOPY, DUODENOSCOPY (EGD), COMBINED N/A 12/09/2021    Procedure: ESOPHAGOGASTRODUODENOSCOPY, WITH BIOPSY;  Surgeon: Sweetie Bain MD;  [place] [facility] [place]     ESOPHAGOSCOPY, GASTROSCOPY, DUODENOSCOPY (EGD), COMBINED N/A 02/24/2023    Procedure: Esophagoscopy, gastroscopy, duodenoscopy (EGD), combined;  Surgeon: Tom George,  MD;  Location:  GI     EXPLORATORY LAPAROTOMY W/ BOWEL RESECTION  09/2020    Exploratory laparotomy with small bowel resection     IR PARACENTESIS  3/14/2023     IR TRANSVEN INTRAHEPATIC PORTOSYST SHUNT  3/14/2023     SIGMOIDOSCOPY FLEXIBLE N/A 02/24/2023    Procedure: Sigmoidoscopy flexible;  Surgeon: Tom George MD;  Location:  GI     MEDICATIONS:  Prescription Medications as of 4/10/2023       Rx Number Disp Refills Start End Last Dispensed Date Next Fill Date Owning Pharmacy    acetaminophen (TYLENOL) 500 MG tablet    1/28/2023        Sig: Take 1 tablet (500 mg) by mouth every 8 hours as needed for mild pain    Class: Historical    Notes to Pharmacy: No more than 2000mg qday    Route: Oral    calcium carbonate (OS-DANIELLE) 500 MG tablet  180 tablet 3 2/28/2023    [facility]/pharmacy #[id number]  [place], [place] - [address]    Sig: Take 1 tablet (500 mg) by mouth 2 times daily    Class: E-Prescribe    Route: Oral    ciprofloxacin (CIPRO) 500 MG tablet  90 tablet 3 12/23/2022    [facility]/pharmacy #[id number]  [place], [place] - [address]    Sig: Take 1 tablet (500 mg) by mouth daily    Class: E-Prescribe    Route: Oral    DULoxetine (CYMBALTA) 60 MG capsule            Sig: Take 60 mg by mouth every evening    Class: Historical    Route: Oral    ferrous gluconate (FERGON) 324 (38 Fe) MG tablet  90 tablet 3 9/13/2022 9/13/2023   [facility]/pharmacy #[id number]  [place], [place] - [address]    Sig: Take 1 tablet (324 mg) by mouth daily (with breakfast)    Class: E-Prescribe    Route: Oral    gabapentin (NEURONTIN) 300 MG capsule            Sig: Take 900 mg by mouth 2 times daily    Class: Historical    Route: Oral    insulin glargine (LANTUS PEN) 100 UNIT/ML pen  15 mL  1/28/2023        Sig: Inject 30 Units Subcutaneous every morning    Class: Historical    Notes to Pharmacy: If Lantus is not covered by insurance, may substitute Basaglar  or Semglee or other insulin glargine product per insurance preference at same dose and frequency.      Route: Subcutaneous    insulin pen needle (B-D U/F) 31G X 5 MM miscellaneous    1/12/2021        Sig: AS DIRECTED. BD UF MINI PEN NEEDLE [id number]: USE ONCE DAILY    Class: Historical    lactulose 20 GM/30ML SOLN  2838 mL 11 8/24/2022    [facility]/pharmacy #[id number] - [place], [place] - [address] AT [address] [facility]    Sig: Take 30 mLs (20 g) by mouth 3 times daily    Class: E-Prescribe    Route: Oral    loperamide (IMODIUM A-D) 1 MG/7.5ML  237 mL 1 9/15/2022    [facility]/pharmacy #[id number] - [place], [place] - [address] AT [address] [facility]    Sig: Take 15 mLs (2 mg) by mouth 4 times daily as needed for diarrhea    Class: E-Prescribe    Route: Oral    magnesium oxide (MAG-OX) 400 MG tablet  60 tablet 3 3/27/2023 7/25/2023   [facility]/pharmacy #[id number] - [place], [place] - [address] [place]    Sig: Take 1 tablet (400 mg) by mouth 2 times daily for 120 days    Class: E-Prescribe    Route: Oral    Multiple Vitamins-Minerals (SUPER THERA DAMARIS M) TABS    9/29/2020        Sig: Take 1 tablet by mouth every morning    Class: Historical    Route: Oral    omeprazole (PRILOSEC) 20 MG DR capsule    4/15/2021        Sig: Take 20 mg by mouth every evening    Class: Historical    Route: Oral    ONETOUCH ULTRA test strip    4/10/2021        Sig: PLEASE SEE ATTACHED FOR DETAILED DIRECTIONS    Class: Historical    potassium chloride ER (KLOR-CON M) 10 MEQ CR tablet  720 tablet 3 2/21/2023 5/22/2023   [facility]/pharmacy #[id number] - [place], [place] - [address] [address] [place]    Sig: Take 4 tablets (40 mEq) by mouth 2 times daily for 90 days    Class: E-Prescribe    Route: Oral    rifaximin (XIFAXAN) 550 MG TABS tablet  60 tablet 11 8/10/2022        Sig: Take 1 tablet (550 mg) by mouth 2 times daily    Class: Local Print    Route: Oral    Prior authorization: Approved    spironolactone (ALDACTONE) 25 MG tablet   30 tablet 3 3/27/2023 7/25/2023   [facility]/pharmacy #[id number] - [place], [place] - [address] [address] AT [address] [facility]    Sig: Take 1 tablet (25 mg) by mouth daily for 120 days    Class: E-Prescribe    Route: Oral    torsemide (DEMADEX) 20 MG tablet  270 tablet 1 1/17/2023    [facility]/pharmacy #[id number] - [place] - [address] [address] AT [address] [facility]    Sig: Take 3 tablets (60 mg) by mouth daily    Class: E-Prescribe    Route: Oral    vitamin D2 (ERGOCALCIFEROL) 20515 units (1250 mcg) capsule  12 capsule 0 2/28/2023    [facility]/pharmacy #[id number] - [place] - [address] AT [address] [facility]    Sig: Take 1 capsule (50,000 Units) by mouth once a week    Class: E-Prescribe    Route: Oral    vitamin D3 (CHOLECALCIFEROL) 50 mcg (2000 units) tablet  90 tablet 3 5/23/2023    [facility]/pharmacy #[id number] - [place] - [address] AT [address] [facility]    Sig: Take 1 tablet (50 mcg) by mouth daily Begin taking this after completion of Vitamin D2 therapy    Class: E-Prescribe    Route: Oral    midodrine (PROAMATINE) 10 MG tablet  90 tablet 3 3/27/2023 4/26/2023   [facility]/pharmacy #[id number] - [place] - [address] AT [address] [facility]    Sig: Take 1 tablet (10 mg) by mouth 3 times daily for 30 days    Class: E-Prescribe    Route: Oral         ALLERGIES:    Allergies   Allergen Reactions     Minocycline Hives     REVIEW OF SYSTEMS:  Review Of Systems  Skin: negative for pigmentation, acne, rash, scaling, itching, bruising, pigmentation, acne, rash, scaling, itching, bruising  Eyes: negative for visual blurring, double vision, glaucoma, cataracts, eye pain, color blindness, glasses, visual blurring, double vision, glaucoma, cataracts, eye pain, color blindness  Ears/Nose/Throat: negative for nasal congestion, purulent rhinorrhea, sneezing, postnasal drainage, hearing loss, deafness, tinnitus  Respiratory: positive for dyspnea on exertion, negative for cough, or hemoptysis  Cardiovascular: negative for,  palpitations, tachycardia, irregular heart beat, chest pain and exertional chest pain or pressure  Gastrointestinal: positive for chronic diarrhea  Genitourinary: negative for, nocturia, dysuria, frequency, urgency, hesitancy and hematuria  Musculoskeletal: negative for, fracture, back pain, neck pain, arthritis and joint pain  Neurologic: negative for, headaches, syncope, stroke, seizures, paralysis and local weakness    A comprehensive review of systems was performed and found to be negative except as described here or above.  SOCIAL HISTORY:   Social History     Socioeconomic History     Marital status:      Spouse name: Not on file     Number of children: Not on file     Years of education: Not on file     Highest education level: Not on file   Occupational History     Not on file   Tobacco Use     Smoking status: Former     Packs/day: 1.00     Years: 16.00     Pack years: 16.00     Types: Cigarettes     Quit date:      Years since quittin.2     Smokeless tobacco: Never   Vaping Use     Vaping status: Never Used   Substance and Sexual Activity     Alcohol use: Not Currently     Comment: Quit 2020     Drug use: Never     Sexual activity: Not on file   Other Topics Concern     Parent/sibling w/ CABG, MI or angioplasty before 65F 55M? Not Asked   Social History Narrative     Not on file     Social Determinants of Health     Financial Resource Strain: Not on file   Food Insecurity: Not on file   Transportation Needs: Not on file   Physical Activity: Not on file   Stress: Not on file   Social Connections: Not on file   Intimate Partner Violence: Not on file   Housing Stability: Not on file     FAMILY MEDICAL HISTORY:   Family History   Problem Relation Age of Onset     Deep Vein Thrombosis Mother      Pulmonary Embolism Mother      Substance Abuse Brother      Substance Abuse Maternal Grandmother      Anesthesia Reaction No family hx of      PHYSICAL EXAM:   /81   Pulse 51   Temp 98  F  (36.7  C)   Wt 116.3 kg (256 lb 6.4 oz)   SpO2 100%   BMI 32.05 kg/m    GENERAL APPEARANCE: alert and no distress  EYES: nonicteric  HENT: mouth without ulcers or lesions  NECK: supple, no adenopathy  RESP: lungs clear to auscultation   CV: regular rhythm, normal rate, no rub  ABDOMEN: soft, nontender, normal bowel sounds, no HSM   Extremities: no clubbing, cyanosis, or edema  MS: no evidence of inflammation in joints, no muscle tenderness  SKIN: no rash  NEURO: mentation intact and speech normal  PSYCH: affect normal/bright   LABS:   Recent Results (from the past 672 hour(s))   Glucose by meter    Collection Time: 03/14/23  6:00 AM   Result Value Ref Range    GLUCOSE BY METER POCT 145 (H) 70 - 99 mg/dL   Glucose by meter    Collection Time: 03/14/23 12:16 PM   Result Value Ref Range    GLUCOSE BY METER POCT 91 70 - 99 mg/dL   Ferritin    Collection Time: 03/17/23  4:43 PM   Result Value Ref Range    Ferritin 136 26 - 388 ng/mL   Iron & Iron Binding Capacity    Collection Time: 03/17/23  4:43 PM   Result Value Ref Range    Iron 53 35 - 180 ug/dL    Iron Binding Capacity 108 (L) 240 - 430 ug/dL    Iron Sat Index 49 (H) 15 - 46 %   Renal panel    Collection Time: 03/17/23  4:43 PM   Result Value Ref Range    Sodium 142 133 - 144 mmol/L    Potassium 3.6 3.4 - 5.3 mmol/L    Chloride 114 (H) 94 - 109 mmol/L    Carbon Dioxide (CO2) 25 20 - 32 mmol/L    Anion Gap 3 3 - 14 mmol/L    Urea Nitrogen 17 7 - 30 mg/dL    Creatinine 1.39 (H) 0.66 - 1.25 mg/dL    Calcium 8.0 (L) 8.5 - 10.1 mg/dL    Glucose 207 (H) 70 - 99 mg/dL    Albumin 3.2 (L) 3.4 - 5.0 g/dL    Phosphorus 3.1 2.5 - 4.5 mg/dL    GFR Estimate 60 (L) >60 mL/min/1.73m2   CBC with platelets    Collection Time: 03/17/23  4:43 PM   Result Value Ref Range    WBC Count 4.4 4.0 - 11.0 10e3/uL    RBC Count 2.62 (L) 4.40 - 5.90 10e6/uL    Hemoglobin 8.5 (L) 13.3 - 17.7 g/dL    Hematocrit 26.3 (L) 40.0 - 53.0 %     78 - 100 fL    MCH 32.4 26.5 - 33.0 pg    MCHC  32.3 31.5 - 36.5 g/dL    RDW 17.4 (H) 10.0 - 15.0 %    Platelet Count 64 (L) 150 - 450 10e3/uL   INR    Collection Time: 03/17/23  4:43 PM   Result Value Ref Range    INR 1.93 (H) 0.85 - 1.15   Adult Type and Screen    Collection Time: 03/17/23  4:43 PM   Result Value Ref Range    ABO/RH(D) A POS     Antibody Screen Negative Negative    SPECIMEN EXPIRATION DATE [id number]    Alkaline phosphatase    Collection Time: 03/17/23  4:43 PM   Result Value Ref Range    Alkaline Phosphatase 254 (H) 40 - 150 U/L   ALT    Collection Time: 03/17/23  4:43 PM   Result Value Ref Range    ALT 26 0 - 70 U/L   AST    Collection Time: 03/17/23  4:43 PM   Result Value Ref Range    AST 36 0 - 45 U/L   Bilirubin  total    Collection Time: 03/17/23  4:43 PM   Result Value Ref Range    Bilirubin Total 1.4 (H) 0.2 - 1.3 mg/dL   Bilirubin direct    Collection Time: 03/17/23  4:43 PM   Result Value Ref Range    Bilirubin Direct 0.6 (H) 0.0 - 0.2 mg/dL   Protein total    Collection Time: 03/17/23  4:43 PM   Result Value Ref Range    Protein Total 5.6 (L) 6.8 - 8.8 g/dL   Hemoglobin and hematocrit    Collection Time: 03/23/23 12:53 PM   Result Value Ref Range    Hemoglobin 8.7 (L) 13.3 - 17.7 g/dL    Hematocrit 27.3 (L) 40.0 - 53.0 %   INR    Collection Time: 03/23/23 12:53 PM   Result Value Ref Range    INR 1.80 (H) 0.85 - 1.15   Comprehensive metabolic panel    Collection Time: 03/23/23 12:53 PM   Result Value Ref Range    Sodium 142 133 - 144 mmol/L    Potassium 3.8 3.4 - 5.3 mmol/L    Chloride 116 (H) 94 - 109 mmol/L    Carbon Dioxide (CO2) 25 20 - 32 mmol/L    Anion Gap 1 (L) 3 - 14 mmol/L    Urea Nitrogen 17 7 - 30 mg/dL    Creatinine 1.22 0.66 - 1.25 mg/dL    Calcium 7.8 (L) 8.5 - 10.1 mg/dL    Glucose 203 (H) 70 - 99 mg/dL    Alkaline Phosphatase 250 (H) 40 - 150 U/L    AST 28 0 - 45 U/L    ALT 23 0 - 70 U/L    Protein Total 5.1 (L) 6.8 - 8.8 g/dL    Albumin 2.7 (L) 3.4 - 5.0 g/dL    Bilirubin Total 1.0 0.2 - 1.3 mg/dL    GFR  Estimate 70 >60 mL/min/1.73m2   Magnesium    Collection Time: 03/23/23 12:53 PM   Result Value Ref Range    Magnesium 1.3 (L) 1.6 - 2.3 mg/dL   Hemoglobin and hematocrit    Collection Time: 03/27/23  9:13 AM   Result Value Ref Range    Hemoglobin 9.3 (L) 13.3 - 17.7 g/dL    Hematocrit 28.9 (L) 40.0 - 53.0 %   INR    Collection Time: 03/27/23  9:13 AM   Result Value Ref Range    INR 1.63 (H) 0.85 - 1.15   Comprehensive metabolic panel    Collection Time: 03/27/23  9:13 AM   Result Value Ref Range    Sodium 143 136 - 145 mmol/L    Potassium 3.6 3.4 - 5.3 mmol/L    Chloride 109 (H) 98 - 107 mmol/L    Carbon Dioxide (CO2) 24 22 - 29 mmol/L    Anion Gap 10 7 - 15 mmol/L    Urea Nitrogen 14.7 6.0 - 20.0 mg/dL    Creatinine 1.13 0.67 - 1.17 mg/dL    Calcium 8.3 (L) 8.6 - 10.0 mg/dL    Glucose 152 (H) 70 - 99 mg/dL    Alkaline Phosphatase 255 (H) 40 - 129 U/L    AST 31 10 - 50 U/L    ALT 12 10 - 50 U/L    Protein Total 5.7 (L) 6.4 - 8.3 g/dL    Albumin 3.3 (L) 3.5 - 5.2 g/dL    Bilirubin Total 1.2 <=1.2 mg/dL    GFR Estimate 77 >60 mL/min/1.73m2   Protein  random urine    Collection Time: 03/27/23  9:17 AM   Result Value Ref Range    Total Protein Urine mg/dL <6.0 1.0 - 14.0 mg/dL    Total Protein UR MG/MG CR      Creatinine Urine mg/dL 21.2 mg/dL   UA with Microscopic    Collection Time: 03/27/23  9:17 AM   Result Value Ref Range    Color Urine Straw Colorless, Straw, Light Yellow, Yellow    Appearance Urine Clear Clear    Glucose Urine Negative Negative mg/dL    Bilirubin Urine Negative Negative    Ketones Urine Negative Negative mg/dL    Specific Gravity Urine 1.007 1.003 - 1.035    Blood Urine Negative Negative    pH Urine 5.0 5.0 - 7.0    Protein Albumin Urine Negative Negative mg/dL    Urobilinogen Urine Normal Normal, 2.0 mg/dL    Nitrite Urine Negative Negative    Leukocyte Esterase Urine Negative Negative    RBC Urine <1 <=2 /HPF    WBC Urine 1 <=5 /HPF    Squamous Epithelials Urine <1 <=1 /HPF   Comprehensive  metabolic panel    Collection Time: 04/03/23  1:57 PM   Result Value Ref Range    Sodium 142 133 - 144 mmol/L    Potassium 4.2 3.4 - 5.3 mmol/L    Chloride 116 (H) 94 - 109 mmol/L    Carbon Dioxide (CO2) 25 20 - 32 mmol/L    Anion Gap 1 (L) 3 - 14 mmol/L    Urea Nitrogen 16 7 - 30 mg/dL    Creatinine 1.11 0.66 - 1.25 mg/dL    Calcium 8.4 (L) 8.5 - 10.1 mg/dL    Glucose 258 (H) 70 - 99 mg/dL    Alkaline Phosphatase 217 (H) 40 - 150 U/L    AST 31 0 - 45 U/L    ALT 22 0 - 70 U/L    Protein Total 5.5 (L) 6.8 - 8.8 g/dL    Albumin 2.9 (L) 3.4 - 5.0 g/dL    Bilirubin Total 1.1 0.2 - 1.3 mg/dL    GFR Estimate 79 >60 mL/min/1.73m2   INR    Collection Time: 04/03/23  1:57 PM   Result Value Ref Range    INR 1.66 (H) 0.85 - 1.15   Magnesium    Collection Time: 04/03/23  1:57 PM   Result Value Ref Range    Magnesium 1.6 1.6 - 2.3 mg/dL   Hemoglobin and hematocrit    Collection Time: 04/03/23  1:57 PM   Result Value Ref Range    Hemoglobin 8.8 (L) 13.3 - 17.7 g/dL    Hematocrit 27.9 (L) 40.0 - 53.0 %   Adult Type and Screen    Collection Time: 04/03/23  1:57 PM   Result Value Ref Range    ABO/RH(D) A POS     Antibody Screen Negative Negative    SPECIMEN EXPIRATION DATE [id number]    CBC with platelets    Collection Time: 04/10/23  1:22 PM   Result Value Ref Range    WBC Count 4.3 4.0 - 11.0 10e3/uL    RBC Count 2.60 (L) 4.40 - 5.90 10e6/uL    Hemoglobin 8.4 (L) 13.3 - 17.7 g/dL    Hematocrit 25.6 (L) 40.0 - 53.0 %    MCV 99 78 - 100 fL    MCH 32.3 26.5 - 33.0 pg    MCHC 32.8 31.5 - 36.5 g/dL    RDW 17.2 (H) 10.0 - 15.0 %    Platelet Count 83 (L) 150 - 450 10e3/uL     CMP  Recent Labs   Lab Test 04/03/23  1357 03/27/23  0913 03/23/23  1253 03/17/23  1643 03/02/23  1206 02/26/23  0514 02/25/23  0541 02/25/23  0535 02/24/23  1314 02/21/23  0926 09/20/21  1227 06/15/21  1235    143 142 142   < > 140  --  140   < > 139   < > 136   POTASSIUM 4.2 3.6 3.8 3.6   < > 3.9  --  3.6   < > 3.3*   < > 4.1   CHLORIDE 116* 109* 116*  114*   < > 113*  --  112*   < > 109*   < > 112*   CO2 25 24 25 25   < > 13*  --  17*   < > 21*   < > 25   ANIONGAP 1* 10 1* 3   < > 14  --  11   < > 9   < > <1*   * 152* 203* 207*   < > 164*   < > 110*   < > 134*   < > 123*   BUN 16 14.7 17 17   < > 28.0*  --  28.3*   < > 24.3*   < > 8   CR 1.11 1.13 1.22 1.39*   < > 2.04*  --  1.90*   < > 1.90*   < > 0.63*   GFRESTIMATED 79 77 70 60*   < > 38*  --  41*   < > 41*   < > >90   GFRESTBLACK  --   --   --   --   --   --   --   --   --   --   --  >90   DANIELLE 8.4* 8.3* 7.8* 8.0*   < > 8.3*  --  8.2*   < > 8.0*   < > 8.9   MAG 1.6  --  1.3*  --   --  1.9  --  1.8   < >  --    < >  --    PHOS  --   --   --  3.1  --  4.9*  --  5.0*  --  3.0  --  3.9   PROTTOTAL 5.5* 5.7* 5.1* 5.6*   < > 5.3*  --  4.5*   < > 5.5*   < > 7.5   ALBUMIN 2.9* 3.3* 2.7* 3.2*   < > 3.2*  --  3.0*   < > 3.3*   < > 3.1*   BILITOTAL 1.1 1.2 1.0 1.4*   < > 0.8  --  2.1*   < > 0.8   < > 0.8   ALKPHOS 217* 255* 250* 254*   < > 189*  --  154*   < > 197*   < > 126   AST 31 31 28 36   < > 30  --  25   < > 30   < > 34   ALT 22 12 23 26   < > 14  --  12   < > 19   < > 36    < > = values in this interval not displayed.     CBC  Recent Labs   Lab Test 04/10/23  1322 04/03/23  1357 03/27/23  0913 03/23/23  1253 03/17/23  1643 03/09/23  1138 03/02/23  1206   HGB 8.4* 8.8* 9.3* 8.7* 8.5* 9.6* 10.0*   WBC 4.3  --   --   --  4.4 6.6 8.1   RBC 2.60*  --   --   --  2.62* 2.93* 3.03*   HCT 25.6* 27.9* 28.9* 27.3* 26.3* 29.6* 31.1*   MCV 99  --   --   --  100 101* 103*   MCH 32.3  --   --   --  32.4 32.8 33.0   MCHC 32.8  --   --   --  32.3 32.4 32.2   RDW 17.2*  --   --   --  17.4* 17.9* 20.0*   PLT 83*  --   --   --  64* 88* 77*     INR  Recent Labs   Lab Test 04/03/23  1357 03/27/23  0913 03/23/23  1253 03/17/23  1643 03/02/23  1206 02/25/23  0535 02/24/23  1314 01/25/23  0938 01/24/23  0854   INR 1.66* 1.63* 1.80* 1.93*   < > 2.47* 2.80*   < > 2.51*   PTT  --   --   --   --   --  40* 41*  --  41*    < > = values  in this interval not displayed.     ABGNo lab results found.   URINE STUDIES  Recent Labs   Lab Test 03/27/23  0917 02/21/23  1215 01/24/23  1043 06/13/22  1313   COLOR Straw Yellow Light Yellow Yellow   APPEARANCE Clear Slightly Cloudy* Clear Clear   URINEGLC Negative Negative Negative Negative   URINEBILI Negative Negative Negative Negative   URINEKETONE Negative Negative Negative Negative   SG 1.007 1.011 1.009 1.027   UBLD Negative Negative Trace* Negative   URINEPH 5.0 5.0 5.0 5.5   PROTEIN Negative Negative Negative 10*   NITRITE Negative Negative Negative Negative   LEUKEST Negative Trace* Small* Negative   RBCU <1 1 7* 1   WBCU 1 5 1 <1     No lab results found.    ASSESSMENT AND PLAN:   #CKD stage 3 s/p multiple episodes of WAI driven by hypovolemic and septic shock, cardiorenal syndrome, obstructive lithiasis and hepatorenal physiology. The creatinine level has been improving and is 1.1 today and as a result his MELD score is now improved to 15. His ascites has improved since spironolactone 25 mg daily has been added however he still requires therapeutic paracentesis. He is willing to try midodrine 5 mg tid to see if it makes a difference. As he has been complaining of breast tenderness since he started spironolactone, will refrain at the present time of increasing the dose further.  He has no evidence of proteinuria and renal imaging was unremarkable in February.The patient was also instructed to lose weight,  keep the sodium intake around 2400 mg /day, follow a plant-based diet and to avoid NSAIDs     #Blood pressure  Previously hypertensive. Currently borderline low. Management as per above.The patient was also instructed to keep a BP diary and to communicate the results    #Type 2 DM  Duration 12 years   Hba1c 5.6 in January 2023  On Lantus 35 units once daily. Currently uncontrolled. Will increase lantus to 38 units daily.    #CVD/dyslipidemia  Metoprolol was stopped in February 2022. Management as  per above     #Blood count  Hemoglobin 8.7 -> 8.4 off apixaban on oral iron supplementation  Ferritin 136, will order IV iron  Platelet count 64    #Acid-base status  CO2 level 25 no need for any supplementation    #Electrolytes  Na 143 no acute issues  K 3.8 -> 4.4 decrease potassium supplementation to 2 tabs twice daily  Mg 1.3 -> 1.6 improved on magnesium supplementation     #BMD  Ca   7.8  -> 9   P  3.1  Alb 2.7 -> 3.2   Vitamin D level 11  On ergocalciferol 14331 U once a week and on calcium carbonate 500 mg twice daily    #CKD journey/transplant not a candidate at this point    The total time of this encounter amounted to 40 minutes on the day of the encounter. This time included time spent with the patient, reviewing records, ordering tests, and performing post visit documentation.       The patient will return to follow up in 3 weeks    Mojgan Pedro MD  Division of Renal Disease and Hypertension

## 2023-04-11 NOTE — NURSING NOTE
Anemia Management Note  SUBJECTIVE/OBJECTIVE:  Referred by Dr. Sweetie Bain on 2023  Primary Diagnosis: Anemia of Other Chronic Illness (D63.8), CKD stage 3a     Secondary Diagnosis:  GAVE, Alcoholic cirrhosis of liver with ascites (K70.3) Anemia in CKD Satge 3a.   *2/15/23 Ok to use CKD dx codes per Dr. Sweetie Bain.   Hgb goal range:  9-10  Epo/Darbo: TBD: Hgb >9.0  Iron regimen:  NA. Elevated TSAT  4/10/23: Dr. Pedro ordered Injectafer.   Labs : 24  Recent FELI use, transfusion, IV iron: NA  RX/TX plans : TBD     No history of stroke, MI and blood clots or cancers.     Contact:            No Boxes Checked on Consent to Communicate.            Latest Ref Rng & Units 3/2/2023    12:06 PM 3/9/2023    11:38 AM 3/17/2023     4:43 PM 3/23/2023    12:53 PM 3/27/2023     9:13 AM 4/3/2023     1:57 PM 4/10/2023     1:22 PM   Anemia   Hemoglobin 13.3 - 17.7 g/dL 10.0   9.6   8.5   8.7   9.3   8.8   8.4     TSAT 15 - 46 %  41   49         Ferritin 26 - 388 ng/mL  146   136           BP Readings from Last 3 Encounters:   04/10/23 129/81   23 123/78   23 123/66     Wt Readings from Last 2 Encounters:   04/10/23 256 lb 6.4 oz (116.3 kg)   23 265 lb 3.2 oz (120.3 kg)           ASSESSMENT:  Hgb:Not at goal/Known  TSat: at goal >30% Ferritin: At goal (>100ng/mL)    PLAN:  Dr. Pedro ordered Injectafer on 4.10.23.     Orders needed to be renewed (for next follow-up date) in EPIC: None    Iron labs due:  May 2023    Plan discussed with:  No call, chart review       NEXT FOLLOW-UP DATE:  23    Gretta Brar RN   Anemia Services  [address] [address]   [email or web address]   Office : [phone]  Fax: [phone]

## 2023-04-19 NOTE — NURSING NOTE
Chief Complaint   Patient presents with     Follow Up     TIPS procedure 1 month follow up           Vitals were taken and medications reconciled.     Kalani Galvez, Visit Facilitator    1:17 PM

## 2023-04-19 NOTE — LETTER
4/19/2023       RE: Blair Oliver  [address] [facility] [address]     Dear Colleague,    Thank you for referring your patient, Blair Oliver, to the [facility] [place] at [facility]. Please see a copy of my visit note below.    Mr. Oliver presents to clinic for follow-up after transjugular intrahepatic portosystemic shunt placement for multiple recurrent variceal bleeding episodes in the setting of cirrhosis and portal hypertension.    Is been approximately 1 month.  He actually has just been discharged from the hospital this morning for mild encephalopathy.  He was kept in the hospital longer over the past couple of days secondary to renal dysfunction which they think may have been developed secondary to contrast exposure.    He is accompanied by his spouse.  He has not had any episodes of GI bleeding, however it does appear that some of the encephalopathy symptoms have developed despite being on Xifaxan.  He no longer takes lactulose, because he was having too many loose bowel movements and was not able to control his bowel movements had episodes of inadvertent/involuntary bowel movements.    He also is dealing with his moderate lower extremity swelling which he is managing with leg wraps and tight fitted socks.    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    PE:  Alert no acute distress  Abdomen, large ventral abdominal hernia with a midline vertical scar.    Lower extremities: +2 left lower extremity pitting edema.  +1 right lower extremity pitting edema.  No ulcerations.    Labs:    Last Comprehensive Metabolic Panel:  Sodium   Date Value Ref Range Status   04/10/2023 140 136 - 145 mmol/L Final   06/15/2021 136 133 - 144 mmol/L Final     Potassium   Date Value Ref Range Status    04/10/2023 4.4 3.4 - 5.3 mmol/L Final   04/03/2023 4.2 3.4 - 5.3 mmol/L Final   06/15/2021 4.1 3.4 - 5.3 mmol/L Final     Chloride   Date Value Ref Range Status   04/10/2023 107 98 - 107 mmol/L Final   04/03/2023 116 (H) 94 - 109 mmol/L Final   06/15/2021 112 (H) 94 - 109 mmol/L Final     Carbon Dioxide   Date Value Ref Range Status   06/15/2021 25 20 - 32 mmol/L Final     Carbon Dioxide (CO2)   Date Value Ref Range Status   04/10/2023 22 22 - 29 mmol/L Final   04/03/2023 25 20 - 32 mmol/L Final     Anion Gap   Date Value Ref Range Status   04/10/2023 11 7 - 15 mmol/L Final   04/03/2023 1 (L) 3 - 14 mmol/L Final   06/15/2021 <1 (L) 3 - 14 mmol/L Final     Glucose   Date Value Ref Range Status   04/10/2023 198 (H) 70 - 99 mg/dL Final   04/03/2023 258 (H) 70 - 99 mg/dL Final   06/15/2021 123 (H) 70 - 99 mg/dL Final     Urea Nitrogen   Date Value Ref Range Status   04/10/2023 18.9 6.0 - 20.0 mg/dL Final   04/03/2023 16 7 - 30 mg/dL Final   06/15/2021 8 7 - 30 mg/dL Final     Creatinine   Date Value Ref Range Status   04/10/2023 1.13 0.67 - 1.17 mg/dL Final   06/15/2021 0.63 (L) 0.66 - 1.25 mg/dL Final     GFR Estimate   Date Value Ref Range Status   04/10/2023 77 >60 mL/min/1.73m2 Final     Comment:     eGFR calculated using 2021 CKD-EPI equation.   06/15/2021 >90 >60 mL/min/[1.73_m2] Final     Comment:     Non  GFR Calc  Starting 12/18/2018, serum creatinine based estimated GFR (eGFR) will be   calculated using the Chronic Kidney Disease Epidemiology Collaboration   (CKD-EPI) equation.       Calcium   Date Value Ref Range Status   04/10/2023 9.0 8.6 - 10.0 mg/dL Final   06/15/2021 8.9 8.5 - 10.1 mg/dL Final     Bilirubin Total   Date Value Ref Range Status   04/10/2023 1.3 (H) <=1.2 mg/dL Final   06/15/2021 0.8 0.2 - 1.3 mg/dL Final     Alkaline Phosphatase   Date Value Ref Range Status   04/10/2023 220 (H) 40 - 129 U/L Final   06/15/2021 126 40 - 150 U/L Final     ALT   Date Value Ref Range Status    04/10/2023 17 10 - 50 U/L Final   06/15/2021 36 0 - 70 U/L Final     AST   Date Value Ref Range Status   04/10/2023 29 10 - 50 U/L Final   06/15/2021 34 0 - 45 U/L Final     INR   Date Value Ref Range Status   04/10/2023 1.64 (H) 0.85 - 1.15 Final   06/15/2021 1.58 (H) 0.86 - 1.14 Final      Imaging:    I reviewed the duplex ultrasound from today which demonstrates a patent TIPS and continued retrograde flow intrahepatic portal veins.  Slight elevation in velocities.    Assessment/plan:    52-year-old male approximate 1 month status post TIPS placement for variceal bleeding, doing relatively okay, however was recently admitted for symptoms of encephalopathy.  He is taking Xifaxan, however is not taking lactulose due to  chronic diarrhea.    Discussed the importance of diet, he is in a difficult place because he been told to increase his protein because of protein deficiency and hepatic insufficiency.  Encourage him to reduce salt intake.  Lower extremity swelling should improve gradually, he should continue aggressive conservative management with leg elevation and compression stockings or wraps.    We should follow-up in 2 months from today with a repeat duplex ultrasound.  No episodes of variceal bleeding.  Would only consider further dilating the shunt if he had episodes of bleeding.  TIPS ultrasound demonstrates a patent TIPS, with some slight elevated velocity which we will continue to follow-up on surveillance.        Again, thank you for allowing me to participate in the care of your patient.      Sincerely,    Teddy Hilario MD

## 2023-04-20 NOTE — PROGRESS NOTES
Mr. Oliver presents to clinic for follow-up after transjugular intrahepatic portosystemic shunt placement for multiple recurrent variceal bleeding episodes in the setting of cirrhosis and portal hypertension.    Is been approximately 1 month.  He actually has just been discharged from the hospital this morning for mild encephalopathy.  He was kept in the hospital longer over the past couple of days secondary to renal dysfunction which they think may have been developed secondary to contrast exposure.    He is accompanied by his spouse.  He has not had any episodes of GI bleeding, however it does appear that some of the encephalopathy symptoms have developed despite being on Xifaxan.  He no longer takes lactulose, because he was having too many loose bowel movements and was not able to control his bowel movements had episodes of inadvertent/involuntary bowel movements.    He also is dealing with his moderate lower extremity swelling which he is managing with leg wraps and tight fitted socks.    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    PE:  Alert no acute distress  Abdomen, large ventral abdominal hernia with a midline vertical scar.    Lower extremities: +2 left lower extremity pitting edema.  +1 right lower extremity pitting edema.  No ulcerations.    Labs:    Last Comprehensive Metabolic Panel:  Sodium   Date Value Ref Range Status   04/10/2023 140 136 - 145 mmol/L Final   06/15/2021 136 133 - 144 mmol/L Final     Potassium   Date Value Ref Range Status   04/10/2023 4.4 3.4 - 5.3 mmol/L Final   04/03/2023 4.2 3.4 - 5.3 mmol/L Final   06/15/2021 4.1 3.4 - 5.3 mmol/L Final     Chloride   Date Value Ref Range Status   04/10/2023 107 98 - 107 mmol/L Final   04/03/2023 116 (H) 94 - 109 mmol/L Final   06/15/2021 112 (H) 94 - 109 mmol/L Final     Carbon Dioxide   Date Value Ref  Range Status   06/15/2021 25 20 - 32 mmol/L Final     Carbon Dioxide (CO2)   Date Value Ref Range Status   04/10/2023 22 22 - 29 mmol/L Final   04/03/2023 25 20 - 32 mmol/L Final     Anion Gap   Date Value Ref Range Status   04/10/2023 11 7 - 15 mmol/L Final   04/03/2023 1 (L) 3 - 14 mmol/L Final   06/15/2021 <1 (L) 3 - 14 mmol/L Final     Glucose   Date Value Ref Range Status   04/10/2023 198 (H) 70 - 99 mg/dL Final   04/03/2023 258 (H) 70 - 99 mg/dL Final   06/15/2021 123 (H) 70 - 99 mg/dL Final     Urea Nitrogen   Date Value Ref Range Status   04/10/2023 18.9 6.0 - 20.0 mg/dL Final   04/03/2023 16 7 - 30 mg/dL Final   06/15/2021 8 7 - 30 mg/dL Final     Creatinine   Date Value Ref Range Status   04/10/2023 1.13 0.67 - 1.17 mg/dL Final   06/15/2021 0.63 (L) 0.66 - 1.25 mg/dL Final     GFR Estimate   Date Value Ref Range Status   04/10/2023 77 >60 mL/min/1.73m2 Final     Comment:     eGFR calculated using 2021 CKD-EPI equation.   06/15/2021 >90 >60 mL/min/[1.73_m2] Final     Comment:     Non  GFR Calc  Starting 12/18/2018, serum creatinine based estimated GFR (eGFR) will be   calculated using the Chronic Kidney Disease Epidemiology Collaboration   (CKD-EPI) equation.       Calcium   Date Value Ref Range Status   04/10/2023 9.0 8.6 - 10.0 mg/dL Final   06/15/2021 8.9 8.5 - 10.1 mg/dL Final     Bilirubin Total   Date Value Ref Range Status   04/10/2023 1.3 (H) <=1.2 mg/dL Final   06/15/2021 0.8 0.2 - 1.3 mg/dL Final     Alkaline Phosphatase   Date Value Ref Range Status   04/10/2023 220 (H) 40 - 129 U/L Final   06/15/2021 126 40 - 150 U/L Final     ALT   Date Value Ref Range Status   04/10/2023 17 10 - 50 U/L Final   06/15/2021 36 0 - 70 U/L Final     AST   Date Value Ref Range Status   04/10/2023 29 10 - 50 U/L Final   06/15/2021 34 0 - 45 U/L Final     INR   Date Value Ref Range Status   04/10/2023 1.64 (H) 0.85 - 1.15 Final   06/15/2021 1.58 (H) 0.86 - 1.14 Final      Imaging:    I reviewed the  duplex ultrasound from today which demonstrates a patent TIPS and continued retrograde flow intrahepatic portal veins.  Slight elevation in velocities.    Assessment/plan:    52-year-old male approximate 1 month status post TIPS placement for variceal bleeding, doing relatively okay, however was recently admitted for symptoms of encephalopathy.  He is taking Xifaxan, however is not taking lactulose due to  chronic diarrhea.    Discussed the importance of diet, he is in a difficult place because he been told to increase his protein because of protein deficiency and hepatic insufficiency.  Encourage him to reduce salt intake.  Lower extremity swelling should improve gradually, he should continue aggressive conservative management with leg elevation and compression stockings or wraps.    We should follow-up in 2 months from today with a repeat duplex ultrasound.  No episodes of variceal bleeding.  Would only consider further dilating the shunt if he had episodes of bleeding.  TIPS ultrasound demonstrates a patent TIPS, with some slight elevated velocity which we will continue to follow-up on surveillance.

## 2023-04-21 NOTE — DISCHARGE INSTRUCTIONS
Please call your GI doctors to arrange for follow-up in the next several days.  Please keep your appointment at [facility] for paracentesis as scheduled later today.    Continue taking all your regular medications including lactulose.    Return to the emergency department for any worsening back pain, abdominal pain, fevers or chills, burning with urination, nausea or vomiting, weakness or any other concerns as given or discussed.

## 2023-04-21 NOTE — ED PROVIDER NOTES
[place] EMERGENCY DEPARTMENT ([facility])    4/21/23       ED PROVIDER NOTE      History     Chief Complaint   Patient presents with     Abdominal Pain     The history is provided by the patient and medical records.     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  Patient states that this pain was significant enough to wake him up from sleep.  Describes the pain as sharp, located in the lower abdomen near area of multiple large ventral hernias.  Occasionally pain is spastic.  Always present but undulating in severity.  Associated with nausea no vomiting.  Has multiple loose stools daily consistent with lactulose therapy.      Patient was discharged from [facility] yesterday.       PICC line remains in right arm.     Per chart review, patient presented to [facility] ED on 4/14 for evaluation of confusion.  Prior to his arrival to the ED on this date, he underwent a paracentesis with 0.45 L of fluid removed on 4/14.  It was noted that he appeared more confused and lethargic than typical after this procedure.  He was found to have hepatic encephalopathy, which improved and resolved during the course of his hospitalization.  Additionally had an WAI which also improved at time of discharge.  Patient was discharged on Xifaxan. Patient is currently being followed by the [facility] for liver dysfunction.    Receives paracentesis 2 times weekly next tomorrow morning.            Past Medical History  Past Medical History:   Diagnosis Date     Alcohol dependence  in remission (H)      Alcoholic cirrhosis of liver with ascites (H)      Anemia      Chronic systolic heart failure (H)      Diabetes (H)     Type 2 DM, Insulin Use.      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      Past Surgical History:   Procedure Laterality Date     COLONOSCOPY N/A 12/09/2021    Procedure: COLONOSCOPY, WITH BIOPSY, WITH CLIPS;  Surgeon: Sweetie Bain MD;  Location: [facility] OR     ESOPHAGOSCOPY, GASTROSCOPY, DUODENOSCOPY (EGD), COMBINED N/A 12/09/2021    Procedure: ESOPHAGOGASTRODUODENOSCOPY, WITH BIOPSY;  Surgeon: Sweetie Bain MD;  Location: [facility] OR     ESOPHAGOSCOPY, GASTROSCOPY, DUODENOSCOPY (EGD), COMBINED N/A 02/24/2023    Procedure: Esophagoscopy, gastroscopy, duodenoscopy (EGD), combined;  Surgeon: Tom George MD;  Location: [facility] GI     EXPLORATORY LAPAROTOMY W/ BOWEL RESECTION  09/2020    Exploratory laparotomy with small bowel resection     IR PARACENTESIS  3/14/2023     IR TRANSVEN INTRAHEPATIC PORTOSYST SHUNT  3/14/2023     SIGMOIDOSCOPY FLEXIBLE N/A 02/24/2023    Procedure: Sigmoidoscopy flexible;  Surgeon: Tom George MD;  Location: [facility] GI     acetaminophen (TYLENOL) 500 MG tablet  calcium carbonate (OS-DANIELLE) 500 MG tablet  ciprofloxacin (CIPRO) 500 MG tablet  DULoxetine (CYMBALTA) 60 MG capsule  ferrous gluconate (FERGON) 324 (38 Fe) MG tablet  gabapentin (NEURONTIN) 300 MG capsule  insulin glargine (LANTUS PEN) 100 UNIT/ML pen  insulin pen needle (B-D U/F) 31G X 5 MM miscellaneous  lactulose 20 GM/30ML SOLN  loperamide (IMODIUM A-D) 1 MG/7.5ML  magnesium oxide (MAG-OX) 400 MG tablet  midodrine (PROAMATINE) 10 MG tablet  midodrine (PROAMATINE) 5 MG tablet  Multiple Vitamins-Minerals (SUPER THERA DAMARIS M) TABS  omeprazole (PRILOSEC) 20 MG  DR capsule  ONETOUCH ULTRA test strip  potassium chloride ER (KLOR-CON M) 10 MEQ CR tablet  rifaximin (XIFAXAN) 550 MG TABS tablet  spironolactone (ALDACTONE) 25 MG tablet  torsemide (DEMADEX) 20 MG tablet  vitamin D2 (ERGOCALCIFEROL) 41166 units (1250 mcg) capsule  [START ON 2023] vitamin D3 (CHOLECALCIFEROL) 50 mcg (2000 units) tablet      Allergies   Allergen Reactions     Minocycline Hives     Family History  Family History   Problem Relation Age of Onset     Deep Vein Thrombosis Mother      Pulmonary Embolism Mother      Substance Abuse Brother      Substance Abuse Maternal Grandmother      Anesthesia Reaction No family hx of      Social History   Social History     Tobacco Use     Smoking status: Former     Packs/day: 1.00     Years: 16.00     Pack years: 16.00     Types: Cigarettes     Quit date:      Years since quittin.3     Smokeless tobacco: Never   Vaping Use     Vaping status: Never Used   Substance Use Topics     Alcohol use: Not Currently     Comment: Quit 2020     Drug use: Never      Past medical history, past surgical history, medications, allergies, family history, and social history were reviewed with the patient. No additional pertinent items.      A medically appropriate review of systems was performed with pertinent positives and negatives noted in the HPI, and all other systems negative.    Physical Exam   BP: 118/71  Pulse: 67  Temp: 98.1  F (36.7  C)  Resp: 20  SpO2: 100 %  Physical Exam  GEN: Weak and chronically ill-appearing, non toxic, cooperative and conversant.   HEENT: The head is normocephalic and atraumatic. Pupils are equal round and reactive to light. Extraocular motions are intact. There is no facial swelling. The neck is nontender and supple.   CV: Regular rate and rhythm. 2+ radial pulses bilaterally.  PULM: Clear to auscultation bilaterally.  Unlabored breathing  ABD: Soft, large lower ventral hernias with palpable subcutaneous bowel, nondistended.   Nondistended ascites present.  EXT: Full range of motion.  Bilateral lower extremity edema, with pitting to the mid legs.  NEURO: Cranial nerves II through XII are intact and symmetric. Bilateral upper and lower extremities grossly show full range of motion without any focal deficits.   PSYCH: Calm and cooperative, interactive.     ED Course, Procedures, & Data      Procedures  Results for orders placed during the hospital encounter of 04/21/23    POC US ECHO LIMITED    Impression  Limited Bedside Cardiac Ultrasound, performed and interpreted by me.  Indication: abdominal pain, recent hospitalization evaluate for global function and volume status  Parasternal long axis, parasternal short axis and apical 4 chamber views were acquired.  Image quality was satisfactory.    Findings:  Global left ventricular function appears intact.  Mild biventricular dilatation.  There is no evidence of free fluid within the pericardium.    IMPRESSION: Grossly normal left ventricular function.  There is biventricular mild dilatation .  No pericardial effusion..                     Results for orders placed or performed during the hospital encounter of 04/21/23   POC US ECHO LIMITED     Status: None    Impression    Limited Bedside Cardiac Ultrasound, performed and interpreted by me.   Indication: abdominal pain, recent hospitalization evaluate for global function and volume status  Parasternal long axis, parasternal short axis and apical 4 chamber views were acquired.   Image quality was satisfactory.    Findings:    Global left ventricular function appears intact.  Mild biventricular dilatation.  There is no evidence of free fluid within the pericardium.    IMPRESSION: Grossly normal left ventricular function.  There is biventricular mild dilatation .  No pericardial effusion..     CT Abdomen Pelvis w/o Contrast     Status: None    Narrative    EXAM: CT ABDOMEN PELVIS W/O CONTRAST  LOCATION: [facility]  MEDICAL CENTER  DATE/TIME: 4/21/2023 3:25 AM CDT    INDICATION: abdominal pain.  no contrast.  hx of contrast related WAI recently  COMPARISON: 04/15/2023  TECHNIQUE: CT scan of the abdomen and pelvis was performed without IV contrast. Multiplanar reformats were obtained. Dose reduction techniques were used.  CONTRAST: None.    FINDINGS:   LOWER CHEST: Normal.    HEPATOBILIARY: A comminuted and cirrhotic in appearance, similar to prior exam. There is a TIPS catheter seen within the liver, unchanged from prior exam.    PANCREAS: No significant mass, duct dilatation, or inflammatory change.    SPLEEN: The spleen is enlarged, similar to prior exam.    ADRENAL GLANDS: No significant nodules.    KIDNEYS/BLADDER: Multiple nonobstructive punctate collecting system calculi are again identified, similar to prior exam. There is no evidence of hydronephrosis or hydroureter. Bladder is unremarkable.    BOWEL: Marked diastases of the rectus abdominis muscles are again noted with herniation of the small large bowel into the defect, similar to prior exam. There is no evidence of small or large bowel obstruction. Stool is seen throughout the colon.    There is a moderate amount of ascites seen in the upper abdomen with increased mesenteric stranding seen in the mesentery.    LYMPH NODES: No lymphadenopathy.    VASCULATURE: [Gastric, Esophageal and mesenteric varices are again noted, similar to prior exam.    PELVIC ORGANS: No pelvic masses.    MUSCULOSKELETAL: Subcutaneous edema seen in the anterior abdominal wall, slightly worsened from prior exam.      Impression    IMPRESSION:   1.  Hepatic cirrhosis with TIPS catheter in place, unchanged, with chronic findings of portal hypertension, which appear to be mildly worsened from prior exam given the increased ascites subcutaneous and mesenteric edema.   2.  Diastases of the rectus abdominis and herniation of the small large bowel into the hernia defect, unchanged from prior exam.    3.  Stool seen throughout the colon, correlate for constipation  4.  multiple punctate nonobstructive collecting system calculi seen in the kidneys bilaterally, no evidence of hydronephrosis or hydroureter.     Comprehensive metabolic panel     Status: Abnormal   Result Value Ref Range    Sodium 137 136 - 145 mmol/L    Potassium 3.3 (L) 3.4 - 5.3 mmol/L    Chloride 107 98 - 107 mmol/L    Carbon Dioxide (CO2) 20 (L) 22 - 29 mmol/L    Anion Gap 10 7 - 15 mmol/L    Urea Nitrogen 17.6 6.0 - 20.0 mg/dL    Creatinine 1.08 0.67 - 1.17 mg/dL    Calcium 8.5 (L) 8.6 - 10.0 mg/dL    Glucose 184 (H) 70 - 99 mg/dL    Alkaline Phosphatase 200 (H) 40 - 129 U/L    AST 36 10 - 50 U/L    ALT 20 10 - 50 U/L    Protein Total 5.1 (L) 6.4 - 8.3 g/dL    Albumin 2.9 (L) 3.5 - 5.2 g/dL    Bilirubin Total 1.1 <=1.2 mg/dL    GFR Estimate 82 >60 mL/min/1.73m2   Lipase     Status: Normal   Result Value Ref Range    Lipase 51 13 - 60 U/L   Lactic acid whole blood     Status: Normal   Result Value Ref Range    Lactic Acid 2.0 0.7 - 2.0 mmol/L   Troponin T, High Sensitivity     Status: Abnormal   Result Value Ref Range    Troponin T, High Sensitivity 57 (H) <=22 ng/L   Ammonia     Status: Abnormal   Result Value Ref Range    Ammonia 95 (H) 16 - 60 umol/L   CBC with platelets and differential     Status: Abnormal   Result Value Ref Range    WBC Count 3.7 (L) 4.0 - 11.0 10e3/uL    RBC Count 2.34 (L) 4.40 - 5.90 10e6/uL    Hemoglobin 7.7 (L) 13.3 - 17.7 g/dL    Hematocrit 24.2 (L) 40.0 - 53.0 %     (H) 78 - 100 fL    MCH 32.9 26.5 - 33.0 pg    MCHC 31.8 31.5 - 36.5 g/dL    RDW 17.0 (H) 10.0 - 15.0 %    Platelet Count 63 (L) 150 - 450 10e3/uL    % Neutrophils 69 %    % Lymphocytes 15 %    % Monocytes 12 %    % Eosinophils 3 %    % Basophils 1 %    % Immature Granulocytes 0 %    NRBCs per 100 WBC 0 <1 /100    Absolute Neutrophils 2.6 1.6 - 8.3 10e3/uL    Absolute Lymphocytes 0.6 (L) 0.8 - 5.3 10e3/uL    Absolute Monocytes 0.4 0.0 - 1.3 10e3/uL     Absolute Eosinophils 0.1 0.0 - 0.7 10e3/uL    Absolute Basophils 0.0 0.0 - 0.2 10e3/uL    Absolute Immature Granulocytes 0.0 <=0.4 10e3/uL    Absolute NRBCs 0.0 10e3/uL   Extra Tube     Status: None    Narrative    The following orders were created for panel order Extra Tube.  Procedure                               Abnormality         Status                     ---------                               -----------         ------                     Extra Blue Top Tube[[id number]]                              Final result                 Please view results for these tests on the individual orders.   Extra Blue Top Tube     Status: None   Result Value Ref Range    Hold Specimen [facility]    CBC with platelets differential     Status: Abnormal    Narrative    The following orders were created for panel order CBC with platelets differential.  Procedure                               Abnormality         Status                     ---------                               -----------         ------                     CBC with platelets and d...[[id number]]  Abnormal            Final result                 Please view results for these tests on the individual orders.     Medications   HYDROmorphone (PF) (DILAUDID) injection 0.5 mg (0.5 mg Intravenous $Given 4/21/23 0203)   HYDROmorphone (PF) (DILAUDID) injection 0.5 mg (0.5 mg Intravenous $Given 4/21/23 0510)   potassium chloride (KLOR-CON) Packet 40 mEq (40 mEq Oral $Given 4/21/23 0561)     Labs Ordered and Resulted from Time of ED Arrival to Time of ED Departure   COMPREHENSIVE METABOLIC PANEL - Abnormal       Result Value    Sodium 137      Potassium 3.3 (*)     Chloride 107      Carbon Dioxide (CO2) 20 (*)     Anion Gap 10      Urea Nitrogen 17.6      Creatinine 1.08      Calcium 8.5 (*)     Glucose 184 (*)     Alkaline Phosphatase 200 (*)     AST 36      ALT 20      Protein Total 5.1 (*)     Albumin 2.9 (*)     Bilirubin Total 1.1      GFR Estimate 82     TROPONIN T, HIGH  SENSITIVITY - Abnormal    Troponin T, High Sensitivity 57 (*)    AMMONIA - Abnormal    Ammonia 95 (*)    CBC WITH PLATELETS AND DIFFERENTIAL - Abnormal    WBC Count 3.7 (*)     RBC Count 2.34 (*)     Hemoglobin 7.7 (*)     Hematocrit 24.2 (*)      (*)     MCH 32.9      MCHC 31.8      RDW 17.0 (*)     Platelet Count 63 (*)     % Neutrophils 69      % Lymphocytes 15      % Monocytes 12      % Eosinophils 3      % Basophils 1      % Immature Granulocytes 0      NRBCs per 100 WBC 0      Absolute Neutrophils 2.6      Absolute Lymphocytes 0.6 (*)     Absolute Monocytes 0.4      Absolute Eosinophils 0.1      Absolute Basophils 0.0      Absolute Immature Granulocytes 0.0      Absolute NRBCs 0.0     LIPASE - Normal    Lipase 51     LACTIC ACID WHOLE BLOOD - Normal    Lactic Acid 2.0       CT Abdomen Pelvis w/o Contrast   Final Result   IMPRESSION:    1.  Hepatic cirrhosis with TIPS catheter in place, unchanged, with chronic findings of portal hypertension, which appear to be mildly worsened from prior exam given the increased ascites subcutaneous and mesenteric edema.    2.  Diastases of the rectus abdominis and herniation of the small large bowel into the hernia defect, unchanged from prior exam.    3.  Stool seen throughout the colon, correlate for constipation   4.  multiple punctate nonobstructive collecting system calculi seen in the kidneys bilaterally, no evidence of hydronephrosis or hydroureter.         POC US ECHO LIMITED   Final Result   Limited Bedside Cardiac Ultrasound, performed and interpreted by me.    Indication: abdominal pain, recent hospitalization evaluate for global function and volume status   Parasternal long axis, parasternal short axis and apical 4 chamber views were acquired.    Image quality was satisfactory.      Findings:     Global left ventricular function appears intact.   Mild biventricular dilatation.   There is no evidence of free fluid within the pericardium.      IMPRESSION:  Grossly normal left ventricular function.  There is biventricular mild dilatation .  No pericardial effusion..                Critical care was not performed.     Medical Decision Making  The patient's presentation was of high complexity (an acute health issue posing potential threat to life or bodily function).    The patient's evaluation involved:  review of 3+ test result(s) ordered prior to this encounter (see separate area of note for details)  independent interpretation of testing performed by another health professional (see separate area of note for details)    The patient's management necessitated high risk (a decision regarding hospitalization).      Assessment & Plan    52-year-old male with HFpEF, alcohol cirrhosis, varices, status post TIPS, multiple episodes of hepatic encephalopathy, among other medical problems who presents for abdominal pain following recent admission for hepatic cephalopathy as noted above.    DDx is broad including bowel obstruction, incarcerated hernia, colitis, infection, tense ascites, thrombosis, among other causes    Clinically the patient is uncomfortable but nontoxic-appearing.  Blood sent, analgesics and antiemetics provided.  Following analgesics patient significantly improved.    Laboratories overall unrevealing.  His hemoglobin is 7.7 which is somewhat decreased compared to prior, but suspect that he is well-hydrated compared to his baseline given recent hospitalization concern for WAI at that time.  K3.3, mild hypokalemia orally replaced.  Troponin minimally elevated to 57, but chronically so.    CT abdomen pelvis performed and shows multiple areas of ventral hernia, but no emergent etiologies of the patient's abdominal pain is found.    On reevaluation he is improved.  Able to tolerate p.o.  He has an appointment for paracentesis later this morning which she will keep.  A broad ddx was considered as above. Given the pts presentation, clinical course, and workup, my  suspicion for an emergent process is low. Pt improved on serial exams. Appropriate for outpt follow up with SERP.        I have reviewed the nursing notes. I have reviewed the findings, diagnosis, plan and need for follow up with the patient.    New Prescriptions    No medications on file       Final diagnoses:   Hypokalemia   Lower abdominal pain   IAliza, am serving as a trained medical scribe to document services personally performed by Ran Haney MD, based on the provider's statements to me.     I, Ran Haney MD, was physically present and have reviewed and verified the accuracy of this note documented by Aliza Faulkner.      Ran Haney MD  [facility] EMERGENCY DEPARTMENT  4/20/2023     Ran Haney MD  04/21/23 0601

## 2023-04-21 NOTE — TELEPHONE ENCOUNTER
lvm for patient     Plan to restart diuretics and KCl .  Orders sent:    Eplerenone 25 a day  Torsemide 20 mg a day  KCL 40mEq/day    Also asked patient to call me.  Dr. Bain wants him to get another round of iron infusions, want to confirm location where he wants infusions.

## 2023-04-21 NOTE — ED TRIAGE NOTES
Pt brought in per EMS for c/o abd pain. Pt states the abd pain woke him up, constant sharp in nature. No nausea/vomiting. Pt was just released from [facility] yesterday. PICC line present to right arm     Triage Assessment     Row Name 04/21/23 0001       Triage Assessment (Adult)    Airway WDL WDL       Respiratory WDL    Respiratory WDL WDL       Skin Circulation/Temperature WDL    Skin Circulation/Temperature WDL WDL       Cardiac WDL    Cardiac WDL WDL       Peripheral/Neurovascular WDL    Peripheral Neurovascular WDL WDL       Cognitive/Neuro/Behavioral WDL    Cognitive/Neuro/Behavioral WDL WDL

## 2023-04-24 NOTE — LETTER
PHYSICIAN ORDERS    DATE & TIME ISSUED: 2023 11:47 AM  PATIENT NAME: Blair Oliver   : 1970     [facility] MR# : [id number]     DIAGNOSIS:  Cirrhosis with ascites  ICD-10 CODE: K70.31  Orders  1 year after issue.  Paracentesis 2 times a week as needed.   Lidocaine 1% solution 10 ml - 10 mL, Injection, ONCE. Starting when released.  Albumin 25% : Please give 12.5 per liter of fluid removed, up to 50g  Draw platelet count if has not been checked within 1 month of paracentesis  Maximum  fluid volume to be removed: 6 L  US paracentesis - 1 TIME IMAGING. Starting when released.  Reason for exam: ascites   If there are any signs of infection and/or SBP please add the following to assess fluid:  Cell count with diff (with WBC)   WBC (if not with cell count)   Total protein (protein fluid)   Gram stain  Albumin fluid   Culture  Please call Suyapa Sanford RN at [phone] with clinical questions.      PLEASE FAX RESULTS AS SOON AS POSSIBLE TO [phone], ATTN:Edgar Bain MD

## 2023-04-24 NOTE — TELEPHONE ENCOUNTER
up 35 # since discharge.  Abdomen and BLE edema singificant.      Just started on diuretics again Sat morning.  Patient wondering about Paulino instead of Eplrenone, said he had breast tenderness with Paulino but tolerable and was more effective in having fluid off    Went for para last week,no fluid in abdomen to tap based on location but going in again tomorrow.Provider said fluid was too high up to tap     Reviewed compression socks, elevating legs, low Na+ diet    Lots of increased pain with hernia with extra fluid on board.      Wants iron infusions at [facility], will send orders (need to review, was set up by anemia clinic prior.

## 2023-04-24 NOTE — TELEPHONE ENCOUNTER
UPDATE- communicated with Dr. Bain.  OK to discontinue Epllrenone, start Spironolactone 50 mg day.  Discussed with patient, rx sent, will get labs Friday to recheck lytes/kidney fxn

## 2023-05-02 NOTE — TELEPHONE ENCOUNTER
Follow-up with anemia management service:    Per [employer] message on 4/27/23, Iron orders were faxed to [facility].  LVM for Delmer to check in.         Latest Ref Rng & Units 3/17/2023     4:43 PM 3/23/2023    12:53 PM 3/27/2023     9:13 AM 4/3/2023     1:57 PM 4/10/2023     1:22 PM 4/21/2023    12:28 AM 4/27/2023    10:55 AM   Anemia   Hemoglobin 13.3 - 17.7 g/dL 8.5   8.7   9.3   8.8   8.4   7.7   9.7     TSAT 15 - 46 % 49        36     Ferritin 26 - 388 ng/mL 136        60           Follow-up call date: 5/9/23    Gretta Brar RN   Anemia Services  [address] [address]   [email or web address]   Office : [phone]  Fax: [phone]

## 2023-05-09 NOTE — TELEPHONE ENCOUNTER
Follow-up with anemia management service:    Injectafer orders were faxed to the [facility] on 4/27/23.   Blair has not heard from the Infusion Center.  Called [phone] to check on orders.   LVM for the Infusion Center.         Latest Ref Rng & Units 3/27/2023     9:13 AM 4/3/2023     1:57 PM 4/10/2023     1:22 PM 4/21/2023    12:28 AM 4/27/2023    10:55 AM 5/3/2023    11:50 AM 5/9/2023    10:16 AM   Anemia   Hemoglobin 13.3 - 17.7 g/dL 9.3   8.8   8.4   7.7   9.7   9.4   10.5     TSAT 15 - 46 %     36   52      Ferritin 26 - 388 ng/mL     60   75            Follow-up call date: 5/10/23    Gretta Brar RN   Anemia Services  [address] [address]   [email or web address]   Office : [phone]  Fax: [phone]

## 2023-05-09 NOTE — TELEPHONE ENCOUNTER
Delmer GOODMAN, he has not heard anything from the [facility].  Per GI note, orders were faxed on 4/27/23.  Message sent Cesilia to check on orders. Waiting to hear back.    Gretta Brar RN   Anemia Services  [address] [address]   [email or web address]   Office : [phone]  Fax: [phone]

## 2023-05-09 NOTE — TELEPHONE ENCOUNTER
Follow-up with anemia management service:    2nd message left for Delmer. Did he get the IV Iron at [facility]?         Latest Ref Rng & Units 3/23/2023    12:53 PM 3/27/2023     9:13 AM 4/3/2023     1:57 PM 4/10/2023     1:22 PM 4/21/2023    12:28 AM 4/27/2023    10:55 AM 5/3/2023    11:50 AM   Anemia   Hemoglobin 13.3 - 17.7 g/dL 8.7   9.3   8.8   8.4   7.7   9.7   9.4     TSAT 15 - 46 %      36   52     Ferritin 26 - 388 ng/mL      60   75             Follow-up call date: 5/24/23    Gretta Brar RN   Anemia Services  [address] [address]   [email or web address]   Office : [phone]  Fax: [phone]

## 2023-05-10 NOTE — TELEPHONE ENCOUNTER
Follow-up with anemia management service:    [facility] returned call. They do have the orders for the Injectafer.  They requested that Delmer call them to schedule the appts.  [facility] [employer] message sent to Delmer with an update.           Latest Ref Rng & Units 3/27/2023     9:13 AM 4/3/2023     1:57 PM 4/10/2023     1:22 PM 4/21/2023    12:28 AM 4/27/2023    10:55 AM 5/3/2023    11:50 AM 5/9/2023    10:16 AM   Anemia   Hemoglobin 13.3 - 17.7 g/dL 9.3   8.8   8.4   7.7   9.7   9.4   10.5     TSAT 15 - 46 %     36   52      Ferritin 26 - 388 ng/mL     60   75            Follow-up call date: 5/17/23    Gretta Brar RN   Anemia Services  [address] [address]   [email or web address]   Office : [phone]  Fax: [phone]

## 2023-05-10 NOTE — TELEPHONE ENCOUNTER
See [facility] message exchange with pt    Patient had inquired about ammonia level being drawn today, noted that wife was concerned.  Lab had already been drawn by time message received.      lvm x 2 with patient and wife in addition to [facility] message throughout day. - lvm with saying if there is increased confusion, will likely need to increase lactulose, but asking they call me to assess symptoms, etc.      lvm for wife that if he is overly lethargic or difficult to wake at all go to ER.  Asked them to call me in a.m. to check on status.

## 2023-05-16 NOTE — TELEPHONE ENCOUNTER
Provider Call: General  Route to LPN    Reason for call: They did a PA on medication- Injectafer- was denied- if they appeal they need a letter of necessity     Call back needed? Yes and he will review      Return Call Needed  Same as documented in contacts section  When to return call?: Greater than one day: Route standard priority

## 2023-05-23 PROBLEM — N18.31 STAGE 3A CHRONIC KIDNEY DISEASE (H): Status: ACTIVE | Noted: 2023-01-01

## 2023-05-23 PROBLEM — D63.1 ANEMIA OF CHRONIC RENAL FAILURE: Status: ACTIVE | Noted: 2023-01-01

## 2023-05-23 PROBLEM — N18.9 ANEMIA OF CHRONIC RENAL FAILURE: Status: ACTIVE | Noted: 2023-01-01

## 2023-05-23 NOTE — NURSING NOTE
Follow-up with anemia management service:    Esteban from [facility] team just called. Delmer Oliver needs Infed, Ferrlecit or Venofer. You can fax new orders to him directly and call him as needed: Ph [phone], fax [phone].    Orders for Infed placed.  Will fax to [facility] once they are signed.           Latest Ref Rng & Units 4/3/2023     1:57 PM 4/10/2023     1:22 PM 4/21/2023    12:28 AM 4/27/2023    10:55 AM 5/3/2023    11:50 AM 5/9/2023    10:16 AM 5/16/2023    11:21 AM   Anemia   Hemoglobin 13.3 - 17.7 g/dL 8.8   8.4   7.7   9.7   9.4   10.5   10.0     TSAT 15 - 46 %    36   52       Ferritin 26 - 388 ng/mL    60   75             Follow-up call date: 5/24/23  Fax signed Iron Orders    Gretta Brar RN   Anemia Services  [address] [address]   [email or web address]   Office : [phone]  Fax: [phone]

## 2023-05-24 NOTE — PROGRESS NOTES
5/24/23: Infed orders faxed to Esteban at [phone].    Gretta Brar RN   [address] [address]   [email or web address]   Office : [phone]  Fax: [phone]

## 2023-05-25 NOTE — TELEPHONE ENCOUNTER
Returned call to Lex ANGEL to call Anemia Services at [phone].     Gretta Brar RN   Anemia Services  [address] [address]   [email or web address]   Office : [phone]  Fax: [phone]

## 2023-05-25 NOTE — TELEPHONE ENCOUNTER
Provider Call: General  Route to LPN    Reason for call: Esteban is requesting prior authorization for Insed be sent electronically asap    Call back needed? Yes    Return Call Needed  Same as documented in contacts section  When to return call?: Greater than one day: Route standard priority

## 2023-05-26 NOTE — PROGRESS NOTES
Follow-up with anemia management service:    Spoke with Esteban. He had requested Infed orders be sent electronically.   Killian will contact the [facility] and have them enter the Infed Orders electronically.         Latest Ref Rng & Units 4/10/2023     1:22 PM 4/21/2023    12:28 AM 4/27/2023    10:55 AM 5/3/2023    11:50 AM 5/9/2023    10:16 AM 5/16/2023    11:21 AM 5/23/2023    10:36 AM   Anemia   Hemoglobin 13.3 - 17.7 g/dL 8.4   7.7   9.7   9.4   10.5   10.0   9.9     TSAT 15 - 46 %   36   52     26     Ferritin 26 - 388 ng/mL   60   75     118         Gretta Brar RN   Anemia Services  [address] [address]   [email or web address]   Office : [phone]  Fax: [phone]

## 2023-05-31 NOTE — PROGRESS NOTES
Follow-up with anemia management service:    Called the [facility] [phone] to get an updated on the Infed.     Per the Infusion Center Delmer  is scheduled for the Infed on Saturday 6/3/23.         Latest Ref Rng & Units 4/10/2023     1:22 PM 4/21/2023    12:28 AM 4/27/2023    10:55 AM 5/3/2023    11:50 AM 5/9/2023    10:16 AM 5/16/2023    11:21 AM 5/23/2023    10:36 AM   Anemia   Hemoglobin 13.3 - 17.7 g/dL 8.4   7.7   9.7   9.4   10.5   10.0   9.9     TSAT 15 - 46 %   36   52     26     Ferritin 26 - 388 ng/mL   60   75     118           Follow-up call date: 6/5/23    Gretta Brar RN   Anemia Services  [address] [address]   [email or web address]   Office : [phone]  Fax: [phone]

## 2023-06-02 NOTE — TELEPHONE ENCOUNTER
Have not received return call- pt Ammonia level elevated, see prior encounter.    lvm for patient's spouse (Emergency contact) to call me/have Delmer call me to check in to assess mental status.

## 2023-06-02 NOTE — TELEPHONE ENCOUNTER
lvm for patient to call me    Ammonia high- see prior communication with Vel Tolbert re: advising patient to increase lactulose based on sx.  Asking patient to return call to see if symptoms have improved, ammonia high.  If still confused or if lethargic despite adequate lactulose and compliance with Rifaximin, may need further evaluation- asked patient to call to assess.

## 2023-06-02 NOTE — TELEPHONE ENCOUNTER
Patient's wife returned call.  Doing better, mental statu improved- taken 4-5 doses of lactulose a day.  Noticed drinking less, urinating less    Discussed labs- creat slight up, Hgb slight up, patient has only been having 1 bm per day prior to yesterday.  Appears slightly dehydrated, discussed adequate oral intake.  Reiterated with wife lactulose casandra't be stopped- he should always take enough to be having 2-3 BMS per day when clear, more like 4-5 BMS per day if foggy/lethargic/confused to get back to baseline.  She said she feels as he's been  Little backed up, discussed over the counter meds may be needed (e.g. miralax) if lactulose alone not producing enough BMs and still confused.      Discussed warning signs of infection, mental status that won't clear with med/significant lethargy may warrant urgent evaluation by provider or ER

## 2023-06-19 NOTE — TELEPHONE ENCOUNTER
Patient lvm for me saying he missed his follow up with Dr. Bain today, wants to reschedule ASAP.    Routed to scheduling, mychart to patient that he can also call to reschedule.

## 2023-06-20 NOTE — TELEPHONE ENCOUNTER
[facility] Call Center    Phone Message    May a detailed message be left on voicemail: no     Reason for Call: Appointment Intake    Writer attempted to reach patient to reschedule 06/13/2023 visit with Dr. Bain due to cancellation.  Per [facility], schedule next available with Dr. Bain.      Thank you!    Action Taken: Message routed to:  [facility] ZOE SOJASMYN    Travel Screening: Not Applicable

## 2023-06-21 NOTE — PROGRESS NOTES
June 21, 2023 3:56 PM - Yan Gomez RN:     Ordered entered for patient to be seen by Dr. Sweetie Bain per [facility] request (pt missed prior 6/13 appointment).

## 2023-07-03 NOTE — TELEPHONE ENCOUNTER
[facility]    Patient will go to [facility] 7/4/23 at 11 am and have PICC line removed.  Order in lace in therapy plan

## 2023-07-03 NOTE — TELEPHONE ENCOUNTER
Patient requesting his PICC line be removed.    Routed to Dr Bain to ask if OK to order removal.      Will place therpay plan and route to [facility] for scheduling if approved.

## 2023-07-03 NOTE — PROGRESS NOTES
Anemia Management Note  SUBJECTIVE/OBJECTIVE:  Referred by Dr. Sweetie Bain on 2023  Primary Diagnosis: Anemia of Other Chronic Illness (D63.8), CKD stage 3a     Secondary Diagnosis:  GAVE, Alcoholic cirrhosis of liver with ascites (K70.3) Anemia in CKD Satge 3a.   *2/15/23 Ok to use CKD dx codes per Dr. Sweetie Bain.   Hgb goal range:  9-10  Epo/Darbo: TBD: Hgb >9.0  Iron regimen: Infed 6/3/23  Labs : 24  Recent FELI use, transfusion, IV iron: NA  RX/TX plans : TBD     No history of stroke, MI and blood clots or cancers.     Contact:            No Boxes Checked on Consent to Communicate.         Latest Ref Rng & Units 5/3/2023    11:50 AM 2023    10:16 AM 2023    11:21 AM 2023    10:36 AM 2023    12:05 PM 6/3/2023     7:10 AM 2023    12:35 PM   Anemia   Hemoglobin 13.3 - 17.7 g/dL 9.4  10.5  10.0  9.9  10.8   10.2    IV Iron Dose       1000mg    TSAT 15 - 46 % 52    26       Ferritin 31 - 409 ng/mL 75    118    263      BP Readings from Last 3 Encounters:   23 125/77   23 119/73   23 121/65     Wt Readings from Last 2 Encounters:   04/10/23 116.3 kg (256 lb 6.4 oz)   23 120.3 kg (265 lb 3.2 oz)           ASSESSMENT:  Hgb:at goal - continue to monitor  TSat: elevated at >50% Ferritin: At goal (>100ng/mL)    PLAN:  Check iron studies in 4 week(s)    Orders needed to be renewed (for next follow-up date) in EPIC: None    Iron labs due:  Early Aug 2023    Plan discussed with:  No call, chart review       NEXT FOLLOW-UP DATE:  23    Gretta Brar RN   Anemia Services  [facility]  [email or web address]   Office : [phone]  Fax: [phone]

## 2023-07-04 NOTE — PROGRESS NOTES
Nursing Note  Blair Oliver presents today to [facility] for:   Chief Complaint   Patient presents with     picc line removal     Picc line removal     During today's [facility] appointment, orders from Dr. Sweetie Bain were completed.  Frequency: once    Progress note:  Patient identification verified by name and date of birth.  Assessment completed.  Vitals recorded in Doc Flowsheets.  Patient was provided with education regarding medication/procedure and possible side effects.  Patient verbalized understanding.     present during visit today: Not Applicable.    Treatment Conditions: Non-applicable.    Premedications: were not ordered.      Vascular access: PICC removed 48 cm. Pressure applied to site for 30 seconds and hemostasis was achieved. Petroleum- based ointment was applied to exit site, covered with gauze and TSM dressing.  Patient remained in recumbent position for 30 minutes after removal. Instructed to leave in place for 24 hours.        Discharge Plan:   Follow up plan of care with: primary care provider,  Discharge instructions were reviewed with patient.  Patient/representative verbalized understanding of discharge instructions and all questions answered.  Patient discharged from [facility] in stable condition.    Yolanda Arceo RN        There were no vitals taken for this visit.

## 2023-07-25 PROBLEM — F10.21 ALCOHOL DEPENDENCE IN REMISSION (H): Status: ACTIVE | Noted: 2023-01-01

## 2023-07-25 NOTE — LETTER
July 31, 2023    Blair Oliver  [address] [place] [facility] [address]    Dear Mr. Oliver,   The purpose of this letter is to let you know that on 7/25/2023 the [facility] Multi-Disciplinary Selection Team reviewed the results of your transplant evaluation.  Based on the results of your evaluation and the selection criteria used by our program, the decision was made to not list you on the liver transplant list.  This is because you have medically improved after your TIPS procedure.   Important things you should know:  If you would like to discuss the decision, or if your medical status changes you may schedule a return visit with your doctor by calling [phone].   We recommend that you continue to follow up with your primary care doctor to manage your health concerns. Please  follow up with Dr. Bain in 3 months, as discussed at your last appointment.   We want you to know that our program has physician and surgeon coverage 24 hours a day, 365 days a year. In addition, our transplant surgeons and physicians will not be on call for two or more transplant programs more than 30 miles apart unless the circumstances have been reviewed and approved by the [employer] for Organ Sharing (UNOS) Membership and Professional Standards Committee (MPSC). Finally, our primary physician and primary surgeons are not designated as the primary surgeon or primary physician at more than 1 transplant hospital. If this coverage changes or there are substantial program changes, you will be notified in writing by letter.   Attached is a letter from UNOS that describes the services and information offered to patients by UNOS and the Organ Procurement and Transplantation Network (OPTN).  Thank you for allowing us to participate in your care.    Sincerely,    Suyapa Sanford, RN, BSN  Solid Organ Transplant  [facility]  Enclosures:  UNOS Letter  cc: Care Team      The Organ Procurement and Transplantation Network  Toll-free patient services line:     Your resource for organ transplant information    If you have a question regarding your own medical care, you always should call your transplant hospital first. However, for general organ transplant-related information, you can call the Organ Procurement and Transplantation Network (OPTN) toll-free patient services line at 3-455-217- 7991. Anyone, including potential transplant candidates, candidates, recipients, family members, friends, living donors, and donor family members, can call this number to:      Talk about organ donation, living donation, the transplant process, the donation process, and transplant policies.  Get a free patient information kit with helpful booklets, waiting list and transplant information, and a list of all transplant hospitals.  Ask questions about the OPTN website (https://optn.transplant.hrsa.gov/), the United Network for Organ Sharing s (UNOS) website (https://unos.org/), or the UNOS website for living donors and transplant recipients. (https://www.transplantliving.org/).  Learn how the OPTN can help you.  Talk about any concerns that you may have with a transplant hospital.    The nation s transplant system, the OPTN, is managed under federal contract by the [employer] Network for Organ Sharing (UNOS), which is a non-profit charitable organization. The OPTN helps create and define organ sharing policies that make the best use of donated organs. This process continuously evaluating new advances and discoveries so policies can be adapted to best serve patients waiting for transplants. To do so, the OPTN works closely with transplant professionals, transplant patients, transplant candidates, donor families, living donors, and the public. All transplant programs and organ procurement organizations throughout the country are OPTN members and are obligated to follow the  policies the OPTN creates for allocating organs.    The OPTN also is responsible for:    Providing educational material for patients, the public, and professionals.  Raising awareness of the need for donated organs and tissue.  Coordinating organ procurement, matching, and placement.  Collecting information about every organ transplant and donation that occurs in the [place].    Remember, you should contact your transplant hospital directly if you have questions or concerns about your own medical care including medical records, work-up progress, and test results.    We are not your transplant hospital, and our staff will not be able to answer questions about your case, so please keep your transplant hospital s phone number handy.    However, while you research your transplant needs and learn as much as you can about transplantation and donation, we welcome your call to our toll-free patient services line at [phone].          Updated 4/1/2019

## 2023-07-25 NOTE — COMMITTEE REVIEW
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  Secondary Diagnosis: Cirrhosis: Fatty Liver (CALLEJAS)    Committee Review Members:  Nutrition Iliana Barros, RD   Pharmacist Agnieszka Villagomez, [facility]    - Clinical GLENN Garcia, Aleksandra Mcclure, [facility]   Transplant Mary Agudelo, ASTRID, Kala Sanford, RN, Josi Deluca, RN, Jr Manuel Tolbert, ASTRID, GABRIEL Silverio CNP, Yan Gomez, ASTRID, Sharon Hernandez RN   Transplant Hepatology  Jessica Barfield MD, Killian Harris MD, Hermelinda Bingham MD, Sweetie Bain MD, Nile Jones MD   Transplant Surgery Dony Morel MD, Mick Ramirez MD, Nereyda Lau, KRISHNA, Leo Bach MD, Abner Kendrick MD       Transplant Eligibility: Cirrhosis with MELD, ETOH    Committee Review Decision: Declined    Relative Contraindications: Other    Absolute Contraindications: None    Committee Chair Nile Cedillo MD verbally attested to the committee's decision.    Committee Discussion Details:     Patient medically improved post-TIPS, will close transplant evaluation at this time.

## 2023-07-25 NOTE — PROGRESS NOTES
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    Cirrhosis  - dx ~ 9/2020  - Etiology: EtOH +/- NAFLD  - hx HE  - hx ascites. Hx SBP (9/2022)  - no hx variceal bleed, had issues with FARHANA from PHG, worse on AC  - last EGD: 2/2023 with grade I EV, scarring from previous treatment, PHG with contact bleeding  - HCC screening: [facility] US 4/2023 - no hepatic lesions  - Last EtOH use: 9/21/2020    Initial History:   Patient states that he was diagnosed with liver disease and cirrhosis in September 2020.  He was admitted to the hospital at that time he was found to have extensive superior mesenteric venous thrombosis, complicated by ischemic bowel and had to undergo an exploratory laparotomy with small bowel resection.  He was placed on Eliquis postoperatively.  During surgery, he was found to have some ascites that was evacuated.     Since then he has had trouble with incisional hernias, and rectus diastasis.  He was seen by his general surgeon in March where he was noted to have a MELD of 18.  He was then sent to [facility] to be seen by surgery for consideration of incisional hernia repair.    Patient complains of episodes of leakage of fluid from his incision that happens intermittently.  This fluid is usually blood tinged but sometimes yellow. He is on 20 mg of furosemide daily, that was started because of his heart failure and his pedal edema.  He has also noted that every 2 to 3 days he has watery bowel movements, up to 12 times a day.  In between there was episodes he has about 2-3 soft bowel  movements per day.  He denies any fevers or chills, abdominal pain, melena, or hematochezia.    His wife is also noted that he is having some memory issues.  He has not had any major episodes of confusion, and has not been admitted to the hospital with hepatic encephalopathy.    Patient denies any prior history of liver disease. He has had abnormal liver enzymes and low platelets as far back as 2011. Past notes from his PCP had also documented excessive alcohol use, as well as, a hospitalization for overdose of Vicodin.     First paracentesis 6/26/2021 - 600 ml removed - 231 WBC, TP 1.4, Albumin 0.8.     Recent EGD with bx negative for celiac. Colon bx showing chronic inactive colitis. Still had loose stools after colonoscopy prep, not c/w overflow.  We have tried a several week course of rifaximin for encephalopathy, this did not help his diarrhea.  Fecal calprotectin was normal when checked December 2021    Liver MRI March 2, 2022 showing cirrhosis without any suspicious enhancing lesions.  Previously demonstrated hyperintense nonenhancing lesions are not well visualized in the current exam likely due to motion.  Also showed new small to moderate volume ascites.     Started having issues with ascites again at the time of his last visit. MRI liver without evidence of PVT or HCC.  Sent to the hospital after his last visit 6/2022 for bradycardia Metoprolol was stopped due to bradycardia, EKG with frequent PVCs. TTE 6/2022 with improvement in his LVEF. Saw his cardiologist at [facility], resumed BB given concern for PVC induced cardiomyopathy. Repeat TTE 7/2022 showing LVEF 40-45%    Saw Dr. Lord for diarrhea - stopped cholestyramine because it wasn't helpful. Now back on high dose imodium.     Ongoing issues with refractory ascites. Back on Metoprolol 50 mg BID - LVEF improved showing moderate LV dilation but LVEF improved back to 53%, or 55-60%.     Hospitalized 9/23-27 for abdominal pain and confusion, found to have  SBP. Is on cipro ppx. Underwent EGD where GAVE was treated with APC    Taking lactulose and now rifaximin (chronic diarrhea at baseline), confusion is stable.  Will require several bowel movements to avoid confusion    Was referred for TIPS for refractory ascites given improvement in his ejection fraction.  Discussed that he is high risk for cardiac standpoint for TIPS.  Encephalopathy has been well controlled on lactulose and rifaximin.  Morning of his TIPS procedure, he was found to have severe hypokalemia.  TIPS was canceled.  Hypokalemia improved with increased supplementation, later found to have severe hypokalemia requiring ED evaluation.  Was admitted and found to have salmonella infection.  Was treated for this, he did not appreciably notice improvement in diarrhea with this.  Had an echocardiogram this admission that showed his ejection fraction decreased to 40 to 45% again, although limited due to his very frequent PVCs.  Given this his TIPS is on indefinite hold.  Given worsening lower extremity swelling and increasing ascites, gradually increasing diuretics as an outpatient.  Also had discussions with cardiology about if we can improve his ejection fraction so TIPS is an option in the future.    Interval Events  - Underwent TIPS 3/2023 - has done very well since. No ascites. Last para 5/3 where just over a liter was removed.    - Having new issues with urinary incontinence over the past few weeks. Has chronic diarrhea with fecal incontinence. No back pain, LE swelling. Has chronic neuropathy in arms and legs  - Taking imodium when he has to get out. Wife will notice he gets confused when he doesn't take lactulose - not severe enough to require hospitalization (which he tries to avoid)  - Hernia is painful  - Ascites/LE edema controlled on diuretics    ROS: 14 point ROS negative except for positives noted in HPI.    PMHx:  Past Medical History:   Diagnosis Date     Alcohol dependence in remission (H)       Alcoholic cirrhosis of liver with ascites (H)      Anemia      Chronic systolic heart failure (H)      Diabetes (H)     Type 2 DM, Insulin Use.      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      PSHx:  Past Surgical History:   Procedure Laterality Date     COLONOSCOPY N/A 12/09/2021    Procedure: COLONOSCOPY, WITH BIOPSY, WITH CLIPS;  Surgeon: Sweetie Bain MD;  Location: [facility] OR     ESOPHAGOSCOPY, GASTROSCOPY, DUODENOSCOPY (EGD), COMBINED N/A 12/09/2021    Procedure: ESOPHAGOGASTRODUODENOSCOPY, WITH BIOPSY;  Surgeon: Sweetie Bain MD;  Location: [facility] OR     ESOPHAGOSCOPY, GASTROSCOPY, DUODENOSCOPY (EGD), COMBINED N/A 02/24/2023    Procedure: Esophagoscopy, gastroscopy, duodenoscopy (EGD), combined;  Surgeon: Tom George MD;  Location: [facility] GI     EXPLORATORY LAPAROTOMY W/ BOWEL RESECTION  09/2020    Exploratory laparotomy with small bowel resection     IR PARACENTESIS  3/14/2023     IR TRANSVEN INTRAHEPATIC PORTOSYST SHUNT  3/14/2023     SIGMOIDOSCOPY FLEXIBLE N/A 02/24/2023    Procedure: Sigmoidoscopy flexible;  Surgeon: Tom George MD;  Location: [facility] GI     FamHx:  Family History   Problem Relation Age of Onset     Deep Vein Thrombosis Mother      Pulmonary Embolism Mother      Substance Abuse Brother      Substance Abuse Maternal Grandmother      Anesthesia Reaction No family hx of    - No family history of liver disease or liver cancer    SocHx:  -   - Tobacco use: Former, quit 1994  - Patient previously worked with [employer]  He is currently on disability due to pain from his hernias and discomfort.  - Alcohol use: Prior significant alcohol use - used to drink about a liter of whiskey per day for about 20 years.  Denies any hospitalizations for  "intoxication or withdrawal, denies any DUIs, denies any prior treatments.  Last drink was 9/21/2020.   - Denies any tobacco use, denies any IV drug use.    Medications:  Current Outpatient Medications   Medication     acetaminophen (TYLENOL) 500 MG tablet     calcium carbonate (OS-DANIELLE) 500 MG tablet     ciprofloxacin (CIPRO) 500 MG tablet     DULoxetine (CYMBALTA) 60 MG capsule     ferrous gluconate (FERGON) 324 (38 Fe) MG tablet     insulin glargine (LANTUS PEN) 100 UNIT/ML pen     insulin pen needle (B-D U/F) 31G X 5 MM miscellaneous     lactulose 20 GM/30ML SOLN     loperamide (IMODIUM A-D) 1 MG/7.5ML     magnesium oxide (MAG-OX) 400 MG tablet     Multiple Vitamins-Minerals (SUPER THERA DAMARIS M) TABS     omeprazole (PRILOSEC) 20 MG DR capsule     ONETOUCH ULTRA test strip     potassium chloride ER (KLOR-CON M) 10 MEQ CR tablet     pregabalin (LYRICA) 50 MG capsule     rifaximin (XIFAXAN) 550 MG TABS tablet     spironolactone (ALDACTONE) 50 MG tablet     torsemide (DEMADEX) 20 MG tablet     vitamin D3 (CHOLECALCIFEROL) 50 mcg (2000 units) tablet     gabapentin (NEURONTIN) 300 MG capsule     vitamin D2 (ERGOCALCIFEROL) 01747 units (1250 mcg) capsule     No current facility-administered medications for this visit.     Allergies:  Allergies   Allergen Reactions     Minocycline Hives       Objective:  /82 (BP Location: Right arm, Patient Position: Sitting, Cuff Size: Adult Large)   Pulse 88   Temp 98.5  F (36.9  C) (Oral)   Resp 18   Ht 1.905 m (6' 3\")   Wt 121.5 kg (267 lb 14.4 oz)   SpO2 100%   BMI 33.49 kg/m    Constitutional: pleasant man in NAD  Eyes: non icteric  Respiratory: Normal respiratory excursion   MSK: normal range of motion of visualized extremities  Abd: Moderate ascites present, large hernias present  Ext: 1+ edema  Skin: No jaundice  Psychiatric: normal mood and orientation    Labs:  Reviewed in EHR    Imaging:   Abdominal US 2/2023  1.  Cirrhosis with portal hypertension including " splenomegaly and abdominal ascites.   2.  Slightly distended gallbladder.   3.  Normal abdominal liver duplex within the limitation of nonvisualization of the splenic vein at the pancreas.   4.  Irregular rhythm. Correlation with EKG.    TTE 10/2022  Frequent ectopy during study making interpretation suboptimal.  Global and regional left ventricular function is low-normal with an EF of 55%.Moderate left ventricular dilation is present.  Right ventricular function, chamber size, wall motion, and thickness are normal.  No clinically significant valvular pathology identified.  No pericardial effusion is present.  This study was compared with the study from 6/14/22 .  No significant changes noted.    TTE 1/2023  Visually Estimated EF: 40-45%   Technically difficult study - contrast was used to enhance endocardial definition due to suboptimal image quality.   Mild to moderately abnormal left ventricular ejection fraction estimated at 40-45%.   EF difficult to estimate due to very frequent PVCs.  Mild left atrial enlargement.     Endoscopy:  EGD 1/2023: Esophageal varices with no stigmata of recent bleeding s/p banding. Erythematous mucosa in the stomach. Portal hypertensive gastropathy. Erythematous duodenopathy.     EGD 2/10/2023:  Grade I esophageal varices. Esophageal ulcer with no stigmata of recent bleeding. Portal hypertensive gastropathy. GAVE. Duodenitis.     EGD 2/2023: Esophageal ulcer. Grade II varices. Portal HTN gastropathy. Normal duodenum.    Colonoscopy 12/2021: Single non-bleeding colonic angioectasia. Congested mucosa in the entire examined colon. One 7 mm polyp in the sigmoid colon, removed. Rectal varices.   Pathology: Tubular adenoma    Colonoscopy 2/2022:  The terminal ileum appeared normal.   A possible visible vessel with oozing and no surrounding ulceration was found in the duodenum [??] For hemostasis, one hemostatic clip was successfully placed (MR conditional). There was no bleeding at the end  of the procedure.   A segmental area of mildly friable mucosa with contact bleeding was found in the rectum and in the sigmoid colon.               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    Was originally referred for LT evaluation - given his CHF and low MELD, did not pursue this.     LVEF improved with sobriety, then worsened with stopping BB. Metoprolol resumed, LVEF improved. Much of his CHF was related to PVCs, improved with treatment of this.      Was referred for TIPS for refractory ascites once his ejection fraction improved on beta-blocker.  TIPS was canceled due to hypokalemia, repeat echo showed reduction in his ejection fraction to 40 to 45%, although difficult to measure given his PVC burden.      Follows cardiology at [facility].  Decided to refer to cardiology here at [facility] to see if there is anything that can be done to improve his PVC mediated cardiomyopathy with the goal of him becoming a TIPS candidate, or potential liver transplant candidate.  Dr. Duarte recommended repeat TTE which showed LVEF improved to 50-55%, suspect other echos were technically limited. Referred for TIPS, felt ok for this from a cardiac standpoint.     He had his TIPS 3/2023, ascites slowly improved and now resolved with this    HE has been well controlled when taking lactulose.     Will close his transplant evaluation given his improved ascites and MELD.     - Continue torsemide 20 mg daily, spironolactone 50 mg daily with 40 mEq KCl  - HCC screening due 10/2023  - Continue metoprolol 50 mg XL  Daily for cardiology - typically BBs are held in refractory ascites, and after SBP, but his metoprolol has  been very helpful in improving his LVEF and should be continued  - Continue lactulose and rifaximin  - Continue ciprofloxacin for SBP ppx - can stop if follow up US shows no ascites  - Will discuss complex hernia repair with surgery  - Referral to urology for incontinence, will also check UA. Does not have warning signs of back pain, LE swelling to go along with incontinence, discussed what to monitor for    RTC 3 months    Sweetie Bain MD MS  Hepatology/Liver Transplant  [facility]

## 2023-07-25 NOTE — LETTER
7/25/2023         RE: Blair Oliver  [address] [facility] [address]        Dear Colleague,    Thank you for referring your patient, Blair Oliver, to the [facility] [place]. Please see a copy of my visit note below.    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    Cirrhosis  - dx ~ 9/2020  - Etiology: EtOH +/- NAFLD  - hx HE  - hx ascites. Hx SBP (9/2022)  - no hx variceal bleed, had issues with FARHANA from PHG, worse on AC  - last EGD: 2/2023 with grade I EV, scarring from previous treatment, PHG with contact bleeding  - HCC screening: Abd US 4/2023 - no hepatic lesions  - Last EtOH use: 9/21/2020    Initial History:   Patient states that he was diagnosed with liver disease and cirrhosis in September 2020.  He was admitted to the hospital at that time he was found to have extensive superior mesenteric venous thrombosis, complicated by ischemic bowel and had to undergo an exploratory laparotomy with small bowel resection.  He was placed on Eliquis postoperatively.  During surgery, he was found to have some ascites that was evacuated.     Since then he has had trouble with incisional hernias, and rectus diastasis.  He was seen by his general surgeon in March where he was noted to have a MELD of 18.  He was then sent to [facility] to be seen by surgery for consideration of incisional hernia repair.    Patient complains of episodes of leakage of fluid from his incision that happens intermittently.  This fluid is usually blood tinged but sometimes  yellow. He is on 20 mg of furosemide daily, that was started because of his heart failure and his pedal edema.  He has also noted that every 2 to 3 days he has watery bowel movements, up to 12 times a day.  In between there was episodes he has about 2-3 soft bowel movements per day.  He denies any fevers or chills, abdominal pain, melena, or hematochezia.    His wife is also noted that he is having some memory issues.  He has not had any major episodes of confusion, and has not been admitted to the hospital with hepatic encephalopathy.    Patient denies any prior history of liver disease. He has had abnormal liver enzymes and low platelets as far back as 2011. Past notes from his PCP had also documented excessive alcohol use, as well as, a hospitalization for overdose of Vicodin.     First paracentesis 6/26/2021 - 600 ml removed - 231 WBC, TP 1.4, Albumin 0.8.     Recent EGD with bx negative for celiac. Colon bx showing chronic inactive colitis. Still had loose stools after colonoscopy prep, not c/w overflow.  We have tried a several week course of rifaximin for encephalopathy, this did not help his diarrhea.  Fecal calprotectin was normal when checked December 2021    Liver MRI March 2, 2022 showing cirrhosis without any suspicious enhancing lesions.  Previously demonstrated hyperintense nonenhancing lesions are not well visualized in the current exam likely due to motion.  Also showed new small to moderate volume ascites.     Started having issues with ascites again at the time of his last visit. MRI liver without evidence of PVT or HCC.  Sent to the hospital after his last visit 6/2022 for bradycardia Metoprolol was stopped due to bradycardia, EKG with frequent PVCs. TTE 6/2022 with improvement in his LVEF. Saw his cardiologist at [facility], resumed BB given concern for PVC induced cardiomyopathy. Repeat TTE 7/2022 showing LVEF 40-45%    Saw Dr. Lord for diarrhea - stopped cholestyramine because it wasn't helpful.  Now back on high dose imodium.     Ongoing issues with refractory ascites. Back on Metoprolol 50 mg BID - LVEF improved showing moderate LV dilation but LVEF improved back to 53%, or 55-60%.     Hospitalized 9/23-27 for abdominal pain and confusion, found to have SBP. Is on cipro ppx. Underwent EGD where GAVE was treated with APC    Taking lactulose and now rifaximin (chronic diarrhea at baseline), confusion is stable.  Will require several bowel movements to avoid confusion    Was referred for TIPS for refractory ascites given improvement in his ejection fraction.  Discussed that he is high risk for cardiac standpoint for TIPS.  Encephalopathy has been well controlled on lactulose and rifaximin.  Morning of his TIPS procedure, he was found to have severe hypokalemia.  TIPS was canceled.  Hypokalemia improved with increased supplementation, later found to have severe hypokalemia requiring ED evaluation.  Was admitted and found to have salmonella infection.  Was treated for this, he did not appreciably notice improvement in diarrhea with this.  Had an echocardiogram this admission that showed his ejection fraction decreased to 40 to 45% again, although limited due to his very frequent PVCs.  Given this his TIPS is on indefinite hold.  Given worsening lower extremity swelling and increasing ascites, gradually increasing diuretics as an outpatient.  Also had discussions with cardiology about if we can improve his ejection fraction so TIPS is an option in the future.    Interval Events  - Underwent TIPS 3/2023 - has done very well since. No ascites. Last para 5/3 where just over a liter was removed.    - Having new issues with urinary incontinence over the past few weeks. Has chronic diarrhea with fecal incontinence. No back pain, LE swelling. Has chronic neuropathy in arms and legs  - Taking imodium when he has to get out. Wife will notice he gets confused when he doesn't take lactulose - not severe enough to require  hospitalization (which he tries to avoid)  - Hernia is painful  - Ascites/LE edema controlled on diuretics    ROS: 14 point ROS negative except for positives noted in HPI.    PMHx:  Past Medical History:   Diagnosis Date    Alcohol dependence in remission (H)     Alcoholic cirrhosis of liver with ascites (H)     Anemia     Chronic systolic heart failure (H)     Diabetes (H)     Type 2 DM, Insulin Use.     Esophageal varices (H)     Gastroesophageal reflux disease without esophagitis     Hepatic encephalopathy (H)     Hyperlipidemia     Hypertension     Incisional hernia     GISELLE (obstructive sleep apnea)     SMV complicated by ischemic small bowel disease      PSHx:  Past Surgical History:   Procedure Laterality Date    COLONOSCOPY N/A 12/09/2021    Procedure: COLONOSCOPY, WITH BIOPSY, WITH CLIPS;  Surgeon: Sweetie Bain MD;  Location: [facility] OR    ESOPHAGOSCOPY, GASTROSCOPY, DUODENOSCOPY (EGD), COMBINED N/A 12/09/2021    Procedure: ESOPHAGOGASTRODUODENOSCOPY, WITH BIOPSY;  Surgeon: Sweetie Bain MD;  Location: [facility] OR    ESOPHAGOSCOPY, GASTROSCOPY, DUODENOSCOPY (EGD), COMBINED N/A 02/24/2023    Procedure: Esophagoscopy, gastroscopy, duodenoscopy (EGD), combined;  Surgeon: Tom George MD;  Location: [facility] GI    EXPLORATORY LAPAROTOMY W/ BOWEL RESECTION  09/2020    Exploratory laparotomy with small bowel resection    IR PARACENTESIS  3/14/2023    IR TRANSVEN INTRAHEPATIC PORTOSYST SHUNT  3/14/2023    SIGMOIDOSCOPY FLEXIBLE N/A 02/24/2023    Procedure: Sigmoidoscopy flexible;  Surgeon: Tom George MD;  Location: [facility] GI     FamHx:  Family History   Problem Relation Age of Onset    Deep Vein Thrombosis Mother     Pulmonary Embolism Mother     Substance Abuse Brother     Substance Abuse Maternal Grandmother     Anesthesia Reaction No family hx of    - No family history of liver disease or liver cancer    SocHx:  -   - Tobacco use: Former, quit 1994  - Patient previously worked with  "printing [employer]  He is currently on disability due to pain from his hernias and discomfort.  - Alcohol use: Prior significant alcohol use - used to drink about a liter of whiskey per day for about 20 years.  Denies any hospitalizations for intoxication or withdrawal, denies any DUIs, denies any prior treatments.  Last drink was 9/21/2020.   - Denies any tobacco use, denies any IV drug use.    Medications:  Current Outpatient Medications   Medication    acetaminophen (TYLENOL) 500 MG tablet    calcium carbonate (OS-DANIELLE) 500 MG tablet    ciprofloxacin (CIPRO) 500 MG tablet    DULoxetine (CYMBALTA) 60 MG capsule    ferrous gluconate (FERGON) 324 (38 Fe) MG tablet    insulin glargine (LANTUS PEN) 100 UNIT/ML pen    insulin pen needle (B-D U/F) 31G X 5 MM miscellaneous    lactulose 20 GM/30ML SOLN    loperamide (IMODIUM A-D) 1 MG/7.5ML    magnesium oxide (MAG-OX) 400 MG tablet    Multiple Vitamins-Minerals (SUPER THERA DAMARIS M) TABS    omeprazole (PRILOSEC) 20 MG DR capsule    ONETOUCH ULTRA test strip    potassium chloride ER (KLOR-CON M) 10 MEQ CR tablet    pregabalin (LYRICA) 50 MG capsule    rifaximin (XIFAXAN) 550 MG TABS tablet    spironolactone (ALDACTONE) 50 MG tablet    torsemide (DEMADEX) 20 MG tablet    vitamin D3 (CHOLECALCIFEROL) 50 mcg (2000 units) tablet    gabapentin (NEURONTIN) 300 MG capsule    vitamin D2 (ERGOCALCIFEROL) 54224 units (1250 mcg) capsule     No current facility-administered medications for this visit.     Allergies:  Allergies   Allergen Reactions    Minocycline Hives       Objective:  /82 (BP Location: Right arm, Patient Position: Sitting, Cuff Size: Adult Large)   Pulse 88   Temp 98.5  F (36.9  C) (Oral)   Resp 18   Ht 1.905 m (6' 3\")   Wt 121.5 kg (267 lb 14.4 oz)   SpO2 100%   BMI 33.49 kg/m    Constitutional: pleasant man in NAD  Eyes: non icteric  Respiratory: Normal respiratory excursion   MSK: normal range of motion of visualized extremities  Abd: Moderate ascites " present, large hernias present  Ext: 1+ edema  Skin: No jaundice  Psychiatric: normal mood and orientation    Labs:  Reviewed in EHR    Imaging:   Abdominal US 2/2023  1.  Cirrhosis with portal hypertension including splenomegaly and abdominal ascites.   2.  Slightly distended gallbladder.   3.  Normal abdominal liver duplex within the limitation of nonvisualization of the splenic vein at the pancreas.   4.  Irregular rhythm. Correlation with EKG.    TTE 10/2022  Frequent ectopy during study making interpretation suboptimal.  Global and regional left ventricular function is low-normal with an EF of 55%.Moderate left ventricular dilation is present.  Right ventricular function, chamber size, wall motion, and thickness are normal.  No clinically significant valvular pathology identified.  No pericardial effusion is present.  This study was compared with the study from 6/14/22 .  No significant changes noted.    TTE 1/2023  Visually Estimated EF: 40-45%   Technically difficult study - contrast was used to enhance endocardial definition due to suboptimal image quality.   Mild to moderately abnormal left ventricular ejection fraction estimated at 40-45%.   EF difficult to estimate due to very frequent PVCs.  Mild left atrial enlargement.     Endoscopy:  EGD 1/2023: Esophageal varices with no stigmata of recent bleeding s/p banding. Erythematous mucosa in the stomach. Portal hypertensive gastropathy. Erythematous duodenopathy.     EGD 2/10/2023:  Grade I esophageal varices. Esophageal ulcer with no stigmata of recent bleeding. Portal hypertensive gastropathy. GAVE. Duodenitis.     EGD 2/2023: Esophageal ulcer. Grade II varices. Portal HTN gastropathy. Normal duodenum.    Colonoscopy 12/2021: Single non-bleeding colonic angioectasia. Congested mucosa in the entire examined colon. One 7 mm polyp in the sigmoid colon, removed. Rectal varices.   Pathology: Tubular adenoma    Colonoscopy 2/2022:  The terminal ileum appeared  normal.   A possible visible vessel with oozing and no surrounding ulceration was found in the duodenum [??] For hemostasis, one hemostatic clip was successfully placed (MR conditional). There was no bleeding at the end of the procedure.   A segmental area of mildly friable mucosa with contact bleeding was found in the rectum and in the sigmoid colon.               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    Was originally referred for LT evaluation - given his CHF and low MELD, did not pursue this.     LVEF improved with sobriety, then worsened with stopping BB. Metoprolol resumed, LVEF improved. Much of his CHF was related to PVCs, improved with treatment of this.      Was referred for TIPS for refractory ascites once his ejection fraction improved on beta-blocker.  TIPS was canceled due to hypokalemia, repeat echo showed reduction in his ejection fraction to 40 to 45%, although difficult to measure given his PVC burden.      Follow[facility] cardiology at [facility].  Decided to refer to cardiology here at [facility] to see if there is anything that can be done to improve his PVC mediated cardiomyopathy with the goal of him becoming a TIPS candidate, or potential liver transplant candidate.  Dr. Duarte recommended repeat TTE which showed LVEF improved to 50-55%, suspect other echos were technically limited. Referred for TIPS, felt ok for this from a cardiac standpoint.     He had his TIPS 3/2023, ascites slowly improved and now resolved with this    HE has been well controlled when taking lactulose.     Will close his transplant evaluation given his improved ascites and MELD.     - Continue torsemide 20 mg  daily, spironolactone 50 mg daily with 40 mEq KCl  - HCC screening due 10/2023  - Continue metoprolol 50 mg XL  Daily for cardiology - typically BBs are held in refractory ascites, and after SBP, but his metoprolol has been very helpful in improving his LVEF and should be continued  - Continue lactulose and rifaximin  - Continue ciprofloxacin for SBP ppx - can stop if follow up US shows no ascites  - Will discuss complex hernia repair with surgery  - Referral to urology for incontinence, will also check UA. Does not have warning signs of back pain, LE swelling to go along with incontinence, discussed what to monitor for    RTC 3 months    Sweetie Bain MD MS  Hepatology/Liver Transplant  [facility]                  Again, thank you for allowing me to participate in the care of your patient.        Sincerely,        Sweetie Bain MD

## 2023-07-25 NOTE — NURSING NOTE
"Chief Complaint   Patient presents with    RECHECK     Cirrhosis     Vital signs:  Temp: 98.5  F (36.9  C) Temp src: Oral BP: 129/82 Pulse: 88   Resp: 18 SpO2: 100 %     Height: 190.5 cm (6' 3\") Weight: 121.5 kg (267 lb 14.4 oz)  Estimated body mass index is 33.49 kg/m  as calculated from the following:    Height as of this encounter: 1.905 m (6' 3\").    Weight as of this encounter: 121.5 kg (267 lb 14.4 oz).      Opal De León, [facility]  7/25/2023 12:49 PM    "

## 2023-08-02 NOTE — PROGRESS NOTES
Anemia Management Note  SUBJECTIVE/OBJECTIVE:  Referred by Dr. Sweetie Bain on 2023  Primary Diagnosis: Anemia of Other Chronic Illness (D63.8), CKD stage 3a     Secondary Diagnosis:  GAVE, Alcoholic cirrhosis of liver with ascites (K70.3) Anemia in CKD Satge 3a.   *2/15/23 Ok to use CKD dx codes per Dr. Sweetie Bain.   Hgb goal range:  9-10  Epo/Darbo: TBD: Hgb >9.0  Iron regimen: Infed 6/3/23  Labs : 24  Recent FELI use, transfusion, IV iron: NA  RX/TX plans : TBD     No history of stroke, MI and blood clots or cancers.     Contact:            No Boxes Checked on Consent to Communicate.         Latest Ref Rng & Units 2023    10:16 AM 2023    11:21 AM 2023    10:36 AM 2023    12:05 PM 6/3/2023     7:10 AM 2023    12:35 PM 2023    12:37 PM   Anemia   Hemoglobin 13.3 - 17.7 g/dL 10.5  10.0  9.9  10.8   10.2  11.1    IV Iron Dose      1000mg     TSAT 15 - 46 %   26        Ferritin 31 - 409 ng/mL   118    263  293      BP Readings from Last 3 Encounters:   23 129/82   23 125/77   23 119/73     Wt Readings from Last 2 Encounters:   23 121.5 kg (267 lb 14.4 oz)   04/10/23 116.3 kg (256 lb 6.4 oz)           ASSESSMENT:  Hgb:at goal - continue to monitor  TSat: at goal >30% Ferritin: At goal (>100ng/mL)    PLAN:  Check iron studies in 4 week(s)    Orders needed to be renewed (for next follow-up date) in EPIC: None    Iron labs due:  End of Aug 2023    Plan discussed with:  No call, chart review       NEXT FOLLOW-UP DATE:  23    Gretta Brar RN   Anemia Services  [facility]  [email or web address]   Office : [phone]  Fax: [phone]

## 2023-08-28 ENCOUNTER — PATIENT OUTREACH (OUTPATIENT)
Dept: CARE COORDINATION | Facility: CLINIC | Age: 53
End: 2023-08-28
Payer: COMMERCIAL

## 2023-08-28 NOTE — PROGRESS NOTES
Referred by Dr. Sweetie Bain on 02/14/2023     Delmer was discharged from [facility] on 8/20/23 with hospice.     Closing Anemia Services .          Latest Ref Rng & Units 5/9/2023    10:16 AM 5/16/2023    11:21 AM 5/23/2023    10:36 AM 6/1/2023    12:05 PM 6/3/2023     7:10 AM 6/30/2023    12:35 PM 7/25/2023    12:37 PM   Anemia   Hemoglobin 13.3 - 17.7 g/dL 10.5  10.0  9.9  10.8   10.2  11.1    IV Iron Dose      1000mg     TSAT 15 - 46 %   26        Ferritin 31 - 409 ng/mL   118    263  293        Anemia labs discontinued, therapy plans cancelled, removed from active patient list, and referring provider notified.    Gretta Brar RN   Anemia Services  [facility]  [email or web address]   Office : [phone]  Fax: [phone]

## (undated) DEVICE — CLIP HEMOCLIP ENDOSCOPIC INSTINCT 2.8X230CM INSC-7-230-SS

## (undated) DEVICE — GOWN IMPERVIOUS 2XL BLUE

## (undated) DEVICE — SOL WATER IRRIG 500ML BOTTLE 2F7113

## (undated) DEVICE — GLOVE EXAM NITRILE LG PF LATEX FREE 5064

## (undated) DEVICE — TUBING SUCTION 12"X1/4" N612

## (undated) DEVICE — SUCTION MANIFOLD NEPTUNE 2 SYS 1 PORT 702-025-000

## (undated) DEVICE — KIT ENDO TURNOVER/PROCEDURE CARRY-ON 101822

## (undated) DEVICE — SYR 30ML SLIP TIP W/O NDL 302833

## (undated) DEVICE — ENDO FORCEP SPIKED SERRATED SHAFT JUMBO 239CM G56998

## (undated) DEVICE — SPECIMEN CONTAINER 3OZ W/FORMALIN 59901

## (undated) DEVICE — ENDO BITE BLOCK ADULT OMNI-BLOC

## (undated) DEVICE — ENDO FORCEP BX CAPTURA PRO SPIKE G50696

## (undated) DEVICE — SUCTION CATH AIRLIFE TRI-FLO W/CONTROL PORT 14FR  T60C

## (undated) RX ORDER — SIMETHICONE 40MG/0.6ML
SUSPENSION, DROPS(FINAL DOSAGE FORM)(ML) ORAL
Status: DISPENSED
Start: 2023-01-01

## (undated) RX ORDER — HYDROMORPHONE HYDROCHLORIDE 1 MG/ML
INJECTION, SOLUTION INTRAMUSCULAR; INTRAVENOUS; SUBCUTANEOUS
Status: DISPENSED
Start: 2023-01-01

## (undated) RX ORDER — AMPICILLIN AND SULBACTAM 2; 1 G/1; G/1
INJECTION, POWDER, FOR SOLUTION INTRAMUSCULAR; INTRAVENOUS
Status: DISPENSED
Start: 2022-01-01

## (undated) RX ORDER — FENTANYL CITRATE 50 UG/ML
INJECTION, SOLUTION INTRAMUSCULAR; INTRAVENOUS
Status: DISPENSED
Start: 2023-01-01

## (undated) RX ORDER — LIDOCAINE HYDROCHLORIDE 10 MG/ML
INJECTION, SOLUTION EPIDURAL; INFILTRATION; INTRACAUDAL; PERINEURAL
Status: DISPENSED
Start: 2023-01-01

## (undated) RX ORDER — REGADENOSON 0.08 MG/ML
INJECTION, SOLUTION INTRAVENOUS
Status: DISPENSED
Start: 2023-01-01

## (undated) RX ORDER — FENTANYL CITRATE 50 UG/ML
INJECTION, SOLUTION INTRAMUSCULAR; INTRAVENOUS
Status: DISPENSED
Start: 2022-01-01

## (undated) RX ORDER — ONDANSETRON 2 MG/ML
INJECTION INTRAMUSCULAR; INTRAVENOUS
Status: DISPENSED
Start: 2023-01-01

## (undated) RX ORDER — LIDOCAINE HYDROCHLORIDE 10 MG/ML
INJECTION, SOLUTION EPIDURAL; INFILTRATION; INTRACAUDAL; PERINEURAL
Status: DISPENSED
Start: 2022-01-01

## (undated) RX ORDER — PROPOFOL 10 MG/ML
INJECTION, EMULSION INTRAVENOUS
Status: DISPENSED
Start: 2023-01-01

## (undated) RX ORDER — POTASSIUM CHLORIDE 7.45 MG/ML
INJECTION INTRAVENOUS
Status: DISPENSED
Start: 2022-01-01

## (undated) RX ORDER — DEXTROSE MONOHYDRATE 25 G/50ML
INJECTION, SOLUTION INTRAVENOUS
Status: DISPENSED
Start: 2023-01-01

## (undated) RX ORDER — AMINOPHYLLINE 25 MG/ML
INJECTION, SOLUTION INTRAVENOUS
Status: DISPENSED
Start: 2023-01-01

## (undated) RX ORDER — PROPOFOL 10 MG/ML
INJECTION, EMULSION INTRAVENOUS
Status: DISPENSED
Start: 2022-01-01

## (undated) RX ORDER — POTASSIUM CHLORIDE 1.5 G/1.58G
POWDER, FOR SOLUTION ORAL
Status: DISPENSED
Start: 2022-01-01

## (undated) RX ORDER — CEFAZOLIN SODIUM/WATER 2 G/20 ML
SYRINGE (ML) INTRAVENOUS
Status: DISPENSED
Start: 2023-01-01

## (undated) RX ORDER — EPINEPHRINE IN SOD CHLOR,ISO 1 MG/10 ML
SYRINGE (ML) INTRAVENOUS
Status: DISPENSED
Start: 2023-01-01

## (undated) RX ORDER — POTASSIUM CHLORIDE 750 MG/1
TABLET, EXTENDED RELEASE ORAL
Status: DISPENSED
Start: 2022-01-01